# Patient Record
Sex: MALE | Race: BLACK OR AFRICAN AMERICAN | Employment: UNEMPLOYED | URBAN - METROPOLITAN AREA
[De-identification: names, ages, dates, MRNs, and addresses within clinical notes are randomized per-mention and may not be internally consistent; named-entity substitution may affect disease eponyms.]

---

## 2020-12-15 ENCOUNTER — APPOINTMENT (OUTPATIENT)
Dept: GENERAL RADIOLOGY | Age: 60
End: 2020-12-15
Payer: COMMERCIAL

## 2020-12-15 ENCOUNTER — HOSPITAL ENCOUNTER (EMERGENCY)
Age: 60
Discharge: HOME | End: 2020-12-15
Payer: COMMERCIAL

## 2020-12-15 VITALS
SYSTOLIC BLOOD PRESSURE: 151 MMHG | WEIGHT: 228.18 LBS | HEART RATE: 84 BPM | RESPIRATION RATE: 16 BRPM | DIASTOLIC BLOOD PRESSURE: 88 MMHG | TEMPERATURE: 98.8 F | BODY MASS INDEX: 34.58 KG/M2 | HEIGHT: 68 IN | OXYGEN SATURATION: 99 %

## 2020-12-15 PROCEDURE — 73502 X-RAY EXAM HIP UNI 2-3 VIEWS: CPT

## 2020-12-15 PROCEDURE — 6370000000 HC RX 637 (ALT 250 FOR IP): Performed by: PHYSICIAN ASSISTANT

## 2020-12-15 PROCEDURE — 99284 EMERGENCY DEPT VISIT MOD MDM: CPT

## 2020-12-15 PROCEDURE — 99283 EMERGENCY DEPT VISIT LOW MDM: CPT

## 2020-12-15 PROCEDURE — 73610 X-RAY EXAM OF ANKLE: CPT

## 2020-12-15 RX ORDER — HYDROCODONE BITARTRATE AND ACETAMINOPHEN 5; 325 MG/1; MG/1
1 TABLET ORAL EVERY 6 HOURS PRN
Qty: 12 TABLET | Refills: 0 | Status: SHIPPED | OUTPATIENT
Start: 2020-12-15 | End: 2020-12-18

## 2020-12-15 RX ORDER — NAPROXEN 500 MG/1
500 TABLET ORAL 2 TIMES DAILY WITH MEALS
Qty: 30 TABLET | Refills: 0 | Status: SHIPPED | OUTPATIENT
Start: 2020-12-15 | End: 2021-01-25

## 2020-12-15 RX ORDER — HYDROCODONE BITARTRATE AND ACETAMINOPHEN 5; 325 MG/1; MG/1
1 TABLET ORAL ONCE
Status: COMPLETED | OUTPATIENT
Start: 2020-12-15 | End: 2020-12-15

## 2020-12-15 RX ADMIN — HYDROCODONE BITARTRATE AND ACETAMINOPHEN 1 TABLET: 5; 325 TABLET ORAL at 12:45

## 2020-12-15 ASSESSMENT — PAIN DESCRIPTION - LOCATION
LOCATION: HIP
LOCATION_2: ANKLE

## 2020-12-15 ASSESSMENT — PAIN DESCRIPTION - ORIENTATION
ORIENTATION: RIGHT
ORIENTATION_2: LEFT

## 2020-12-15 ASSESSMENT — PAIN DESCRIPTION - DESCRIPTORS
DESCRIPTORS_2: DISCOMFORT
DESCRIPTORS: DISCOMFORT

## 2020-12-15 ASSESSMENT — PAIN SCALES - GENERAL
PAINLEVEL_OUTOF10: 8
PAINLEVEL_OUTOF10: 8
PAINLEVEL_OUTOF10: 1

## 2020-12-15 ASSESSMENT — PAIN - FUNCTIONAL ASSESSMENT: PAIN_FUNCTIONAL_ASSESSMENT: 0-10

## 2020-12-15 ASSESSMENT — PAIN DESCRIPTION - PAIN TYPE
TYPE_2: ACUTE PAIN
TYPE: ACUTE PAIN

## 2020-12-15 ASSESSMENT — ENCOUNTER SYMPTOMS
NAUSEA: 0
BACK PAIN: 0

## 2020-12-15 ASSESSMENT — PAIN DESCRIPTION - INTENSITY: RATING_2: 8

## 2020-12-15 NOTE — ED PROVIDER NOTES
[address] [facility]      Pt Name: Kaley Vasquez  MRN: [id number]  [place] 1960  Date of evaluation: 12/15/2020  Provider: Nestor Katz PA-C    This patient was not seen and evaluated by the attending physician No att. providers found. CHIEF COMPLAINT       Chief Complaint   Patient presents with    Hip Pain     states a alyce had fell while he was working and hit his left ankle and right hip.  Ankle Pain         HISTORYOF PRESENT ILLNESS  (Location/Symptom, Timing/Onset, Context/Setting, Quality, Duration, Modifying Factors, Severity.)   Kaley Vasquez is a 61 y.o. male who presents to the emergency department with right hip pain and left ankle pain. On Friday while at work a pipe rolled into his ankle and caused him to fall. He landed on his right hip. Since then he has had constant left ankle and right hip pain that worsens with weightbearing or movement. He took ibuprofen with minimal relief. Denies numbness or weakness. Nursing Notes were reviewedand I agree. REVIEW OF SYSTEMS    (2-9 systems for level 4, 10 or more forlevel 5)     Review of Systems   Constitutional: Negative for chills and fever. Gastrointestinal: Negative for nausea. Genitourinary: Negative for difficulty urinating. Musculoskeletal: Positive for arthralgias. Negative for back pain and neck pain. Skin: Negative for rash and wound. Neurological: Negative for weakness and numbness. All other systems reviewed and are negative. Except as noted above the remainder ofthe review of systems was reviewed and negative. PAST MEDICALHISTORY   History reviewed. No pertinent past medical history. SURGICAL HISTORY     History reviewed. No pertinent surgical history. CURRENT MEDICATIONS       Previous Medications    No medications on file       ALLERGIES     Patient has no known allergies. FAMILY HISTORY     History reviewed.  No pertinent family history. No family status information on file. SOCIAL HISTORY    reports that he has never smoked. He has never used smokeless tobacco. He reports current alcohol use of about 4.0 standard drinks of alcohol per week. He reports that he does not use drugs. PHYSICAL EXAM    (up to 7 for level 4, 8 or more for level 5)     ED Triage Vitals [12/15/20 1230]   BP Temp Temp Source Pulse Resp SpO2 Height Weight   (!) 151/88 98.8 °F (37.1 °C) Oral 84 16 99 % 5' 8\" (1.727 m) 228 lb 2.8 oz (103.5 kg)       Physical Exam  Vitals signs and nursing note reviewed. Constitutional:       General: He is not in acute distress. Appearance: He is well-developed. HENT:      Head: Normocephalic and atraumatic. Neck:      Musculoskeletal: Neck supple. Cardiovascular:      Pulses: Normal pulses. Pulmonary:      Effort: Pulmonary effort is normal. No respiratory distress. Musculoskeletal: Normal range of motion. Comments: Other than slight tenderness over the lateral malleolus of the left ankle and over the lateral right hip, the patient has a normal musculoskeletal exam without swelling or ecchymosis. Full range of motion of the hip and ankle. Skin:     General: Skin is warm and dry. Neurological:      Mental Status: He is alert and oriented to person, place, and time. Psychiatric:         Behavior: Behavior normal.            DIAGNOSTIC RESULTS     RADIOLOGY:   Non-plain film images such as CT, Ultrasound and MRI are read by the radiologist.Plain radiographic images are visualized and preliminarily interpreted by Carlos A Watts PA-C with the below findings:        Interpretation per the Radiologist below, if available at the time of this note:    XR HIP 2-3 VW W PELVIS RIGHT   Final Result   Negative. No fracture or other acute abnormality. XR ANKLE LEFT (MIN 3 VIEWS)   Final Result   No acute abnormality of the ankle.              LABS:  Labs Reviewed - No data to display    All other labs were within normal range or not returned as of this dictation. EMERGENCY DEPARTMENT COURSE and DIFFERENTIAL DIAGNOSIS/MDM:   Vitals:    Vitals:    12/15/20 1230   BP: (!) 151/88   Pulse: 84   Resp: 16   Temp: 98.8 °F (37.1 °C)   TempSrc: Oral   SpO2: 99%   Weight: 228 lb 2.8 oz (103.5 kg)   Height: 5' 8\" (1.727 m)        I have evaluated this patient. My supervising physician was available for consultation. Patient has a reassuring exam and negative x-rays. Pain medications prescribed. He will follow-up with Workmen's Comp. and/or orthopedics. Discussed results, diagnosis and plan with patient and/or family. Questions addressed. Dispositionand follow-up agreed upon. Specific discharge instructions explained. The patient and/or family and I have discussed the diagnosis and risks, and we agree with discharging home to follow-up with their primary care,specialist or referral doctor. We also discussed returning to the Emergency Department immediately if new or worsening symptoms occur. We have discussed the symptoms which are most concerning that necessitate immediatereturn. PROCEDURES:  None    FINAL IMPRESSION      1. Acute left ankle pain    2. Contusion of right hip, initial encounter          DISPOSITION/PLAN   DISPOSITION Decision To Discharge 12/15/2020 01:26:27 PM      PATIENT REFERRED TO:  *[address] [place] [phone]    Schedule an appointment as soon as possible for a visit       Heddy Sacks, MD  [address] [place] [address]  [phone]    Schedule an appointment as soon as possible for a visit         MEDICATIONS:  New Prescriptions    HYDROCODONE-ACETAMINOPHEN (NORCO) 5-325 MG PER TABLET    Take 1 tablet by mouth every 6 hours as needed for Pain for up to 3 days.  . Take lowest dose possible to manage pain       (Please note that portions of this note were completed with a voice recognition program.  Efforts were made toedit the dictations but occasionally words are mis-transcribed.)    JOHN Corbin PA-C  12/15/20 0732

## 2020-12-15 NOTE — ED TRIAGE NOTES
Patient to ED via private car states a alyce had fell while he was working and hit his left ankle and right hip. Patient did refuse wheelchair to be brought to room. Patient ambulated with steady gait.

## 2021-01-22 ENCOUNTER — OFFICE VISIT (OUTPATIENT)
Dept: ORTHOPEDIC SURGERY | Age: 61
End: 2021-01-22
Payer: COMMERCIAL

## 2021-01-22 VITALS — HEIGHT: 68 IN | WEIGHT: 228 LBS | BODY MASS INDEX: 34.56 KG/M2 | TEMPERATURE: 97.2 F

## 2021-01-22 DIAGNOSIS — M54.31 SCIATICA, RIGHT SIDE: Primary | ICD-10-CM

## 2021-01-22 DIAGNOSIS — S90.02XA CONTUSION OF LEFT ANKLE, INITIAL ENCOUNTER: ICD-10-CM

## 2021-01-22 DIAGNOSIS — M25.572 LEFT ANKLE PAIN, UNSPECIFIED CHRONICITY: ICD-10-CM

## 2021-01-22 DIAGNOSIS — S93.402A SPRAIN OF LEFT ANKLE, UNSPECIFIED LIGAMENT, INITIAL ENCOUNTER: ICD-10-CM

## 2021-01-22 DIAGNOSIS — M25.551 RIGHT HIP PAIN: ICD-10-CM

## 2021-01-22 PROCEDURE — 99203 OFFICE O/P NEW LOW 30 MIN: CPT | Performed by: ORTHOPAEDIC SURGERY

## 2021-01-22 RX ORDER — PREDNISONE 10 MG/1
TABLET ORAL
Qty: 26 TABLET | Refills: 0 | Status: ON HOLD | OUTPATIENT
Start: 2021-01-22 | End: 2021-02-02 | Stop reason: HOSPADM

## 2021-01-24 PROBLEM — M54.31 SCIATICA, RIGHT SIDE: Status: ACTIVE | Noted: 2021-01-24

## 2021-01-24 PROBLEM — S90.02XA CONTUSION OF LEFT ANKLE: Status: ACTIVE | Noted: 2021-01-24

## 2021-01-24 NOTE — PROGRESS NOTES
CHIEF COMPLAINT:   1- Left ankle pain/ Ankle sprain. 2- Right posterior hip pain/  Sciatica. DATE OF INJURY: 12/11/2020, Worker's Comp. HISTORY:  Mr. Jolie Welch 61 y.o.  male presents today for the first visit for evaluation of a left ankle and right hip injury which occurred when he was at work and a pipe hit his left ankle and fell on right hip. He is complaining of lateral ankle pain and posterior right hip pain. He went to [facility] ER 12/15/2020 because of the pain. He was told that he has a sprain, and asked to f/u with Orthopedics. He reports today moderate pain. Pain increase with walking and decrease with rest and elevation. He also here for evaluation of right posterior hip pain that radiate to the leg. He reports that the pain is worse with activity and better with rest.  He reports that the pain is aching and dull in nature. He has a h/o LBP as an old Worker's Comp injury. No numbness or tingling sensation, and no other complaint. Past Medical History:   Diagnosis Date    Diabetes mellitus type 1 ([facility] [address]         History reviewed. No pertinent surgical history. Social History     Socioeconomic History    Marital status:      Spouse name: Not on file    Number of children: Not on file    Years of education: Not on file    Highest education level: Not on file   Occupational History    Not on file   Social Needs    Financial resource strain: Not on file    Food insecurity     Worry: Not on file     Inability: Not on file    Transportation needs     Medical: Not on file     Non-medical: Not on file   Tobacco Use    Smoking status: Never Smoker    Smokeless tobacco: Never Used   Substance and Sexual Activity    Alcohol use:  Yes     Alcohol/week: 4.0 standard drinks     Types: 2 Cans of beer, 2 Shots of liquor per week    Drug use: No    Sexual activity: Not on file   Lifestyle    Physical activity     Days per week: Not on file     Minutes per session: Not on file    Stress: Not on file   Relationships    Social connections     Talks on phone: Not on file     Gets together: Not on file     Attends [employer] service: Not on file     Active member of club or organization: Not on file     Attends meetings of clubs or organizations: Not on file     Relationship status: Not on file    Intimate partner violence     Fear of current or ex partner: Not on file     Emotionally abused: Not on file     Physically abused: Not on file     Forced sexual activity: Not on file   Other Topics Concern    Not on file   Social History Narrative    Not on file        History reviewed. No pertinent family history. Pertinent items are noted in HPI  Review of systems reviewed from Patient History Form dated on 1/22/2021 and available in the patient's chart under the Media tab. No change. PHYSICAL EXAM:  Mr. Bulmaro Mahoney is a very pleasant 61 y.o.  male who presents today in no acute distress, awake, alert, and oriented. He is well dressed, nourished and  groomed. Patient with normal affect. Height is  5' 8\" (1.727 m), weight is 228 lb (103.4 kg). Resting respiratory rate is 16. Examination of the gait, showed that the patient walks with a limp, WB left leg . Examination of both ankles showing a decreased range of motion of the left ankle compare to the other side because of pain. There is minimal swelling that can be seen, no ecchymosis over lateral side of the left ankle. He has intact sensation and good pedal pulses. He has mild tenderness on deep palpation over the left ankle ATF. The ankles are stable to drawer test bilaterally, equally.  Ankle reflex 1+ bilaterally. He is nontender to palpation at the lateral aspect of either hip overlying the greater trochanteric bursa. Motor strength is 5/5 throughout both lower extremities. He has a positive straight leg raise at 80 degree.  The skin overlying the right hip is intact without evidence of scar, lesion, laceration or abrasion. Distal pulses are 2+ and symmetric bilaterally. Sensation is grossly intact to light touch and symmetric over the bilateral lower extremities. IMAGING:  Xray's taken today in the office, were reviewed, 3 views of the left ankle, and showed no acute fracture. Ankle mortise in anatomic position. Xrays taken today in the office were reviewed. There is an AP view of the pelvis, and AP and lateral view of the right hip which demonstrates no significant arthritis. IMPRESSION:    1- Left ankle pain/ Ankle sprain. 2- Right posterior hip pain/  Sciatica. PLAN:  I assured the patient that the xray is negative for acute fracture. The xray findings were reviewed with the patient and explained to his that the pain is 2ry to ankle sprain, and that it should continue to improve the next few weeks. He can be WBAT, and avoid heavy impact acitivity and work on peroneal muscle strength. I have discussed the normal course and history of the sciatica condition with the patient, and various treatment options. We also discussed the use of NSAIDs and tylenol, as well as risks and benefits of PIERRE. The patient would like to try Meds, and we will give Prednisone. F/U with our spine team. F/U in 4 weeks, PT if needed.       Yudi Shaikh MD

## 2021-01-25 ENCOUNTER — APPOINTMENT (OUTPATIENT)
Dept: GENERAL RADIOLOGY | Age: 61
End: 2021-01-25
Payer: MEDICAID

## 2021-01-25 ENCOUNTER — HOSPITAL ENCOUNTER (EMERGENCY)
Age: 61
Discharge: ANOTHER HOSPITAL | End: 2021-01-26
Attending: EMERGENCY MEDICINE
Payer: MEDICAID

## 2021-01-25 ENCOUNTER — APPOINTMENT (OUTPATIENT)
Dept: MRI IMAGING | Age: 61
End: 2021-01-25
Payer: MEDICAID

## 2021-01-25 DIAGNOSIS — M54.16 SPINAL STENOSIS OF LUMBAR REGION WITH RADICULOPATHY: ICD-10-CM

## 2021-01-25 DIAGNOSIS — N30.00 ACUTE CYSTITIS WITHOUT HEMATURIA: ICD-10-CM

## 2021-01-25 DIAGNOSIS — M48.061 SPINAL STENOSIS OF LUMBAR REGION WITH RADICULOPATHY: ICD-10-CM

## 2021-01-25 DIAGNOSIS — M48.02 NEURAL FORAMINAL STENOSIS OF CERVICAL SPINE: Primary | ICD-10-CM

## 2021-01-25 PROBLEM — M54.50: Status: ACTIVE | Noted: 2021-01-25

## 2021-01-25 LAB
A/G RATIO: 1.2 (ref 1.1–2.2)
ALBUMIN SERPL-MCNC: 4 G/DL (ref 3.4–5)
ALP BLD-CCNC: 171 U/L (ref 40–129)
ALT SERPL-CCNC: 22 U/L (ref 10–40)
ANION GAP SERPL CALCULATED.3IONS-SCNC: 11 MMOL/L (ref 3–16)
AST SERPL-CCNC: 29 U/L (ref 15–37)
BACTERIA: ABNORMAL /HPF
BASOPHILS ABSOLUTE: 0 K/UL (ref 0–0.2)
BASOPHILS RELATIVE PERCENT: 0.7 %
BILIRUB SERPL-MCNC: 0.7 MG/DL (ref 0–1)
BILIRUBIN URINE: ABNORMAL
BLOOD, URINE: ABNORMAL
BUN BLDV-MCNC: 9 MG/DL (ref 7–20)
CALCIUM SERPL-MCNC: 9.3 MG/DL (ref 8.3–10.6)
CHLORIDE BLD-SCNC: 99 MMOL/L (ref 99–110)
CLARITY: ABNORMAL
CO2: 24 MMOL/L (ref 21–32)
COLOR: ABNORMAL
COMMENT UA: ABNORMAL
CREAT SERPL-MCNC: 0.8 MG/DL (ref 0.8–1.3)
EKG ATRIAL RATE: 90 BPM
EKG DIAGNOSIS: NORMAL
EKG P AXIS: 70 DEGREES
EKG P-R INTERVAL: 170 MS
EKG Q-T INTERVAL: 346 MS
EKG QRS DURATION: 78 MS
EKG QTC CALCULATION (BAZETT): 423 MS
EKG R AXIS: 34 DEGREES
EKG T AXIS: 25 DEGREES
EKG VENTRICULAR RATE: 90 BPM
EOSINOPHILS ABSOLUTE: 0 K/UL (ref 0–0.6)
EOSINOPHILS RELATIVE PERCENT: 0.6 %
EPITHELIAL CELLS, UA: 0 /HPF (ref 0–5)
GFR AFRICAN AMERICAN: >60
GFR NON-AFRICAN AMERICAN: >60
GLOBULIN: 3.3 G/DL
GLUCOSE BLD-MCNC: 127 MG/DL (ref 70–99)
GLUCOSE URINE: NEGATIVE MG/DL
HCT VFR BLD CALC: 36.4 % (ref 40.5–52.5)
HEMOGLOBIN: 12.4 G/DL (ref 13.5–17.5)
KETONES, URINE: ABNORMAL MG/DL
LEUKOCYTE ESTERASE, URINE: ABNORMAL
LYMPHOCYTES ABSOLUTE: 0.7 K/UL (ref 1–5.1)
LYMPHOCYTES RELATIVE PERCENT: 13.7 %
MCH RBC QN AUTO: 33.3 PG (ref 26–34)
MCHC RBC AUTO-ENTMCNC: 34 G/DL (ref 31–36)
MCV RBC AUTO: 97.9 FL (ref 80–100)
MICROSCOPIC EXAMINATION: YES
MONOCYTES ABSOLUTE: 0.7 K/UL (ref 0–1.3)
MONOCYTES RELATIVE PERCENT: 15 %
MUCUS: ABNORMAL /LPF
NEUTROPHILS ABSOLUTE: 3.5 K/UL (ref 1.7–7.7)
NEUTROPHILS RELATIVE PERCENT: 70 %
NITRITE, URINE: POSITIVE
PDW BLD-RTO: 15 % (ref 12.4–15.4)
PH UA: 5 (ref 5–8)
PLATELET # BLD: 202 K/UL (ref 135–450)
PMV BLD AUTO: 7.7 FL (ref 5–10.5)
POTASSIUM SERPL-SCNC: 3.8 MMOL/L (ref 3.5–5.1)
PROTEIN UA: 30 MG/DL
RBC # BLD: 3.72 M/UL (ref 4.2–5.9)
RBC UA: 2 /HPF (ref 0–4)
SODIUM BLD-SCNC: 134 MMOL/L (ref 136–145)
SPECIFIC GRAVITY UA: 1.02 (ref 1–1.03)
TOTAL PROTEIN: 7.3 G/DL (ref 6.4–8.2)
URINE REFLEX TO CULTURE: YES
URINE TYPE: ABNORMAL
UROBILINOGEN, URINE: 1 E.U./DL
WBC # BLD: 5 K/UL (ref 4–11)
WBC UA: 85 /HPF (ref 0–5)

## 2021-01-25 PROCEDURE — 72141 MRI NECK SPINE W/O DYE: CPT

## 2021-01-25 PROCEDURE — 6360000002 HC RX W HCPCS: Performed by: EMERGENCY MEDICINE

## 2021-01-25 PROCEDURE — 93005 ELECTROCARDIOGRAM TRACING: CPT | Performed by: EMERGENCY MEDICINE

## 2021-01-25 PROCEDURE — 85025 COMPLETE CBC W/AUTO DIFF WBC: CPT

## 2021-01-25 PROCEDURE — 96365 THER/PROPH/DIAG IV INF INIT: CPT

## 2021-01-25 PROCEDURE — 87086 URINE CULTURE/COLONY COUNT: CPT

## 2021-01-25 PROCEDURE — 80053 COMPREHEN METABOLIC PANEL: CPT

## 2021-01-25 PROCEDURE — 36415 COLL VENOUS BLD VENIPUNCTURE: CPT

## 2021-01-25 PROCEDURE — 87186 SC STD MICRODIL/AGAR DIL: CPT

## 2021-01-25 PROCEDURE — 87040 BLOOD CULTURE FOR BACTERIA: CPT

## 2021-01-25 PROCEDURE — 72146 MRI CHEST SPINE W/O DYE: CPT

## 2021-01-25 PROCEDURE — 93010 ELECTROCARDIOGRAM REPORT: CPT | Performed by: INTERNAL MEDICINE

## 2021-01-25 PROCEDURE — 71045 X-RAY EXAM CHEST 1 VIEW: CPT

## 2021-01-25 PROCEDURE — 72148 MRI LUMBAR SPINE W/O DYE: CPT

## 2021-01-25 PROCEDURE — 99284 EMERGENCY DEPT VISIT MOD MDM: CPT

## 2021-01-25 PROCEDURE — 87088 URINE BACTERIA CULTURE: CPT

## 2021-01-25 PROCEDURE — 2580000003 HC RX 258: Performed by: EMERGENCY MEDICINE

## 2021-01-25 PROCEDURE — 81001 URINALYSIS AUTO W/SCOPE: CPT

## 2021-01-25 RX ORDER — CELECOXIB 200 MG/1
200 CAPSULE ORAL DAILY
Status: ON HOLD | COMMUNITY
End: 2021-02-02 | Stop reason: HOSPADM

## 2021-01-25 RX ORDER — AMLODIPINE BESYLATE 5 MG/1
5 TABLET ORAL DAILY
COMMUNITY
End: 2021-09-15 | Stop reason: SDUPTHER

## 2021-01-25 RX ORDER — ASPIRIN 81 MG/1
81 TABLET ORAL DAILY
Status: ON HOLD | COMMUNITY
End: 2021-02-11 | Stop reason: HOSPADM

## 2021-01-25 RX ORDER — OMEPRAZOLE 40 MG/1
40 CAPSULE, DELAYED RELEASE ORAL DAILY PRN
Status: ON HOLD | COMMUNITY
End: 2021-02-11 | Stop reason: HOSPADM

## 2021-01-25 RX ORDER — VALSARTAN 160 MG/1
160 TABLET ORAL DAILY
Status: ON HOLD | COMMUNITY
End: 2021-02-11 | Stop reason: HOSPADM

## 2021-01-25 RX ORDER — METFORMIN HYDROCHLORIDE 500 MG/1
500 TABLET, EXTENDED RELEASE ORAL
COMMUNITY
End: 2021-09-15 | Stop reason: SDUPTHER

## 2021-01-25 RX ADMIN — CEFTRIAXONE 1000 MG: 1 INJECTION, POWDER, FOR SOLUTION INTRAMUSCULAR; INTRAVENOUS at 20:27

## 2021-01-25 ASSESSMENT — PAIN SCALES - GENERAL: PAINLEVEL_OUTOF10: 8

## 2021-01-25 ASSESSMENT — PAIN DESCRIPTION - PAIN TYPE: TYPE: ACUTE PAIN

## 2021-01-25 ASSESSMENT — PAIN DESCRIPTION - DESCRIPTORS: DESCRIPTORS: ACHING;SHARP

## 2021-01-25 ASSESSMENT — PAIN DESCRIPTION - LOCATION: LOCATION: ANKLE;HIP

## 2021-01-25 ASSESSMENT — PAIN DESCRIPTION - PROGRESSION: CLINICAL_PROGRESSION: NOT CHANGED

## 2021-01-25 NOTE — ED PROVIDER NOTES
[facility]      Pt Name: Izzy Bardales  MRN: [id number]  [place] 1960  Date of evaluation: 1/25/2021  Provider: Andrew Jensen, [address] [place] [address]  Chief Complaint   Patient presents with    Extremity Weakness     Pt states he has bilateral leg weakness that started around midnight last night, also states he has bilateral finger tip numbness, pt with history recent sciatica(was seen 1/22/2021 by ortho) Has right hip pain and left ankle pain. I wore personal protective equipment when I was in the room the entire time. This includes gloves, N95 mask, face shield, and a glove over my stethoscope for protection. HPI  Izzy Bardales is a 61 y.o. male who presents with bilateral lower extremity numbness and weakness unable to walk for the past 24 hours. He is also complained of bilateral paresthesias in his fingertips. Denies any fever chills. Denies any history of IV drug abuse. He did fall 1 week ago and hit his head. The only blood thinner he is on his aspirin. He denies any previous history. Denies a history of cancer. He denies any loss of bowel or bladder. He has a history of diabetes and hypertension and hyperlipidemia. Nothing makes it better or worse. He describes it as moderate. He came in by ambulance and was able to ambulate without ataxia. He denies headache or back pain. He has had a history of back pain. He has been diagnosed recently sciatica. He was also diagnosed with a contusion of his ankle where he got injured at work within the past 2 weeks. Patient states he was out walking his legs became so weak that he had to crawl on the ground. He states this caused bruising of his forearms. He denies any loss of bowel or bladder. REVIEW OF SYSTEMS  All systems negative except as noted in the HPI. Reviewed Nurses' notes and concur    No LMP for male patient.     PAST MEDICAL HISTORY  Past Medical History:   Diagnosis Date    Diabetes mellitus type 1 ([facility] [address]     Hyperlipidemia     Hypertension        FAMILY HISTORY  History reviewed. No pertinent family history. SOCIAL HISTORY   reports that he has never smoked. He has never used smokeless tobacco. He reports current alcohol use of about 4.0 standard drinks of alcohol per week. He reports that he does not use drugs. SURGICAL HISTORY  Past Surgical History:   Procedure Laterality Date    GASTRIC BYPASS SURGERY         CURRENT MEDICATIONS  Current Outpatient Rx   Medication Sig Dispense Refill    valsartan (DIOVAN) 160 MG tablet Take 160 mg by mouth daily      amLODIPine (NORVASC) 5 MG tablet Take 5 mg by mouth daily      metFORMIN (GLUCOPHAGE-XR) 500 MG extended release tablet Take 500 mg by mouth daily (with breakfast)      aspirin 81 MG EC tablet Take 81 mg by mouth daily      celecoxib (CELEBREX) 200 MG capsule Take 200 mg by mouth daily      omeprazole (PRILOSEC) 40 MG delayed release capsule Take 40 mg by mouth daily as needed      predniSONE (DELTASONE) 10 MG tablet take 3 tabs PO BID x2 days, then 2 tabs PO BID x2 days, then 1 tab PO BID x2 days, then 1 tab PO daily x2 days 26 tablet 0       ALLERGIES  No Known Allergies    Tetanus vaccination status reviewed: tetanus re-vaccination not indicated. PHYSICAL EXAM  VITAL SIGNS: BP (!) 171/77   Pulse 84   Temp 97.7 °F (36.5 °C) (Oral)   Resp 22   Wt 225 lb 8.5 oz (102.3 kg)   SpO2 95%   BMI 34.29 kg/m²   Constitutional: Well-developed, well-nourished, appears normal, nontoxic, activity: Resting comfortably on the cart, no obvious pain moving all extremities equally. HENT: Normocephalic, Atraumatic, Bilateral external ears normal, TM's were normal, Mucus membranes are moist and oropharynx is patent and clear, No oral exudates, Nose normal.  Eyes: PERRLA, EOMI, Conjunctiva normal, No discharge. No scleral icterus.   Neck: Normal range of motion, No tenderness, Supple. Lymphatic: No lymphadenopathy noted. Cardiovascular: Normal heart rate, Normal rhythm, no murmurs, no gallops, no rubs. Thorax & Lungs: Normal breath sounds, no respiratory distress, no wheezing, no rales, no rhonchi  Abdomen: Soft, Nontender, No hepatosplenomegaly, No masses, No pulsatile masses, No distension, normal bowel sounds  Skin: Warm, Dry, No erythema, No rash. Back: No tenderness, Full range of motion, No scoliosis. Extremities: No edema, No tenderness, No cyanosis, No clubbing. No amputations, capillary refill less than 2 seconds. There is bruising noted on bilateral mid forearms measuring approximately 4 cm in size that he states is from crawling on the ground. Musculoskeletal: Good range of motion in all major joints, No tenderness to palpation, no major deformities noted. Neurologic: Alert & oriented x 3, CN II through XII are intact, normal motor function, normal sensory function, no focal deficits noted, no clonus, no tremors.   Psychiatric: Affect normal, Mood normal.    ?  LABORATORY  Labs Reviewed   CBC WITH AUTO DIFFERENTIAL - Abnormal; Notable for the following components:       Result Value    RBC 3.72 (*)     Hemoglobin 12.4 (*)     Hematocrit 36.4 (*)     Lymphocytes Absolute 0.7 (*)     All other components within normal limits    Narrative:     Performed at:  [address] [address]   Phone [phone]   COMPREHENSIVE METABOLIC PANEL - Abnormal; Notable for the following components:    Sodium 134 (*)     Glucose 127 (*)     Alkaline Phosphatase 171 (*)     All other components within normal limits    Narrative:     Performed at:  [facility]  [address] [facility] [address]   Phone [phone]   URINE RT REFLEX TO CULTURE - Abnormal; Notable for the following components:    Clarity, UA CLOUDY (*)     Bilirubin Urine SMALL (*)     Ketones, Urine TRACE (*) Blood, Urine SMALL (*)     Protein, UA 30 (*)     Nitrite, Urine POSITIVE (*)     Leukocyte Esterase, Urine MODERATE (*)     All other components within normal limits    Narrative:     Performed at:  [address] [address]   Phone [phone]   MICROSCOPIC URINALYSIS - Abnormal; Notable for the following components:    Mucus, UA Rare (*)     Bacteria, UA 4+ (*)     WBC, UA 85 (*)     All other components within normal limits    Narrative:     Performed at:  [facility]  [address] [facility] [address]   Phone [phone]   CULTURE, URINE   CULTURE, BLOOD 1   CULTURE, BLOOD 2         RADIOLOGY/PROCEDURES  I personally reviewed the images for this case. MRI LUMBAR SPINE WO CONTRAST   Final Result   1. Diffuse congenital spinal canal stenosis exacerbated by degenerative   changes as detailed above resulting in severe spinal canal stenosis at L3-4,   L4-5, and L5-S1 and moderate spinal canal stenosis at L2-3.   2. Severe bilateral L5 and mild bilateral L3 and L4 neural foraminal   narrowing. 3. Cholelithiasis. MRI THORACIC SPINE WO CONTRAST   Final Result   Diffuse congenital spinal canal stenosis and epidural lipomatosis exacerbated   by degenerative changes as described above and resulting in moderate spinal   canal stenosis at the T3-4, T4-5, T5-6, and T7-8 levels. MRI CERVICAL SPINE WO CONTRAST   Preliminary Result   Moderate to severe central canal narrowing is identified at multiple levels   due to combination of ossification of posterior longitudinal ligament,   congenital shortening of the pedicles, degenerate disc disease and facet   arthropathy with cord flattening notably C3-C4, C4-5 and less so C5-6. Severe right-sided foraminal narrowing C3-4 and C5-6. Severe left foraminal   narrowing C4-5.   Otherwise other multilevel degenerate changes identified   dictated above in the body of the report. XR CHEST PORTABLE   Final Result   No acute process. COURSE & MEDICAL DECISION MAKING  Pertinent Labs, EKG, & Imaging studies reviewed. (See chart for details)    Vitals:    01/25/21 1315 01/25/21 1445 01/25/21 1800 01/25/21 2015   BP: 139/79 (!) 140/78 (!) 161/84 (!) 171/77   Pulse: 83 84 79 84   Resp: 17 15 17 22   Temp:       TempSrc:       SpO2: 100% 98% 98% 95%   Weight:           Medications   cefTRIAXone (ROCEPHIN) 1000 mg IVPB in 50 mL D5W minibag (1,000 mg Intravenous New Bag 1/25/21 2027)       New Prescriptions    No medications on file       SEP-1 CORE MEASURE DATA  Exclusion criteria: the patient is NOT to be included for sepsis due to: Infection is not suspected    Patient remained stable in the ED. MRIs of his cervical spine revealed stenosis with spinal cord flattening at the cervical canal and neuroforaminal narrowing at C4-5 on the left and C5-6 on the right. The lumbar spine is pending at this time. At 2000 of the lumbar spine was returned and revealed severe stenosis of the lumbar spine with foraminal narrowing. I spoke to Dr. Shannan Timmons who stated that patient could go home or be admitted. However, the patient states that he cannot ambulate and I do not feel he be safe at home. He cannot perform his normal activities of daily living. Therefore, I will transfer the patient over to [facility]..  I spoke to the hospitalist, Dr. Adelso Alvarez, and he agreed to accept the patient. Patient's blood pressure was found to be elevated according to CMS/Medicare and the Affordable Care Act/ObamaCare criteria. Elevated blood pressure could occur because of pain or anxiety or other reasons and does not mean that they need to have their blood pressure treated or medications otherwise adjusted. However, this could also be a sign that they will need to have their blood pressure treated or medications changed.     The patient was instructed to follow up closely with their personal physician to have their blood pressure rechecked. The patient was instructed to take a list of recent blood pressure readings to their next visit with their personal physician. See discharge instructions for specific medications, discharge information, and treatments. They were verbally instructed to return to emergency if any problems. (This chart has been completed using [address]. Although attempts have been made to ensure accuracy, words and/or phrases may not be transcribed as intended.)    Patient refused pain medicines at the time of their exam.    IMPRESSION(S):  1. Neural foraminal stenosis of cervical spine    2. Acute cystitis without hematuria    3. Spinal stenosis of lumbar region with radiculopathy        ?   Recheck Times: 1600, 1730, 2000, 2015  Critical Care Time: 35 minutes    Diagnostic considerations include but are not limited to:  thrombotic stroke, embolic stroke, hemorrhagic stroke, TIA,  hypoglycemia, mass lesions, metabolic cause, head injury, encephalopathy, multiple sclerosis, seizure, hypoxia, Bell's palsy, Ayden's paralysis, infection/abscesses-intracranially or other spinal cord, other         Melinda Esquivel,   01/25/21 2002       Melinda Esquivel, DO  01/25/21 2046       Melinda Esquivel, DO  01/25/21 2055       Melinda Esquivel, DO  01/25/21 6372

## 2021-01-26 ENCOUNTER — APPOINTMENT (OUTPATIENT)
Dept: CT IMAGING | Age: 61
DRG: 023 | End: 2021-01-26
Attending: INTERNAL MEDICINE
Payer: MEDICAID

## 2021-01-26 ENCOUNTER — HOSPITAL ENCOUNTER (INPATIENT)
Age: 61
LOS: 8 days | Discharge: NURSING, REHAB OR LONG-TERM CARE | DRG: 023 | End: 2021-02-03
Attending: INTERNAL MEDICINE | Admitting: INTERNAL MEDICINE
Payer: MEDICAID

## 2021-01-26 VITALS
RESPIRATION RATE: 17 BRPM | SYSTOLIC BLOOD PRESSURE: 152 MMHG | OXYGEN SATURATION: 91 % | HEART RATE: 91 BPM | BODY MASS INDEX: 34.29 KG/M2 | WEIGHT: 225.53 LBS | DIASTOLIC BLOOD PRESSURE: 86 MMHG | TEMPERATURE: 97.7 F

## 2021-01-26 DIAGNOSIS — M54.50: ICD-10-CM

## 2021-01-26 DIAGNOSIS — S90.02XS CONTUSION OF LEFT ANKLE, SEQUELA: ICD-10-CM

## 2021-01-26 DIAGNOSIS — M48.02 CERVICAL SPINAL STENOSIS: Primary | ICD-10-CM

## 2021-01-26 LAB
A/G RATIO: 1.1 (ref 1.1–2.2)
ALBUMIN SERPL-MCNC: 4 G/DL (ref 3.4–5)
ALP BLD-CCNC: 158 U/L (ref 40–129)
ALT SERPL-CCNC: 19 U/L (ref 10–40)
ANION GAP SERPL CALCULATED.3IONS-SCNC: 14 MMOL/L (ref 3–16)
AST SERPL-CCNC: 27 U/L (ref 15–37)
BASOPHILS ABSOLUTE: 0 K/UL (ref 0–0.2)
BASOPHILS RELATIVE PERCENT: 0.4 %
BILIRUB SERPL-MCNC: 0.7 MG/DL (ref 0–1)
BUN BLDV-MCNC: 10 MG/DL (ref 7–20)
CALCIUM SERPL-MCNC: 9.6 MG/DL (ref 8.3–10.6)
CHLORIDE BLD-SCNC: 102 MMOL/L (ref 99–110)
CO2: 25 MMOL/L (ref 21–32)
CREAT SERPL-MCNC: 0.9 MG/DL (ref 0.8–1.3)
EOSINOPHILS ABSOLUTE: 0.1 K/UL (ref 0–0.6)
EOSINOPHILS RELATIVE PERCENT: 1.6 %
GFR AFRICAN AMERICAN: >60
GFR NON-AFRICAN AMERICAN: >60
GLOBULIN: 3.5 G/DL
GLUCOSE BLD-MCNC: 100 MG/DL (ref 70–99)
GLUCOSE BLD-MCNC: 104 MG/DL (ref 70–99)
GLUCOSE BLD-MCNC: 117 MG/DL (ref 70–99)
GLUCOSE BLD-MCNC: 125 MG/DL (ref 70–99)
GLUCOSE BLD-MCNC: 130 MG/DL (ref 70–99)
HCT VFR BLD CALC: 38.4 % (ref 40.5–52.5)
HEMOGLOBIN: 12.7 G/DL (ref 13.5–17.5)
INR BLD: 1.1 (ref 0.86–1.14)
LYMPHOCYTES ABSOLUTE: 0.8 K/UL (ref 1–5.1)
LYMPHOCYTES RELATIVE PERCENT: 22.3 %
MAGNESIUM: 1.9 MG/DL (ref 1.8–2.4)
MCH RBC QN AUTO: 33 PG (ref 26–34)
MCHC RBC AUTO-ENTMCNC: 33.1 G/DL (ref 31–36)
MCV RBC AUTO: 99.8 FL (ref 80–100)
MONOCYTES ABSOLUTE: 0.7 K/UL (ref 0–1.3)
MONOCYTES RELATIVE PERCENT: 19.3 %
NEUTROPHILS ABSOLUTE: 1.9 K/UL (ref 1.7–7.7)
NEUTROPHILS RELATIVE PERCENT: 56.4 %
PDW BLD-RTO: 15 % (ref 12.4–15.4)
PERFORMED ON: ABNORMAL
PLATELET # BLD: 176 K/UL (ref 135–450)
PMV BLD AUTO: 7.7 FL (ref 5–10.5)
POTASSIUM REFLEX MAGNESIUM: 3.4 MMOL/L (ref 3.5–5.1)
PROTHROMBIN TIME: 12.8 SEC (ref 10–13.2)
RBC # BLD: 3.85 M/UL (ref 4.2–5.9)
SODIUM BLD-SCNC: 141 MMOL/L (ref 136–145)
TOTAL PROTEIN: 7.5 G/DL (ref 6.4–8.2)
WBC # BLD: 3.4 K/UL (ref 4–11)

## 2021-01-26 PROCEDURE — 83036 HEMOGLOBIN GLYCOSYLATED A1C: CPT

## 2021-01-26 PROCEDURE — 6370000000 HC RX 637 (ALT 250 FOR IP): Performed by: INTERNAL MEDICINE

## 2021-01-26 PROCEDURE — 36415 COLL VENOUS BLD VENIPUNCTURE: CPT

## 2021-01-26 PROCEDURE — 2580000003 HC RX 258: Performed by: INTERNAL MEDICINE

## 2021-01-26 PROCEDURE — 85025 COMPLETE CBC W/AUTO DIFF WBC: CPT

## 2021-01-26 PROCEDURE — 1200000000 HC SEMI PRIVATE

## 2021-01-26 PROCEDURE — 72125 CT NECK SPINE W/O DYE: CPT

## 2021-01-26 PROCEDURE — 80053 COMPREHEN METABOLIC PANEL: CPT

## 2021-01-26 PROCEDURE — 83735 ASSAY OF MAGNESIUM: CPT

## 2021-01-26 PROCEDURE — 6360000002 HC RX W HCPCS: Performed by: INTERNAL MEDICINE

## 2021-01-26 PROCEDURE — 85610 PROTHROMBIN TIME: CPT

## 2021-01-26 RX ORDER — INSULIN LISPRO 100 [IU]/ML
0-6 INJECTION, SOLUTION INTRAVENOUS; SUBCUTANEOUS
Status: DISCONTINUED | OUTPATIENT
Start: 2021-01-27 | End: 2021-01-28

## 2021-01-26 RX ORDER — OXYCODONE HYDROCHLORIDE 5 MG/1
5 TABLET ORAL EVERY 4 HOURS PRN
Status: COMPLETED | OUTPATIENT
Start: 2021-01-26 | End: 2021-01-27

## 2021-01-26 RX ORDER — DEXTROSE MONOHYDRATE 25 G/50ML
12.5 INJECTION, SOLUTION INTRAVENOUS PRN
Status: DISCONTINUED | OUTPATIENT
Start: 2021-01-26 | End: 2021-01-28

## 2021-01-26 RX ORDER — INSULIN LISPRO 100 [IU]/ML
0-6 INJECTION, SOLUTION INTRAVENOUS; SUBCUTANEOUS EVERY 4 HOURS
Status: DISCONTINUED | OUTPATIENT
Start: 2021-01-26 | End: 2021-01-26

## 2021-01-26 RX ORDER — SODIUM CHLORIDE 0.9 % (FLUSH) 0.9 %
10 SYRINGE (ML) INJECTION PRN
Status: DISCONTINUED | OUTPATIENT
Start: 2021-01-26 | End: 2021-01-28

## 2021-01-26 RX ORDER — DEXTROSE MONOHYDRATE 50 MG/ML
100 INJECTION, SOLUTION INTRAVENOUS PRN
Status: DISCONTINUED | OUTPATIENT
Start: 2021-01-26 | End: 2021-01-28

## 2021-01-26 RX ORDER — SODIUM CHLORIDE 0.9 % (FLUSH) 0.9 %
10 SYRINGE (ML) INJECTION EVERY 12 HOURS SCHEDULED
Status: DISCONTINUED | OUTPATIENT
Start: 2021-01-26 | End: 2021-01-28

## 2021-01-26 RX ORDER — MAGNESIUM SULFATE IN WATER 40 MG/ML
2000 INJECTION, SOLUTION INTRAVENOUS PRN
Status: DISCONTINUED | OUTPATIENT
Start: 2021-01-26 | End: 2021-01-28

## 2021-01-26 RX ORDER — ASPIRIN 81 MG/1
81 TABLET ORAL DAILY
Status: DISCONTINUED | OUTPATIENT
Start: 2021-01-26 | End: 2021-01-26

## 2021-01-26 RX ORDER — ACETAMINOPHEN 325 MG/1
650 TABLET ORAL EVERY 6 HOURS PRN
Status: DISCONTINUED | OUTPATIENT
Start: 2021-01-26 | End: 2021-01-28

## 2021-01-26 RX ORDER — POTASSIUM CHLORIDE 7.45 MG/ML
10 INJECTION INTRAVENOUS PRN
Status: DISCONTINUED | OUTPATIENT
Start: 2021-01-26 | End: 2021-01-28

## 2021-01-26 RX ORDER — OMEPRAZOLE 20 MG/1
40 CAPSULE, DELAYED RELEASE ORAL EVERY MORNING
Status: DISCONTINUED | OUTPATIENT
Start: 2021-01-26 | End: 2021-02-03 | Stop reason: HOSPADM

## 2021-01-26 RX ORDER — AMLODIPINE BESYLATE 5 MG/1
5 TABLET ORAL DAILY
Status: DISCONTINUED | OUTPATIENT
Start: 2021-01-26 | End: 2021-02-03 | Stop reason: HOSPADM

## 2021-01-26 RX ORDER — ONDANSETRON 2 MG/ML
4 INJECTION INTRAMUSCULAR; INTRAVENOUS EVERY 6 HOURS PRN
Status: DISCONTINUED | OUTPATIENT
Start: 2021-01-26 | End: 2021-01-28

## 2021-01-26 RX ORDER — PROMETHAZINE HYDROCHLORIDE 12.5 MG/1
12.5 TABLET ORAL EVERY 6 HOURS PRN
Status: DISCONTINUED | OUTPATIENT
Start: 2021-01-26 | End: 2021-01-28

## 2021-01-26 RX ORDER — POTASSIUM CHLORIDE 20 MEQ/1
40 TABLET, EXTENDED RELEASE ORAL ONCE
Status: COMPLETED | OUTPATIENT
Start: 2021-01-26 | End: 2021-01-26

## 2021-01-26 RX ORDER — NICOTINE POLACRILEX 4 MG
15 LOZENGE BUCCAL PRN
Status: DISCONTINUED | OUTPATIENT
Start: 2021-01-26 | End: 2021-01-28

## 2021-01-26 RX ORDER — SODIUM CHLORIDE 9 MG/ML
1000 INJECTION, SOLUTION INTRAVENOUS ONCE
Status: COMPLETED | OUTPATIENT
Start: 2021-01-26 | End: 2021-01-26

## 2021-01-26 RX ORDER — ACETAMINOPHEN 650 MG/1
650 SUPPOSITORY RECTAL EVERY 6 HOURS PRN
Status: DISCONTINUED | OUTPATIENT
Start: 2021-01-26 | End: 2021-01-28

## 2021-01-26 RX ORDER — FAMOTIDINE 20 MG/1
20 TABLET, FILM COATED ORAL DAILY PRN
Status: DISCONTINUED | OUTPATIENT
Start: 2021-01-26 | End: 2021-01-28

## 2021-01-26 RX ADMIN — OXYCODONE 5 MG: 5 TABLET ORAL at 06:50

## 2021-01-26 RX ADMIN — CEFTRIAXONE 1000 MG: 1 INJECTION, POWDER, FOR SOLUTION INTRAMUSCULAR; INTRAVENOUS at 13:18

## 2021-01-26 RX ADMIN — Medication 10 ML: at 20:54

## 2021-01-26 RX ADMIN — AMLODIPINE BESYLATE 5 MG: 5 TABLET ORAL at 10:03

## 2021-01-26 RX ADMIN — POTASSIUM CHLORIDE 40 MEQ: 1500 TABLET, EXTENDED RELEASE ORAL at 13:18

## 2021-01-26 RX ADMIN — SODIUM CHLORIDE 1000 ML: 9 INJECTION, SOLUTION INTRAVENOUS at 06:24

## 2021-01-26 RX ADMIN — OXYCODONE 5 MG: 5 TABLET ORAL at 19:04

## 2021-01-26 RX ADMIN — OMEPRAZOLE 40 MG: 20 CAPSULE, DELAYED RELEASE ORAL at 10:03

## 2021-01-26 ASSESSMENT — PAIN SCALES - GENERAL
PAINLEVEL_OUTOF10: 0
PAINLEVEL_OUTOF10: 0
PAINLEVEL_OUTOF10: 7

## 2021-01-26 ASSESSMENT — PAIN DESCRIPTION - ORIENTATION: ORIENTATION: RIGHT

## 2021-01-26 ASSESSMENT — PAIN DESCRIPTION - LOCATION: LOCATION: HIP

## 2021-01-26 ASSESSMENT — PAIN DESCRIPTION - PAIN TYPE: TYPE: ACUTE PAIN;CHRONIC PAIN

## 2021-01-26 NOTE — ED NOTES
Gave report to Veto Mallory RN at [facility] All questions answered at this time.       Melva Bloch, RN  01/25/21 0648

## 2021-01-26 NOTE — PROGRESS NOTES
Patient admitted to unit. VSS, alert and oriented x4. Neuro checks WNL with exception to leg weakness and fingertip numbness. Pain a 3/10 to his back right now. Fall precautions in place.

## 2021-01-26 NOTE — PROGRESS NOTES
4 Eyes Admission Assessment     I agree as the admission nurse that 2 RN's have performed a thorough Head to Toe Skin Assessment on the patient. ALL assessment sites listed below have been assessed on admission. Areas assessed by both nurses:   [x]   Head, Face, and Ears   [x]   Shoulders, Back, and Chest  [x]   Arms, Elbows, and Hands   [x]   Coccyx, Sacrum, and Ischium  [x]   Legs, Feet, and Heels        Does the Patient have Skin Breakdown?   Bilateral scrapes to both elbows as well as knees  Randolph Prevention initiated:  NA   Wound Care Orders initiated:  NA      WOC nurse consulted for Pressure Injury (Stage 3,4, Unstageable, DTI, NWPT, and Complex wounds) or Randolph score 18 or lower:  NA      Nurse 1 eSignature: Electronically signed by Laxmi Lovelace RN on 1/26/21 at 4:12 AM EST    **SHARE this note so that the co-signing nurse is able to place an eSignature**    Nurse 2 eSignature: Electronically signed by Latonya Dougherty RN on 1/26/21 at 6:57 AM EST

## 2021-01-26 NOTE — CONSULTS
NEUROSURGERY CONSULT  Keyla Win  [id number]   1960 1/26/2021    Requesting physician: Jyotsna Man DO    Reason for consultation: LE weakness, lumbar stenosis     History of present illness: Patient is a 61 y.o. male w/ PMH of  HTN, HLD, DM, who presented on 1/25/2021 with complaints of lower extremity weakness/numbness, and numbness in fingertips that started approx 24 hours prior to arrival. Patient states he had a fall at work on 12/11/2020 where he rolled his ankle and was thrown to the ground when he was hit by a rolling pipe. He began having numbness in his fingertips after the fall that is still present, reports that he does not appreciate arm/hand weakness and has not been dropping things. He has midline posterior neck pain that he rates a 6/10 currently. Last night patient began noticing weakness and numbness in his legs, he reports the numbness has since resolved and he was able to walk when he got to the emergency department. Patient also has intermittent pain down the outside of his right leg that he states is not new, he was diagnosed with \"sciatica\" at some point in the past. He also has pain in his left ankle which he sprained during the same fall, he saw an orthopaedic surgeon for this and was negative for fractures. Patient denies any falls since 12/11, he denies loss bowel/bladder function, denies saddle paresthesias. ROS:   GENERAL:  Denies fever or recent illness.  Denies weight changes   EYES:  Denies vision change or diplopia  EARS:  Denies hearing loss  CARDIAC:  Denies chest pain  RESPIRATORY:  Denies shortness of breath  SKIN:  Denies rash or lesions   HEM:  Denies excessive bruising  PSYCH:  Denies anxiety or depression  NEURO:  Denies headache, + numbness/tingling in bilateral fingertips   :  Denies urinary difficulty  GI: Denies nausea, vomiting, diarrhea or constipation  MUSCULOSKELETAL:  + neck pain, + back pain, + left ankle pain    No Known Allergies    Past Medical History:   Diagnosis Date    Diabetes mellitus type 1 ([address]     Hyperlipidemia     Hypertension         Past Surgical History:   Procedure Laterality Date    GASTRIC BYPASS SURGERY         Social History     Occupational History    Not on file   Tobacco Use    Smoking status: Never Smoker    Smokeless tobacco: Never Used   Substance and Sexual Activity    Alcohol use: Yes     Alcohol/week: 4.0 standard drinks     Types: 2 Cans of beer, 2 Shots of liquor per week    Drug use: No    Sexual activity: Not on file        No family history on file.      Outpatient Medications Marked as Taking for the 1/26/21 encounter [facility] Encounter)   Medication Sig Dispense Refill    valsartan (DIOVAN) 160 MG tablet Take 160 mg by mouth daily      amLODIPine (NORVASC) 5 MG tablet Take 5 mg by mouth daily      metFORMIN (GLUCOPHAGE-XR) 500 MG extended release tablet Take 500 mg by mouth daily (with breakfast)      aspirin 81 MG EC tablet Take 81 mg by mouth daily      celecoxib (CELEBREX) 200 MG capsule Take 200 mg by mouth daily      omeprazole (PRILOSEC) 40 MG delayed release capsule Take 40 mg by mouth daily as needed      predniSONE (DELTASONE) 10 MG tablet take 3 tabs PO BID x2 days, then 2 tabs PO BID x2 days, then 1 tab PO BID x2 days, then 1 tab PO daily x2 days 26 tablet 0      Current Facility-Administered Medications   Medication Dose Route Frequency Provider Last Rate Last Admin    sodium chloride flush 0.9 % injection 10 mL  10 mL Intravenous 2 times per day Lisabeth Ort A [place], DO        sodium chloride flush 0.9 % injection 10 mL  10 mL Intravenous PRN Lisabeth Ort A [place], DO        potassium chloride 10 mEq/100 mL IVPB (Peripheral Line)  10 mEq Intravenous PRN Marva Abel [place], DO        magnesium sulfate 2000 mg in 50 mL IVPB premix  2,000 mg Intravenous PRN Lisabeth Ort A [place], DO        promethazine (PHENERGAN) tablet 12.5 mg  12.5 mg Oral Q6H PRN Ahmad A [place], DO        Or    ondansetron (ZOFRAN) injection 4 mg  4 mg Intravenous Q6H PRN Jenene Duty A [place], DO        famotidine (PEPCID) tablet 20 mg  20 mg Oral Daily PRN John Copa, DO        acetaminophen (TYLENOL) tablet 650 mg  650 mg Oral Q6H [facility]N [facility], [facility]        Or    acetaminophen (TYLENOL) suppository 650 mg  650 mg Rectal Q6H PRN John Copa, DO        aspirin EC tablet 81 mg  81 mg Oral Daily [facility]        amLODIPine (NORVASC) tablet 5 mg  5 mg Oral Daily [facility], [facility]        omeprazole (PRILOSEC) delayed release capsule 40 mg  40 mg Oral QAM [facility], [facility]        insulin lispro (1 Unit Dial) 0-6 Units  0-6 Units Subcutaneous Q4H [facility] [place], DO   Stopped at 01/26/21 0625    glucose (GLUTOSE) 40 % oral gel 15 g  15 g Oral PRN [facility], [facility]        dextrose 50 % IV solution  12.5 g Intravenous PRN Jenene Duty A [place], DO        glucagon (rDNA) injection 1 mg  1 mg Intramuscular PRN [facility], DO        dextrose 5 % solution  100 mL/hr Intravenous PRN Jenene Duty A [place], DO        oxyCODONE (ROXICODONE) immediate release tablet 5 mg  5 mg Oral Q4H PRN [facility], DO   5 mg at 01/26/21 0650      Objective:  BP (!) 152/90   Pulse 68   Temp 98.5 °F (36.9 °C) (Oral)   Resp 16   Ht 5' 8\" (1.727 m)   Wt 228 lb (103.4 kg)   SpO2 98%   BMI 34.67 kg/m²     Physical Exam:  Patient seen and examined   General: Well developed. Alert and cooperative in no acute distress. HENT: atraumatic, neck supple, missing teeth   Eyes: Optic discs: Not tested  Pulmonary: unlabored respiratory effort  Cardiovascular:  Warm well perfused.  No peripheral edema  Gastrointestinal: abdomen soft, NT, ND    Neurologic Exam:  Neurological:  Mental Status: Awake, alert, oriented x 4  Attention: Intact  Sensation: n/t to bilateral fingertips, otherwise sensation intact to light touch     DTRs:    Right  Left    ankle clonus  - -   toes (babinski)  down Down     Hoffmans negative bilaterally     Musculoskeletal:   Gait: Not tested   Assist devices: None   Tone: normal   Motor strength:    Right  Left    Right  Left    Deltoid  5 5  Hip Flex  5 5   Biceps  5 5  Knee Extensors  5 5   Triceps  5 5  Knee Flexors  5 5   Wrist Ext  5 5  Ankle Dorsiflex. 5 5   Wrist Flex  5 5  Ankle Plantarflex. 5 5   Handgrip  4+ 4+  Ext Manohar Longus  5 5   Thumb Ext  4+ 4+         Radiological Findings:  MRI cervical spine  1/25/2021 1824  Moderate to severe central canal narrowing is identified at multiple levels due to combination of thickening of the posterior longitudinal ligament,, congenital shortening of the pedicles, degenerate disc disease and facet arthropathy with cord flattening notably C3-C4, C4-5 and less so C5-6. Severe right-sided foraminal narrowing C3-4 and C5-6. Severe left foraminal narrowing C4-5. Otherwise other multilevel degenerate changes identified above in the body of the report. MRI thoracic spine  1/25/2021 1856  Diffuse congenital spinal canal stenosis and epidural lipomatosis exacerbated by degenerative changes as described above and resulting in moderate spinal canal stenosis at the T3-4, T4-5, T5-6, and T7-8 levels. MRI lumbar spine   1/25/2021 1919  1. Diffuse congenital spinal canal stenosis exacerbated by degenerative changes as detailed above resulting in severe spinal canal stenosis at L3-4, L4-5, and L5-S1 and moderate spinal canal stenosis at L2-3. 2. Severe bilateral L5 and mild bilateral L3 and L4 neural foraminal narrowing. 3. Cholelithiasis.      Labs  Recent Labs     01/25/21  1242 01/26/21  0545   * 141   CL 99 102   CO2 24 25   BUN 9 10   CREATININE 0.8 0.9   GLUCOSE 127* 104*     Recent Labs     01/25/21  1242 01/26/21  0545   WBC 5.0 3.4*   RBC 3.72* 3.85*   INR  --  1.10         Patient Active Problem List    Diagnosis Date Noted    Acute low back pain with possible spinal stenosis of less than six weeks' duration 01/25/2021    Contusion of left ankle 01/24/2021    Sciatica, right side 01/24/2021       Assessment:  60 yo male admitted with back pain, lower extremity weakness and hand paresthesias. Cervical spine with evidence of severe central canal narrowing with with cord flattening notably C3-C4, C4-5 and less so C5-6 as well as foraminal narrowing. MRI lumbar spine with moderate to severe spinal canal stenosis at L3-4, L4-5, and L5-S1 and neural foraminal narrowing. Plan:  1. Tentatively plan for C3-C6 posterior decompression/fusion/fixation with Dr. Beverly Morales on Thursday afternoon   2. q 4 hour neuro checks   3. PT/OT, activity as tolerated   4. Diet/pain control per primary team  5. Hold ASA for upcoming surgery   6. Medical clearance for OR   7. CT cervical spine wo contrast today  8. Will continue to follow, thank you for consult. Please call with any questions. DISPO- inpatient, tentatively plan for OR Thursday   WAGNER Green-CNP  Neurosurgery Nurse Practitioner  [phone]    Patient was seen and examined with Dr. Grzegorz Heredia  who agrees with above assessment and plan. Electronically signed by:  Delmi Hightower, 1/26/2021 7:37 AM

## 2021-01-26 NOTE — PLAN OF CARE
Problem: Falls - Risk of:  Goal: Will remain free from falls  Outcome: Ongoing  Patient has remained alert and oriented. Able to follow commands and verbalize his needs. Continues to have bilateral lower extremity weakness. Ambulates with walker and stand by assistance of one person. In bed with call light in reach and bed alarm activated. Will continue to keep room free of clutter and monitor for safety. Problem: Pain:  Description: Pain management should include both nonpharmacologic and pharmacologic interventions. Goal: Pain level will decrease  Outcome: Ongoing  No complaints of pain this shift. Instructed to notify of change in level of comfort. Will continue to monitor comfort level and treat as needed. Problem: Falls - Risk of:  Goal: Absence of physical injury  Outcome: Ongoing  Patient has remained free of injury. Will monitor.

## 2021-01-26 NOTE — H&P
Internal Medicine  MS4  History & Physical      CC:  Bilateral lower extremity weakness and numbness    HistoryObtained From:  Patient, Epic    HISTORY OF PRESENT ILLNESS:  Latoya Veras is a 61 y.o. male with PMH of Diabetes mellitus, hypertension, and hyperlipidemia who p/w acute onset lower extremity weakness and numbness with difficulty walking. Mr. Brooklyn Burton had an incident at work on 12/11/20 where a pipe fell and hit his left ankle, which caused him to fall on his right hip. He was seen at [facility] ED on 12/15 and was given medication and told to follow up with ortho and to start going to physical therapy. He was diagnosed with R sided sciatica and left ankle sprain. Pt states that he was watching football on Sunday (1/24) and that after returning home from the store he suddenly felt his legs go numb, stating \"they just stopped working\". Pt says that at the same time he felt some pain in his lower back as well as numbness and tingling in his fingers. Pt was unable to get up to go to the bathroom, resulting in an episode of urinary incontinence. EMS was then called. ED course: Pt was taken to [facility] ED for evaluation by EMS. He was able to ambulate in the ED without issue. CXR indicated no acute cardiopulmonary pathology. MRI of the spine showed moderate-severe central canal stenosis and degenerative changes of the cervical, thoracic, and lumbar spine. Pt was found to have a UTI on urinalysis. Due to the pt's LE weakness, inability to complete ADLs, and radiological findings, he was admitted to [facility]. for further evaluation and management. Today the patient continues to complain of lower extremity weakness and \"feels shaky\" when walking with assistance to the bathroom. He continues to endorse numbness and tingling in his fingertips. Pt denies F/C/N/V, HA, fatigue, CP, dyspnea, abdominal pain, constipation/diarrhea, and urinary symptoms.      Past Medical History: Diagnosis Date    Diabetes mellitus type 1 ([facility] [address]     Hyperlipidemia     Hypertension        Past Surgical History:        Procedure Laterality Date    GASTRIC BYPASS SURGERY         Medications Prior to Admission:    · Medications Prior to Admission: valsartan (DIOVAN) 160 MG tablet, Take 160 mg by mouth daily  · amLODIPine (NORVASC) 5 MG tablet, Take 5 mg by mouth daily  · metFORMIN (GLUCOPHAGE-XR) 500 MG extended release tablet, Take 500 mg by mouth daily (with breakfast)  · aspirin 81 MG EC tablet, Take 81 mg by mouth daily  · celecoxib (CELEBREX) 200 MG capsule, Take 200 mg by mouth daily  · omeprazole (PRILOSEC) 40 MG delayed release capsule, Take 40 mg by mouth daily as needed  · predniSONE (DELTASONE) 10 MG tablet, take 3 tabs PO BID x2 days, then 2 tabs PO BID x2 days, then 1 tab PO BID x2 days, then 1 tab PO daily x2 days  ·   Allergies:  Patient has no known allergies. Social History:   · TOBACCO: reports that he has never smoked. He has never used smokeless tobacco.  · ETOH:   reports current alcohol use of about 4.0 standard drinks of alcohol per week. · DRUGS : Denies  · Patient currently lives at home    Family History:   · No family history on file. Review of Systems: A 10 point review of systems was conducted, significant findings as notedin HPI. Physical Exam  Constitutional:       Appearance: Normal appearance. HENT:      Head: Normocephalic and atraumatic. Cardiovascular:      Rate and Rhythm: Normal rate and regular rhythm. Pulses: Normal pulses. Heart sounds: Normal heart sounds. No friction rub. No gallop. Comments: IV in left arm  Pulmonary:      Effort: Pulmonary effort is normal.      Breath sounds: Normal breath sounds. Abdominal:      General: Bowel sounds are normal.      Palpations: Abdomen is soft. Musculoskeletal:      Right lower leg: No edema. Left lower leg: No edema. Skin:     General: Skin is warm and dry.    Neurological: General: No focal deficit present. Mental Status: He is alert and oriented to person, place, and time. Comments: Straight leg raise test negative bilaterally. Numbness/tingling in finger tips bilaterally. Sensation to light touch preserved in LE bilaterally. Vitals:    01/26/21 0945   BP: 138/84   Pulse: 69   Resp: 18   Temp:    SpO2:        DATA:    Labs:  BMP:   Recent Labs     01/25/21  1242 01/26/21  0545   * 141   K 3.8 3.4*   CL 99 102   CO2 24 25   BUN 9 10   CREATININE 0.8 0.9   GLUCOSE 127* 104*     CBC:   Recent Labs     01/25/21  1242 01/26/21  0545   WBC 5.0 3.4*   HGB 12.4* 12.7*   HCT 36.4* 38.4*    176       LFT's:   Recent Labs     01/25/21  1242 01/26/21  0545   AST 29 27   ALT 22 19   BILITOT 0.7 0.7   ALKPHOS 171* 158*     Troponin: No results for input(s): TROPONINI in the last 72 hours. BNP: No results for input(s): BNP in the last 72 hours. ABGs: No results for input(s): PHART, UGZ8LRM, PO2ART in the last 72 hours. INR:   Recent Labs     01/26/21  0545   INR 1.10       U/A:  Recent Labs     01/25/21  1242 01/25/21  1352   COLORU DK YELLOW  --    PHUR 5.0  --    WBCUA  --  85*   RBCUA  --  2   MUCUS  --  Rare*   BACTERIA  --  4+*   CLARITYU CLOUDY*  --    SPECGRAV 1.021  --    LEUKOCYTESUR MODERATE*  --    UROBILINOGEN 1.0  --    BILIRUBINUR SMALL*  --    BLOODU SMALL*  --    GLUCOSEU Negative  --        CT CERVICAL SPINE WO CONTRAST    (Results Pending)       ASSESSMENT AND PLAN:  William Hunt is a 61 y.o. male with PMHx DM, HTN, HLD presenting with bilateral lower extremity weakness/numbness and low back pain. # Bilateral Lower extremity weakness and numbness  Diffuse congenital spinal canal stenosis exacerbated by degenerative  changes as detailed above resulting in severe spinal canal stenosis at L3-4,  L4-5, and L5-S1 and moderate spinal canal stenosis at L2-3. Severe bilateral L5 and mild bilateral L3 and L4 neural foraminal narrowing.   Pt is now

## 2021-01-26 NOTE — CARE COORDINATION
Case Management Assessment           Initial Evaluation                Date / Time of Evaluation: 1/26/2021 4:20 PM                 Assessment Completed by: Ana María Hendricks    Patient Name: Miriam Mallory     YOB: 1960  Diagnosis: Acute low back pain with possible spinal stenosis of less than six weeks' duration [M54.5]     Date / Time: 1/26/2021  2:02 AM    Patient Admission Status: Inpatient    If patient is discharged prior to next notation, then this note serves as note for discharge by case management. Current PCP: Adenike Ingram Patient: No    Chart Reviewed: Yes  Patient/ Family Interviewed: Yes    Initial assessment completed at bedside with: Patient     Hospitalization in the last 30 days: No    Emergency Contacts:  Extended Emergency Contact Information  [facility] [address] Phone: [phone]  Relation: Brother/Sister    Advance Directives:   Code Status: Full [address]: No    Financial:  Payor: Ivan Edmondson / Plan: Ivan Edmondson (O) / Product Type: *No Product type* /     Pre-cert required for SNF: No    Pharmacy:    [address] [address]  Phone: [phone] Fax: [phone]      Potential assistance Purchasing Medications: Potential Assistance Purchasing Medications: No  Does Patient want to participate in local refill/ meds to beds program?: Not Assessed    Meds To Beds General Rules:  1. Can ONLY be done Monday- Friday between 8:30am-5pm  2. Prescription(s) must be in pharmacy by 3pm to be filled same day  3. Copy of patient's insurance/ prescription drug card and patient face sheet must be sent along with the prescription(s)  4. Cost of Rx cannot be added to hospital bill.  If financial assistance is needed, please contact unit  or ;  or  CANNOT provide pharmacy voucher for patients co-pays  5. Patients can then  the prescription on their way out of the hospital at discharge, or pharmacy can deliver to the bedside if staff is available. (payment due at time of pick-up or delivery - cash, check, or card accepted)     Able to afford home medications/ co-pay costs: Yes    ADLS:  Support Systems: Family Members, Friends/Neighbors    PT AM-PAC:   /24  OT AM-PAC:   /24    Housing:  Home Environment: Home independent at baseline   Steps: yes     Plans to RETURN to current housing: Yes  Barrier(s) to RETURNING to current housing: [address]:  Currently ACTIVE with [employer]: No  Home Care Agency: Not Applicable    Currently ACTIVE with [employer]: No    Durable Medical Equipment:  DME Provider: None   Equipment: None reported by patient     Home Oxygen and Respiratory Equipment:  Has HOME OXYGEN prior to admission: No    Dialysis:  Active with HD/PD prior to admission: No    DISCHARGE PLAN:  Disposition: Home    Transportation PLAN for discharge: Friend      Factors facilitating achievement of predicted outcomes: Motivated, Cooperative and Pleasant    Barriers to discharge: clearance     Additional Case Management Notes:   SW met with patient at bedside. Patient reported he was feeling okay and was told he would have surgery Thursday. Patient is from home alone at baseline. He is independent with ADL and does not use any DME at home. Patient stated he is hoping this pain will be relived post surgery and he can return home. He is open to services if they are recommending. AT this time reports a friend may transport home. KAMRAN provided contact information to patient/family and placed information on white board in room should needs arise. No other needs noted at this time.        The Plan for Transition of Care is related

## 2021-01-26 NOTE — CONSULTS
Medical History:   Diagnosis Date    Diabetes mellitus type 1 ([address]     Hyperlipidemia     Hypertension         Past Surgical History:   Procedure Laterality Date    GASTRIC BYPASS SURGERY         Social History     Occupational History    Not on file   Tobacco Use    Smoking status: Never Smoker    Smokeless tobacco: Never Used   Substance and Sexual Activity    Alcohol use: Yes     Alcohol/week: 4.0 standard drinks     Types: 2 Cans of beer, 2 Shots of liquor per week    Drug use: No    Sexual activity: Not on file        No family history on file.      Outpatient Medications Marked as Taking for the 1/26/21 encounter [facility] Encounter)   Medication Sig Dispense Refill    valsartan (DIOVAN) 160 MG tablet Take 160 mg by mouth daily      amLODIPine (NORVASC) 5 MG tablet Take 5 mg by mouth daily      metFORMIN (GLUCOPHAGE-XR) 500 MG extended release tablet Take 500 mg by mouth daily (with breakfast)      aspirin 81 MG EC tablet Take 81 mg by mouth daily      celecoxib (CELEBREX) 200 MG capsule Take 200 mg by mouth daily      omeprazole (PRILOSEC) 40 MG delayed release capsule Take 40 mg by mouth daily as needed      predniSONE (DELTASONE) 10 MG tablet take 3 tabs PO BID x2 days, then 2 tabs PO BID x2 days, then 1 tab PO BID x2 days, then 1 tab PO daily x2 days 26 tablet 0      Current Facility-Administered Medications   Medication Dose Route Frequency Provider Last Rate Last Admin    sodium chloride flush 0.9 % injection 10 mL  10 mL Intravenous 2 times per day Asher Sabino A [place], DO        sodium chloride flush 0.9 % injection 10 mL  10 mL Intravenous PRN [place] Sabino A [place], DO        potassium chloride 10 mEq/100 mL IVPB (Peripheral Line)  10 mEq Intravenous PRN Herlinda Ibrahim, DO        magnesium sulfate 2000 mg in 50 mL IVPB premix  2,000 mg Intravenous PRN [place] Sabino A [place], DO        promethazine (PHENERGAN) tablet 12.5 mg  12.5 mg Oral Q6H PRN Mj Ibrahim, DO        Or    ondansetron (ZOFRAN) injection 4 mg  4 mg Intravenous Q6H PRN Mj Ibrahim, DO        famotidine (PEPCID) tablet 20 mg  20 mg Oral Daily PRN Ian Ibrahim, DO        acetaminophen (TYLENOL) tablet 650 mg  650 mg Oral Q6H PRN mad ABI Ibrahim, DO        Or    acetaminophen (TYLENOL) suppository 650 mg  650 mg Rectal Q6H PRN Nola Ibrahim, DO        amLODIPine (NORVASC) tablet 5 mg  5 mg Oral Daily mad ABI Ibrahim, DO   5 mg at 01/26/21 1003    omeprazole (PRILOSEC) delayed release capsule 40 mg  40 mg Oral QAM Ahmad ABI Ibrahim, DO   40 mg at 01/26/21 1003    insulin lispro (1 Unit Dial) 0-6 Units  0-6 Units Subcutaneous Q4H Ahmad ABI Ibrahim, DO   Stopped at 01/26/21 0625    glucose (GLUTOSE) 40 % oral gel 15 g  15 g Oral PRN Mj Ibrahim, DO        dextrose 50 % IV solution  12.5 g Intravenous PRN Nola Ibrahim, DO        glucagon (rDNA) injection 1 mg  1 mg Intramuscular PRN [facility]        dextrose 5 % solution  100 mL/hr Intravenous PRN Mj Ibrahim, DO        oxyCODONE (ROXICODONE) immediate release tablet 5 mg  5 mg Oral Q4H PRN [facility]   5 mg at 01/26/21 0650    cefTRIAXone (ROCEPHIN) 1000 mg IVPB in 50 mL D5W minibag  1,000 mg Intravenous Q24H Lili Cortez MD        potassium chloride (KLOR-CON M) extended release tablet 40 mEq  40 mEq Oral Once Lili Cortez MD          Objective:  /75   Pulse 71   Temp 98.4 °F (36.9 °C) (Oral)   Resp 16   Ht 5' 8\" (1.727 m)   Wt 228 lb (103.4 kg)   SpO2 96%   BMI 34.67 kg/m²     Physical Exam:  Patient seen and examined   General: Well developed. Alert and cooperative in no acute distress. HENT: atraumatic, neck supple, missing teeth   Eyes: Optic discs: Not tested  Pulmonary: unlabored respiratory effort  Cardiovascular:  Warm well perfused.  No peripheral edema  Gastrointestinal: abdomen soft, NT, ND    Neurologic Exam:  Neurological:  Mental Status: Awake, alert, oriented x 4  Attention: Intact  Sensation: n/t to bilateral fingertips, otherwise sensation intact to light touch     DTRs:    Right  Left    ankle clonus  - -   toes (babinski)  down Down     Hoffmans negative bilaterally     Musculoskeletal:   Gait: Not tested   Assist devices: None   Tone: normal   Motor strength:    Right  Left    Right  Left    Deltoid  5 5  Hip Flex  5 5   Biceps  5 5  Knee Extensors  5 5   Triceps  5 5  Knee Flexors  5 5   Wrist Ext  5 5  Ankle Dorsiflex. 5 5   Wrist Flex  5 5  Ankle Plantarflex. 5 5   Handgrip  4+ 4+  Ext Manohar Longus  5 5   Thumb Ext  4+ 4+         Radiological Findings:    I personally reviewed the patient's imaging which consists of an MRI of the cervical, thoracic, and lumbar spine dated 1/25/2021. In the cervical spine there is evidence of multilevel spondylosis with severe central stenosis extending from C3-C6. No evidence of T2 signal changes noted. In the thoracic spine, there is also diffuse spondylosis particularly at T3-4, T4-5, T5-6, and T7-8 levels. This results in mild central stenosis given concurrent epidural lipomatosis. In the lumbar spine, there is also diffusecongenital central stenosiswith epidural lipomatosis resulting in severe central stenosis extending from L2-S1. Labs  Recent Labs     01/25/21  1242 01/26/21  0545   * 141   CL 99 102   CO2 24 25   BUN 9 10   CREATININE 0.8 0.9   GLUCOSE 127* 104*   MG  --  1.90     Recent Labs     01/25/21  1242 01/26/21  0545   WBC 5.0 3.4*   RBC 3.72* 3.85*   INR  --  1.10         Patient Active Problem List    Diagnosis Date Noted    Acute low back pain with possible spinal stenosis of less than six weeks' duration 01/25/2021    Contusion of left ankle 01/24/2021    Sciatica, right side 01/24/2021       Assessment:  62 yo male admitted with back pain, lower extremity weakness and hand paresthesias.  Cervical spine with evidence of severe central canal narrowing extending from C3-6 that likely underlies his symptoms given the involvement of the upper extremities. Plan:  1. Tentatively plan for C3-C6 posterior decompression/fusion/fixation on Thursday afternoon   2. q 4 hour neuro checks   3. PT/OT, activity as tolerated   4. Diet/pain control per primary team  5. Hold ASA for upcoming surgery   6. Medical clearance for OR   7. CT cervical spine wo contrast today  8. Will continue to follow, thank you for consult. Please call with any questions.      Shea Interiano MD, PhD  [address], [facility], [address] [phone] (c), [phone] (o)

## 2021-01-27 LAB
A/G RATIO: 1.2 (ref 1.1–2.2)
ABO/RH: NORMAL
ALBUMIN SERPL-MCNC: 3.7 G/DL (ref 3.4–5)
ALP BLD-CCNC: 131 U/L (ref 40–129)
ALT SERPL-CCNC: 19 U/L (ref 10–40)
ANION GAP SERPL CALCULATED.3IONS-SCNC: 11 MMOL/L (ref 3–16)
ANTIBODY SCREEN: NORMAL
AST SERPL-CCNC: 24 U/L (ref 15–37)
BASOPHILS ABSOLUTE: 0 K/UL (ref 0–0.2)
BASOPHILS RELATIVE PERCENT: 0.6 %
BILIRUB SERPL-MCNC: 0.5 MG/DL (ref 0–1)
BUN BLDV-MCNC: 10 MG/DL (ref 7–20)
CALCIUM SERPL-MCNC: 9.1 MG/DL (ref 8.3–10.6)
CHLORIDE BLD-SCNC: 101 MMOL/L (ref 99–110)
CO2: 24 MMOL/L (ref 21–32)
CREAT SERPL-MCNC: 0.8 MG/DL (ref 0.8–1.3)
EOSINOPHILS ABSOLUTE: 0.1 K/UL (ref 0–0.6)
EOSINOPHILS RELATIVE PERCENT: 3.6 %
ESTIMATED AVERAGE GLUCOSE: 157.1 MG/DL
GFR AFRICAN AMERICAN: >60
GFR NON-AFRICAN AMERICAN: >60
GLOBULIN: 3.1 G/DL
GLUCOSE BLD-MCNC: 109 MG/DL (ref 70–99)
GLUCOSE BLD-MCNC: 145 MG/DL (ref 70–99)
GLUCOSE BLD-MCNC: 168 MG/DL (ref 70–99)
GLUCOSE BLD-MCNC: 214 MG/DL (ref 70–99)
HBA1C MFR BLD: 7.1 %
HCT VFR BLD CALC: 37 % (ref 40.5–52.5)
HEMOGLOBIN: 12.5 G/DL (ref 13.5–17.5)
INR BLD: 0.97 (ref 0.86–1.14)
LYMPHOCYTES ABSOLUTE: 0.6 K/UL (ref 1–5.1)
LYMPHOCYTES RELATIVE PERCENT: 27.4 %
MAGNESIUM: 1.9 MG/DL (ref 1.8–2.4)
MCH RBC QN AUTO: 33.2 PG (ref 26–34)
MCHC RBC AUTO-ENTMCNC: 33.7 G/DL (ref 31–36)
MCV RBC AUTO: 98.6 FL (ref 80–100)
MONOCYTES ABSOLUTE: 0.5 K/UL (ref 0–1.3)
MONOCYTES RELATIVE PERCENT: 22 %
NEUTROPHILS ABSOLUTE: 1.1 K/UL (ref 1.7–7.7)
NEUTROPHILS RELATIVE PERCENT: 46.4 %
ORGANISM: ABNORMAL
PDW BLD-RTO: 14.9 % (ref 12.4–15.4)
PERFORMED ON: ABNORMAL
PLATELET # BLD: 159 K/UL (ref 135–450)
PMV BLD AUTO: 7.9 FL (ref 5–10.5)
POTASSIUM REFLEX MAGNESIUM: 3.4 MMOL/L (ref 3.5–5.1)
PROTHROMBIN TIME: 11.2 SEC (ref 10–13.2)
RBC # BLD: 3.75 M/UL (ref 4.2–5.9)
SODIUM BLD-SCNC: 136 MMOL/L (ref 136–145)
TOTAL PROTEIN: 6.8 G/DL (ref 6.4–8.2)
URINE CULTURE, ROUTINE: ABNORMAL
WBC # BLD: 2.3 K/UL (ref 4–11)

## 2021-01-27 PROCEDURE — 82607 VITAMIN B-12: CPT

## 2021-01-27 PROCEDURE — 6370000000 HC RX 637 (ALT 250 FOR IP): Performed by: INTERNAL MEDICINE

## 2021-01-27 PROCEDURE — 6360000002 HC RX W HCPCS: Performed by: INTERNAL MEDICINE

## 2021-01-27 PROCEDURE — 85610 PROTHROMBIN TIME: CPT

## 2021-01-27 PROCEDURE — 80053 COMPREHEN METABOLIC PANEL: CPT

## 2021-01-27 PROCEDURE — 86850 RBC ANTIBODY SCREEN: CPT

## 2021-01-27 PROCEDURE — 36415 COLL VENOUS BLD VENIPUNCTURE: CPT

## 2021-01-27 PROCEDURE — 86900 BLOOD TYPING SEROLOGIC ABO: CPT

## 2021-01-27 PROCEDURE — 86901 BLOOD TYPING SEROLOGIC RH(D): CPT

## 2021-01-27 PROCEDURE — 2580000003 HC RX 258: Performed by: NURSE PRACTITIONER

## 2021-01-27 PROCEDURE — 2580000003 HC RX 258: Performed by: INTERNAL MEDICINE

## 2021-01-27 PROCEDURE — 82747 ASSAY OF FOLIC ACID RBC: CPT

## 2021-01-27 PROCEDURE — 85025 COMPLETE CBC W/AUTO DIFF WBC: CPT

## 2021-01-27 PROCEDURE — 83735 ASSAY OF MAGNESIUM: CPT

## 2021-01-27 PROCEDURE — 1200000000 HC SEMI PRIVATE

## 2021-01-27 PROCEDURE — U0002 COVID-19 LAB TEST NON-CDC: HCPCS

## 2021-01-27 RX ORDER — CEFAZOLIN SODIUM 2 G/50ML
2000 SOLUTION INTRAVENOUS ONCE
Status: DISCONTINUED | OUTPATIENT
Start: 2021-01-28 | End: 2021-01-28 | Stop reason: HOSPADM

## 2021-01-27 RX ORDER — SODIUM CHLORIDE 9 MG/ML
INJECTION, SOLUTION INTRAVENOUS CONTINUOUS
Status: DISCONTINUED | OUTPATIENT
Start: 2021-01-28 | End: 2021-01-28

## 2021-01-27 RX ADMIN — OXYCODONE 5 MG: 5 TABLET ORAL at 21:04

## 2021-01-27 RX ADMIN — AMLODIPINE BESYLATE 5 MG: 5 TABLET ORAL at 08:41

## 2021-01-27 RX ADMIN — Medication 10 ML: at 20:58

## 2021-01-27 RX ADMIN — SODIUM CHLORIDE: 9 INJECTION, SOLUTION INTRAVENOUS at 23:56

## 2021-01-27 RX ADMIN — INSULIN LISPRO 1 UNITS: 100 INJECTION, SOLUTION INTRAVENOUS; SUBCUTANEOUS at 09:15

## 2021-01-27 RX ADMIN — INSULIN LISPRO 1 UNITS: 100 INJECTION, SOLUTION INTRAVENOUS; SUBCUTANEOUS at 12:28

## 2021-01-27 RX ADMIN — CEFTRIAXONE 1000 MG: 1 INJECTION, POWDER, FOR SOLUTION INTRAMUSCULAR; INTRAVENOUS at 12:28

## 2021-01-27 RX ADMIN — Medication 10 ML: at 08:42

## 2021-01-27 RX ADMIN — INSULIN LISPRO 2 UNITS: 100 INJECTION, SOLUTION INTRAVENOUS; SUBCUTANEOUS at 20:57

## 2021-01-27 RX ADMIN — OMEPRAZOLE 40 MG: 20 CAPSULE, DELAYED RELEASE ORAL at 08:41

## 2021-01-27 ASSESSMENT — PAIN DESCRIPTION - ORIENTATION: ORIENTATION: RIGHT

## 2021-01-27 ASSESSMENT — PAIN - FUNCTIONAL ASSESSMENT: PAIN_FUNCTIONAL_ASSESSMENT: PREVENTS OR INTERFERES SOME ACTIVE ACTIVITIES AND ADLS

## 2021-01-27 ASSESSMENT — PAIN DESCRIPTION - PROGRESSION: CLINICAL_PROGRESSION: GRADUALLY WORSENING

## 2021-01-27 ASSESSMENT — PAIN SCALES - GENERAL: PAINLEVEL_OUTOF10: 6

## 2021-01-27 ASSESSMENT — PAIN DESCRIPTION - LOCATION: LOCATION: HIP;LEG

## 2021-01-27 NOTE — CARE COORDINATION
Patient here from home alone. He is independent at baseline. Plans to return to home when medically ready. Plans for OR tomorrow/decompression fusion. CM will continue to follow for any changes to discharge plans.     Tania Mayer RN  RN Case Manager  [phone]

## 2021-01-27 NOTE — PROGRESS NOTES
Physical Therapy and Occupational Therapy  No Treatment    Referral received and chart reviewed. Per chart, pt is schedule for a C3-C6 posterior decompression/fusion/fixation on 1/28. Will sign off from PT/OT at this time and await new orders post-op. Will initiate PT/OT evaluations after surgery.       Joanne Reyes Siemens, OTR/L, [id number]

## 2021-01-27 NOTE — PROGRESS NOTES
Patient a/o x4. Vitals stable. Pain tolerable overnight. Ambulating well with walker/gb. Resting comfortably overnight.

## 2021-01-27 NOTE — PROGRESS NOTES
NEUROSURGERY     Malia Parker   [id number]   1960 1/27/2021    Interval History: 62 yo male admitted with back pain, lower extremity weakness and hand paresthesias. Plan for posterior cervical decompression/fusion/fixation tomorrow. Hospital Day #1      Subjective: Patient has no complaints this morning, continues with numbness/tingling in his fingertips. Objective:  BP (!) 145/79   Pulse 67   Temp 97.9 °F (36.6 °C) (Oral)   Resp 16   Ht 5' 8\" (1.727 m)   Wt 228 lb (103.4 kg)   SpO2 98%   BMI 34.67 kg/m²     Labs:  Recent Labs     01/25/21  1242 01/26/21  0545 01/27/21  0635   * 141 136   CL 99 102 101   CO2 24 25 24   BUN 9 10 10   CREATININE 0.8 0.9 0.8   GLUCOSE 127* 104* 168*     Recent Labs     01/25/21  1242 01/26/21  0545 01/27/21  0635   WBC 5.0 3.4* 2.3*   RBC 3.72* 3.85* 3.75*   INR  --  1.10  --        Neurologic Exam:  GCS:  4 - Opens eyes on own  5 - Alert and oriented  6 - Follows simple motor commands    Mental Status: Awake, alert, oriented x 4  Attention: Intact  Sensation: n/t to bilateral fingertips, otherwise sensation intact to light touch      DTRs:     Right  Left    ankle clonus  - -   toes (babinski)  down Down      Hoffmans negative bilaterally      Musculoskeletal:   Gait: Not tested   Assist devices: None   Tone: normal   Motor strength:     Right  Left      Right  Left    Deltoid  5 5   Hip Flex  5 5   Biceps  5 5   Knee Extensors  5 5   Triceps  5 5   Knee Flexors  5 5   Wrist Ext  5 5   Ankle Dorsiflex. 5 5   Wrist Flex  5 5   Ankle Plantarflex. 5 5   Handgrip  4+ 4+   Ext Manohar Longus  5 5   Thumb Ext  4+ 4+                Assessment   62 yo male admitted with back pain, lower extremity weakness and hand paresthesias. Cervical spine with evidence of severe central canal narrowing extending from C3-6 that likely underlies his symptoms given the involvement of the upper extremities. Plan:  1.  Plan for C3-C6 posterior decompression/fusion/fixation tomorrow afternoon   2. q 4 hour neuro checks   3. PT/OT, activity as tolerated   4. Diet/pain control per primary team  5. Hold ASA for upcoming surgery   6. Medical clearance for OR   7. Pre op labs, surgical consent, rapid Covid test, NPO @ 0000, hibiclens bath prior to surgery, Ancef on call to OR  8. Will continue to follow. Please call with any questions. DISPO-inpatient, plan for OR tomorrow      WAGNER Christensen-CNP  Neurosurgery Nurse Practitioner  [phone]  Patient was seen and examined with Dr. Jose Mane who agrees with above assessment and plan. Electronically signed by:  William Estrella, 1/27/2021 8:32 AM

## 2021-01-27 NOTE — PLAN OF CARE
Problem: Falls - Risk of:  Goal: Will remain free from falls  Description: Will remain free from falls  Outcome: Ongoing     Problem: Skin Integrity:  Goal: Will show no infection signs and symptoms  Description: Will show no infection signs and symptoms  Outcome: Ongoing     Problem: Skin Integrity:  Goal: Absence of new skin breakdown  Description: Absence of new skin breakdown  Outcome: Ongoing     Problem: Pain:  Goal: Pain level will decrease  Description: Pain level will decrease  Outcome: Ongoing

## 2021-01-27 NOTE — PROGRESS NOTES
Patient A&Ox4, VSS with exception to an elevated BP. Neuro checks at baseline. Patient currently denies pain, tolerating diet well, denies nausea. Patient NPO at midnight, anticipating OR tomorrow. Patient instructed to call out for needs. Will continue to monitor.

## 2021-01-27 NOTE — PROGRESS NOTES
Hospitalist Progress Note      PCP: Shon García    Date of Admission: 1/26/2021    Chief Complaint: Bilateral lower extremity weakness    Hospital Course: 27-year-old male presented to the hospital bilateral lower extremity weakness. MRI of the spine showed multilevel spinal stenosis. Neurosurgery following    Subjective: No acute events reported overnight. Patient feels fine and awaiting surgery tomorrow      Medications:  Reviewed    Infusion Medications    [START ON 1/28/2021] sodium chloride      dextrose       Scheduled Medications    [START ON 1/28/2021] ceFAZolin  2,000 mg Intravenous Once    sodium chloride flush  10 mL Intravenous 2 times per day    amLODIPine  5 mg Oral Daily    omeprazole  40 mg Oral QAM    cefTRIAXone (ROCEPHIN) IV  1,000 mg Intravenous Q24H    influenza virus vaccine  0.5 mL Intramuscular Prior to discharge    insulin lispro  0-6 Units Subcutaneous 4x Daily AC & HS     PRN Meds: sodium chloride flush, potassium chloride, magnesium sulfate, promethazine **OR** ondansetron, famotidine, acetaminophen **OR** acetaminophen, glucose, dextrose, glucagon (rDNA), dextrose, oxyCODONE      Intake/Output Summary (Last 24 hours) at 1/27/2021 1240  Last data filed at 1/27/2021 0305  Gross per 24 hour   Intake 960 ml   Output --   Net 960 ml       Physical Exam Performed:    BP (!) 150/80   Pulse 73   Temp 98.4 °F (36.9 °C) (Oral)   Resp 16   Ht 5' 8\" (1.727 m)   Wt 228 lb (103.4 kg)   SpO2 96%   BMI 34.67 kg/m²     General appearance: No apparent distress, appears stated age and cooperative. HEENT: Pupils equal, round, and reactive to light. Conjunctivae/corneas clear. Neck: Supple, with full range of motion. No jugular venous distention. Trachea midline. Respiratory:  Normal respiratory effort. Clear to auscultation, bilaterally without Rales/Wheezes/Rhonchi. Cardiovascular: Regular rate and rhythm with normal S1/S2 without murmurs, rubs or gallops.   Abdomen: Soft, non-tender, non-distended with normal bowel sounds. Musculoskeletal: No clubbing, cyanosis or edema bilaterally. Full range of motion without deformity. Skin: Skin color, texture, turgor normal.  No rashes or lesions. Neurologic:  Neurovascularly intact without any focal sensory/motor deficits. Cranial nerves: II-XII intact, grossly non-focal.  Psychiatric: Alert and oriented, thought content appropriate, normal insight  Capillary Refill: Brisk,< 3 seconds   Peripheral Pulses: +2 palpable, equal bilaterally       Labs:   Recent Labs     01/25/21  1242 01/26/21  0545 01/27/21  0635   WBC 5.0 3.4* 2.3*   HGB 12.4* 12.7* 12.5*   HCT 36.4* 38.4* 37.0*    176 159     Recent Labs     01/25/21  1242 01/26/21  0545 01/27/21  0635   * 141 136   K 3.8 3.4* 3.4*   CL 99 102 101   CO2 24 25 24   BUN 9 10 10   CREATININE 0.8 0.9 0.8   CALCIUM 9.3 9.6 9.1     Recent Labs     01/25/21  1242 01/26/21  0545 01/27/21  0635   AST 29 27 24   ALT 22 19 19   BILITOT 0.7 0.7 0.5   ALKPHOS 171* 158* 131*     Recent Labs     01/26/21  0545   INR 1.10     No results for input(s): Mi Gathers in the last 72 hours. Urinalysis:      Lab Results   Component Value Date    NITRU POSITIVE 01/25/2021    WBCUA 85 01/25/2021    BACTERIA 4+ 01/25/2021    RBCUA 2 01/25/2021    BLOODU SMALL 01/25/2021    SPECGRAV 1.021 01/25/2021    GLUCOSEU Negative 01/25/2021       Radiology:  CT CERVICAL SPINE WO CONTRAST   Final Result      1. Straightening of the normal cervical lordosis without spondylolisthesis. 2. No evidence of vertebral compression deformity or acute fracture. 3. Moderate cervical spondylosis superimposed on short pedicles contributing to multilevel spinal stenosis which is moderate to severe at C3-4 and C5-6. These findings are better evaluated on the recent prior MRI.               Assessment/Plan:    Cervical spinal central canal stenosis    Lumbo-sacral spinal canal stenosis  Bilateral Lower extremity weakness and numbness  - Neurosurgery following, plan for C3-6 posterior decompression/fusion/fixation on Thursday per Dr. Price Paci  - Hold ASA  - Pain control  - Neuro checks q4h  - PT/OT eval     UTI  -Continue Rocephin     Diabetes mellitus  - LSSI    HTN  - Continue home amlodipine     HLD  - Continue home statin      ----------------------------------------------------------------------------------------    Pre-Operative Risk assessment using 2014 ACC/AHA guidelines     Emergent procedure No  Active Cardiac Condition No (decompensated HF, Arrhythmia, MI <3 weeks, severe valve disease)  Risk Level of Procedure Intermediate Risk (intraperitoneal, intrathoracic, HENT, orthopedic, or carotid endarterectomy, etc.)  Revised Cardiac Risk Index Risk factors: None  Measurement of Exercise Tolerance before Surgery >4 Yes    According to the 2014 ACC/AHA pre-operative risk assessment guidelines Dodie Gonzalez is a low risk for major cardiac complications during a intermediate risk procedure and may continue as planned. Specific medication recommendations are listed below. Medications recommended to continue should be taken with a sip of water even when NPO. Further recommendations from consultants: None    Patient is at intermediate/acceptable risk for planned procedure.   May go ahead without any further testing    ----------------------------------------------------------------------------------------      DVT Prophylaxis: SCD  Diet: DIET GENERAL;  Diet NPO, After Midnight  Code Status: Full Code    PT/OT Eval Status: Consulted    Dispo -pending clinical course    Len Austin MD

## 2021-01-28 ENCOUNTER — ANESTHESIA (OUTPATIENT)
Dept: OPERATING ROOM | Age: 61
DRG: 023 | End: 2021-01-28
Payer: MEDICAID

## 2021-01-28 ENCOUNTER — ANESTHESIA EVENT (OUTPATIENT)
Dept: OPERATING ROOM | Age: 61
DRG: 023 | End: 2021-01-28
Payer: MEDICAID

## 2021-01-28 VITALS — OXYGEN SATURATION: 100 % | SYSTOLIC BLOOD PRESSURE: 162 MMHG | DIASTOLIC BLOOD PRESSURE: 78 MMHG | TEMPERATURE: 96.8 F

## 2021-01-28 LAB
ANION GAP SERPL CALCULATED.3IONS-SCNC: 10 MMOL/L (ref 3–16)
BASOPHILS ABSOLUTE: 0 K/UL (ref 0–0.2)
BASOPHILS RELATIVE PERCENT: 0.6 %
BUN BLDV-MCNC: 10 MG/DL (ref 7–20)
CALCIUM SERPL-MCNC: 9.3 MG/DL (ref 8.3–10.6)
CHLORIDE BLD-SCNC: 103 MMOL/L (ref 99–110)
CO2: 25 MMOL/L (ref 21–32)
CREAT SERPL-MCNC: 0.9 MG/DL (ref 0.8–1.3)
EOSINOPHILS ABSOLUTE: 0.1 K/UL (ref 0–0.6)
EOSINOPHILS RELATIVE PERCENT: 2.1 %
GFR AFRICAN AMERICAN: >60
GFR NON-AFRICAN AMERICAN: >60
GLUCOSE BLD-MCNC: 117 MG/DL (ref 70–99)
GLUCOSE BLD-MCNC: 126 MG/DL (ref 70–99)
GLUCOSE BLD-MCNC: 148 MG/DL (ref 70–99)
GLUCOSE BLD-MCNC: 151 MG/DL (ref 70–99)
HCT VFR BLD CALC: 36.7 % (ref 40.5–52.5)
HEMOGLOBIN: 12.8 G/DL (ref 13.5–17.5)
LYMPHOCYTES ABSOLUTE: 0.8 K/UL (ref 1–5.1)
LYMPHOCYTES RELATIVE PERCENT: 32.1 %
MCH RBC QN AUTO: 33.9 PG (ref 26–34)
MCHC RBC AUTO-ENTMCNC: 34.9 G/DL (ref 31–36)
MCV RBC AUTO: 97.4 FL (ref 80–100)
MONOCYTES ABSOLUTE: 0.5 K/UL (ref 0–1.3)
MONOCYTES RELATIVE PERCENT: 21.7 %
NEUTROPHILS ABSOLUTE: 1.1 K/UL (ref 1.7–7.7)
NEUTROPHILS RELATIVE PERCENT: 43.5 %
PDW BLD-RTO: 14.7 % (ref 12.4–15.4)
PERFORMED ON: ABNORMAL
PLATELET # BLD: 162 K/UL (ref 135–450)
PMV BLD AUTO: 7.8 FL (ref 5–10.5)
POTASSIUM SERPL-SCNC: 3.5 MMOL/L (ref 3.5–5.1)
RBC # BLD: 3.77 M/UL (ref 4.2–5.9)
SODIUM BLD-SCNC: 138 MMOL/L (ref 136–145)
VITAMIN B-12: 1941 PG/ML (ref 211–911)
WBC # BLD: 2.4 K/UL (ref 4–11)

## 2021-01-28 PROCEDURE — 6360000002 HC RX W HCPCS: Performed by: NURSE ANESTHETIST, CERTIFIED REGISTERED

## 2021-01-28 PROCEDURE — 6370000000 HC RX 637 (ALT 250 FOR IP): Performed by: INTERNAL MEDICINE

## 2021-01-28 PROCEDURE — 3600000014 HC SURGERY LEVEL 4 ADDTL 15MIN: Performed by: NEUROLOGICAL SURGERY

## 2021-01-28 PROCEDURE — 00NW0ZZ RELEASE CERVICAL SPINAL CORD, OPEN APPROACH: ICD-10-PCS | Performed by: NEUROLOGICAL SURGERY

## 2021-01-28 PROCEDURE — 6360000002 HC RX W HCPCS: Performed by: NEUROLOGICAL SURGERY

## 2021-01-28 PROCEDURE — 2500000003 HC RX 250 WO HCPCS: Performed by: NEUROLOGICAL SURGERY

## 2021-01-28 PROCEDURE — 7100000001 HC PACU RECOVERY - ADDTL 15 MIN: Performed by: NEUROLOGICAL SURGERY

## 2021-01-28 PROCEDURE — 2720000010 HC SURG SUPPLY STERILE: Performed by: NEUROLOGICAL SURGERY

## 2021-01-28 PROCEDURE — 2709999900 HC NON-CHARGEABLE SUPPLY: Performed by: NEUROLOGICAL SURGERY

## 2021-01-28 PROCEDURE — 2580000003 HC RX 258: Performed by: NEUROLOGICAL SURGERY

## 2021-01-28 PROCEDURE — 94150 VITAL CAPACITY TEST: CPT

## 2021-01-28 PROCEDURE — 3600000004 HC SURGERY LEVEL 4 BASE: Performed by: NEUROLOGICAL SURGERY

## 2021-01-28 PROCEDURE — 3700000001 HC ADD 15 MINUTES (ANESTHESIA): Performed by: NEUROLOGICAL SURGERY

## 2021-01-28 PROCEDURE — 2500000003 HC RX 250 WO HCPCS: Performed by: NURSE ANESTHETIST, CERTIFIED REGISTERED

## 2021-01-28 PROCEDURE — 85025 COMPLETE CBC W/AUTO DIFF WBC: CPT

## 2021-01-28 PROCEDURE — 6360000002 HC RX W HCPCS: Performed by: ANESTHESIOLOGY

## 2021-01-28 PROCEDURE — 7100000000 HC PACU RECOVERY - FIRST 15 MIN: Performed by: NEUROLOGICAL SURGERY

## 2021-01-28 PROCEDURE — 6370000000 HC RX 637 (ALT 250 FOR IP): Performed by: NEUROLOGICAL SURGERY

## 2021-01-28 PROCEDURE — 2580000003 HC RX 258: Performed by: INTERNAL MEDICINE

## 2021-01-28 PROCEDURE — 1200000000 HC SEMI PRIVATE

## 2021-01-28 PROCEDURE — 0RG20K1 FUSION OF 2 OR MORE CERVICAL VERTEBRAL JOINTS WITH NONAUTOLOGOUS TISSUE SUBSTITUTE, POSTERIOR APPROACH, POSTERIOR COLUMN, OPEN APPROACH: ICD-10-PCS | Performed by: NEUROLOGICAL SURGERY

## 2021-01-28 PROCEDURE — C1713 ANCHOR/SCREW BN/BN,TIS/BN: HCPCS | Performed by: NEUROLOGICAL SURGERY

## 2021-01-28 PROCEDURE — 6360000002 HC RX W HCPCS: Performed by: INTERNAL MEDICINE

## 2021-01-28 PROCEDURE — 3700000000 HC ANESTHESIA ATTENDED CARE: Performed by: NEUROLOGICAL SURGERY

## 2021-01-28 PROCEDURE — C9290 INJ, BUPIVACAINE LIPOSOME: HCPCS | Performed by: NEUROLOGICAL SURGERY

## 2021-01-28 PROCEDURE — 80048 BASIC METABOLIC PNL TOTAL CA: CPT

## 2021-01-28 PROCEDURE — 2580000003 HC RX 258: Performed by: NURSE ANESTHETIST, CERTIFIED REGISTERED

## 2021-01-28 PROCEDURE — 36415 COLL VENOUS BLD VENIPUNCTURE: CPT

## 2021-01-28 DEVICE — ROD SPNL LORDOTIC 4X55MM TI ALLOY NON-STERILE SYMPHONY: Type: IMPLANTABLE DEVICE | Site: SPINE CERVICAL | Status: FUNCTIONAL

## 2021-01-28 DEVICE — SCREW SPNL 3.5X14MM SYMPHONY: Type: IMPLANTABLE DEVICE | Site: SPINE CERVICAL | Status: FUNCTIONAL

## 2021-01-28 DEVICE — GRAFT BNE MED 6.25 CC MTRX FIBERGRAFT BG: Type: IMPLANTABLE DEVICE | Site: SPINE CERVICAL | Status: FUNCTIONAL

## 2021-01-28 DEVICE — SCREW SPNL 3.5X12MM SYMPHONY: Type: IMPLANTABLE DEVICE | Site: SPINE CERVICAL | Status: FUNCTIONAL

## 2021-01-28 DEVICE — SCREW SPINAL F/SYMPHONY OCT SYSTEM: Type: IMPLANTABLE DEVICE | Site: SPINE CERVICAL | Status: FUNCTIONAL

## 2021-01-28 RX ORDER — CEFAZOLIN SODIUM 1 G/3ML
INJECTION, POWDER, FOR SOLUTION INTRAMUSCULAR; INTRAVENOUS PRN
Status: DISCONTINUED | OUTPATIENT
Start: 2021-01-28 | End: 2021-01-28 | Stop reason: SDUPTHER

## 2021-01-28 RX ORDER — OXYCODONE HYDROCHLORIDE 5 MG/1
10 TABLET ORAL PRN
Status: DISCONTINUED | OUTPATIENT
Start: 2021-01-28 | End: 2021-01-28 | Stop reason: HOSPADM

## 2021-01-28 RX ORDER — ACETAMINOPHEN 325 MG/1
650 TABLET ORAL EVERY 4 HOURS PRN
Status: DISCONTINUED | OUTPATIENT
Start: 2021-01-28 | End: 2021-02-03 | Stop reason: HOSPADM

## 2021-01-28 RX ORDER — LABETALOL 20 MG/4 ML (5 MG/ML) INTRAVENOUS SYRINGE
5 EVERY 10 MIN PRN
Status: DISCONTINUED | OUTPATIENT
Start: 2021-01-28 | End: 2021-01-28 | Stop reason: HOSPADM

## 2021-01-28 RX ORDER — MEPERIDINE HYDROCHLORIDE 25 MG/ML
12.5 INJECTION INTRAMUSCULAR; INTRAVENOUS; SUBCUTANEOUS EVERY 5 MIN PRN
Status: DISCONTINUED | OUTPATIENT
Start: 2021-01-28 | End: 2021-01-28 | Stop reason: HOSPADM

## 2021-01-28 RX ORDER — BISACODYL 10 MG
10 SUPPOSITORY, RECTAL RECTAL DAILY PRN
Status: DISCONTINUED | OUTPATIENT
Start: 2021-01-28 | End: 2021-02-03 | Stop reason: HOSPADM

## 2021-01-28 RX ORDER — SUCCINYLCHOLINE/SOD CL,ISO/PF 200MG/10ML
SYRINGE (ML) INTRAVENOUS PRN
Status: DISCONTINUED | OUTPATIENT
Start: 2021-01-28 | End: 2021-01-28 | Stop reason: SDUPTHER

## 2021-01-28 RX ORDER — ONDANSETRON 2 MG/ML
INJECTION INTRAMUSCULAR; INTRAVENOUS PRN
Status: DISCONTINUED | OUTPATIENT
Start: 2021-01-28 | End: 2021-01-28 | Stop reason: SDUPTHER

## 2021-01-28 RX ORDER — SODIUM CHLORIDE, SODIUM LACTATE, POTASSIUM CHLORIDE, CALCIUM CHLORIDE 600; 310; 30; 20 MG/100ML; MG/100ML; MG/100ML; MG/100ML
INJECTION, SOLUTION INTRAVENOUS CONTINUOUS PRN
Status: DISCONTINUED | OUTPATIENT
Start: 2021-01-28 | End: 2021-01-28 | Stop reason: SDUPTHER

## 2021-01-28 RX ORDER — HYDROMORPHONE HCL 110MG/55ML
PATIENT CONTROLLED ANALGESIA SYRINGE INTRAVENOUS PRN
Status: DISCONTINUED | OUTPATIENT
Start: 2021-01-28 | End: 2021-01-28 | Stop reason: SDUPTHER

## 2021-01-28 RX ORDER — DEXAMETHASONE SODIUM PHOSPHATE 4 MG/ML
INJECTION, SOLUTION INTRA-ARTICULAR; INTRALESIONAL; INTRAMUSCULAR; INTRAVENOUS; SOFT TISSUE PRN
Status: DISCONTINUED | OUTPATIENT
Start: 2021-01-28 | End: 2021-01-28 | Stop reason: SDUPTHER

## 2021-01-28 RX ORDER — SENNA AND DOCUSATE SODIUM 50; 8.6 MG/1; MG/1
1 TABLET, FILM COATED ORAL 2 TIMES DAILY
Status: DISCONTINUED | OUTPATIENT
Start: 2021-01-28 | End: 2021-02-03 | Stop reason: HOSPADM

## 2021-01-28 RX ORDER — ROCURONIUM BROMIDE 10 MG/ML
INJECTION, SOLUTION INTRAVENOUS PRN
Status: DISCONTINUED | OUTPATIENT
Start: 2021-01-28 | End: 2021-01-28 | Stop reason: SDUPTHER

## 2021-01-28 RX ORDER — MIDAZOLAM HYDROCHLORIDE 1 MG/ML
INJECTION INTRAMUSCULAR; INTRAVENOUS PRN
Status: DISCONTINUED | OUTPATIENT
Start: 2021-01-28 | End: 2021-01-28 | Stop reason: SDUPTHER

## 2021-01-28 RX ORDER — REMIFENTANIL HYDROCHLORIDE 1 MG/ML
INJECTION, POWDER, LYOPHILIZED, FOR SOLUTION INTRAVENOUS CONTINUOUS PRN
Status: DISCONTINUED | OUTPATIENT
Start: 2021-01-28 | End: 2021-01-28 | Stop reason: SDUPTHER

## 2021-01-28 RX ORDER — PROMETHAZINE HYDROCHLORIDE 12.5 MG/1
12.5 TABLET ORAL EVERY 6 HOURS PRN
Status: DISCONTINUED | OUTPATIENT
Start: 2021-01-28 | End: 2021-02-03 | Stop reason: HOSPADM

## 2021-01-28 RX ORDER — DIPHENHYDRAMINE HYDROCHLORIDE 50 MG/ML
12.5 INJECTION INTRAMUSCULAR; INTRAVENOUS
Status: DISCONTINUED | OUTPATIENT
Start: 2021-01-28 | End: 2021-01-28 | Stop reason: HOSPADM

## 2021-01-28 RX ORDER — PROPOFOL 10 MG/ML
INJECTION, EMULSION INTRAVENOUS CONTINUOUS PRN
Status: DISCONTINUED | OUTPATIENT
Start: 2021-01-28 | End: 2021-01-28 | Stop reason: SDUPTHER

## 2021-01-28 RX ORDER — SODIUM CHLORIDE 0.9 % (FLUSH) 0.9 %
10 SYRINGE (ML) INJECTION EVERY 12 HOURS SCHEDULED
Status: DISCONTINUED | OUTPATIENT
Start: 2021-01-28 | End: 2021-02-03 | Stop reason: HOSPADM

## 2021-01-28 RX ORDER — MAGNESIUM HYDROXIDE/ALUMINUM HYDROXICE/SIMETHICONE 120; 1200; 1200 MG/30ML; MG/30ML; MG/30ML
15 SUSPENSION ORAL EVERY 6 HOURS PRN
Status: DISCONTINUED | OUTPATIENT
Start: 2021-01-28 | End: 2021-02-03 | Stop reason: HOSPADM

## 2021-01-28 RX ORDER — OXYCODONE HYDROCHLORIDE 5 MG/1
5 TABLET ORAL PRN
Status: DISCONTINUED | OUTPATIENT
Start: 2021-01-28 | End: 2021-01-28 | Stop reason: HOSPADM

## 2021-01-28 RX ORDER — OXYCODONE HYDROCHLORIDE 5 MG/1
10 TABLET ORAL EVERY 4 HOURS PRN
Status: DISCONTINUED | OUTPATIENT
Start: 2021-01-28 | End: 2021-02-03 | Stop reason: HOSPADM

## 2021-01-28 RX ORDER — MORPHINE SULFATE 4 MG/ML
1 INJECTION, SOLUTION INTRAMUSCULAR; INTRAVENOUS EVERY 5 MIN PRN
Status: DISCONTINUED | OUTPATIENT
Start: 2021-01-28 | End: 2021-01-28 | Stop reason: HOSPADM

## 2021-01-28 RX ORDER — DIAZEPAM 5 MG/1
5 TABLET ORAL EVERY 6 HOURS PRN
Status: DISCONTINUED | OUTPATIENT
Start: 2021-01-28 | End: 2021-02-03 | Stop reason: HOSPADM

## 2021-01-28 RX ORDER — OXYCODONE HYDROCHLORIDE 5 MG/1
5 TABLET ORAL EVERY 4 HOURS PRN
Status: DISCONTINUED | OUTPATIENT
Start: 2021-01-28 | End: 2021-02-03 | Stop reason: HOSPADM

## 2021-01-28 RX ORDER — GLYCOPYRROLATE 1 MG/5 ML
SYRINGE (ML) INTRAVENOUS PRN
Status: DISCONTINUED | OUTPATIENT
Start: 2021-01-28 | End: 2021-01-28 | Stop reason: SDUPTHER

## 2021-01-28 RX ORDER — PROPOFOL 10 MG/ML
INJECTION, EMULSION INTRAVENOUS PRN
Status: DISCONTINUED | OUTPATIENT
Start: 2021-01-28 | End: 2021-01-28 | Stop reason: SDUPTHER

## 2021-01-28 RX ORDER — ONDANSETRON 2 MG/ML
4 INJECTION INTRAMUSCULAR; INTRAVENOUS EVERY 6 HOURS PRN
Status: DISCONTINUED | OUTPATIENT
Start: 2021-01-28 | End: 2021-02-03 | Stop reason: HOSPADM

## 2021-01-28 RX ORDER — FAMOTIDINE 20 MG/1
20 TABLET, FILM COATED ORAL 2 TIMES DAILY
Status: DISCONTINUED | OUTPATIENT
Start: 2021-01-28 | End: 2021-02-03 | Stop reason: HOSPADM

## 2021-01-28 RX ORDER — LIDOCAINE HYDROCHLORIDE 20 MG/ML
INJECTION, SOLUTION INTRAVENOUS PRN
Status: DISCONTINUED | OUTPATIENT
Start: 2021-01-28 | End: 2021-01-28 | Stop reason: SDUPTHER

## 2021-01-28 RX ORDER — SODIUM CHLORIDE 0.9 % (FLUSH) 0.9 %
10 SYRINGE (ML) INJECTION PRN
Status: DISCONTINUED | OUTPATIENT
Start: 2021-01-28 | End: 2021-02-03 | Stop reason: HOSPADM

## 2021-01-28 RX ORDER — SODIUM CHLORIDE 9 MG/ML
INJECTION, SOLUTION INTRAVENOUS CONTINUOUS
Status: DISCONTINUED | OUTPATIENT
Start: 2021-01-28 | End: 2021-01-29

## 2021-01-28 RX ORDER — PROMETHAZINE HYDROCHLORIDE 25 MG/ML
6.25 INJECTION, SOLUTION INTRAMUSCULAR; INTRAVENOUS
Status: DISCONTINUED | OUTPATIENT
Start: 2021-01-28 | End: 2021-01-28 | Stop reason: HOSPADM

## 2021-01-28 RX ORDER — METOCLOPRAMIDE HYDROCHLORIDE 5 MG/ML
10 INJECTION INTRAMUSCULAR; INTRAVENOUS
Status: DISCONTINUED | OUTPATIENT
Start: 2021-01-28 | End: 2021-01-28 | Stop reason: HOSPADM

## 2021-01-28 RX ORDER — METHOCARBAMOL 750 MG/1
750 TABLET, FILM COATED ORAL EVERY 6 HOURS PRN
Status: DISCONTINUED | OUTPATIENT
Start: 2021-01-29 | End: 2021-02-03 | Stop reason: HOSPADM

## 2021-01-28 RX ORDER — POLYETHYLENE GLYCOL 3350 17 G/17G
17 POWDER, FOR SOLUTION ORAL DAILY
Status: DISCONTINUED | OUTPATIENT
Start: 2021-01-28 | End: 2021-02-03 | Stop reason: HOSPADM

## 2021-01-28 RX ORDER — FENTANYL CITRATE 50 UG/ML
INJECTION, SOLUTION INTRAMUSCULAR; INTRAVENOUS PRN
Status: DISCONTINUED | OUTPATIENT
Start: 2021-01-28 | End: 2021-01-28 | Stop reason: SDUPTHER

## 2021-01-28 RX ORDER — CEFAZOLIN SODIUM 2 G/50ML
2000 SOLUTION INTRAVENOUS EVERY 8 HOURS
Status: COMPLETED | OUTPATIENT
Start: 2021-01-29 | End: 2021-01-31

## 2021-01-28 RX ORDER — HYDRALAZINE HYDROCHLORIDE 20 MG/ML
5 INJECTION INTRAMUSCULAR; INTRAVENOUS EVERY 10 MIN PRN
Status: DISCONTINUED | OUTPATIENT
Start: 2021-01-28 | End: 2021-01-28 | Stop reason: HOSPADM

## 2021-01-28 RX ORDER — HEPARIN SODIUM 5000 [USP'U]/.5ML
5000 INJECTION, SOLUTION INTRAVENOUS; SUBCUTANEOUS EVERY 8 HOURS
Status: DISCONTINUED | OUTPATIENT
Start: 2021-01-29 | End: 2021-01-29

## 2021-01-28 RX ADMIN — PHENYLEPHRINE HYDROCHLORIDE 100 MCG: 10 INJECTION, SOLUTION INTRAMUSCULAR; INTRAVENOUS; SUBCUTANEOUS at 17:06

## 2021-01-28 RX ADMIN — ROCURONIUM BROMIDE 5 MG: 10 INJECTION INTRAVENOUS at 16:20

## 2021-01-28 RX ADMIN — CEFTRIAXONE 1000 MG: 1 INJECTION, POWDER, FOR SOLUTION INTRAMUSCULAR; INTRAVENOUS at 12:14

## 2021-01-28 RX ADMIN — PHENYLEPHRINE HYDROCHLORIDE 100 MCG: 10 INJECTION, SOLUTION INTRAMUSCULAR; INTRAVENOUS; SUBCUTANEOUS at 17:41

## 2021-01-28 RX ADMIN — PHENYLEPHRINE HYDROCHLORIDE 100 MCG: 10 INJECTION, SOLUTION INTRAMUSCULAR; INTRAVENOUS; SUBCUTANEOUS at 16:53

## 2021-01-28 RX ADMIN — SODIUM CHLORIDE: 9 INJECTION, SOLUTION INTRAVENOUS at 19:42

## 2021-01-28 RX ADMIN — ROCURONIUM BROMIDE 45 MG: 10 INJECTION INTRAVENOUS at 16:50

## 2021-01-28 RX ADMIN — Medication 60 MG: at 16:35

## 2021-01-28 RX ADMIN — HYDROMORPHONE HYDROCHLORIDE 0.5 MG: 1 INJECTION, SOLUTION INTRAMUSCULAR; INTRAVENOUS; SUBCUTANEOUS at 19:23

## 2021-01-28 RX ADMIN — HYDROMORPHONE HYDROCHLORIDE 1 MG: 2 INJECTION, SOLUTION INTRAMUSCULAR; INTRAVENOUS; SUBCUTANEOUS at 17:31

## 2021-01-28 RX ADMIN — Medication 0.2 MG: at 16:11

## 2021-01-28 RX ADMIN — PROPOFOL 200 MCG/KG/MIN: 10 INJECTION, EMULSION INTRAVENOUS at 16:20

## 2021-01-28 RX ADMIN — MIDAZOLAM HYDROCHLORIDE 2 MG: 2 INJECTION, SOLUTION INTRAMUSCULAR; INTRAVENOUS at 16:11

## 2021-01-28 RX ADMIN — HYDROMORPHONE HYDROCHLORIDE 0.5 MG: 1 INJECTION, SOLUTION INTRAMUSCULAR; INTRAVENOUS; SUBCUTANEOUS at 19:03

## 2021-01-28 RX ADMIN — REMIFENTANIL HYDROCHLORIDE 0.2 MCG/KG/MIN: 1 INJECTION, POWDER, LYOPHILIZED, FOR SOLUTION INTRAVENOUS at 16:20

## 2021-01-28 RX ADMIN — PHENYLEPHRINE HYDROCHLORIDE 200 MCG: 10 INJECTION, SOLUTION INTRAMUSCULAR; INTRAVENOUS; SUBCUTANEOUS at 17:53

## 2021-01-28 RX ADMIN — LIDOCAINE HYDROCHLORIDE 100 MG: 20 INJECTION, SOLUTION INTRAVENOUS at 16:20

## 2021-01-28 RX ADMIN — PHENYLEPHRINE HYDROCHLORIDE 100 MCG: 10 INJECTION, SOLUTION INTRAMUSCULAR; INTRAVENOUS; SUBCUTANEOUS at 17:33

## 2021-01-28 RX ADMIN — FENTANYL CITRATE 100 MCG: 50 INJECTION INTRAMUSCULAR; INTRAVENOUS at 16:11

## 2021-01-28 RX ADMIN — METHOCARBAMOL 1000 MG: 100 INJECTION, SOLUTION INTRAMUSCULAR; INTRAVENOUS at 19:01

## 2021-01-28 RX ADMIN — SUGAMMADEX 200 MG: 100 INJECTION, SOLUTION INTRAVENOUS at 17:21

## 2021-01-28 RX ADMIN — Medication 140 MG: at 16:20

## 2021-01-28 RX ADMIN — OMEPRAZOLE 40 MG: 20 CAPSULE, DELAYED RELEASE ORAL at 09:35

## 2021-01-28 RX ADMIN — SODIUM CHLORIDE, SODIUM LACTATE, POTASSIUM CHLORIDE, AND CALCIUM CHLORIDE: .6; .31; .03; .02 INJECTION, SOLUTION INTRAVENOUS at 16:11

## 2021-01-28 RX ADMIN — PROPOFOL 200 MG: 10 INJECTION, EMULSION INTRAVENOUS at 16:20

## 2021-01-28 RX ADMIN — PROPOFOL 200 MG: 10 INJECTION, EMULSION INTRAVENOUS at 16:30

## 2021-01-28 RX ADMIN — ONDANSETRON 4 MG: 2 INJECTION INTRAMUSCULAR; INTRAVENOUS at 16:20

## 2021-01-28 RX ADMIN — AMLODIPINE BESYLATE 5 MG: 5 TABLET ORAL at 09:35

## 2021-01-28 RX ADMIN — PHENYLEPHRINE HYDROCHLORIDE 100 MCG: 10 INJECTION, SOLUTION INTRAMUSCULAR; INTRAVENOUS; SUBCUTANEOUS at 17:25

## 2021-01-28 RX ADMIN — HYDROMORPHONE HYDROCHLORIDE 0.5 MG: 1 INJECTION, SOLUTION INTRAMUSCULAR; INTRAVENOUS; SUBCUTANEOUS at 18:51

## 2021-01-28 RX ADMIN — LIDOCAINE HYDROCHLORIDE 100 MG: 20 INJECTION, SOLUTION INTRAVENOUS at 16:35

## 2021-01-28 RX ADMIN — DEXAMETHASONE SODIUM PHOSPHATE 10 MG: 4 INJECTION, SOLUTION INTRAMUSCULAR; INTRAVENOUS at 16:50

## 2021-01-28 RX ADMIN — Medication 10 ML: at 09:35

## 2021-01-28 RX ADMIN — CEFAZOLIN SODIUM 2000 MG: 1 POWDER, FOR SOLUTION INTRAMUSCULAR; INTRAVENOUS at 16:32

## 2021-01-28 ASSESSMENT — PULMONARY FUNCTION TESTS
PIF_VALUE: 21
PIF_VALUE: 23
PIF_VALUE: 21
PIF_VALUE: 41
PIF_VALUE: 25
PIF_VALUE: 26
PIF_VALUE: 24
PIF_VALUE: 26
PIF_VALUE: 24
PIF_VALUE: 26
PIF_VALUE: 19
PIF_VALUE: 5
PIF_VALUE: 23
PIF_VALUE: 24
PIF_VALUE: 24
PIF_VALUE: 26
PIF_VALUE: 26
PIF_VALUE: 21
PIF_VALUE: 27
PIF_VALUE: 25
PIF_VALUE: 21
PIF_VALUE: 21
PIF_VALUE: 24
PIF_VALUE: 26
PIF_VALUE: 25
PIF_VALUE: 28
PIF_VALUE: 26
PIF_VALUE: 21
PIF_VALUE: 26
PIF_VALUE: 24
PIF_VALUE: 25
PIF_VALUE: 1
PIF_VALUE: 3
PIF_VALUE: 25
PIF_VALUE: 22
PIF_VALUE: 21
PIF_VALUE: 25
PIF_VALUE: 24
PIF_VALUE: 26
PIF_VALUE: 19
PIF_VALUE: 26
PIF_VALUE: 23
PIF_VALUE: 24
PIF_VALUE: 24
PIF_VALUE: 21
PIF_VALUE: 24
PIF_VALUE: 24
PIF_VALUE: 23
PIF_VALUE: 19
PIF_VALUE: 27
PIF_VALUE: 21
PIF_VALUE: 21
PIF_VALUE: 19
PIF_VALUE: 23
PIF_VALUE: 24
PIF_VALUE: 21
PIF_VALUE: 26
PIF_VALUE: 25
PIF_VALUE: 26
PIF_VALUE: 24
PIF_VALUE: 26
PIF_VALUE: 26
PIF_VALUE: 4
PIF_VALUE: 26
PIF_VALUE: 25
PIF_VALUE: 24
PIF_VALUE: 2
PIF_VALUE: 26
PIF_VALUE: 26

## 2021-01-28 ASSESSMENT — PAIN SCALES - GENERAL
PAINLEVEL_OUTOF10: 7
PAINLEVEL_OUTOF10: 10
PAINLEVEL_OUTOF10: 4

## 2021-01-28 ASSESSMENT — PAIN DESCRIPTION - FREQUENCY
FREQUENCY: CONTINUOUS
FREQUENCY: CONTINUOUS

## 2021-01-28 ASSESSMENT — PAIN DESCRIPTION - PAIN TYPE
TYPE: ACUTE PAIN;SURGICAL PAIN

## 2021-01-28 ASSESSMENT — PAIN DESCRIPTION - LOCATION
LOCATION: INCISION;NECK
LOCATION: INCISION;NECK

## 2021-01-28 ASSESSMENT — PAIN DESCRIPTION - DESCRIPTORS: DESCRIPTORS: BURNING;SORE

## 2021-01-28 ASSESSMENT — PAIN DESCRIPTION - PROGRESSION: CLINICAL_PROGRESSION: GRADUALLY IMPROVING

## 2021-01-28 ASSESSMENT — PAIN DESCRIPTION - ONSET: ONSET: ON-GOING

## 2021-01-28 NOTE — PROGRESS NOTES
NEUROSURGERY     Erica Oconnor   [id number]   1960 1/28/2021    Interval History: 60 yo male admitted with back pain, lower extremity weakness and hand paresthesias. Plan for C3-C6 posterior decompression/fusion/fixation today with Dr. Britney Mock.     In language easily understood by a layperson, the risks and benefits of surgical intervention were discussed at length with the patient and any family members present. Risks including, but not limited to, infection, bleeding, possible blood transfusion, numbness, weakness, paralysis, loss of bowel or bladder control, or death were explained fully. Any questions were answered to the patient's satisfaction and signed consent obtained. Hospital Day #2      Subjective: Patient frustrated he cannot eat before surgery, otherwise no complaints.      Objective:  BP (!) 155/75   Pulse 64   Temp 98.5 °F (36.9 °C) (Oral)   Resp 14   Ht 5' 8\" (1.727 m)   Wt 228 lb (103.4 kg)   SpO2 97%   BMI 34.67 kg/m²     Labs:  Recent Labs     01/26/21  0545 01/27/21  0635 01/28/21  0646    136 138    101 103   CO2 25 24 25   BUN 10 10 10   CREATININE 0.9 0.8 0.9   GLUCOSE 104* 168* 148*     Recent Labs     01/26/21  0545 01/27/21  0635 01/27/21  1529 01/28/21  0646   WBC 3.4* 2.3*  --  2.4*   RBC 3.85* 3.75*  --  3.77*   INR 1.10  --  0.97  --        Neurologic Exam:  GCS:  4 - Opens eyes on own  5 - Alert and oriented  6 - Follows simple motor commands    Mental Status: Awake, alert, oriented x 4  Attention: Intact  Sensation: n/t to bilateral fingertips, otherwise sensation intact to light touch      DTRs:     Right  Left    ankle clonus  - -   toes (babinski)  down Down      Hoffmans negative bilaterally      Musculoskeletal:   Gait: Not tested   Assist devices: None   Tone: normal   Motor strength:     Right  Left      Right  Left    Deltoid  5 5   Hip Flex  5 5   Biceps  5 5   Knee Extensors  5 5   Triceps  5 5   Knee Flexors  5 5   Wrist Ext  5 5   Ankle

## 2021-01-28 NOTE — PLAN OF CARE
Problem: Falls - Risk of:  Goal: Will remain free from falls  1/27/2021 2153 by Yaritza Betts RN  Outcome: Ongoing   Calls out appropriately. Bed locked in lowest position. Bed alarm on. Call light/belongings within reach. Problem: Skin Integrity:  Goal: Absence of new skin breakdown  1/27/2021 2153 by Yaritza Betts RN  Outcome: Ongoing   Skin intact. Abrasions to knees and elbows (healing). Patient moves well in bed. Problem: Pain:  Goal: Control of acute pain  Outcome: Ongoing   Pain treated with oral medication.

## 2021-01-28 NOTE — ANESTHESIA POSTPROCEDURE EVALUATION
Department of Anesthesiology  Postprocedure Note    Patient: Marisol Scott  MRN: [id number]  YOB: 1960  Date of evaluation: 1/28/2021  Time:  6:59 PM     Procedure Summary     Date: 01/28/21 Room / Location: [facility] [address] [place] [address] / [facility]    Anesthesia Start: 7192 Anesthesia Stop: 1837    Procedure: C3-4, C4-5, C5-6 POSTERIOR CERVICAL DECOMPRESSION, FUSION AND FIXATION (N/A ) Diagnosis:       Cervical spinal cord compression ([address]      Cervical spinal stenosis      (Cervical spinal cord compression ([address] [G95.20] Cervical spinal stenosis [M48.02])    Surgeons: Montrell Price MD Responsible Provider:     Anesthesia Type: general ASA Status: 3          Anesthesia Type: No value filed. Harvinder Phase I:      Harvinder Phase II:      Last vitals: Reviewed and per EMR flowsheets.        Anesthesia Post Evaluation    Patient location during evaluation: PACU  Patient participation: complete - patient participated  Level of consciousness: awake and alert  Pain score: 0  Airway patency: patent  Nausea & Vomiting: no nausea and no vomiting  Complications: no  Cardiovascular status: hemodynamically stable  Respiratory status: acceptable  Hydration status: euvolemic

## 2021-01-28 NOTE — PLAN OF CARE
Problem: Falls - Risk of:  Goal: Will remain free from falls  Description: Will remain free from falls  Outcome: Ongoing     Problem: Skin Integrity:  Goal: Absence of new skin breakdown  Description: Absence of new skin breakdown  Outcome: Ongoing     Problem: Pain:  Goal: Pain level will decrease  Description: Pain level will decrease  Outcome: Met This Shift     Problem: Discharge Planning:  Goal: Discharged to appropriate level of care  Description: Discharged to appropriate level of care  Outcome: Ongoing     Problem: Mobility - Impaired:  Goal: Mobility will improve to maximum level  Description: Mobility will improve to maximum level  Outcome: Ongoing     Problem: Mobility - Impaired:  Goal: Mobility will improve to maximum level  Description: Mobility will improve to maximum level  Outcome: Ongoing     Problem: Pain - Acute:  Goal: Pain level will decrease  Description: Pain level will decrease  Outcome: Met This Shift

## 2021-01-28 NOTE — OP NOTE
Operative Report    PATIENT NAME: Chencho Mondragon  YOB: 1960  MEDICAL RECORD# [id number]  SURGERY DATE: 01/28/2021    SURGEON:  Adwoa Amador MD, PhD    ASSISTANT:  MD Dr. Petros Pal served as assistant on this surgery due to the complex nature of the procedure and the lack of a resident or fellow assistant. He provided assistance with exposure, instrumentation, and cervical decompression. PREOPERATIVE DIAGNOSIS:  1. Multilevel spondylosis from C3-6  2. C3-6 central stenosis  3. Central cord syndrome with paresis    POSTOPERATIVE DIAGNOSIS:  1.  Same. OPERATIVE PROCEDURES PERFORMED:  1. C3-C6 bilateral decompressive laminectomies for decompression of spinal cord   2. C3-C6 bilateral lateral mass screw fixation  4. C3-C6 bilateral posterolateral fusion with FiberGraft allograft  5. Intraoperative fluoroscopy  6. Intraoperative neuromonitoring (MEP, EMG, SSEP)    ANESTHESIA:  General    INDICATIONS:  Mr. Lorenza Herrera is a 61year old man who presented with central cord syndrome after a fall at work. He reported bilateral hand and arm weakness with numbness. He also noted bilateral leg weakness and imbalance. Imaging demonstrated evidence of significant central stenosis spanning C3-6. Given these findings and the patients symptoms, surgical decompression with fixation and fusion was recommended. The patient understands the risks and benefits of the procedure including but not limited to bleeding, infection, worsened neurologic outcome, vascular injury, cerebral spinal fluid leak, pseudoarthrosis, need for additional procedures, anesthesia risks, and death. PROCEDURES IN DETAIL:      Under universal protocol and informed consent, the patient was brought to the operating room. General anesthesia was induced and the patient was intubated with careful attention to not extend the patients neck. A Jauregui catheter was placed. Neuromonitoring leads (SSEPs, MEPs, EMG) were placed.  After pre-positioning baselines were obtained, the patient was placed in Gardner 3-pin fixation and positioned prone onto chest rolls. All pressure points were appropriately padded. The shoulders was pulled down and away using silk tape. A linear incision spanning C3-C6 levels was planned and shaved. Intravenous antibiotics (Ancef) and Decadron were given by anesthesia. Strict attention was paid to maintain systolic blood pressure above 120mm Hg and avoid hypotension. A time out was completed and the surgical site was prepped and draped in the usual sterile fashion. After infiltration of the skin with 1% Lidocaine with 1:100,000 Epinephrine, the incision was made with a #15 scalpel. Bovie electrocautery was then used to complete the dissection down to the fascia which was split at the midline. Subperiosteal dissection was undertaken to fully expose the lamina and spinous processes of C3 to C6 bilaterally. Dissection was carried out to the lateral edges of the lateral masses bilaterally. Angled Weitlaner retractors were inserted. Fluoroscopy was used to confirm proper identification of cervical levels. At this time, the laminectomy was initiated with drilling of troughs on the lateral aspects of the laminae from of C4 and C5 using the AM8 attachment on a [employer] Dial[employer] drill. The spinous process and lamina bone complex was then elevated up off the dura while the residual ligamentous attachments were released with Kerrison punch forceps. The inferior half of C3 lamina and the superior half of C6 lamina were also progressively removed to decompress the C3-4 and C5-6 interspaces. Once the bony complex was detached, additional bony edges and compressive ligamentum flavum were removed to complete the decompression. No neuromonitoring changes were noted during the decompression. With the central canal decompression completed, attention was turned to fixation of the subaxial cervical spine.  Lateral mass screws were placed from C3-C6 bilaterally. This was accomplished by drilling  holes in the center of the lateral mass and then drilling a path angled 30 degrees lateral and 20 degrees rostral. 14mm screws were placed at each level except at C4 on the right where a 12mm screw was placed. All screws had good bony purchase. The position of the screws was verified with AP and lateral fluoroscopy. Rods were then placed over the screw heads, cut, and bent to fit. Caps were placed over these to lock in the rods. Finally, the facets and remaining posterior elements were decorticated from C3-C6 bilaterally, along with the exposed lateral aspects of the lateral masses. FiberGraft allograft was then placed in the posterolateral gutters and into the facets spanning C3-C6 to provide a substrate for fusion. Meticulous hemostasis was undertaken and the wound was irrigated copiously with antibiotic solution. A medium hemovac drain was then laid deep to the fascia, tunneled away from the incision line, and secured to the skin with a stitch. The incision was then closed in layers using 0 Vicryl sutures in the deep muscles and fascia, 2-0 Vicryl sutures in the subcutaneous tissues, and a running 3-0 Ethilon suture for the skin. Exparel was injected jane-incisionally in the paraspinous muscles. The wound was dressed with antibiotic ointment, Telfa, and Medipore tape. All needle, sponge, and instrument counts were correct. Notably, SSEPs, MEPs, and EMGs remained at baseline for the duration of the case. The patient was turned back to supine position onto a hospital bed, taken out of Leigh 3-pin fixation, and extubated. He was taken to the PACU in stable condition. I affirm in accordance with CMS regulations that I was present and scrubbed for all critical portions of the case. ESTIMATED BLOOD LOSS: 75 mL    IMPLANTS:   1.  Depuy Synthes Symphony posterior cervical screw system   C3: 3.5 x 14 mm screws bilaterally   C4: 3.5 x 14 mm screw on left, 3.5 x 12 screw on right     C5: 3.5 x 14 mm screw on left   C6: 3.5 x 14 mm screw on left   2. Depuy Synthes Symphony 4.0 x 55 mm Ti rods bilaterally  3. FiberGraft allograft (6.25 cc)    SPECIMEN:   1. None    DRAINS:   1. Medium Hemovac Drain    DISPOSITION:  PACU in stable condition.

## 2021-01-28 NOTE — PROGRESS NOTES
Patient A&Ox4, VSS with exception to an elevated BP, pt asymptomatic. Neuro checks unchanged, pt denies pain, reports numbness and tingling in finger tips. Patient remains NPO, anticipating OR this afternoon. Will continue to monitor.

## 2021-01-28 NOTE — PROGRESS NOTES
Hospitalist Progress Note      PCP: [facility]    Date of Admission: 1/26/2021    Chief Complaint: Bilateral lower extremity weakness    Hospital Course: 71-year-old male presented to the hospital bilateral lower extremity weakness. MRI of the spine showed multilevel spinal stenosis. Neurosurgery following    Subjective: No acute events reported overnight. Awaiting OR today    Medications:  Reviewed    Infusion Medications    sodium chloride 75 mL/hr at 01/27/21 2356    dextrose       Scheduled Medications    ceFAZolin  2,000 mg Intravenous Once    sodium chloride flush  10 mL Intravenous 2 times per day    amLODIPine  5 mg Oral Daily    omeprazole  40 mg Oral QAM    cefTRIAXone (ROCEPHIN) IV  1,000 mg Intravenous Q24H    influenza virus vaccine  0.5 mL Intramuscular Prior to discharge    insulin lispro  0-6 Units Subcutaneous 4x Daily AC & HS     PRN Meds: sodium chloride flush, potassium chloride, magnesium sulfate, promethazine **OR** ondansetron, famotidine, acetaminophen **OR** acetaminophen, glucose, dextrose, glucagon (rDNA), dextrose      Intake/Output Summary (Last 24 hours) at 1/28/2021 1449  Last data filed at 1/28/2021 0425  Gross per 24 hour   Intake 240 ml   Output --   Net 240 ml       Physical Exam Performed:    BP (!) 160/90   Pulse 59   Temp 97.8 °F (36.6 °C) (Axillary)   Resp 16   Ht 5' 8\" (1.727 m)   Wt 228 lb (103.4 kg)   SpO2 97%   BMI 34.67 kg/m²     General appearance: No apparent distress, appears stated age and cooperative. HEENT: Pupils equal, round, and reactive to light. Conjunctivae/corneas clear. Neck: Supple, with full range of motion. No jugular venous distention. Trachea midline. Respiratory:  Normal respiratory effort. Clear to auscultation, bilaterally without Rales/Wheezes/Rhonchi. Cardiovascular: Regular rate and rhythm with normal S1/S2 without murmurs, rubs or gallops.   Abdomen: Soft, non-tender, non-distended with normal bowel sounds. Musculoskeletal: No clubbing, cyanosis or edema bilaterally. Full range of motion without deformity. Skin: Skin color, texture, turgor normal.  No rashes or lesions. Neurologic:  Neurovascularly intact without any focal sensory/motor deficits. Cranial nerves: II-XII intact, grossly non-focal.  Psychiatric: Alert and oriented, thought content appropriate, normal insight  Capillary Refill: Brisk,< 3 seconds   Peripheral Pulses: +2 palpable, equal bilaterally       Labs:   Recent Labs     01/26/21  0545 01/27/21  0635 01/27/21  1529 01/28/21  0646   WBC 3.4* 2.3*  --  2.4*   HGB 12.7* 12.5*  --  12.8*   HCT 38.4* 37.0* 37.0 36.7*    159  --  162     Recent Labs     01/26/21  0545 01/27/21  0635 01/28/21  0646    136 138   K 3.4* 3.4* 3.5    101 103   CO2 25 24 25   BUN 10 10 10   CREATININE 0.9 0.8 0.9   CALCIUM 9.6 9.1 9.3     Recent Labs     01/26/21  0545 01/27/21  0635   AST 27 24   ALT 19 19   BILITOT 0.7 0.5   ALKPHOS 158* 131*     Recent Labs     01/26/21  0545 01/27/21  1529   INR 1.10 0.97     No results for input(s): Hooker Prosper in the last 72 hours. Urinalysis:      Lab Results   Component Value Date    NITRU POSITIVE 01/25/2021    WBCUA 85 01/25/2021    BACTERIA 4+ 01/25/2021    RBCUA 2 01/25/2021    BLOODU SMALL 01/25/2021    SPECGRAV 1.021 01/25/2021    GLUCOSEU Negative 01/25/2021       Radiology:  CT CERVICAL SPINE WO CONTRAST   Final Result      1. Straightening of the normal cervical lordosis without spondylolisthesis. 2. No evidence of vertebral compression deformity or acute fracture. 3. Moderate cervical spondylosis superimposed on short pedicles contributing to multilevel spinal stenosis which is moderate to severe at C3-4 and C5-6. These findings are better evaluated on the recent prior MRI.               Assessment/Plan:    Cervical spinal central canal stenosis    Lumbo-sacral spinal canal stenosis  Bilateral Lower extremity weakness and numbness  - Neurosurgery following, plan for C3-6 posterior decompression/fusion/fixation on Thursday per Dr. Charisse Ziegler  - Hold ASA  - Pain control  - Neuro checks q4h  - PT/OT eval     UTI  -Continue Rocephin     Diabetes mellitus  - LSSI    HTN  - Continue home amlodipine     HLD  - Continue home statin    DVT Prophylaxis: SCD  Diet: Diet NPO, After Midnight  Code Status: Full Code    PT/OT Eval Status: Consulted    Dispo -OR today    Maite Donato MD

## 2021-01-28 NOTE — PROGRESS NOTES
Patient a/o x4. Vitals stable. Pain treated with oral medication. Pre-op shower done Wednesday evening. Tolerating ambulation well with walker. NPO @ MN.

## 2021-01-28 NOTE — PROGRESS NOTES
Neurosurgery Progress Note    Patient seen and examined on 01/28/21. No acute events overnight. Reports persistent leg weakness and paresthesias in the arms. Neurologically stable on exam.     A/P:  62 yo man with central cord syndrome and myelopathy 2/2 severe central stenosis    -Neuro stable  -Frequent neuro checks  -Pain control with PO meds  -Muscle relaxants prn  -Mi[place] J collar  -Plan to OR today for C3-6 D/F/F. All questions answered.    -NPO for procedure  -Will follow      Jenae Lyle MD, PhD  [address], [address] [place] [address], [id number] [phone] (c), [phone] (o)

## 2021-01-28 NOTE — PROGRESS NOTES
Neurosurgery Progress Note    Patient seen and examined on 01/27/21. No acute events overnight. Reports persistent leg weakness and paresthesias in the arms. Neurologically stable on exam.     A/P:  60 yo man with central cord syndrome and myelopathy 2/2 severe central stenosis    -Neuro stable  -Frequent neuro checks  -Pain control with PO meds  -Muscle relaxants prn  -Mi[place] J collar  -Plan to OR tomorrow for C3-6 D/F/F. Discussed the particulars of the case as well as risks and benefits at length with patient. All questions answered.    -NPO at midnight  -Medical clearance  -Will follow      Liz Felix MD, PhD  [address], [address] [place] [address], [address] [phone] (c), [phone] (o)

## 2021-01-28 NOTE — CARE COORDINATION
Case Management Daily Note                    Date: 1/28/2021     Patient Name: August Sellers    Date of Admission: 1/26/2021  2:02 AM  YOB: 1960    Length of Stay: 2         Patient Admission Status: Inpatient  Diagnosis:Acute low back pain with possible spinal stenosis of less than six weeks' duration [M54.5]     ________________________________________________________________________________________  Discharge Plan: Home  From home independent at baseline     Insurance: Payor: Edison Spence / Plan: Edison Spence ([facility] [place] / Product Type: *No Product type* /   Is pre-cert/notification needed: yes     Tentative discharge date: 1/29 vs 1/30     Current barriers: OR today, therapy post, transport    Referrals completed: Not Applicable    Resources/ information provided: Not indicated at this time   ________________________________________________________________________________________  PT AM-PAC:   / 24 per last evaluation on: Pending     OT AM-PAC:   / 24 per last evaluation on: Pending     DME Needs for discharge: defer   ________________________________________________________________________________________  Notes/Plan of Care:   SW rounded on this date. Patient going to OR today for C3-C6. Therapy to eval post op. SW to follow     Jakub Bird and/or his family were provided with choice of provider; he and/or his family are in agreement with the discharge plan at this time.     Care Transition Patient: TON Lay  [facility]   Case Management Department  Ph: [phone]

## 2021-01-28 NOTE — BRIEF OP NOTE
Brief Postoperative Note      Patient: Matthew Hillman  YOB: 1960  MRN: [id number]    Date of Procedure: 1/28/2021    Pre-Op Diagnosis: Cervical spinal cord compression (HCC) [G95.20] Cervical spinal stenosis [M48.02]    Post-Op Diagnosis: Same       Procedure(s):  C3-4, C4-5, C5-6 POSTERIOR CERVICAL DECOMPRESSION, FUSION AND FIXATION    Surgeon(s):  MD Giovanni Gooden MD    Assistant:  Surgical Assistant: Chanel Maya    Anesthesia: General    Estimated Blood Loss (mL): 75 cc    Complications: None    Specimens:   * No specimens in log *    Implants:  See operative log      Drains: Hemovac drain    Findings: Severe stenosis C3-6    Electronically signed by Shea Interiano MD on 1/28/2021 at 5:59 PM

## 2021-01-28 NOTE — ANESTHESIA PRE PROCEDURE
Department of Anesthesiology  Preprocedure Note       Name:  Jitendra Reddy   Age:  61 y.o.  :  1960                                          MRN:  [id number]         Date:  2021      Surgeon: Joseph Fuentes):  Leny Johnson MD    Procedure: Procedure(s):  C3-4, C4-5, C5-6 POSTERIOR CERVICAL DECOMPRESSION, FUSION AND FIXATION    Medications prior to admission:   Prior to Admission medications    Medication Sig Start Date End Date Taking?  Authorizing Provider   valsartan (DIOVAN) 160 MG tablet Take 160 mg by mouth daily   Yes Historical Provider, MD   amLODIPine (NORVASC) 5 MG tablet Take 5 mg by mouth daily   Yes Historical Provider, MD   metFORMIN (GLUCOPHAGE-XR) 500 MG extended release tablet Take 500 mg by mouth daily (with breakfast)   Yes Historical Provider, MD   aspirin 81 MG EC tablet Take 81 mg by mouth daily   Yes Historical Provider, MD   celecoxib (CELEBREX) 200 MG capsule Take 200 mg by mouth daily   Yes Historical Provider, MD   omeprazole (PRILOSEC) 40 MG delayed release capsule Take 40 mg by mouth daily as needed   Yes Historical Provider, MD   predniSONE (DELTASONE) 10 MG tablet take 3 tabs PO BID x2 days, then 2 tabs PO BID x2 days, then 1 tab PO BID x2 days, then 1 tab PO daily x2 days 21  Yes Abad Villanueva MD       Current medications:    Current Facility-Administered Medications   Medication Dose Route Frequency Provider Last Rate Last Admin    HYDROmorphone (DILAUDID) injection 0.25 mg  0.25 mg Intravenous Q5 Min PRN Romelia Daley MD        HYDROmorphone (DILAUDID) injection 0.5 mg  0.5 mg Intravenous Q5 Min PRN Romelia Daley MD        morphine injection 1 mg  1 mg Intravenous Q5 Min PRN Romelia Daley MD        HYDROmorphone (DILAUDID) injection 0.5 mg  0.5 mg Intravenous Q5 Min PRN Romelia Daley MD        oxyCODONE (ROXICODONE) immediate release tablet 5 mg  5 mg Oral PRN Romelia Daley MD        Or    oxyCODONE (ROXICODONE) immediate release tablet 10 mg 10 mg Oral PRN Concha Mondragon MD        diphenhydrAMINE (BENADRYL) injection 12.5 mg  12.5 mg Intravenous Once PRN Concha Mondragon MD        metoclopramide [facility]) injection 10 mg  10 mg Intravenous Once PRN Concha Mondragon MD        promethazine [facility]) injection 6.25 mg  6.25 mg Intravenous Once PRN Concha Mondragon MD        labetalol (NORMODYNE;TRANDATE) injection syringe 5 mg  5 mg Intravenous Q10 Min PRN Concha Mondragon MD        hydrALAZINE (APRESOLINE) injection 5 mg  5 mg Intravenous Q10 Min PRN Concha Mondragon MD        meperidine (DEMEROL) injection 12.5 mg  12.5 mg Intravenous Q5 Min PRN Concha Mondragon MD        0.9 % sodium chloride infusion   Intravenous Continuous William Delores, APRN - NP 75 mL/hr at 01/27/21 2356 New Bag at 01/27/21 2356    ceFAZolin (ANCEF) 2000 mg in dextrose 3 % 50 mL IVPB (duplex)  2,000 mg Intravenous Once William Delores, APRN - NP        sodium chloride flush 0.9 % injection 10 mL  10 mL Intravenous 2 times per day Jenene Duty A [place], DO   10 mL at 01/28/21 0935    sodium chloride flush 0.9 % injection 10 mL  10 mL Intravenous PRN Imelda Ibrahim, DO        potassium chloride 10 mEq/100 mL IVPB (Peripheral Line)  10 mEq Intravenous PRN John Copa, DO        magnesium sulfate 2000 mg in 50 mL IVPB premix  2,000 mg Intravenous PRN Pedmigue Diazzi, DO        promethazine (PHENERGAN) tablet 12.5 mg  12.5 mg Oral Q6H PRN Ahmad A [place], DO        Or    ondansetron (ZOFRAN) injection 4 mg  4 mg Intravenous Q6H PRN Ahmad A [place], DO        famotidine (PEPCID) tablet 20 mg  20 mg Oral Daily PRN Pedmigue Alejandrajazi, DO        acetaminophen (TYLENOL) tablet 650 mg  650 mg Oral Q6H PRN Ahmad A [place], DO        Or    acetaminophen (TYLENOL) suppository 650 mg  650 mg Rectal Q6H PRN Imelda Ibrahim, DO        amLODIPine (NORVASC) tablet 5 mg  5 mg Oral Daily Mj Ibrahim DO   5 mg at 01/28/21 0935    omeprazole (PRILOSEC) delayed release capsule 40 mg  40 mg Oral QAM Mj Ibrahim, DO   40 mg at 01/28/21 0935    glucose (GLUTOSE) 40 % oral gel 15 g  15 g Oral PRN Mj Ibrahim DO        dextrose 50 % IV solution  12.5 g Intravenous PRN Jaden Pradhan ABI Ibrahim DO        glucagon (rDNA) injection 1 mg  1 mg Intramuscular PRN Mj Ibrahim, DO        dextrose 5 % solution  100 mL/hr Intravenous PRN Mj Ibrahim DO        cefTRIAXone (ROCEPHIN) 1000 mg IVPB in 50 mL D5W minibag  1,000 mg Intravenous Q24H [facility] MD ALLAN   Stopped at 01/28/21 1328    influenza quadrivalent split vaccine (FLUZONE;FLUARIX;FLULAVAL;AFLURIA) injection 0.5 mL  0.5 mL Intramuscular Prior to discharge [facility] MD ALLAN        insulin lispro (1 Unit Dial) 0-6 Units  0-6 Units Subcutaneous 4x Daily AC & HS Ricarda Gloria MD   Stopped at 01/28/21 1211       Allergies:  No Known Allergies    Problem List:    Patient Active Problem List   Diagnosis Code    Contusion of left ankle S90. 02XA    Sciatica, right side M54.31    Acute low back pain with possible spinal stenosis of less than six weeks' duration M54.5       Past Medical History:        Diagnosis Date    Diabetes mellitus type 1 ([address]     Hyperlipidemia     Hypertension        Past Surgical History:        Procedure Laterality Date    GASTRIC BYPASS SURGERY         Social History:    Social History     Tobacco Use    Smoking status: Never Smoker    Smokeless tobacco: Never Used   Substance Use Topics    Alcohol use:  Yes     Alcohol/week: 4.0 standard drinks     Types: 2 Cans of beer, 2 Shots of liquor per week                                Counseling given: Not Answered      Vital Signs (Current):   Vitals:    01/27/21 2330 01/28/21 0330 01/28/21 0650 01/28/21 1143   BP: (!) 144/78 (!) 161/77 (!) 155/75 (!) 160/90   Pulse: 67 66 64 59   Resp: 14 14 14 16   Temp: 98.4 °F (36.9 °C) 98.2 °F (36.8 °C) 98.5 °F (36.9 °C) 97.8 °F (36.6 °C)   TempSrc: Oral Oral Oral Axillary   SpO2: 98% 97% 97% 97%   Weight:       Height: BP Readings from Last 3 Encounters:   01/28/21 (!) 160/90   01/26/21 (!) 152/86   12/15/20 (!) 151/88       NPO Status:                                                                                 BMI:   Wt Readings from Last 3 Encounters:   01/26/21 228 lb (103.4 kg)   01/25/21 225 lb 8.5 oz (102.3 kg)   01/22/21 228 lb (103.4 kg)     Body mass index is 34.67 kg/m².     CBC:   Lab Results   Component Value Date    WBC 2.4 01/28/2021    RBC 3.77 01/28/2021    HGB 12.8 01/28/2021    HCT 36.7 01/28/2021    MCV 97.4 01/28/2021    RDW 14.7 01/28/2021     01/28/2021       CMP:   Lab Results   Component Value Date     01/28/2021    K 3.5 01/28/2021    K 3.4 01/27/2021     01/28/2021    CO2 25 01/28/2021    BUN 10 01/28/2021    CREATININE 0.9 01/28/2021    GFRAA >60 01/28/2021    AGRATIO 1.2 01/27/2021    LABGLOM >60 01/28/2021    GLUCOSE 148 01/28/2021    PROT 6.8 01/27/2021    CALCIUM 9.3 01/28/2021    BILITOT 0.5 01/27/2021    ALKPHOS 131 01/27/2021    AST 24 01/27/2021    ALT 19 01/27/2021       POC Tests:   Recent Labs     01/28/21  1142   POCGLU 117*       Coags:   Lab Results   Component Value Date    PROTIME 11.2 01/27/2021    INR 0.97 01/27/2021       HCG (If Applicable): No results found for: PREGTESTUR, PREGSERUM, HCG, HCGQUANT     ABGs: No results found for: PHART, PO2ART, IFA3VZO, ZNH4MFU, BEART, O4VFIXXU     Type & Screen (If Applicable):  No results found for: LABABO, LABRH    Drug/Infectious Status (If Applicable):  No results found for: HIV, HEPCAB    COVID-19 Screening (If Applicable):   Lab Results   Component Value Date    COVID19 Not Detected 01/27/2021         Anesthesia Evaluation  Patient summary reviewed and Nursing notes reviewed no history of anesthetic complications:   Airway: Mallampati: II  TM distance: >3 FB   Neck ROM: full  Mouth opening: > = 3 FB Dental:          Pulmonary:                              Cardiovascular:    (+) hypertension:,                   Neuro/Psych:               GI/Hepatic/Renal: Neg GI/Hepatic/Renal ROS            Endo/Other:    (+) DiabetesType II DM, , .                 Abdominal:           Vascular:                                        Anesthesia Plan      general     ASA 1    (80-year-old male presents for C3-4, C4-5, C5-6 POSTERIOR CERVICAL DECOMPRESSION, FUSION AND FIXATION. Plan general anesthesia with ASA standard monitors. Questions answered. Patient agreeable with anesthetic plan.  )  Induction: intravenous. Anesthetic plan and risks discussed with patient. Plan discussed with CRNA.     Attending anesthesiologist reviewed and agrees with Pre Eval content        Tio Pang MD   1/28/2021

## 2021-01-29 ENCOUNTER — APPOINTMENT (OUTPATIENT)
Dept: GENERAL RADIOLOGY | Age: 61
DRG: 023 | End: 2021-01-29
Attending: INTERNAL MEDICINE
Payer: MEDICAID

## 2021-01-29 LAB
ANION GAP SERPL CALCULATED.3IONS-SCNC: 11 MMOL/L (ref 3–16)
BLOOD CULTURE, ROUTINE: NORMAL
BUN BLDV-MCNC: 7 MG/DL (ref 7–20)
CALCIUM SERPL-MCNC: 9 MG/DL (ref 8.3–10.6)
CHLORIDE BLD-SCNC: 103 MMOL/L (ref 99–110)
CO2: 22 MMOL/L (ref 21–32)
CREAT SERPL-MCNC: 0.9 MG/DL (ref 0.8–1.3)
CULTURE, BLOOD 2: NORMAL
GFR AFRICAN AMERICAN: >60
GFR NON-AFRICAN AMERICAN: >60
GLUCOSE BLD-MCNC: 214 MG/DL (ref 70–99)
HCT VFR BLD CALC: 35.5 % (ref 40.5–52.5)
HCT VFR BLD CALC: 37 %
HEMOGLOBIN: 12 G/DL (ref 13.5–17.5)
MCH RBC QN AUTO: 33.6 PG (ref 26–34)
MCHC RBC AUTO-ENTMCNC: 33.9 G/DL (ref 31–36)
MCV RBC AUTO: 99.2 FL (ref 80–100)
PDW BLD-RTO: 14.6 % (ref 12.4–15.4)
PLATELET # BLD: 157 K/UL (ref 135–450)
PMV BLD AUTO: 7.7 FL (ref 5–10.5)
POTASSIUM SERPL-SCNC: 3.9 MMOL/L (ref 3.5–5.1)
RBC # BLD: 3.58 M/UL (ref 4.2–5.9)
RBC FOLATE: 768 NG/ML
SODIUM BLD-SCNC: 136 MMOL/L (ref 136–145)
WBC # BLD: 6.8 K/UL (ref 4–11)

## 2021-01-29 PROCEDURE — 6370000000 HC RX 637 (ALT 250 FOR IP): Performed by: NEUROLOGICAL SURGERY

## 2021-01-29 PROCEDURE — 72040 X-RAY EXAM NECK SPINE 2-3 VW: CPT

## 2021-01-29 PROCEDURE — 97530 THERAPEUTIC ACTIVITIES: CPT

## 2021-01-29 PROCEDURE — 2580000003 HC RX 258: Performed by: NEUROLOGICAL SURGERY

## 2021-01-29 PROCEDURE — 97535 SELF CARE MNGMENT TRAINING: CPT

## 2021-01-29 PROCEDURE — 6360000002 HC RX W HCPCS: Performed by: NEUROLOGICAL SURGERY

## 2021-01-29 PROCEDURE — 97116 GAIT TRAINING THERAPY: CPT

## 2021-01-29 PROCEDURE — 80048 BASIC METABOLIC PNL TOTAL CA: CPT

## 2021-01-29 PROCEDURE — 85027 COMPLETE CBC AUTOMATED: CPT

## 2021-01-29 PROCEDURE — 6360000002 HC RX W HCPCS: Performed by: INTERNAL MEDICINE

## 2021-01-29 PROCEDURE — 97166 OT EVAL MOD COMPLEX 45 MIN: CPT

## 2021-01-29 PROCEDURE — 36415 COLL VENOUS BLD VENIPUNCTURE: CPT

## 2021-01-29 PROCEDURE — 97162 PT EVAL MOD COMPLEX 30 MIN: CPT

## 2021-01-29 PROCEDURE — 1200000000 HC SEMI PRIVATE

## 2021-01-29 RX ORDER — HEPARIN SODIUM 5000 [USP'U]/ML
5000 INJECTION, SOLUTION INTRAVENOUS; SUBCUTANEOUS EVERY 8 HOURS
Status: DISCONTINUED | OUTPATIENT
Start: 2021-01-29 | End: 2021-02-03 | Stop reason: HOSPADM

## 2021-01-29 RX ADMIN — OXYCODONE 10 MG: 5 TABLET ORAL at 00:06

## 2021-01-29 RX ADMIN — OMEPRAZOLE 40 MG: 20 CAPSULE, DELAYED RELEASE ORAL at 09:49

## 2021-01-29 RX ADMIN — METHOCARBAMOL 1000 MG: 100 INJECTION, SOLUTION INTRAMUSCULAR; INTRAVENOUS at 02:52

## 2021-01-29 RX ADMIN — OXYCODONE 10 MG: 5 TABLET ORAL at 05:07

## 2021-01-29 RX ADMIN — CEFAZOLIN SODIUM 2000 MG: 2 SOLUTION INTRAVENOUS at 17:14

## 2021-01-29 RX ADMIN — DOCUSATE SODIUM 50 MG AND SENNOSIDES 8.6 MG 1 TABLET: 8.6; 5 TABLET, FILM COATED ORAL at 21:03

## 2021-01-29 RX ADMIN — METHOCARBAMOL 1000 MG: 100 INJECTION, SOLUTION INTRAMUSCULAR; INTRAVENOUS at 11:00

## 2021-01-29 RX ADMIN — AMLODIPINE BESYLATE 5 MG: 5 TABLET ORAL at 09:49

## 2021-01-29 RX ADMIN — OXYCODONE 10 MG: 5 TABLET ORAL at 21:03

## 2021-01-29 RX ADMIN — DIAZEPAM 5 MG: 5 TABLET ORAL at 05:07

## 2021-01-29 RX ADMIN — CEFAZOLIN SODIUM 2000 MG: 2 SOLUTION INTRAVENOUS at 02:10

## 2021-01-29 RX ADMIN — FAMOTIDINE 20 MG: 20 TABLET ORAL at 09:49

## 2021-01-29 RX ADMIN — FAMOTIDINE 20 MG: 20 TABLET ORAL at 21:03

## 2021-01-29 RX ADMIN — CEFAZOLIN SODIUM 2000 MG: 2 SOLUTION INTRAVENOUS at 10:21

## 2021-01-29 RX ADMIN — OXYCODONE 10 MG: 5 TABLET ORAL at 17:14

## 2021-01-29 RX ADMIN — HEPARIN SODIUM 5000 UNITS: 5000 INJECTION INTRAVENOUS; SUBCUTANEOUS at 21:03

## 2021-01-29 RX ADMIN — Medication 10 ML: at 21:03

## 2021-01-29 RX ADMIN — OXYCODONE 10 MG: 5 TABLET ORAL at 12:02

## 2021-01-29 RX ADMIN — HYDROMORPHONE HYDROCHLORIDE 1 MG: 1 INJECTION, SOLUTION INTRAMUSCULAR; INTRAVENOUS; SUBCUTANEOUS at 07:37

## 2021-01-29 RX ADMIN — POLYETHYLENE GLYCOL 3350 17 G: 17 POWDER, FOR SOLUTION ORAL at 09:49

## 2021-01-29 RX ADMIN — CEFTRIAXONE 1000 MG: 1 INJECTION, POWDER, FOR SOLUTION INTRAMUSCULAR; INTRAVENOUS at 12:30

## 2021-01-29 ASSESSMENT — PAIN SCALES - GENERAL
PAINLEVEL_OUTOF10: 7

## 2021-01-29 ASSESSMENT — PAIN DESCRIPTION - PAIN TYPE
TYPE: ACUTE PAIN;SURGICAL PAIN
TYPE: SURGICAL PAIN

## 2021-01-29 ASSESSMENT — PAIN DESCRIPTION - FREQUENCY
FREQUENCY: CONTINUOUS
FREQUENCY: CONTINUOUS

## 2021-01-29 ASSESSMENT — PAIN DESCRIPTION - PROGRESSION
CLINICAL_PROGRESSION: NOT CHANGED

## 2021-01-29 ASSESSMENT — PAIN DESCRIPTION - ONSET
ONSET: ON-GOING
ONSET: ON-GOING

## 2021-01-29 ASSESSMENT — PAIN DESCRIPTION - LOCATION: LOCATION: INCISION;NECK

## 2021-01-29 ASSESSMENT — PAIN DESCRIPTION - DESCRIPTORS
DESCRIPTORS: BURNING;SORE
DESCRIPTORS: BURNING;SORE
DESCRIPTORS: ACHING

## 2021-01-29 ASSESSMENT — PAIN - FUNCTIONAL ASSESSMENT
PAIN_FUNCTIONAL_ASSESSMENT: PREVENTS OR INTERFERES SOME ACTIVE ACTIVITIES AND ADLS
PAIN_FUNCTIONAL_ASSESSMENT: PREVENTS OR INTERFERES SOME ACTIVE ACTIVITIES AND ADLS

## 2021-01-29 ASSESSMENT — PAIN DESCRIPTION - ORIENTATION: ORIENTATION: POSTERIOR

## 2021-01-29 NOTE — PLAN OF CARE
Problem: Falls - Risk of:  Goal: Will remain free from falls  Description: Will remain free from falls  Outcome: Ongoing  Note: Pt is in bed with alarm on. Non-skid socks are on. Fall precautions in place. Call light and bedside table in reach. Problem: Skin Integrity:  Goal: Will show no infection signs and symptoms  Description: Will show no infection signs and symptoms  Outcome: Ongoing  Note: No signs of infection. VSS. Skin is clean/dry/intact. Pt self-repositions. No new redness, swelling or drainage.

## 2021-01-29 NOTE — PROGRESS NOTES
This RN received report from PACU. Both emergency contacts were called by this RN with no answer. Voice mail left with call back number.

## 2021-01-29 NOTE — PROGRESS NOTES
Pt is A/O x4. VSS. Incision is CDI, collar on and aligned. Pt c/o pain to neck and back, managed with PO and IV meds per MAR. Voiding adequately. Fall precautions in place. Will continue to monitor.

## 2021-01-29 NOTE — PROGRESS NOTES
Physical Therapy    Facility/Department: [facility] 5T ORTHO/NEURO  Initial Assessment    NAME: Jose Miguel Cortes  : 1960  MRN: [id number]    Date of Service: 2021    Discharge Recommendations: Jose Miguel Cortes scored a 17/24 on the AM-PAC short mobility form. Current research shows that an AM-PAC score of 17 or less is typically not associated with a discharge to the patient's home setting. Based on the patient's AM-PAC score and their current functional mobility deficits, it is recommended that the patient have 5-7 sessions per week of Physical Therapy at d/c to increase the patient's independence. At this time, this patient demonstrates the endurance, and/or tolerance for 3 hours of therapy each day, with a treatment frequency of 5-7x/wk. Please see assessment section for further patient specific details. If patient discharges prior to next session this note will serve as a discharge summary. Please see below for the latest assessment towards goals. PT Equipment Recommendations  Equipment Needed: (defer)    Assessment   Body structures, Functions, Activity limitations: Decreased functional mobility ; Decreased balance;Decreased strength;Decreased endurance  Assessment: Pt presenting with unsteady gait and req RW for all fucntional mobility this date. PTA pt IND with all functional mobility and was not using any ADs. Pt showing good motivation and expected to make good progress with acute level rehab at d/c. Pt will continue to recieve skilled PT while in house to improve activty tolerance and functional mobility. Prognosis: Good  Decision Making: Medium Complexity  PT Education: Goals;PT Role;Gait Training;General Safety;Plan of Care;Transfer Training;Energy Conservation; Functional Mobility Training  REQUIRES PT FOLLOW UP: Yes  Activity Tolerance  Activity Tolerance: Patient Tolerated treatment well;Patient limited by fatigue;Patient limited by endurance       Patient Diagnosis(es): There were appointments at d/c. Cognition        Objective             Strength RLE  Strength RLE: WFL  Strength LLE  Strength LLE: WFL        Bed mobility  Rolling to Right: Contact guard assistance  Supine to Sit: Contact guard assistance(HOB elevated and used hand rail to pull himself up)  Scooting: Stand by assistance  Transfers  Sit to Stand: Contact guard assistance  Stand to sit: Contact guard assistance  Comment: Pt req VC to push from bed/chair for safe sit<>stand transfer  Ambulation  Ambulation?: Yes  Ambulation 1  Surface: level tile  Device: Rolling Walker  Assistance: Min A  Gait Deviations: Slow Jaqueline; Increased TERRELL; Decreased step length;Decreased step height  Distance: 10 feet and 15 feet  Comments: Pt demonstrated unsteadiness and muscle fasciculations on second bout of amb which req seated rest break. Pt had no reports of pain or dizziness.   Stairs/Curb  Stairs?: No     Balance  Sitting - Static: Good  Sitting - Dynamic: Fair  Standing - Static: Fair  Standing - Dynamic: [address]  Times per week: 5-7  Times per day: Daily  Current Treatment Recommendations: Strengthening, Safety Education & Training, Balance Training, Endurance Training, Functional Mobility Training, Equipment Evaluation, Education, & procurement, Transfer Training, Gait Training, Stair training  Safety Devices  Type of devices: Left in chair, Chair alarm in place, Call light within reach, Nurse notified, Gait belt    G-Code       OutComes Score                                                  AM-PAC Score  -PAC Inpatient Mobility Raw Score : 17 (01/29/21 1019)  AM-PAC Inpatient T-Scale Score : 42.13 (01/29/21 1019)  Mobility Inpatient CMS 0-100% Score: 50.57 (01/29/21 1019)  Mobility Inpatient CMS G-Code Modifier : CK (01/29/21 1019)          Goals  Short term goals  Time Frame for Short term goals: discharge  Short term goal 1: pt will complete sit<>stnd transfer with SUP to RW  Short term goal 2: pt will amb 50 feet with SUP with RW  Short term goal 3: pt will complete 5 steps with unilateral use of rail and SUP  Patient Goals   Patient goals : return home       Therapy Time   Individual Concurrent Group Co-treatment   Time In 0900         Time Out 0938         Minutes 38                  Timed code treatment minutes:28  Total treatment time:38      If patient is discharged prior to next treatment, this note will serve as the discharge summary. Domo Srivastava, SPT    Therapist was present, directed the patient's care, made skilled judgement, and was responsible for assessment and treatment of the patient.   Aidan Perea, PT

## 2021-01-29 NOTE — PROGRESS NOTES
NEUROSURGERY POST-OP PROGRESS NOTE    Patient Name: Marisol Scott YOB: 1960   Sex: Male Age: 61 yrs     Medical Record Number: [id number] Acct Number: [de-identified]   Room Number: 9882/0195-84 Hospital Day: Hospital Day: 4     Interval History:  Post-operative Day# 1 s/p  C3-4, C4-5, C5-6 POSTERIOR CERVICAL DECOMPRESSION, FUSION AND FIXATION    Subjective:  Patient reports neck is sore but pain is tolerable.  Sitting up eating breakfast.     Objective:    VITAL SIGNS   BP (!) 154/74   Pulse 73   Temp 98.5 °F (36.9 °C) (Oral)   Resp 14   Ht 5' 8\" (1.727 m)   Wt 228 lb (103.4 kg)   SpO2 94%   BMI 34.67 kg/m²    Height Height: 5' 8\" (172.7 cm)   Weight Weight: 228 lb (103.4 kg)        Allergies No Known Allergies   NPO Status DIET GENERAL;   Isolation No active isolations     LABS   Basic Metabolic Profile Recent Labs     01/27/21  0635 01/28/21  0646 01/29/21  0553    138 136    103 103   CO2 24 25 22   BUN 10 10 7   CREATININE 0.8 0.9 0.9   GLUCOSE 168* 148* 214*   MG 1.90  --   --       Complete Blood Count Recent Labs     01/27/21  0635 01/28/21  0646 01/29/21  0553   WBC 2.3* 2.4* 6.8   RBC 3.75* 3.77* 3.58*      Coagulation Studies Recent Labs     01/27/21  1529   INR 0.97        MEDICATIONS   Inpatient Medications     sodium chloride flush, 10 mL, Intravenous, 2 times per day    ceFAZolin (ANCEF) IVPB, 2,000 mg, Intravenous, Q8H    methocarbamol IVPB, 1,000 mg, Intravenous, Q8H **FOLLOWED BY** methocarbamol, 750 mg, Oral, Q6H PRN    polyethylene glycol, 17 g, Oral, Daily    sennosides-docusate sodium, 1 tablet, Oral, BID    famotidine, 20 mg, Oral, BID    heparin (porcine) PF, 5,000 Units, Subcutaneous, Q8H    amLODIPine, 5 mg, Oral, Daily    omeprazole, 40 mg, Oral, QAM    cefTRIAXone (ROCEPHIN) IV, 1,000 mg, Intravenous, Q24H   Infusions    sodium chloride 75 mL/hr at 01/28/21 1942      Antibiotics   [unfilled]     Neurologic Exam:    Mental status: awake, alert, oriented x 4    DTRs:    Right  Left    ankle clonus  - -   toes (babinski)  down Down      Musculoskeletal:   Gait: Not tested   Tone: normal   Sensory: numbness/tingling to bilateral fingertips   Motor strength:    Right  Left    Right  Left    Deltoid  5 5  Hip Flex  5 5   Biceps  5 5  Knee Extensors  5 5   Triceps  5 5  Knee Flexors  5 5   Wrist Ext  5 5  Ankle Dorsiflex. 5 5   Wrist Flex  5 5  Ankle Plantarflex. 5 5   Handgrip  4 4  Ext Manohar Longus  5 5   Thumb Ext  4 4         Incision: c/d/i   Drain: 55ml    Respiratory:  Unlabored respiratory pattern    Abdomen:   Soft, ND   Last BM pre-op     Cardiovascular:  Warm, well perfused    Assessment   62 yo male POD 1 s/p C3-4, C4-5, C5-6 POSTERIOR CERVICAL DECOMPRESSION, FUSION AND FIXATION    Plan:  1. Neurologic exam frequency: q 4   2. Mobility: PT/OT  3. Gardiner J collar:   -Cervical collar to be worn at all times   -Cervical collar does NOT need to be worn when bathing or showering   -Cervical collar pads to be cleaned with soap & water, air dried, and changed daily   -OK to put gauze over incision to keep cervical collar from rubbing, if needed  4. Diet: ADAT   5. Antibiotics: ancef q 8 hours post op  6. Drain: continue to monitor output  7. Jauregui: remove   8. DVT Prophylaxis: SCDs and heparin SQ    9. GI Prophylaxis: pepcid  10. Bowel Regimen: senokot, glycolax    11. Pain control: tylenol, roxicodone, dilaudid   12. Muscle spasms: robaxin, valium   13. Incisional Care: cleanse daily with soap and water, pat dry with clean towel and paint with CHG swab   14. Dispo Planning: continue care on [address] [place],  for discharge planning     Patient was seen and examined with Dr. Jevon Shaikh who agrees with above assessment and plan. Electronically signed by: Augusta Meigs, 1/29/2021 6:57 AM   Neurosurgery Nurse Practitioner  [phone]      __________________________________________________________________    I saw and examined Alexisis Rito on 01/29/21.  I agree 208.7

## 2021-01-29 NOTE — PLAN OF CARE
Problem: Falls - Risk of:  Goal: Will remain free from falls  1/28/2021 2218 by Leny Johnson RN  Outcome: Ongoing   Calls out appropriately. Bed locked in lowest position. Bed alarm on. Frequent rounds made. Problem: Pain:  Goal: Pain level will decrease  1/28/2021 2218 by Leny Johnson RN  Outcome: Ongoing   Pain treated with oral medication. IV ordered PRN. Problem: Mobility - Impaired:  Goal: Mobility will improve to maximum level  1/28/2021 2218 by Leny Johnson RN  Outcome: Ongoing   Yet to ambulate post surgery. Able to actively move all extremities. Problem: Sensory Perception - Impaired:  Goal: Sensory function intact, lower extremity  Outcome: Ongoing   Patient reports numbness/tingling to all extremities as did prior to sx.

## 2021-01-29 NOTE — PROGRESS NOTES
PACU Transfer Note    Vitals:    01/28/21 2000   BP: (!) 144/77   Pulse: 76   Resp: 10   Temp: 97.3 °F (36.3 °C)   SpO2: 100%   BP within 20% of baseline value. In: 1300 [I.V.:1300]  Out: 275 [Urine:200]    Pain assessment:  present - adequately treated  Pain Level: 4(\"tolerable\")    Report given to Receiving unit RNChandrakant Patient by phone. Patient is well known to her. Transferred back to room per pacu transport with cervical collar on, bag of extra collar supplies sent. Attempted to update daughter, Bronwyn Dickey by phone, but there is no answer.     1/28/2021 8:17 PM

## 2021-01-29 NOTE — PROGRESS NOTES
Hospitalist Progress Note      PCP: Regine Whitney    Date of Admission: 1/26/2021    Chief Complaint: Bilateral lower extremity weakness    Hospital Course: 71-year-old male presented to the hospital bilateral lower extremity weakness. MRI of the spine showed multilevel spinal stenosis. Neurosurgery following    Subjective: Patient underwent surgery today. This morning his pain is 4/10. Denies any other concerns. Medications:  Reviewed    Infusion Medications    sodium chloride 75 mL/hr at 01/28/21 1942     Scheduled Medications    sodium chloride flush  10 mL Intravenous 2 times per day    ceFAZolin (ANCEF) IVPB  2,000 mg Intravenous Q8H    polyethylene glycol  17 g Oral Daily    sennosides-docusate sodium  1 tablet Oral BID    famotidine  20 mg Oral BID    heparin (porcine) PF  5,000 Units Subcutaneous Q8H    amLODIPine  5 mg Oral Daily    omeprazole  40 mg Oral QAM    cefTRIAXone (ROCEPHIN) IV  1,000 mg Intravenous Q24H     PRN Meds: sodium chloride flush, promethazine **OR** ondansetron, acetaminophen, oxyCODONE **OR** oxyCODONE, HYDROmorphone **OR** HYDROmorphone, diazePAM, [COMPLETED] methocarbamol IVPB **FOLLOWED BY** methocarbamol, bisacodyl, aluminum & magnesium hydroxide-simethicone      Intake/Output Summary (Last 24 hours) at 1/29/2021 1241  Last data filed at 1/29/2021 0800  Gross per 24 hour   Intake 1900 ml   Output 1380 ml   Net 520 ml       Physical Exam Performed:    BP (!) 147/85   Pulse 75   Temp 98.6 °F (37 °C) (Oral)   Resp 12   Ht 5' 8\" (1.727 m)   Wt 228 lb (103.4 kg)   SpO2 97%   BMI 34.67 kg/m²     General appearance: No apparent distress, appears stated age and cooperative. HEENT: Pupils equal, round, and reactive to light. Conjunctivae/corneas clear. Neck: Neck brace on  Respiratory:  Normal respiratory effort. Clear to auscultation, bilaterally without Rales/Wheezes/Rhonchi.   Cardiovascular: Regular rate and rhythm with normal S1/S2 without murmurs, rubs or gallops. Abdomen: Soft, non-tender, non-distended with normal bowel sounds. Musculoskeletal: No clubbing, cyanosis or edema bilaterally. Full range of motion without deformity. Skin: Skin color, texture, turgor normal.  No rashes or lesions. Neurologic:  Neurovascularly intact without any focal sensory/motor deficits. Cranial nerves: II-XII intact, grossly non-focal.  Psychiatric: Alert and oriented, thought content appropriate, normal insight  Capillary Refill: Brisk,< 3 seconds   Peripheral Pulses: +2 palpable, equal bilaterally       Labs:   Recent Labs     01/27/21  0635 01/27/21  1529 01/28/21  0646 01/29/21  0553   WBC 2.3*  --  2.4* 6.8   HGB 12.5*  --  12.8* 12.0*   HCT 37.0* 37.0 36.7* 35.5*     --  162 157     Recent Labs     01/27/21  0635 01/28/21  0646 01/29/21  0553    138 136   K 3.4* 3.5 3.9    103 103   CO2 24 25 22   BUN 10 10 7   CREATININE 0.8 0.9 0.9   CALCIUM 9.1 9.3 9.0     Recent Labs     01/27/21  0635   AST 24   ALT 19   BILITOT 0.5   ALKPHOS 131*     Recent Labs     01/27/21  1529   INR 0.97     No results for input(s): CKTOTAL, TROPONINI in the last 72 hours. Urinalysis:      Lab Results   Component Value Date    NITRU POSITIVE 01/25/2021    WBCUA 85 01/25/2021    BACTERIA 4+ 01/25/2021    RBCUA 2 01/25/2021    BLOODU SMALL 01/25/2021    SPECGRAV 1.021 01/25/2021    GLUCOSEU Negative 01/25/2021       Radiology:  XR CERVICAL SPINE (2-3 VIEWS)   Final Result      4 views demonstrate grade 1 anterolisthesis of C2 and C3. There are fusion rods and lateral mass screws from C3-C6. Prevertebral soft tissues are unremarkable. Mild-moderate degenerative disc disease at C4-5 and C5-6. Postoperative changes are seen. There is carotid artery calcification. CT CERVICAL SPINE WO CONTRAST   Final Result      1. Straightening of the normal cervical lordosis without spondylolisthesis. 2. No evidence of vertebral compression deformity or acute fracture.    3. Moderate

## 2021-01-29 NOTE — PROGRESS NOTES
functional mobility attempts  Safety Devices  Safety Devices in place: Yes  Type of devices: Call light within reach; Chair alarm in place;Nurse notified; Left in chair           Patient Diagnosis(es): There were no encounter diagnoses. has a past medical history of Diabetes mellitus type 1 ([address] Hyperlipidemia, and Hypertension. has a past surgical history that includes Gastric bypass surgery. Treatment Diagnosis: impaired ADLs /functional transfers/ decreaed endurance 2/2 generalized weakness /fall      Restrictions  Position Activity Restriction  Other position/activity restrictions: activity as tolerated. Cervical collar donned at all times    Subjective   General  Chart Reviewed: Yes  Patient assessed for rehabilitation services?: Yes  Additional Pertinent Hx: Admit 1/26 Acute low back pain    Neuro sx consult to OR 1/28 s/p C3-4, C4-5, C5-6 POSTERIOR CERVICAL DECOMPRESSION, FUSION AND FIXATION                 PMHX: DM, HTN, HLD and gastric bypass sx  Family / Caregiver Present: No  Diagnosis: Acute back pain-- s/p C3-4, C4-5, C5-6 POSTERIOR CERVICAL DECOMPRESSION, FUSION AND FIXATION on 1/28  Subjective  Subjective: \" I feel like I can't take care of myself\" pt in bed agreeable for OOB/OT eval and tx.   Patient Currently in Pain: No  Vital Signs  Patient Currently in Pain: No    Social/Functional History  Social/Functional History  Lives With: Alone  Type of Home: Apartment  Home Layout: One level(laundry is up 8 steps in apartment complex, pt completed PTA)  Bathroom Shower/Tub: Tub/Shower unit  Bathroom Toilet: Standard  Bathroom Accessibility: Accessible  ADL Assistance: Independent  Homemaking Assistance: Independent  Homemaking Responsibilities: Yes  Ambulation Assistance: Independent  Transfer Assistance: Independent  Active : Yes  Occupation: Retired  Type of occupation:   Additional Comments: Pt reports he was IND with all ADLs and transportation PTA but expects daughter in law to help around the home and takes to his appointments at d/c.       Objective  Treatment included functional transfer training, ADL's and pt. education. Orientation  Overall Orientation Status: Within Functional Limits     Balance  Sitting Balance: Contact guard assistance  Standing Balance: Minimal assistance(to CGA with walker)  Standing Balance  Time: 4 mins x 2 and 1 mins x  1  Activity: functional transfers /stance at sink/ functional mobility in room  Functional Mobility  Functional - Mobility Device: Rolling Walker  Activity: To/from bathroom  Assist Level: Minimal assistance  Functional Mobility Comments: Pt with LADI dias at end of ambulation. 18 ft total  Toilet Transfers  Toilet - Technique: Ambulating  Equipment Used: Standard toilet  Toilet Transfer: Minimal assistance  Toilet Transfers Comments: with two hand holds  ADL  Feeding: Setup  Grooming: Setup(seated)  LE Dressing: Maximum assistance; Moderate assistance  Toileting: Maximum assistance  Additional Comments: Pt limited by back pain/ cervical collar  and generalized weakness  Tone RUE  RUE Tone: Normotonic  Tone LUE  LUE Tone: Normotonic  Coordination  Coordination and Movement description: Fine motor impairments;Right UE;Left UE;Decreased accuracy     Bed mobility  Rolling to Right: Contact guard assistance  Supine to Sit: Contact guard assistance(HOB elevated and used hand rail to pull himself up)  Scooting: Stand by assistance  Transfers  Sit to stand: Minimal assistance  Stand to sit: Minimal assistance  Transfer Comments: v.cues for hand placement / tech - pulling up on walker     Cognition  Overall Cognitive Status: WFL    LUE AROM (degrees)  LUE AROM : WFL  Left Hand AROM (degrees)  Left Hand AROM: WFL  RUE AROM (degrees)  RUE AROM : WFL  Right Hand AROM (degrees)  Right Hand AROM: WFL  LUE Strength  Gross LUE Strength: WFL  L Hand General: 4/5  RUE Strength  Gross RUE Strength: WFL  R Hand General: 4+/5     Plan   Plan  Times per week:

## 2021-01-29 NOTE — PROGRESS NOTES
Patient received from the OR to PACU #9 post C3-4, C4-5, C5-6 POSTERIOR CERVICAL DECOMPRESSION, FUSION AND FIXATION of Dr. Melissa Ahumada on PACU monitoring equipment. Report given per CRNA and OR RN. Per report, patient did require pressors for BP support but was otherwise stable intra op. Hard cervical collar placed on arrival to pacu. Patient is still asleep from surgery with oral airway in place and no evidence of pain.

## 2021-01-29 NOTE — CARE COORDINATION
Patient here from home. Plans to discharge to home when medically ready. PT/OT pending. CM will continue to follow for any changes to discharge plans. Chance Mitchell RN  RN Case Manager  [phone]    Updated 1218pm  CM spoke with Lucy/DANNY. Patient has recs for ARU. He however is under workmans comp. Would need a C9. CM spoke to Sri/Neuro. Danni Pang discussed with Drs office. Does not anticipate procedure being covered under workmans comp because it is not related to case. Patient does not have any other insurance. Lost his job in December. Will need to complete a medicaid application ASAP. CM left a vm for Ed/inpatient financial.  CM will continue to follow.   Chance Mitchell RN  RN Case Manager  [phone]

## 2021-01-30 PROCEDURE — 97535 SELF CARE MNGMENT TRAINING: CPT

## 2021-01-30 PROCEDURE — 6370000000 HC RX 637 (ALT 250 FOR IP): Performed by: NEUROLOGICAL SURGERY

## 2021-01-30 PROCEDURE — 6360000002 HC RX W HCPCS: Performed by: NEUROLOGICAL SURGERY

## 2021-01-30 PROCEDURE — 6360000002 HC RX W HCPCS: Performed by: INTERNAL MEDICINE

## 2021-01-30 PROCEDURE — 97116 GAIT TRAINING THERAPY: CPT

## 2021-01-30 PROCEDURE — 97530 THERAPEUTIC ACTIVITIES: CPT

## 2021-01-30 PROCEDURE — 2580000003 HC RX 258: Performed by: NEUROLOGICAL SURGERY

## 2021-01-30 PROCEDURE — 1200000000 HC SEMI PRIVATE

## 2021-01-30 PROCEDURE — 97110 THERAPEUTIC EXERCISES: CPT

## 2021-01-30 RX ADMIN — DIAZEPAM 5 MG: 5 TABLET ORAL at 14:21

## 2021-01-30 RX ADMIN — OXYCODONE 10 MG: 5 TABLET ORAL at 08:59

## 2021-01-30 RX ADMIN — OXYCODONE 10 MG: 5 TABLET ORAL at 14:21

## 2021-01-30 RX ADMIN — METHOCARBAMOL 750 MG: 750 TABLET ORAL at 08:59

## 2021-01-30 RX ADMIN — CEFAZOLIN SODIUM 2000 MG: 2 SOLUTION INTRAVENOUS at 00:28

## 2021-01-30 RX ADMIN — FAMOTIDINE 20 MG: 20 TABLET ORAL at 09:58

## 2021-01-30 RX ADMIN — POLYETHYLENE GLYCOL 3350 17 G: 17 POWDER, FOR SOLUTION ORAL at 09:58

## 2021-01-30 RX ADMIN — CEFTRIAXONE 1000 MG: 1 INJECTION, POWDER, FOR SOLUTION INTRAMUSCULAR; INTRAVENOUS at 11:59

## 2021-01-30 RX ADMIN — OXYCODONE 10 MG: 5 TABLET ORAL at 04:19

## 2021-01-30 RX ADMIN — OMEPRAZOLE 40 MG: 20 CAPSULE, DELAYED RELEASE ORAL at 09:58

## 2021-01-30 RX ADMIN — OXYCODONE 10 MG: 5 TABLET ORAL at 18:32

## 2021-01-30 RX ADMIN — Medication 10 ML: at 21:34

## 2021-01-30 RX ADMIN — DIAZEPAM 5 MG: 5 TABLET ORAL at 05:29

## 2021-01-30 RX ADMIN — DOCUSATE SODIUM 50 MG AND SENNOSIDES 8.6 MG 1 TABLET: 8.6; 5 TABLET, FILM COATED ORAL at 20:56

## 2021-01-30 RX ADMIN — HEPARIN SODIUM 5000 UNITS: 5000 INJECTION INTRAVENOUS; SUBCUTANEOUS at 14:21

## 2021-01-30 RX ADMIN — AMLODIPINE BESYLATE 5 MG: 5 TABLET ORAL at 09:58

## 2021-01-30 RX ADMIN — CEFAZOLIN SODIUM 2000 MG: 2 SOLUTION INTRAVENOUS at 17:14

## 2021-01-30 RX ADMIN — CEFAZOLIN SODIUM 2000 MG: 2 SOLUTION INTRAVENOUS at 09:58

## 2021-01-30 RX ADMIN — HEPARIN SODIUM 5000 UNITS: 5000 INJECTION INTRAVENOUS; SUBCUTANEOUS at 04:19

## 2021-01-30 RX ADMIN — DOCUSATE SODIUM 50 MG AND SENNOSIDES 8.6 MG 1 TABLET: 8.6; 5 TABLET, FILM COATED ORAL at 09:58

## 2021-01-30 RX ADMIN — Medication 10 ML: at 10:00

## 2021-01-30 RX ADMIN — FAMOTIDINE 20 MG: 20 TABLET ORAL at 20:57

## 2021-01-30 RX ADMIN — HEPARIN SODIUM 5000 UNITS: 5000 INJECTION INTRAVENOUS; SUBCUTANEOUS at 20:57

## 2021-01-30 ASSESSMENT — PAIN SCALES - GENERAL
PAINLEVEL_OUTOF10: 7
PAINLEVEL_OUTOF10: 9

## 2021-01-30 ASSESSMENT — PAIN DESCRIPTION - LOCATION: LOCATION: NECK

## 2021-01-30 NOTE — PROGRESS NOTES
Patient is alert and oriented. Vital signs are stable. Collar and drain remain in place. Dressing is clean, dry, and intact. Patient's pain is controlled with oral medication per MAR. Patient ambulates x1 with a walker. Patient tolerates ambulation well. Patient voiding adequate amount of urine without complication. Bed is in the lowest position. Bed alarm is activated. Call light is within reach. Will continue to monitor and reassess.

## 2021-01-30 NOTE — PROGRESS NOTES
Physical Therapy  Daily Treatment Note    Discharge Recommendations: Darshan Mullins scored a 17/24 on the AM-PAC short mobility form. Current research shows that an AM-PAC score of 17 or less is typically not associated with a discharge to the patient's home setting. Based on the patient's AM-PAC score and their current functional mobility deficits, it is recommended that the patient have 5-7 sessions per week of Physical Therapy at d/c to increase the patient's independence. At this time, this patient demonstrates the endurance, and/or tolerance for 3 hours of therapy each day, with a treatment frequency of 5-7x/wk. Please see assessment section for further patient specific details. Assessment:  Pt with improved gait tolerance this session. Mobility effortful and guarded. Needing min assist for bed mobility. Pt is well below baseline for mobility--was independent prior to admission. Pt lives alone with flight of stairs to apartment. Would benefit from continued IP PT at D/C prior to going home. Equipment Needs: Defer to next level of care    Chart Reviewed: Yes     Other position/activity restrictions: activity as tolerated. Cervical collar donned at all times   Additional Pertinent Hx: Pt is a 62 y/o male admitted acute LBP and possibe spinal stenosis. Transferred from [facility] ED post fall on 1/26 to [facility] had C3-6 cervicl decompression on 1/28. 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 Recent sciatica 1/22/21. PMHx: DM, HTN. Diagnosis: acute LBP and possibe spinl stenosis   Treatment Diagnosis: Decreased mobility s/p cervical spine surgery    Subjective: Pt in bed initially. Agreeable to working with PT. States he lives in apartment alone. Flight of stairs to his apartment. \"My legs just stopped working one day. \"     Pain: 4/10 at end of session. \"It got up to 9 overnight. \"    Objective:    Bed mobility  Supine to sit: Min assist x 1, HOB up partially with use of rail  Sit to Supine: Min assist x 1, HOB up partially with use of rail    Transfers  Sit to stand: CGA from bed  Stand to sit: CGA onto bed  Other: Cues/reminders for hand placement with transfers when using walker    Ambulation  Assistance Level: CGA  Assistive device: Wheeled walker  Distance: ~140 ft  Quality of gait: Step-through pattern; decreased pace; weak; no LOB; cautious    Exercises  While sitting EOB with SBA: 15 reps B heel raises, toe raises, hip flexion, LAQ    Patient Education  Reviewed log roll. Demonstrated understanding. Calling for assist with needs. Expressed understanding. Safety Devices  Pt left with needs in reach. In bed with bed alarm on. RN updated. AM-PAC score  AM-PAC Inpatient Mobility Raw Score : 17  AM-PAC Inpatient T-Scale Score : 42.13  Mobility Inpatient CMS 0-100% Score: 50.57  Mobility Inpatient CMS G-Code Modifier : CK    Goals: (as determined and assessed by primary PT)  Time Frame for Short term goals: discharge  Short term goal 1: pt will complete sit<>stnd transfer with SUP to RW   Short term goal 2: pt will amb 50 feet with SUP with RW   Short term goal 3: pt will complete 5 steps with unilateral use of rail and SUP      Plan:  Times per week: 5-7; Times per day: Daily  Current Treatment Recommendations: Strengthening, Safety Education & Training, Balance Training, Endurance Training, Functional Mobility Training, Equipment Evaluation, Education, & procurement, Transfer Training, Gait Training, Stair training    Therapy Time    Individual  Concurrent  Group  Co-treatment    Time In  1105            Time Out  1128            Minutes  23              Timed Code Treatment Minutes: 23  Total Treatment Minutes: 23    Will continue per plan of care. If patient is discharged prior to next treatment, this note will serve as the discharge summary.     [place], [place] [id number]    No goals met this date.  Continue working towards goals.   Destinee Ramos, PT, DPT  [id number]

## 2021-01-30 NOTE — PLAN OF CARE
Problem: Falls - Risk of:  Goal: Will remain free from falls  Description: Will remain free from falls  1/30/2021 1013 by Jessica Bunn RN  Outcome: Ongoing  1/30/2021 0008 by Camacho Onofre RN  Outcome: Ongoing     Problem: Pain:  Goal: Pain level will decrease  Description: Pain level will decrease  1/30/2021 1013 by Jessica Bunn RN  Outcome: Ongoing  1/30/2021 0008 by Camacho Onofre RN  Outcome: Ongoing

## 2021-01-30 NOTE — PROGRESS NOTES
Occupational Therapy  Facility/Department: [facility] 5T ORTHO/NEURO  Daily Treatment Note  NAME: William Hunt  : 1960  MRN: [id number]    Date of Service: 2021    Discharge Recommendations:     OT Equipment Recommendations  Other: defer to next care facility    Assessment   Performance deficits / Impairments: Decreased functional mobility ; Decreased ADL status; Decreased endurance    Assessment: Pt demo increased activity tolerance- ambulating in hallway w/ RW and Min A-CGA. Pt also don/doff pants and socks w/ CGA this date. Pt completed functinal transfers w/ CGA. Pt cont to be limited by pain. Pt would benefit from cont OT services to maximize functional independence. Treatment Diagnosis: impaired ADLs /functional transfers/ decreaed endurance 2/2 generalized weakness /fall  Prognosis: Good  OT Education: OT Role;ADL Adaptive Strategies;Transfer Training  Patient Education: verb understanding / reinforce as needed  REQUIRES OT FOLLOW UP: Yes  Activity Tolerance  Activity Tolerance: Patient Tolerated treatment well;Patient limited by pain  Activity Tolerance: Pt tolerated treatment but was limited by pain  Safety Devices  Safety Devices in place: Yes  Type of devices: Call light within reach;Nurse notified; Left in bed;Bed alarm in place         Patient Diagnosis(es): There were no encounter diagnoses. has a past medical history of Diabetes mellitus type 1 ([facility] [address] Hyperlipidemia, and Hypertension. has a past surgical history that includes Gastric bypass surgery and cervical laminectomy (N/A, 2021). Restrictions  Position Activity Restriction  Other position/activity restrictions: activity as tolerated.  Cervical collar donned at all times     Subjective   General  Chart Reviewed: Yes  Patient assessed for rehabilitation services?: Yes  Additional Pertinent Hx: Admit  Acute low back pain    Neuro sx consult to OR  s/p C3-4, C4-5, C5-6 POSTERIOR CERVICAL DECOMPRESSION, FUSION AND FIXATION                 PMHX: DM, HTN, HLD and gastric bypass sx  Response to previous treatment: Patient with no complaints from previous session  Family / Caregiver Present: No  Diagnosis: Acute back pain-- s/p C3-4, C4-5, C5-6 POSTERIOR CERVICAL DECOMPRESSION, FUSION AND FIXATION on 1/28  Subjective  Subjective: Pt supine in bed upon arrival and agreeable to therapy        Orientation      Objective    ADL  UE Dressing: Minimal assistance(don/doff gown seated EOB)  LE Dressing: Contact guard assistance(pt donned pants and socks seated EOB. CGA in stance for balance during pant mgmt)           Balance  Sitting Balance: Stand by assistance(seated unsupported EOB)  Standing Balance: Contact guard assistance  Standing Balance  Time: ~10min total  Activity: functional transfer/ functional mobility in hallway/ stance for LB dressing  Functional Mobility  Functional - Mobility Device: Rolling Walker  Activity: To/from bathroom; Other(ambulationin hallway)  Assist Level: Minimal assistance(-CGA)     Bed mobility  Rolling to Right: Stand by assistance  Supine to Sit: Stand by assistance(HOB elevated)  Sit to Supine: Stand by assistance(HOB elevated)  Scooting: Stand by assistance     Transfers  Sit to stand: Contact guard assistance  Stand to sit: Contact guard assistance  Transfer Comments: v.cues for hand placement / tech - pulling up on walker                          Cognition  Overall Cognitive Status: [facility]                                         Plan   Plan  Times per week: 5-7x  Times per day: Daily  Current Treatment Recommendations: Endurance Training, Safety Education & Training, Self-Care / ADL, Equipment Evaluation, Education, & procurement, Patient/Caregiver Education & Training  G-Code     OutComes Score                                                  AM-PAC Score        AM-[facility] Inpatient Daily Activity Raw Score: 18 (01/30/21 1532)  AM-PAC Inpatient ADL T-Scale Score : 38.66 (01/30/21 1532)  ADL Inpatient CMS 0-100% Score: 46.65 (01/30/21 1532)  ADL Inpatient CMS G-Code Modifier : CK (01/30/21 1532)    Goals  Short term goals  Time Frame for Short term goals: at d/c  Short term goal 1: Stance x 7 mins with CGA for ADLs  Short term goal 2: LE dressing with CGA and AE prn- goal met 1/30: upgraded goal= LE dressing w/ Mod I  Short term goal 3: Commode transfers with SBA with RTS prn  Patient Goals   Patient goals : return to normal again       Therapy Time   Individual Concurrent Group Co-treatment   Time In [address]         Time Out 0929         Minutes [address] [place], [place]

## 2021-01-30 NOTE — PROGRESS NOTES
Hospitalist Progress Note      PCP: Sol Patel    Date of Admission: 1/26/2021    Chief Complaint: Bilateral lower extremity weakness    Hospital Course: 51-year-old male presented to the hospital bilateral lower extremity weakness. MRI of the spine showed multilevel spinal stenosis. Neurosurgery following    Subjective: Patient states he is in a lot of pain this morning. Complains of worsening numbness in bilateral hands. Denies any other weakness      Medications:  Reviewed    Infusion Medications     Scheduled Medications    heparin (porcine)  5,000 Units Subcutaneous Q8H    sodium chloride flush  10 mL Intravenous 2 times per day    ceFAZolin (ANCEF) IVPB  2,000 mg Intravenous Q8H    polyethylene glycol  17 g Oral Daily    sennosides-docusate sodium  1 tablet Oral BID    famotidine  20 mg Oral BID    amLODIPine  5 mg Oral Daily    omeprazole  40 mg Oral QAM    cefTRIAXone (ROCEPHIN) IV  1,000 mg Intravenous Q24H     PRN Meds: sodium chloride flush, promethazine **OR** ondansetron, acetaminophen, oxyCODONE **OR** oxyCODONE, HYDROmorphone **OR** HYDROmorphone, diazePAM, [COMPLETED] methocarbamol IVPB **FOLLOWED BY** methocarbamol, bisacodyl, aluminum & magnesium hydroxide-simethicone      Intake/Output Summary (Last 24 hours) at 1/30/2021 0920  Last data filed at 1/30/2021 0701  Gross per 24 hour   Intake 240 ml   Output 2265 ml   Net -2025 ml       Physical Exam Performed:    BP (!) 170/81   Pulse 74   Temp 98.5 °F (36.9 °C) (Oral)   Resp 14   Ht 5' 8\" (1.727 m)   Wt 228 lb (103.4 kg)   SpO2 97%   BMI 34.67 kg/m²     General appearance: No apparent distress, appears stated age and cooperative. HEENT: Pupils equal, round, and reactive to light. Conjunctivae/corneas clear. Neck: Neck brace on  Respiratory:  Normal respiratory effort. Clear to auscultation, bilaterally without Rales/Wheezes/Rhonchi.   Cardiovascular: Regular rate and rhythm with normal S1/S2 without murmurs, rubs or gallops. Abdomen: Soft, non-tender, non-distended with normal bowel sounds. Musculoskeletal: No clubbing, cyanosis or edema bilaterally. Full range of motion without deformity. Skin: Skin color, texture, turgor normal.  No rashes or lesions. Neurologic:  Neurovascularly intact without any focal sensory/motor deficits. Cranial nerves: II-XII intact, grossly non-focal.  Psychiatric: Alert and oriented, thought content appropriate, normal insight  Capillary Refill: Brisk,< 3 seconds   Peripheral Pulses: +2 palpable, equal bilaterally       Labs:   Recent Labs     01/27/21  1529 01/28/21  0646 01/29/21  0553   WBC  --  2.4* 6.8   HGB  --  12.8* 12.0*   HCT 37.0 36.7* 35.5*   PLT  --  162 157     Recent Labs     01/28/21  0646 01/29/21  0553    136   K 3.5 3.9    103   CO2 25 22   BUN 10 7   CREATININE 0.9 0.9   CALCIUM 9.3 9.0     No results for input(s): AST, ALT, BILIDIR, BILITOT, ALKPHOS in the last 72 hours. Recent Labs     01/27/21  1529   INR 0.97     No results for input(s): Coralyn Collazo in the last 72 hours. Urinalysis:      Lab Results   Component Value Date    NITRU POSITIVE 01/25/2021    WBCUA 85 01/25/2021    BACTERIA 4+ 01/25/2021    RBCUA 2 01/25/2021    BLOODU SMALL 01/25/2021    SPECGRAV 1.021 01/25/2021    GLUCOSEU Negative 01/25/2021       Radiology:  XR CERVICAL SPINE (2-3 VIEWS)   Final Result      4 views demonstrate grade 1 anterolisthesis of C2 and C3. There are fusion rods and lateral mass screws from C3-C6. Prevertebral soft tissues are unremarkable. Mild-moderate degenerative disc disease at C4-5 and C5-6. Postoperative changes are seen. There is carotid artery calcification. CT CERVICAL SPINE WO CONTRAST   Final Result      1. Straightening of the normal cervical lordosis without spondylolisthesis. 2. No evidence of vertebral compression deformity or acute fracture.    3. Moderate cervical spondylosis superimposed on short pedicles contributing to multilevel spinal stenosis which is moderate to severe at C3-4 and C5-6. These findings are better evaluated on the recent prior MRI. Assessment/Plan:    Cervical spinal central canal stenosis    Lumbo-sacral spinal canal stenosis  Bilateral Lower extremity weakness and numbness  - Neurosurgery following,   -Status post C3-6 posterior decompression/fusion/fixation   - Pain control  - Neuro checks q4h  - PT/OT eval     UTI  -Continue Rocephin     Diabetes mellitus  - LSSI    HTN  - Continue home amlodipine     HLD  - Continue home statin    DVT Prophylaxis: SCD  Diet: DIET GENERAL;  Code Status: Full Code    PT/OT Eval Status: Consulted    Dispo -likely need SNF placement.   Scoring 16/17 on PT/OT    Emily Hernandez MD

## 2021-01-30 NOTE — PROGRESS NOTES
Neurosurgery Progress Note    Patient seen and examined on 01/30/21. No acute events overnight. Reports moderate control of incisional pain. Neurologically stable on exam. Able to ambulate in hallway with PT. Drain output 90 cc.     A/P: 62 yo man POD 2 s/p C3-6 D/F/F    -Neuro stable  -Pain control with PO meds  -Muscle relaxants prn  -Will monitor drain output  -PT/OT, mobilize  -ARU consult given myelopathy/SCI   -Will follow    Roel Umaña MD, PhD  [address] [place] [address], [address], [address] [phone] (c), [phone] (o)     \

## 2021-01-31 ENCOUNTER — APPOINTMENT (OUTPATIENT)
Dept: GENERAL RADIOLOGY | Age: 61
DRG: 023 | End: 2021-01-31
Attending: INTERNAL MEDICINE
Payer: MEDICAID

## 2021-01-31 PROCEDURE — 6370000000 HC RX 637 (ALT 250 FOR IP): Performed by: NEUROLOGICAL SURGERY

## 2021-01-31 PROCEDURE — 3209999900 FLUORO FOR SURGICAL PROCEDURES

## 2021-01-31 PROCEDURE — 6360000002 HC RX W HCPCS: Performed by: NEUROLOGICAL SURGERY

## 2021-01-31 PROCEDURE — 2580000003 HC RX 258: Performed by: NEUROLOGICAL SURGERY

## 2021-01-31 PROCEDURE — 1200000000 HC SEMI PRIVATE

## 2021-01-31 PROCEDURE — 72040 X-RAY EXAM NECK SPINE 2-3 VW: CPT

## 2021-01-31 PROCEDURE — 6360000002 HC RX W HCPCS: Performed by: INTERNAL MEDICINE

## 2021-01-31 RX ADMIN — HEPARIN SODIUM 5000 UNITS: 5000 INJECTION INTRAVENOUS; SUBCUTANEOUS at 20:43

## 2021-01-31 RX ADMIN — OXYCODONE 10 MG: 5 TABLET ORAL at 00:36

## 2021-01-31 RX ADMIN — Medication 10 ML: at 08:20

## 2021-01-31 RX ADMIN — CEFAZOLIN SODIUM 2000 MG: 2 SOLUTION INTRAVENOUS at 00:36

## 2021-01-31 RX ADMIN — CEFAZOLIN SODIUM 2000 MG: 2 SOLUTION INTRAVENOUS at 08:20

## 2021-01-31 RX ADMIN — FAMOTIDINE 20 MG: 20 TABLET ORAL at 20:43

## 2021-01-31 RX ADMIN — POLYETHYLENE GLYCOL 3350 17 G: 17 POWDER, FOR SOLUTION ORAL at 08:20

## 2021-01-31 RX ADMIN — AMLODIPINE BESYLATE 5 MG: 5 TABLET ORAL at 08:21

## 2021-01-31 RX ADMIN — OXYCODONE 10 MG: 5 TABLET ORAL at 16:38

## 2021-01-31 RX ADMIN — FAMOTIDINE 20 MG: 20 TABLET ORAL at 08:20

## 2021-01-31 RX ADMIN — OMEPRAZOLE 40 MG: 20 CAPSULE, DELAYED RELEASE ORAL at 09:12

## 2021-01-31 RX ADMIN — CEFTRIAXONE 1000 MG: 1 INJECTION, POWDER, FOR SOLUTION INTRAMUSCULAR; INTRAVENOUS at 11:36

## 2021-01-31 RX ADMIN — Medication 10 ML: at 20:50

## 2021-01-31 RX ADMIN — HEPARIN SODIUM 5000 UNITS: 5000 INJECTION INTRAVENOUS; SUBCUTANEOUS at 14:59

## 2021-01-31 RX ADMIN — DOCUSATE SODIUM 50 MG AND SENNOSIDES 8.6 MG 1 TABLET: 8.6; 5 TABLET, FILM COATED ORAL at 20:43

## 2021-01-31 RX ADMIN — ONDANSETRON 4 MG: 2 INJECTION INTRAMUSCULAR; INTRAVENOUS at 14:59

## 2021-01-31 RX ADMIN — HEPARIN SODIUM 5000 UNITS: 5000 INJECTION INTRAVENOUS; SUBCUTANEOUS at 06:22

## 2021-01-31 RX ADMIN — OXYCODONE 10 MG: 5 TABLET ORAL at 06:27

## 2021-01-31 RX ADMIN — DOCUSATE SODIUM 50 MG AND SENNOSIDES 8.6 MG 1 TABLET: 8.6; 5 TABLET, FILM COATED ORAL at 08:20

## 2021-01-31 RX ADMIN — DIAZEPAM 5 MG: 5 TABLET ORAL at 07:13

## 2021-01-31 ASSESSMENT — PAIN DESCRIPTION - PROGRESSION
CLINICAL_PROGRESSION: GRADUALLY WORSENING
CLINICAL_PROGRESSION: GRADUALLY IMPROVING

## 2021-01-31 ASSESSMENT — PAIN SCALES - GENERAL
PAINLEVEL_OUTOF10: 3
PAINLEVEL_OUTOF10: 7
PAINLEVEL_OUTOF10: 3

## 2021-01-31 ASSESSMENT — PAIN DESCRIPTION - FREQUENCY: FREQUENCY: CONTINUOUS

## 2021-01-31 ASSESSMENT — PAIN DESCRIPTION - DESCRIPTORS
DESCRIPTORS: ACHING
DESCRIPTORS: ACHING

## 2021-01-31 ASSESSMENT — PAIN DESCRIPTION - ONSET: ONSET: ON-GOING

## 2021-01-31 ASSESSMENT — PAIN DESCRIPTION - LOCATION: LOCATION: NECK

## 2021-01-31 NOTE — PROGRESS NOTES
Hospitalist Progress Note      PCP: Svetlana Benton    Date of Admission: 1/26/2021    Chief Complaint: Bilateral lower extremity weakness    Hospital Course: 70-year-old male presented to the hospital bilateral lower extremity weakness. MRI of the spine showed multilevel spinal stenosis. Neurosurgery following    Subjective: Patient denies any new concerns. Neck pain has been improving. Continues to remain in neck brace    Medications:  Reviewed    Infusion Medications     Scheduled Medications    heparin (porcine)  5,000 Units Subcutaneous Q8H    sodium chloride flush  10 mL Intravenous 2 times per day    polyethylene glycol  17 g Oral Daily    sennosides-docusate sodium  1 tablet Oral BID    famotidine  20 mg Oral BID    amLODIPine  5 mg Oral Daily    omeprazole  40 mg Oral QAM    cefTRIAXone (ROCEPHIN) IV  1,000 mg Intravenous Q24H     PRN Meds: sodium chloride flush, promethazine **OR** ondansetron, acetaminophen, oxyCODONE **OR** oxyCODONE, diazePAM, [COMPLETED] methocarbamol IVPB **FOLLOWED BY** methocarbamol, bisacodyl, aluminum & magnesium hydroxide-simethicone      Intake/Output Summary (Last 24 hours) at 1/31/2021 1156  Last data filed at 1/31/2021 0820  Gross per 24 hour   Intake 1090 ml   Output 197 ml   Net 893 ml       Physical Exam Performed:    BP (!) 166/92   Pulse 70   Temp 99.1 °F (37.3 °C) (Oral)   Resp 16   Ht 5' 8\" (1.727 m)   Wt 232 lb 5.8 oz (105.4 kg)   SpO2 96%   BMI 35.33 kg/m²     General appearance: No apparent distress, appears stated age and cooperative. HEENT: Pupils equal, round, and reactive to light. Conjunctivae/corneas clear. Neck: Neck brace on  Respiratory:  Normal respiratory effort. Clear to auscultation, bilaterally without Rales/Wheezes/Rhonchi. Cardiovascular: Regular rate and rhythm with normal S1/S2 without murmurs, rubs or gallops. Abdomen: Soft, non-tender, non-distended with normal bowel sounds.   Musculoskeletal: No clubbing, cyanosis or edema bilaterally. Full range of motion without deformity. Skin: Skin color, texture, turgor normal.  No rashes or lesions. Neurologic:  Neurovascularly intact without any focal sensory/motor deficits. Cranial nerves: II-XII intact, grossly non-focal.  Psychiatric: Alert and oriented, thought content appropriate, normal insight  Capillary Refill: Brisk,< 3 seconds   Peripheral Pulses: +2 palpable, equal bilaterally       Labs:   Recent Labs     01/29/21  0553   WBC 6.8   HGB 12.0*   HCT 35.5*        Recent Labs     01/29/21  0553      K 3.9      CO2 22   BUN 7   CREATININE 0.9   CALCIUM 9.0     No results for input(s): AST, ALT, BILIDIR, BILITOT, ALKPHOS in the last 72 hours. No results for input(s): INR in the last 72 hours. No results for input(s): Maryclare Brayan in the last 72 hours. Urinalysis:      Lab Results   Component Value Date    NITRU POSITIVE 01/25/2021    WBCUA 85 01/25/2021    BACTERIA 4+ 01/25/2021    RBCUA 2 01/25/2021    BLOODU SMALL 01/25/2021    SPECGRAV 1.021 01/25/2021    GLUCOSEU Negative 01/25/2021       Radiology:  XR CERVICAL SPINE (2-3 VIEWS)   Final Result      4 views demonstrate grade 1 anterolisthesis of C2 and C3. There are fusion rods and lateral mass screws from C3-C6. Prevertebral soft tissues are unremarkable. Mild-moderate degenerative disc disease at C4-5 and C5-6. Postoperative changes are seen. There is carotid artery calcification. XR CERVICAL SPINE (2-3 VIEWS)   Final Result      Intraoperative fluoroscopy was performed. Correlate with procedural note for additional information. FLUORO FOR SURGICAL PROCEDURES   Final Result      Intraoperative fluoroscopy was performed. Correlate with procedural note for additional information. CT CERVICAL SPINE WO CONTRAST   Final Result      1. Straightening of the normal cervical lordosis without spondylolisthesis.    2. No evidence of vertebral compression deformity or acute fracture. 3. Moderate cervical spondylosis superimposed on short pedicles contributing to multilevel spinal stenosis which is moderate to severe at C3-4 and C5-6. These findings are better evaluated on the recent prior MRI.               Assessment/Plan:    Cervical spinal central canal stenosis    Lumbo-sacral spinal canal stenosis  Bilateral Lower extremity weakness and numbness  - Neurosurgery following,   -Status post C3-6 posterior decompression/fusion/fixation   - Pain control  - Neuro checks q4h  - PT/OT eval.  Pending evaluation for ARU     UTI  -Completed Rocephin course     Diabetes mellitus  - LSSI    HTN  - Continue home amlodipine     HLD  - Continue home statin    DVT Prophylaxis: SCD  Diet: DIET GENERAL;  Code Status: Full Code    PT/OT Eval Status: Consulted    Dispo -ARU evaluation pending    Gabbie Holden MD

## 2021-01-31 NOTE — PLAN OF CARE
Problem: Falls - Risk of:  Goal: Will remain free from falls  Description: Will remain free from falls  Outcome: Ongoing  Note: Patient will remain free from falls. Patient will use call light to notify staff of needs prior to exiting the bed. Patient's bed will remain in lowest position with wheels locked and bed alarm engaged. Problem: Pain:  Goal: Pain level will decrease  Description: Pain level will decrease  Outcome: Ongoing  Note: Patient's pain will continue to improve and continue to be managed per the [facility]. Patient will also attempt to use distraction and repositioning to help alleviate pain. Problem: Pain:  Goal: Pain level will decrease  Description: Pain level will decrease  Outcome: Ongoing  Note: Patient's pain will continue to improve and continue to be managed per the [facility]. Patient will also attempt to use distraction and repositioning to help alleviate pain. Problem: Pain:  Goal: Pain level will decrease  Description: Pain level will decrease  Outcome: Ongoing  Note: Patient's pain will continue to improve and continue to be managed per the [facility]. Patient will also attempt to use distraction and repositioning to help alleviate pain. Problem: Pain - Acute:  Goal: Pain level will decrease  Description: Pain level will decrease  Outcome: Ongoing  Note: Patient's pain will continue to improve and continue to be managed per the [facility]. Patient will also attempt to use distraction and repositioning to help alleviate pain.

## 2021-01-31 NOTE — PROGRESS NOTES
Patient is A/O x4, VSS - BP can be high, and pain is being managed per the [facility]. Patient is ambulating to the bathroom to void with a walker, gait belt and x1 SBA. Patient tolerates activity well. Patient is tolerating PO liquids and diet well. Fall precautions remain in place, will continue to monitor and assess patient.

## 2021-01-31 NOTE — PROGRESS NOTES
Neurosurgery Progress Note    Patient seen and examined on 01/31/21. No acute events overnight. Has persistent numbness in arms. Neurologically stable on exam. Drain output 220 cc.     A/P: 60 yo man POD 3 s/p C3-6 D/F/F    -Neuro stable  -Pain control with PO meds  -Muscle relaxants prn  -Will monitor drain output  -PT/OT, mobilize  -ARU consult given myelopathy/SCI   -Dispo: Placement pending    Rissa Gillis MD, PhD  [address], [address] [place] [address], [id number] [phone] (c), [phone] (o)

## 2021-01-31 NOTE — PLAN OF CARE
Problem: Falls - Risk of:  Goal: Will remain free from falls  Description: Will remain free from falls  1/31/2021 0930 by Steve Weinberg RN  Outcome: Ongoing   Fall risk precautions in place. Bed is locked and in lowest position. 2/4 side rails up. Call light within reach. Fall risk Bracelet in place. Frequent checks on patient. Free from falls at this time. Will continue to monitor. Problem: Pain:  Goal: Pain level will decrease  Description: Pain level will decrease  1/31/2021 0930 by Steve Weinberg RN  Outcome: Ongoing   PRN medications given for pain. Will continue to monitor.

## 2021-02-01 LAB
ANION GAP SERPL CALCULATED.3IONS-SCNC: 7 MMOL/L (ref 3–16)
BASOPHILS ABSOLUTE: 0 K/UL (ref 0–0.2)
BASOPHILS RELATIVE PERCENT: 0.3 %
BUN BLDV-MCNC: 12 MG/DL (ref 7–20)
CALCIUM SERPL-MCNC: 9.4 MG/DL (ref 8.3–10.6)
CHLORIDE BLD-SCNC: 102 MMOL/L (ref 99–110)
CO2: 31 MMOL/L (ref 21–32)
CREAT SERPL-MCNC: 0.9 MG/DL (ref 0.8–1.3)
EOSINOPHILS ABSOLUTE: 0.1 K/UL (ref 0–0.6)
EOSINOPHILS RELATIVE PERCENT: 1.8 %
GFR AFRICAN AMERICAN: >60
GFR NON-AFRICAN AMERICAN: >60
GLUCOSE BLD-MCNC: 130 MG/DL (ref 70–99)
GLUCOSE BLD-MCNC: 153 MG/DL (ref 70–99)
GLUCOSE BLD-MCNC: 183 MG/DL (ref 70–99)
HCT VFR BLD CALC: 32.9 % (ref 40.5–52.5)
HEMOGLOBIN: 11.2 G/DL (ref 13.5–17.5)
LYMPHOCYTES ABSOLUTE: 0.9 K/UL (ref 1–5.1)
LYMPHOCYTES RELATIVE PERCENT: 20.9 %
MCH RBC QN AUTO: 33.7 PG (ref 26–34)
MCHC RBC AUTO-ENTMCNC: 34.2 G/DL (ref 31–36)
MCV RBC AUTO: 98.5 FL (ref 80–100)
MONOCYTES ABSOLUTE: 0.5 K/UL (ref 0–1.3)
MONOCYTES RELATIVE PERCENT: 11.6 %
NEUTROPHILS ABSOLUTE: 2.9 K/UL (ref 1.7–7.7)
NEUTROPHILS RELATIVE PERCENT: 65.4 %
PDW BLD-RTO: 14.3 % (ref 12.4–15.4)
PERFORMED ON: ABNORMAL
PERFORMED ON: ABNORMAL
PLATELET # BLD: 161 K/UL (ref 135–450)
PMV BLD AUTO: 8 FL (ref 5–10.5)
POTASSIUM REFLEX MAGNESIUM: 3.9 MMOL/L (ref 3.5–5.1)
RBC # BLD: 3.34 M/UL (ref 4.2–5.9)
SODIUM BLD-SCNC: 140 MMOL/L (ref 136–145)
WBC # BLD: 4.5 K/UL (ref 4–11)

## 2021-02-01 PROCEDURE — 97116 GAIT TRAINING THERAPY: CPT

## 2021-02-01 PROCEDURE — 6360000002 HC RX W HCPCS: Performed by: INTERNAL MEDICINE

## 2021-02-01 PROCEDURE — 97530 THERAPEUTIC ACTIVITIES: CPT

## 2021-02-01 PROCEDURE — 99254 IP/OBS CNSLTJ NEW/EST MOD 60: CPT | Performed by: PHYSICAL MEDICINE & REHABILITATION

## 2021-02-01 PROCEDURE — 83036 HEMOGLOBIN GLYCOSYLATED A1C: CPT

## 2021-02-01 PROCEDURE — 85025 COMPLETE CBC W/AUTO DIFF WBC: CPT

## 2021-02-01 PROCEDURE — 97535 SELF CARE MNGMENT TRAINING: CPT

## 2021-02-01 PROCEDURE — 36415 COLL VENOUS BLD VENIPUNCTURE: CPT

## 2021-02-01 PROCEDURE — 6370000000 HC RX 637 (ALT 250 FOR IP): Performed by: NEUROLOGICAL SURGERY

## 2021-02-01 PROCEDURE — 1200000000 HC SEMI PRIVATE

## 2021-02-01 PROCEDURE — 2580000003 HC RX 258: Performed by: NEUROLOGICAL SURGERY

## 2021-02-01 PROCEDURE — 80048 BASIC METABOLIC PNL TOTAL CA: CPT

## 2021-02-01 PROCEDURE — 6370000000 HC RX 637 (ALT 250 FOR IP): Performed by: INTERNAL MEDICINE

## 2021-02-01 RX ORDER — LABETALOL 20 MG/4 ML (5 MG/ML) INTRAVENOUS SYRINGE
10 EVERY 4 HOURS PRN
Status: DISCONTINUED | OUTPATIENT
Start: 2021-02-01 | End: 2021-02-03 | Stop reason: HOSPADM

## 2021-02-01 RX ORDER — NICOTINE POLACRILEX 4 MG
15 LOZENGE BUCCAL PRN
Status: DISCONTINUED | OUTPATIENT
Start: 2021-02-01 | End: 2021-02-03 | Stop reason: HOSPADM

## 2021-02-01 RX ORDER — INSULIN LISPRO 100 [IU]/ML
0-3 INJECTION, SOLUTION INTRAVENOUS; SUBCUTANEOUS NIGHTLY
Status: DISCONTINUED | OUTPATIENT
Start: 2021-02-01 | End: 2021-02-03 | Stop reason: HOSPADM

## 2021-02-01 RX ORDER — DEXTROSE MONOHYDRATE 25 G/50ML
12.5 INJECTION, SOLUTION INTRAVENOUS PRN
Status: DISCONTINUED | OUTPATIENT
Start: 2021-02-01 | End: 2021-02-03 | Stop reason: HOSPADM

## 2021-02-01 RX ORDER — INSULIN LISPRO 100 [IU]/ML
0-6 INJECTION, SOLUTION INTRAVENOUS; SUBCUTANEOUS
Status: DISCONTINUED | OUTPATIENT
Start: 2021-02-01 | End: 2021-02-03 | Stop reason: HOSPADM

## 2021-02-01 RX ORDER — DEXTROSE MONOHYDRATE 50 MG/ML
100 INJECTION, SOLUTION INTRAVENOUS PRN
Status: DISCONTINUED | OUTPATIENT
Start: 2021-02-01 | End: 2021-02-03 | Stop reason: HOSPADM

## 2021-02-01 RX ADMIN — OMEPRAZOLE 40 MG: 20 CAPSULE, DELAYED RELEASE ORAL at 09:04

## 2021-02-01 RX ADMIN — POLYETHYLENE GLYCOL 3350 17 G: 17 POWDER, FOR SOLUTION ORAL at 08:57

## 2021-02-01 RX ADMIN — AMLODIPINE BESYLATE 5 MG: 5 TABLET ORAL at 08:57

## 2021-02-01 RX ADMIN — OXYCODONE 10 MG: 5 TABLET ORAL at 03:57

## 2021-02-01 RX ADMIN — OXYCODONE 10 MG: 5 TABLET ORAL at 18:11

## 2021-02-01 RX ADMIN — INSULIN LISPRO 1 UNITS: 100 INJECTION, SOLUTION INTRAVENOUS; SUBCUTANEOUS at 20:33

## 2021-02-01 RX ADMIN — DOCUSATE SODIUM 50 MG AND SENNOSIDES 8.6 MG 1 TABLET: 8.6; 5 TABLET, FILM COATED ORAL at 08:57

## 2021-02-01 RX ADMIN — INSULIN LISPRO 1 UNITS: 100 INJECTION, SOLUTION INTRAVENOUS; SUBCUTANEOUS at 17:15

## 2021-02-01 RX ADMIN — FAMOTIDINE 20 MG: 20 TABLET ORAL at 20:32

## 2021-02-01 RX ADMIN — OXYCODONE 10 MG: 5 TABLET ORAL at 13:08

## 2021-02-01 RX ADMIN — FAMOTIDINE 20 MG: 20 TABLET ORAL at 08:57

## 2021-02-01 RX ADMIN — OXYCODONE 10 MG: 5 TABLET ORAL at 08:57

## 2021-02-01 RX ADMIN — DOCUSATE SODIUM 50 MG AND SENNOSIDES 8.6 MG 1 TABLET: 8.6; 5 TABLET, FILM COATED ORAL at 20:32

## 2021-02-01 RX ADMIN — Medication 10 ML: at 20:59

## 2021-02-01 RX ADMIN — Medication 10 ML: at 09:04

## 2021-02-01 RX ADMIN — HEPARIN SODIUM 5000 UNITS: 5000 INJECTION INTRAVENOUS; SUBCUTANEOUS at 05:50

## 2021-02-01 RX ADMIN — HEPARIN SODIUM 5000 UNITS: 5000 INJECTION INTRAVENOUS; SUBCUTANEOUS at 20:32

## 2021-02-01 RX ADMIN — OXYCODONE 10 MG: 5 TABLET ORAL at 00:08

## 2021-02-01 RX ADMIN — HEPARIN SODIUM 5000 UNITS: 5000 INJECTION INTRAVENOUS; SUBCUTANEOUS at 13:08

## 2021-02-01 ASSESSMENT — PAIN DESCRIPTION - LOCATION
LOCATION: NECK

## 2021-02-01 ASSESSMENT — PAIN DESCRIPTION - ONSET
ONSET: ON-GOING
ONSET: ON-GOING

## 2021-02-01 ASSESSMENT — PAIN DESCRIPTION - DESCRIPTORS
DESCRIPTORS: ACHING

## 2021-02-01 ASSESSMENT — PAIN DESCRIPTION - PAIN TYPE
TYPE: SURGICAL PAIN
TYPE: SURGICAL PAIN

## 2021-02-01 ASSESSMENT — PAIN DESCRIPTION - FREQUENCY
FREQUENCY: INTERMITTENT
FREQUENCY: INTERMITTENT

## 2021-02-01 ASSESSMENT — PAIN DESCRIPTION - PROGRESSION
CLINICAL_PROGRESSION: NOT CHANGED
CLINICAL_PROGRESSION: NOT CHANGED

## 2021-02-01 ASSESSMENT — PAIN SCALES - GENERAL
PAINLEVEL_OUTOF10: 7
PAINLEVEL_OUTOF10: 1
PAINLEVEL_OUTOF10: 8
PAINLEVEL_OUTOF10: 7

## 2021-02-01 ASSESSMENT — PAIN DESCRIPTION - ORIENTATION
ORIENTATION: RIGHT;LEFT

## 2021-02-01 NOTE — PROGRESS NOTES
Hospitalist Progress Note      PCP: Jason Specter    Chief Complaint. Patient is a 80-year-old male who presented to hospital for bilateral lower extremity weakness, MRI spine did show multilevel spinal stenosis    Date of Admission: 1/26/2021    Subjective:   denies chest pain, nausea, vomiting, shortness of breath, fever or chills. mention feels overall better    Medications:  Reviewed    Infusion Medications   Scheduled Medications    heparin (porcine)  5,000 Units Subcutaneous Q8H    sodium chloride flush  10 mL Intravenous 2 times per day    polyethylene glycol  17 g Oral Daily    sennosides-docusate sodium  1 tablet Oral BID    famotidine  20 mg Oral BID    amLODIPine  5 mg Oral Daily    omeprazole  40 mg Oral QAM     PRN Meds: sodium chloride flush, promethazine **OR** ondansetron, acetaminophen, oxyCODONE **OR** oxyCODONE, diazePAM, [COMPLETED] methocarbamol IVPB **FOLLOWED BY** methocarbamol, bisacodyl, aluminum & magnesium hydroxide-simethicone      Intake/Output Summary (Last 24 hours) at 2/1/2021 1320  Last data filed at 2/1/2021 0900  Gross per 24 hour   Intake 1080 ml   Output 80 ml   Net 1000 ml       Physical Exam Performed:    BP (!) 163/90   Pulse 75   Temp 98.5 °F (36.9 °C) (Oral)   Resp 16   Ht 5' 8\" (1.727 m)   Wt 232 lb 5.8 oz (105.4 kg)   SpO2 95%   BMI 35.33 kg/m²     General appearance: No apparent distress,   HEENT:  Conjunctivae/corneas clear. Neck: Supple, with full range of motion. Respiratory:  Normal respiratory effort. Clear to auscultation, bilaterally without Rales/Wheezes/Rhonchi. Cardiovascular: Regular rate and rhythm with normal S1/S2 without murmurs or rubs  Abdomen: Soft, non-tender, non-distended, normal bowel sounds. Musculoskeletal: No cyanosis or edema bilaterally  Neurologic:  without any focal sensory/motor deficits.  grossly non-focal.  Psychiatric: Alert and oriented, Normal mood  Peripheral Pulses: +2 palpable, equal bilaterally       Labs: Recent Labs     02/01/21  0608   WBC 4.5   HGB 11.2*   HCT 32.9*        Recent Labs     02/01/21  0608      K 3.9      CO2 31   BUN 12   CREATININE 0.9   CALCIUM 9.4     No results for input(s): AST, ALT, BILIDIR, BILITOT, ALKPHOS in the last 72 hours. No results for input(s): INR in the last 72 hours. No results for input(s): Lyndee Romain in the last 72 hours. Urinalysis:      Lab Results   Component Value Date    NITRU POSITIVE 01/25/2021    WBCUA 85 01/25/2021    BACTERIA 4+ 01/25/2021    RBCUA 2 01/25/2021    BLOODU SMALL 01/25/2021    SPECGRAV 1.021 01/25/2021    GLUCOSEU Negative 01/25/2021       Radiology:  XR CERVICAL SPINE (2-3 VIEWS)   Final Result      4 views demonstrate grade 1 anterolisthesis of C2 and C3. There are fusion rods and lateral mass screws from C3-C6. Prevertebral soft tissues are unremarkable. Mild-moderate degenerative disc disease at C4-5 and C5-6. Postoperative changes are seen. There is carotid artery calcification. XR CERVICAL SPINE (2-3 VIEWS)   Final Result      Intraoperative fluoroscopy was performed. Correlate with procedural note for additional information. FLUORO FOR SURGICAL PROCEDURES   Final Result      Intraoperative fluoroscopy was performed. Correlate with procedural note for additional information. CT CERVICAL SPINE WO CONTRAST   Final Result      1. Straightening of the normal cervical lordosis without spondylolisthesis. 2. No evidence of vertebral compression deformity or acute fracture. 3. Moderate cervical spondylosis superimposed on short pedicles contributing to multilevel spinal stenosis which is moderate to severe at C3-4 and C5-6. These findings are better evaluated on the recent prior MRI.             Assessment/Plan:    Active Hospital Problems    Diagnosis    Acute low back pain with possible spinal stenosis of less than six weeks' duration [M54.5]       Patient is a 75-year-old male who presented to hospital for bilateral lower extremity weakness, MRI spine did show multilevel spinal stenosis    Assessment  Bilateral lower extremity weakness, numbness  Cervical spinal stenosis  Lumbosacral canal stenosis  Urinary tract infection  Diabetes mellitus  Hypertension  Hyperlipidemia    Plan  Neurosurgery following, status post C3-C6 posterior decompression fixation, fusion  Pain control  Patient evaluated by PT OT-recommend ARU placement  Neurochecks  Sliding scale  Continue home amlodipine  Continue home statin  S/p completion of Rocephin course  DVT prophylaxis-SCD  Diet: DIET GENERAL;  Code Status: Full Code    PT/OT Eval Status: ordered    Dispo -likely discharge 1 to 2 days    Julian Narayan MD

## 2021-02-01 NOTE — PROGRESS NOTES
Physical Therapy  Facility/Department: [facility] 5T ORTHO/NEURO  Daily Treatment Note  NAME: Odilon Smith  : 1960  MRN: [id number]    Date of Service: 2021    Discharge Recommendations: Odilon Smith scored a 17/24 on the AM-PAC short mobility form. Current research shows that an AM-PAC score of 17 or less is typically not associated with a discharge to the patient's home setting. Based on the patient's AM-PAC score and their current functional mobility deficits, it is recommended that the patient have 5-7 sessions per week of Physical Therapy at d/ to increase the patient's independence. At this time, this patient demonstrates the endurance, and/or tolerance for 3 hours of therapy each day, with a treatment frequency of 5-7x/wk. Please see assessment section for further patient specific details. If patient discharges prior to next session this note will serve as a discharge summary. Please see below for the latest assessment towards goals. Assessment   Body structures, Functions, Activity limitations: Decreased functional mobility ; Decreased balance;Decreased strength;Decreased endurance  Assessment: Pt demonstrated improvement in amb distance with use of RW but showed unsteadiness throughout the session. Pt completed stair training but showed poor awarenss of where feet are in space which req Min A and VC throughout to ensure safe navigation. Pt previously IND with functional mobility and completion of full flight of stairs to enter apartment, now req RW and assist with all functional mobility. Pt is a good candidate for acute level rehab at d/ to return to IND functional status and PLOF. Pt will continue to benefit from skilled PT while in house to improve activity tolerace, RW management, and gait/stair sequencing. PT Education: Goals;PT Role;Gait Training;General Safety;Plan of Care;Transfer Training;Energy Conservation; Functional Mobility Training  REQUIRES PT FOLLOW UP: Yes  Activity Tolerance  Activity Tolerance: Patient Tolerated treatment well;Patient limited by fatigue;Patient limited by endurance     Patient Diagnosis(es): There were no encounter diagnoses. has a past medical history of Diabetes mellitus type 1 ([facility] [address] Hyperlipidemia, and Hypertension. has a past surgical history that includes Gastric bypass surgery and cervical laminectomy (N/A, 1/28/2021). Restrictions  Position Activity Restriction  Other position/activity restrictions: activity as tolerated. Cervical collar donned at all times  Subjective   General  Chart Reviewed: Yes  Additional Pertinent Hx: Pt is a 62 y/o male admitted acute LBP and possibe spinal stenosis. Transferred from [facility] ED post fall on 1/26 to [place] [address] had C3-6 cervicl decompression on 1/28. 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 Recent sciatica 1/22/21. PMHx: DM, HTN. Family / Caregiver Present: No  Subjective  Subjective: Pt found supine in bed and agreeable to PT. \"I can give the stairs a try\"          Orientation     Cognition      Objective   Bed mobility  Rolling to Right: Stand by assistance  Supine to Sit: Stand by assistance  Sit to Supine: Stand by assistance  Scooting: Stand by assistance  Transfers  Sit to Stand: Contact guard assistance  Stand to sit: Contact guard assistance  Comment: Pt req VC to push from bed/chair for safe sit<>stand transfer  Ambulation  Ambulation?: Yes  Ambulation 1  Surface: level tile  Device: Rolling Walker  Assistance: Contact guard assistance  Gait Deviations: Slow Jaqueline; Increased TERRELL; Decreased step length;Decreased step height  Distance: 150 feet X 2, 25 feet  Comments: pt demonstrated unsteadiness and decreased awareness of where feet are in space during amb. Pt had no overt LOB but req CGA throughout amb secondary to unsteadiness.   Stairs/Curb  Stairs?: Yes  Stairs  # Steps : 2  Rails: Bilateral  Device: No Device  Assistance: Minimal assistance  Comment: Pt demonstrated lack of awareness in where feet are in space and increased time to complete 2 steps secondary to this. Pt was unsteady throughout stiar training and req VC to perform step to step pattern for asc/desc stairs     Balance  Sitting - Static: Good  Sitting - Dynamic: Fair  Standing - Static: Fair  Standing - Dynamic: Fair  Comments: Pt had no overt LOB but was unsteady throguhout the session                           G-Code     OutComes Score                                                     AM-PAC Score  AM-PAC Inpatient Mobility Raw Score : 17 (02/01/21 1211)  AM-PAC Inpatient T-Scale Score : 42.13 (02/01/21 1211)  Mobility Inpatient CMS 0-100% Score: 50.57 (02/01/21 1211)  Mobility Inpatient CMS G-Code Modifier : CK (02/01/21 1211)          Goals  Short term goals  Time Frame for Short term goals: discharge  Short term goal 1: pt will complete sit<>stnd transfer with SUP to RW-ongoing  Short term goal 2: pt will amb 50 feet with SUP with RW-ongoing  Short term goal 3: pt will complete 5 steps with unilateral use of rail and SUP-ongoing  Patient Goals   Patient goals : return home    Plan    Plan  Times per week: 5-7  Times per day: Daily  Current Treatment Recommendations: Strengthening, Safety Education & Training, Balance Training, Endurance Training, Functional Mobility Training, Equipment Evaluation, Education, & procurement, Transfer Training, Gait Training, Stair training  Safety Devices  Type of devices: Left in bed, Gait belt, Nurse notified, Call light within reach, Bed alarm in place     Therapy Time   Individual Concurrent Group Co-treatment   Time In 1136         Time Out 1159         Minutes 23                  Timed code treatment minutes:23  Total treatment time:23    If patient is discharged prior to next treatment, this note will serve as the discharge summary.       Kathleen Gonzalez, SPT

## 2021-02-01 NOTE — PLAN OF CARE
Problem: Falls - Risk of:  Goal: Will remain free from falls  Description: Will remain free from falls  2/1/2021 1342 by Mylene Richardson RN  Outcome: Ongoing  Note: Pt is in bed with alarm on. Non-skid socks are on. Up x1 with walker and gait belt. Camera on. Fall precautions in place. Problem: Skin Integrity:  Goal: Will show no infection signs and symptoms  Description: Will show no infection signs and symptoms  Outcome: Ongoing  Note: No signs of infection. Incision is clean/dry/intact. No new redness, swelling, or drainage.

## 2021-02-01 NOTE — PROGRESS NOTES
Occupational Therapy  Facility/Department: [facility] 5T ORTHO/NEURO  Daily Treatment Note  NAME: Darshan Mullins  : 1960  MRN: [id number]    Date of Service: 2021    Discharge Recommendations:  Darshan Mullins scored a 16/24 on the AM-PAC ADL Inpatient form. Current research shows that an AM-PAC score of 17 or less is typically not associated with a discharge to the patient's home setting. Based on the patient's AM-PAC score and their current ADL deficits, it is recommended that the patient have 5-7 sessions per week of Occupational Therapy at d/c to increase the patient's independence. At this time, this patient demonstrates the endurance, and/or tolerance for 3 hours of therapy each day, with a treatment frequency of 5-7x/wk. Please see assessment section for further patient specific details. If patient discharges prior to next session this note will serve as a discharge summary. Please see below for the latest assessment towards goals. OT Equipment Recommendations  Other: defer to next care facility    Assessment   Performance deficits / Impairments: Decreased functional mobility ; Decreased ADL status; Decreased endurance  Assessment: Performing functional transfers/mobility w/ CGA. Required Min A to don pants. Pt has c/o numbness/tingling both hands - needing assist to open/close containers, to tie pants, to  clothing. Anticipate pt would require assist to don/doff cervical collar for hygiene (managing velcro). Pt lives alone and has limited support system. Pt would benefit from furhter IP OT to maximize safety and independence prior to return home alone. Continue per POC.   Treatment Diagnosis: impaired ADLs /functional transfers/ decreaed endurance 2/2 generalized weakness /fall  Prognosis: Good  OT Education: OT Role;ADL Adaptive Strategies;Transfer Training  Patient Education: verb understanding / reinforce as needed  REQUIRES OT FOLLOW UP: Yes  Activity Tolerance  Activity Tolerance: Patient Tolerated treatment well  Safety Devices  Safety Devices in place: Not Applicable(handoff to physical therapist)         Patient Diagnosis(es): There were no encounter diagnoses. has a past medical history of Diabetes mellitus type 1 ([facility] [address] Hyperlipidemia, and Hypertension. has a past surgical history that includes Gastric bypass surgery and cervical laminectomy (N/A, 1/28/2021). Restrictions  Position Activity Restriction  Other position/activity restrictions: activity as tolerated. Cervical collar: Wear collar at all times except for hygiene     Subjective   General  Chart Reviewed: Yes  Patient assessed for rehabilitation services?: Yes  Additional Pertinent Hx: Admit 1/26 Acute low back pain    Neuro sx consult to OR 1/28 s/p C3-4, C4-5, C5-6 POSTERIOR CERVICAL DECOMPRESSION, FUSION AND FIXATION                 PMHX: DM, HTN, HLD and gastric bypass sx  Response to previous treatment: Patient with no complaints from previous session  Family / Caregiver Present: No  Diagnosis: Acute back pain-- s/p C3-4, C4-5, C5-6 POSTERIOR CERVICAL DECOMPRESSION, FUSION AND FIXATION on 1/28    Subjective  Subjective: In bed on entry. Agreeable to work with OT. Reporting his hands are numb and tingling. \"They told me I'd be here [facility]) for another 7 days or so. \"    Vital Signs  Patient Currently in Pain: Yes(surgical discomfort - not rated, denied need for pain meds at the time (late entry for 1145))     Orientation  Orientation  Overall Orientation Status: Within Functional Limits     Objective    ADL  Feeding: Setup  UE Dressing: (NT - pt does have B hand weakness/numbness (anticipate pt would need assistance for velcro management))  LE Dressing: Minimal assistance(requesting to put on new underwear/pants; doffed dirty clothing - donned 2 different pairs of pants (for proper fit); required assist to thread L foot, CGA pullup in standing)           Balance  Sitting Balance: Stand by assistance(EOB)  Standing Balance: Contact guard assistance(for clothing management, hand hygiene at sink level)    Functional Mobility  Functional - Mobility Device: Rolling Walker  Activity: To/from bathroom  Assist Level: Contact guard assistance  Functional Mobility Comments: Note: mildly unsteady, LEs appearing to be weak    Toilet Transfers  Toilet - Technique: Ambulating(using wh walker)  Equipment Used: Standard bedside commode(in bathroom)  Toilet Transfer: Contact guard assistance  Toilet Transfers Comments: NOTE: pt declining to sit on std toilet 2* needing to be cleaned; performed 3in1 transfer w/ CGA (freestanding in bathroom)    Bed mobility  Supine to Sit: Stand by assistance  Scooting: Stand by assistance(to EOB)     Transfers  Sit to stand: Contact guard assistance  Stand to sit: Contact guard assistance  Transfer Comments: v.cues for hand placement / tech - pulling up on walker           Coordination  Fine Motor: needing assistance to manipulate tie on pants; challenged to properly  onto waistband of pants (numbness/tingling, weakness of B hands)                 Cognition  Overall Cognitive Status: [facility]                                         Plan   Plan  Times per week: 5-7x  Times per day: Daily  Current Treatment Recommendations: Endurance Training, Safety Education & Training, Self-Care / ADL, Equipment Evaluation, Education, & procurement, Patient/Caregiver Education & Training                                                      AM-PAC Score        AM-[facility] Inpatient Daily Activity Raw Score: 16 (02/01/21 1456)  AM-PAC Inpatient ADL T-Scale Score : 35.96 (02/01/21 1456)  ADL Inpatient CMS 0-100% Score: 53.32 (02/01/21 1456)  ADL Inpatient CMS G-Code Modifier : CK (02/01/21 1456)    Goals  Short term goals  Time Frame for Short term goals: at d/c  Short term goal 1: Stance x 7 mins with CGA for ADLs (ongoing)  Short term goal 2: LE dressing with CGA and AE prn- goal met 1/30: upgraded goal= LE dressing w/ Mod I (not met)  Short term goal 3: Commode transfers with SBA with RTS prn (not met)  Patient Goals   Patient goals : return to normal again       Therapy Time   Individual Concurrent Group Co-treatment   Time In 1113         Time Out 1145         Minutes 32                 Catherine Acosta OTR/L #[id number]

## 2021-02-01 NOTE — PROGRESS NOTES
Patient is A/O x4, VSS, and pain is being managed per the [facility]. Patient is tolerating PO liquids and diet well. Patient is adequately voiding to the bathroom. Patient ambulates with a walker and gait belt and tolerates activity well. Fall precautions remain in place. Will continue to monitor and assess patients.

## 2021-02-01 NOTE — PROGRESS NOTES
NEUROSURGERY POST-OP PROGRESS NOTE    Patient Name: Dana Brar YOB: 1960   Sex: Male Age: 61 yrs     Medical Record Number: [id number] Acct Number: [de-identified]   Room Number: 1449/2317-06 Hospital Day: Hospital Day: 7     Interval History:  Post-operative Day# 4 s/p  C3-4, C4-5, C5-6 POSTERIOR CERVICAL DECOMPRESSION, FUSION AND FIXATION    Subjective:  Resting in bed, pain manageable. Waiting on social work to assist with discharge planning. Objective:    VITAL SIGNS   BP (!) 166/90   Pulse 68   Temp 98.3 °F (36.8 °C) (Oral)   Resp 16   Ht 5' 8\" (1.727 m)   Wt 232 lb 5.8 oz (105.4 kg)   SpO2 96%   BMI 35.33 kg/m²    Height Height: 5' 8\" (172.7 cm)   Weight Weight: 232 lb 5.8 oz (105.4 kg)        Allergies No Known Allergies   NPO Status DIET GENERAL;   Isolation No active isolations     LABS   Basic Metabolic Profile No results for input(s): NA, CL, CO2, BUN, CREATININE, GLUCOSE, ALB, PHOS, MG in the last 72 hours. Invalid input(s): POTASSIUM, CA   Complete Blood Count No results for input(s): WBC, RBC, HEMOGLOBIN in the last 72 hours. Invalid input(s): HEMATOCRIT   Coagulation Studies No results for input(s): PTT, INR in the last 72 hours.     Invalid input(s): PLATELETS, PROA, PT, PTTA     MEDICATIONS   Inpatient Medications     heparin (porcine), 5,000 Units, Subcutaneous, Q8H    sodium chloride flush, 10 mL, Intravenous, 2 times per day    polyethylene glycol, 17 g, Oral, Daily    sennosides-docusate sodium, 1 tablet, Oral, BID    famotidine, 20 mg, Oral, BID    amLODIPine, 5 mg, Oral, Daily    omeprazole, 40 mg, Oral, QAM   Infusions      Antibiotics   [unfilled]     Neurologic Exam:    Mental status: awake, alert, oriented x 4    DTRs:    Right  Left    ankle clonus  - -   toes (babinski)  down Down      Musculoskeletal:   Gait: Not tested   Tone: normal   Sensory: numbness/tingling to bilateral fingertips   Motor strength:    Right  Left    Right  Left    Deltoid 5 5  Hip Flex  5 5   Biceps  5 5  Knee Extensors  5 5   Triceps  5 5  Knee Flexors  5 5   Wrist Ext  5 5  Ankle Dorsiflex. 5 5   Wrist Flex  5 5  Ankle Plantarflex. 5 5   Handgrip  4 4  Ext Manohar Longus  5 5   Thumb Ext  4 4         Incision: c/d/i   Drain: 45/50 : 95ml out     Respiratory:  Unlabored respiratory pattern    Abdomen:   Soft, ND   Last BM 1/28     Cardiovascular:  Warm, well perfused    Assessment   62 yo male POD 4 s/p C3-4, C4-5, C5-6 POSTERIOR CERVICAL DECOMPRESSION, FUSION AND FIXATION    Plan:  1. Neurologic exam frequency: q 4   2. Mobility: PT/OT  3. [place] collar:   -Cervical collar to be worn at all times   -Cervical collar does NOT need to be worn when bathing or showering   -Cervical collar pads to be cleaned with soap & water, air dried, and changed daily   -OK to put gauze over incision to keep cervical collar from rubbing, if needed  4. Diet: ADAT   5. Drain: continue to monitor output  6. DVT Prophylaxis: SCDs and heparin SQ    7. GI Prophylaxis: pepcid  8. Bowel Regimen: senokot, glycolax    9. Pain control: tylenol, roxicodone  10. Muscle spasms: robaxin, valium   11. Incisional Care: cleanse daily with soap and water, pat dry with clean towel and paint with CHG swab   12. Dispo Planning: continue care on 5 tower, ARU consult,  for discharge assistance     Patient was discussed with Dr. Alisa Pichardo who agrees with above assessment and plan. Electronically signed by:  Valentina Mccracken, 2/1/2021 7:08 AM   Neurosurgery Nurse Practitioner  [phone]

## 2021-02-01 NOTE — CARE COORDINATION
Patient here from home. Has recs for ARU. Will need to complete medicaid application. Per Lucy/DANNY, unit can take patient as a pending medicaid. CM left follow up vm for Ed/. Per Sri/Angelita, issue was discussed with Drs office and doesn't anticipate procedure being covered under workmans comp because it is not related to case.  CM will continue to follow for any changes to discharge plans,  Dottie Garcia RN  RN Case Manager  [phone]

## 2021-02-01 NOTE — PLAN OF CARE
Problem: Falls - Risk of:  Goal: Will remain free from falls  Description: Will remain free from falls  Outcome: Ongoing  Note: Patient will remain free from falls. Patient will use call light to notify staff of needs prior to exiting the bed. Patient's bed will remain in lowest position with wheels locked and bed alarm engaged. Problem: Pain:  Goal: Pain level will decrease  Description: Pain level will decrease  Outcome: Ongoing  Note: Patient's pain will continue to improve and continue to be managed per the [facility]. Problem: Pain - Acute:  Goal: Pain level will decrease  Description: Pain level will decrease  Outcome: Ongoing  Note: Patient's pain will continue to improve and continue to be managed per the [facility].

## 2021-02-01 NOTE — CONSULTS
Consult Note  Physical Medicine and Rehabilitation    Patient: Rimma Adame  [id number]  Date: 2/1/2021      Chief Complaint: Spinal stenosis     History of Present Illness/Hospital Course:  Patient is a 35-year-old male who presented to hospital for bilateral lower extremity weakness, MRI spine did show multilevel spinal stenosis. He underwent a C3-4, C4-5, C5-6 POSTERIOR CERVICAL DECOMPRESSION, FUSION AND FIXATION with Dr. Nova Sarmiento. Today he is feeling well and would like to come to the ARU for further treatment and therapy. Prior Level of Function:  Independent for mobility, ADLs, and IADLs    Current Level of Function:  Mod assist     Past Medical History:   Diagnosis Date    Diabetes mellitus type 1 ([facility] [address]     Hyperlipidemia     Hypertension        Past Surgical History:   Procedure Laterality Date    CERVICAL LAMINECTOMY N/A 1/28/2021    C3-4, C4-5, C5-6 POSTERIOR CERVICAL DECOMPRESSION, FUSION AND FIXATION performed by Jenny More MD at [address] [place] [address]         No family history on file. Social History     Socioeconomic History    Marital status:      Spouse name: None    Number of children: None    Years of education: None    Highest education level: None   Occupational History    None   Social Needs    Financial resource strain: None    Food insecurity     Worry: None     Inability: None    Transportation needs     Medical: None     Non-medical: None   Tobacco Use    Smoking status: Never Smoker    Smokeless tobacco: Never Used   Substance and Sexual Activity    Alcohol use:  Yes     Alcohol/week: 4.0 standard drinks     Types: 2 Cans of beer, 2 Shots of liquor per week    Drug use: No    Sexual activity: None   Lifestyle    Physical activity     Days per week: None     Minutes per session: None    Stress: None   Relationships    Social connections     Talks on phone: None     Gets together: None     Attends [employer] service: None     Active member of club or organization: None     Attends meetings of clubs or organizations: None     Relationship status: None    Intimate partner violence     Fear of current or ex partner: None     Emotionally abused: None     Physically abused: None     Forced sexual activity: None   Other Topics Concern    None   Social History Narrative    None           REVIEW OF SYSTEMS:   CONSTITUTIONAL: negative for fevers, chills, diaphoresis, appetite change, night sweats, unexpected weight change, fatigue  EYES: negative for blurred vision, eye discharge, visual disturbance and icterus  HEENT: negative for hearing loss, tinnitus, ear drainage, sinus pressure, nasal congestion, epistaxis and snoring  RESPIRATORY: Negative for hemoptysis, cough, sputum production  CARDIOVASCULAR: negative for chest pain, palpitations, exertional chest pressure/discomfort, syncope, edema  GASTROINTESTINAL: negative for nausea, vomiting, diarrhea, blood in stool, abdominal pain, constipation  GENITOURINARY: negative for frequency, dysuria, urinary incontinence, decreased urine volume, and hematuria  HEMATOLOGIC/LYMPHATIC: negative for easy bruising, bleeding and lymphadenopathy  ALLERGIC/IMMUNOLOGIC: negative for recurrent infections, angioedema, anaphylaxis and drug reactions  ENDOCRINE: negative for weight changes and diabetic symptoms including polyuria, polydipsia and polyphagia  MUSCULOSKELETAL: positive for pain, joint swelling, decreased range of motion in cervical spine. NEUROLOGICAL: negative for headaches, slurred speech, unilateral weakness- positive for bilateral LE weakness. PSYCHIATRIC/BEHAVIORAL: negative for hallucinations, behavioral problems, confusion and agitation.      Physical Examination:  Vitals:   Patient Vitals for the past 24 hrs:   BP Temp Temp src Pulse Resp SpO2   02/01/21 1215 (!) 163/90 98.5 °F (36.9 °C) Oral 75 16 95 %   02/01/21 0700 (!) 166/90 98.3 °F (36.8 °C) Oral 68 16 96 %   02/01/21 0330 (!) 159/84 98.4 °F (36.9 °C) Oral 72 16 96 %   02/01/21 0000 (!) 146/89 98.5 °F (36.9 °C) Oral 80 16 95 %   01/31/21 1908 (!) 158/90 98.4 °F (36.9 °C) Oral 72 16 96 %     Const: Alert. WDWN. No distress  Eyes: Conjunctiva noninjected, no icterus noted; pupils equal, round, and reactive to light. HENT: Atraumatic, normocephalic; Oral mucosa moist  Neck: Trachea midline, neck supple. No thyromegaly noted. - [place] J noted. CV: Regular rate and rhythm, no murmur rub or gallop noted  Resp: Lungs clear to auscultation bilaterally, no rales wheezes or ronchi, no retractions. Respirations unlabored. GI: Soft, nontender, nondistended. Normal bowel sounds. No palpable masses. Skin: Normal temperature and turgor. No rashes or breakdown noted. Ext: No significant edema appreciated. No varicosities. MSK: No joint tenderness, erythema, warmth noted. AROM intact. Neuro: Alert, oriented, appropriate. No cranial nerve deficits appreciated. Sensation intact to light touch. Motor examination reveals normal strength in bilateral UE and 4/5 strength in bilateral LE. Reflexes normal and symmetric. Psych: Stable mood, normal judgement, normal affect     Lab Results   Component Value Date    WBC 4.5 02/01/2021    HGB 11.2 (L) 02/01/2021    HCT 32.9 (L) 02/01/2021    MCV 98.5 02/01/2021     02/01/2021     Lab Results   Component Value Date    INR 0.97 01/27/2021    INR 1.10 01/26/2021    PROTIME 11.2 01/27/2021    PROTIME 12.8 01/26/2021     Lab Results   Component Value Date    CREATININE 0.9 02/01/2021    BUN 12 02/01/2021     02/01/2021    K 3.9 02/01/2021     02/01/2021    CO2 31 02/01/2021     Lab Results   Component Value Date    ALT 19 01/27/2021    AST 24 01/27/2021    ALKPHOS 131 (H) 01/27/2021    BILITOT 0.5 01/27/2021         XR CERVICAL SPINE (2-3 VIEWS)   Final Result      4 views demonstrate grade 1 anterolisthesis of C2 and C3. There are fusion rods and lateral mass screws from C3-C6.  Prevertebral soft tissues are unremarkable. Mild-moderate degenerative disc disease at C4-5 and C5-6. Postoperative changes are seen. There is carotid artery calcification. XR CERVICAL SPINE (2-3 VIEWS)   Final Result      Intraoperative fluoroscopy was performed. Correlate with procedural note for additional information. FLUORO FOR SURGICAL PROCEDURES   Final Result      Intraoperative fluoroscopy was performed. Correlate with procedural note for additional information. CT CERVICAL SPINE WO CONTRAST   Final Result      1. Straightening of the normal cervical lordosis without spondylolisthesis. 2. No evidence of vertebral compression deformity or acute fracture. 3. Moderate cervical spondylosis superimposed on short pedicles contributing to multilevel spinal stenosis which is moderate to severe at C3-4 and C5-6. These findings are better evaluated on the recent prior MRI. Assessment:  Cervical spinal central canal stenosis    Lumbo-sacral spinal canal stenosis  Bilateral Lower extremity weakness and numbness  - Neurosurgery following  -Status post C3-6 posterior decompression/fusion/fixation- successful   - Pain control  - Requires extensive PT/OT in ARU setting      UTI  -Completed Rocephin course  - Follow up      Diabetes mellitus  - LSSI     HTN  - Continue home amlodipine     HLD  - Continue home statin    Impairments- Decreased functional mobility, Decreased ADLs    Recommendations:  ARU admit when medically ready. Patient with new functional deficits and ongoing medical complexity. Demonstrates ability to tolerate 3 hours therapy/day. He is a good candidate for acute inpatient rehab when medically appropriate. Thank you for this consult. Please contact me with any questions or concerns.      Anselmo Gann D.O. M.P.H  PM&R  2/1/2021  3:26 PM

## 2021-02-02 LAB
ANION GAP SERPL CALCULATED.3IONS-SCNC: 8 MMOL/L (ref 3–16)
BUN BLDV-MCNC: 13 MG/DL (ref 7–20)
CALCIUM SERPL-MCNC: 9.2 MG/DL (ref 8.3–10.6)
CHLORIDE BLD-SCNC: 102 MMOL/L (ref 99–110)
CO2: 29 MMOL/L (ref 21–32)
CREAT SERPL-MCNC: 0.9 MG/DL (ref 0.8–1.3)
ESTIMATED AVERAGE GLUCOSE: 142.7 MG/DL
GFR AFRICAN AMERICAN: >60
GFR NON-AFRICAN AMERICAN: >60
GLUCOSE BLD-MCNC: 105 MG/DL (ref 70–99)
GLUCOSE BLD-MCNC: 121 MG/DL (ref 70–99)
GLUCOSE BLD-MCNC: 133 MG/DL (ref 70–99)
GLUCOSE BLD-MCNC: 134 MG/DL (ref 70–99)
GLUCOSE BLD-MCNC: 253 MG/DL (ref 70–99)
HBA1C MFR BLD: 6.6 %
PERFORMED ON: ABNORMAL
POTASSIUM REFLEX MAGNESIUM: 3.8 MMOL/L (ref 3.5–5.1)
SODIUM BLD-SCNC: 139 MMOL/L (ref 136–145)

## 2021-02-02 PROCEDURE — 80048 BASIC METABOLIC PNL TOTAL CA: CPT

## 2021-02-02 PROCEDURE — 97116 GAIT TRAINING THERAPY: CPT

## 2021-02-02 PROCEDURE — 97530 THERAPEUTIC ACTIVITIES: CPT

## 2021-02-02 PROCEDURE — 1200000000 HC SEMI PRIVATE

## 2021-02-02 PROCEDURE — 6370000000 HC RX 637 (ALT 250 FOR IP): Performed by: NEUROLOGICAL SURGERY

## 2021-02-02 PROCEDURE — 2580000003 HC RX 258: Performed by: NEUROLOGICAL SURGERY

## 2021-02-02 PROCEDURE — 99232 SBSQ HOSP IP/OBS MODERATE 35: CPT | Performed by: PHYSICAL MEDICINE & REHABILITATION

## 2021-02-02 PROCEDURE — 2500000003 HC RX 250 WO HCPCS: Performed by: INTERNAL MEDICINE

## 2021-02-02 PROCEDURE — 36415 COLL VENOUS BLD VENIPUNCTURE: CPT

## 2021-02-02 PROCEDURE — 6360000002 HC RX W HCPCS: Performed by: INTERNAL MEDICINE

## 2021-02-02 RX ORDER — OXYCODONE HYDROCHLORIDE 10 MG/1
10 TABLET ORAL EVERY 4 HOURS PRN
Qty: 12 TABLET | Refills: 0 | Status: ON HOLD | OUTPATIENT
Start: 2021-02-02 | End: 2021-02-11 | Stop reason: HOSPADM

## 2021-02-02 RX ORDER — AMLODIPINE BESYLATE 5 MG/1
5 TABLET ORAL DAILY
Status: CANCELLED | OUTPATIENT
Start: 2021-02-03

## 2021-02-02 RX ORDER — NICOTINE POLACRILEX 4 MG
15 LOZENGE BUCCAL PRN
Status: CANCELLED | OUTPATIENT
Start: 2021-02-02

## 2021-02-02 RX ORDER — OMEPRAZOLE 20 MG/1
40 CAPSULE, DELAYED RELEASE ORAL EVERY MORNING
Status: CANCELLED | OUTPATIENT
Start: 2021-02-03

## 2021-02-02 RX ORDER — DEXTROSE MONOHYDRATE 50 MG/ML
100 INJECTION, SOLUTION INTRAVENOUS PRN
Status: CANCELLED | OUTPATIENT
Start: 2021-02-02

## 2021-02-02 RX ORDER — BISACODYL 10 MG
10 SUPPOSITORY, RECTAL RECTAL DAILY PRN
Status: CANCELLED | OUTPATIENT
Start: 2021-02-02

## 2021-02-02 RX ORDER — FAMOTIDINE 20 MG/1
20 TABLET, FILM COATED ORAL 2 TIMES DAILY
Status: CANCELLED | OUTPATIENT
Start: 2021-02-02

## 2021-02-02 RX ORDER — OXYCODONE HYDROCHLORIDE 5 MG/1
5 TABLET ORAL EVERY 4 HOURS PRN
Qty: 9 TABLET | Refills: 0 | Status: ON HOLD | OUTPATIENT
Start: 2021-02-02 | End: 2021-02-11 | Stop reason: HOSPADM

## 2021-02-02 RX ORDER — INSULIN LISPRO 100 [IU]/ML
0-6 INJECTION, SOLUTION INTRAVENOUS; SUBCUTANEOUS
Status: CANCELLED | OUTPATIENT
Start: 2021-02-02

## 2021-02-02 RX ORDER — MAGNESIUM HYDROXIDE/ALUMINUM HYDROXICE/SIMETHICONE 120; 1200; 1200 MG/30ML; MG/30ML; MG/30ML
15 SUSPENSION ORAL EVERY 6 HOURS PRN
Status: CANCELLED | OUTPATIENT
Start: 2021-02-02

## 2021-02-02 RX ORDER — FAMOTIDINE 20 MG/1
20 TABLET, FILM COATED ORAL 2 TIMES DAILY
Qty: 60 TABLET | Refills: 3 | Status: SHIPPED | OUTPATIENT
Start: 2021-02-02 | End: 2021-09-15 | Stop reason: SDUPTHER

## 2021-02-02 RX ORDER — ONDANSETRON 2 MG/ML
4 INJECTION INTRAMUSCULAR; INTRAVENOUS EVERY 6 HOURS PRN
Status: CANCELLED | OUTPATIENT
Start: 2021-02-02

## 2021-02-02 RX ORDER — METHOCARBAMOL 750 MG/1
750 TABLET, FILM COATED ORAL 3 TIMES DAILY PRN
Qty: 30 TABLET | Refills: 0 | Status: ON HOLD | OUTPATIENT
Start: 2021-02-02 | End: 2021-02-11 | Stop reason: HOSPADM

## 2021-02-02 RX ORDER — PROMETHAZINE HYDROCHLORIDE 12.5 MG/1
12.5 TABLET ORAL EVERY 6 HOURS PRN
Status: CANCELLED | OUTPATIENT
Start: 2021-02-02

## 2021-02-02 RX ORDER — ACETAMINOPHEN 325 MG/1
650 TABLET ORAL EVERY 4 HOURS PRN
Status: CANCELLED | OUTPATIENT
Start: 2021-02-02

## 2021-02-02 RX ORDER — DEXTROSE MONOHYDRATE 25 G/50ML
12.5 INJECTION, SOLUTION INTRAVENOUS PRN
Status: CANCELLED | OUTPATIENT
Start: 2021-02-02

## 2021-02-02 RX ORDER — POLYETHYLENE GLYCOL 3350 17 G/17G
17 POWDER, FOR SOLUTION ORAL DAILY PRN
Status: CANCELLED | OUTPATIENT
Start: 2021-02-02

## 2021-02-02 RX ORDER — METHOCARBAMOL 750 MG/1
750 TABLET, FILM COATED ORAL EVERY 6 HOURS PRN
Status: CANCELLED | OUTPATIENT
Start: 2021-02-02

## 2021-02-02 RX ORDER — OXYCODONE HYDROCHLORIDE 5 MG/1
10 TABLET ORAL EVERY 4 HOURS PRN
Status: CANCELLED | OUTPATIENT
Start: 2021-02-02

## 2021-02-02 RX ORDER — INSULIN LISPRO 100 [IU]/ML
0-3 INJECTION, SOLUTION INTRAVENOUS; SUBCUTANEOUS NIGHTLY
Status: CANCELLED | OUTPATIENT
Start: 2021-02-02

## 2021-02-02 RX ORDER — OXYCODONE HYDROCHLORIDE 5 MG/1
5 TABLET ORAL EVERY 4 HOURS PRN
Status: CANCELLED | OUTPATIENT
Start: 2021-02-02

## 2021-02-02 RX ADMIN — OXYCODONE 10 MG: 5 TABLET ORAL at 16:10

## 2021-02-02 RX ADMIN — INSULIN LISPRO 2 UNITS: 100 INJECTION, SOLUTION INTRAVENOUS; SUBCUTANEOUS at 20:30

## 2021-02-02 RX ADMIN — LABETALOL 20 MG/4 ML (5 MG/ML) INTRAVENOUS SYRINGE 10 MG: at 12:31

## 2021-02-02 RX ADMIN — AMLODIPINE BESYLATE 5 MG: 5 TABLET ORAL at 08:33

## 2021-02-02 RX ADMIN — OXYCODONE 10 MG: 5 TABLET ORAL at 10:30

## 2021-02-02 RX ADMIN — OMEPRAZOLE 40 MG: 20 CAPSULE, DELAYED RELEASE ORAL at 08:33

## 2021-02-02 RX ADMIN — OXYCODONE 10 MG: 5 TABLET ORAL at 01:43

## 2021-02-02 RX ADMIN — HEPARIN SODIUM 5000 UNITS: 5000 INJECTION INTRAVENOUS; SUBCUTANEOUS at 19:34

## 2021-02-02 RX ADMIN — DOCUSATE SODIUM 50 MG AND SENNOSIDES 8.6 MG 1 TABLET: 8.6; 5 TABLET, FILM COATED ORAL at 19:35

## 2021-02-02 RX ADMIN — HEPARIN SODIUM 5000 UNITS: 5000 INJECTION INTRAVENOUS; SUBCUTANEOUS at 12:33

## 2021-02-02 RX ADMIN — HEPARIN SODIUM 5000 UNITS: 5000 INJECTION INTRAVENOUS; SUBCUTANEOUS at 05:08

## 2021-02-02 RX ADMIN — Medication 10 ML: at 20:30

## 2021-02-02 RX ADMIN — Medication 10 ML: at 08:34

## 2021-02-02 RX ADMIN — METHOCARBAMOL 750 MG: 750 TABLET ORAL at 05:08

## 2021-02-02 RX ADMIN — METHOCARBAMOL 750 MG: 750 TABLET ORAL at 19:35

## 2021-02-02 RX ADMIN — DOCUSATE SODIUM 50 MG AND SENNOSIDES 8.6 MG 1 TABLET: 8.6; 5 TABLET, FILM COATED ORAL at 08:33

## 2021-02-02 RX ADMIN — FAMOTIDINE 20 MG: 20 TABLET ORAL at 08:33

## 2021-02-02 RX ADMIN — FAMOTIDINE 20 MG: 20 TABLET ORAL at 19:34

## 2021-02-02 RX ADMIN — POLYETHYLENE GLYCOL 3350 17 G: 17 POWDER, FOR SOLUTION ORAL at 08:33

## 2021-02-02 RX ADMIN — OXYCODONE 10 MG: 5 TABLET ORAL at 20:30

## 2021-02-02 RX ADMIN — OXYCODONE 10 MG: 5 TABLET ORAL at 06:29

## 2021-02-02 ASSESSMENT — PAIN - FUNCTIONAL ASSESSMENT: PAIN_FUNCTIONAL_ASSESSMENT: PREVENTS OR INTERFERES SOME ACTIVE ACTIVITIES AND ADLS

## 2021-02-02 ASSESSMENT — PAIN SCALES - GENERAL
PAINLEVEL_OUTOF10: 6
PAINLEVEL_OUTOF10: 7
PAINLEVEL_OUTOF10: 7
PAINLEVEL_OUTOF10: 4

## 2021-02-02 ASSESSMENT — PAIN DESCRIPTION - LOCATION
LOCATION: NECK

## 2021-02-02 ASSESSMENT — PAIN DESCRIPTION - ORIENTATION
ORIENTATION: RIGHT;LEFT
ORIENTATION: LEFT;RIGHT
ORIENTATION: LEFT;RIGHT

## 2021-02-02 ASSESSMENT — PAIN DESCRIPTION - FREQUENCY
FREQUENCY: INTERMITTENT

## 2021-02-02 ASSESSMENT — PAIN DESCRIPTION - PAIN TYPE
TYPE: SURGICAL PAIN

## 2021-02-02 ASSESSMENT — PAIN DESCRIPTION - DESCRIPTORS
DESCRIPTORS: ACHING

## 2021-02-02 ASSESSMENT — PAIN DESCRIPTION - ONSET
ONSET: ON-GOING
ONSET: ON-GOING

## 2021-02-02 ASSESSMENT — PAIN DESCRIPTION - PROGRESSION
CLINICAL_PROGRESSION: NOT CHANGED
CLINICAL_PROGRESSION: NOT CHANGED

## 2021-02-02 NOTE — CARE COORDINATION
Case Management Daily Note                    Date: 2/2/2021     Patient Name: Dariel Ramos    Date of Admission: 1/26/2021  2:02 AM  YOB: 1960    Length of Stay: 7         Patient Admission Status: Inpatient  Diagnosis:Acute low back pain with possible spinal stenosis of less than six weeks' duration [M54.5]     ________________________________________________________________________________________  Discharge Plan: Home with Suzette Hartley   From home at baseline     Insurance: Payor: Worker's Comp not covering as this is not work related. Self-Pay with team working on pending medicaid application. Is pre-cert/notification needed: N/A     Tentative discharge date: 2/3     Current barriers: Start Medicaid Ashlyn, would need placement that could take pending medicaid status     Referrals completed: Inpatient Rehab: [facility]  Will discuss SNF    Resources/ information provided: Inpatient Rehab Information   ________________________________________________________________________________________  PT AM-PAC: 16 / 24 per last evaluation on: 2/1    OT AM-PAC: 16 / 24 per last evaluation on: 2/1    DME Needs for discharge: Lakeisha Villanueva   ________________________________________________________________________________________  Notes/Plan of Care:   KAMRAN reviewed chart on this date. Patient from home with stairs to enter his apartment. He is currently stand by and contact guard assist for transfers. He is able to ambulate 150 ft.x2. Stairs appear to be area that could make return home difficult. [facility] unable to accept under Interview Master as it is not a work related injury. Patient will need to find placement that is willing to take pending medicaid. Earle Maria with Financial stated application will be submitted today. SW to discuss alternative options to ARU if not available. UPDATE: 11:54 pm, COVID PCR. [facility] Plan for admission 2/3. Shreyas Ying and/or his family were provided with choice of provider; he and/or his family are in agreement with the discharge plan at this time.     Care Transition Patient: TON Thompson  [facility]   Case Management Department  Ph: [phone]

## 2021-02-02 NOTE — PROGRESS NOTES
Patient is alert and oriented. Vital signs are stable. Patient's hemovac drain and collar remain in place. Patient's pain is controlled with oral medication per MAR. Patient ambulates x1 with a walker. Patient tolerates ambulation well. Bed is in the lowest position. Bed alarm is activated. Call light is within reach. Will continue to monitor and reassess.

## 2021-02-02 NOTE — PROGRESS NOTES
Pt is A/O x4. VSS w/ exception to BP which can be elevated at times. Up x1 with walker and gait belt, tolerating ambulation well. Pt states he has some numbness to fingertips. Dressing is CDI. Fall precautions in place. Will continue to monitor.

## 2021-02-02 NOTE — PROGRESS NOTES
Hospitalist Progress Note      PCP: David Smith    Chief Complaint. Patient is a 41-year-old male who presented to hospital for bilateral lower extremity weakness, MRI spine did show multilevel spinal stenosis    Date of Admission: 1/26/2021    Subjective: Patient lying in bed comfortably, has no complaints today, denies chest pain, nausea, vomiting, shortness of breath, fever or chills. mention feels overall better    Medications:  Reviewed    Infusion Medications    dextrose       Scheduled Medications    insulin lispro  0-6 Units Subcutaneous TID WC    insulin lispro  0-3 Units Subcutaneous Nightly    heparin (porcine)  5,000 Units Subcutaneous Q8H    sodium chloride flush  10 mL Intravenous 2 times per day    polyethylene glycol  17 g Oral Daily    sennosides-docusate sodium  1 tablet Oral BID    famotidine  20 mg Oral BID    amLODIPine  5 mg Oral Daily    omeprazole  40 mg Oral QAM     PRN Meds: labetalol, glucose, dextrose, glucagon (rDNA), dextrose, sodium chloride flush, promethazine **OR** ondansetron, acetaminophen, oxyCODONE **OR** oxyCODONE, diazePAM, [COMPLETED] methocarbamol IVPB **FOLLOWED BY** methocarbamol, bisacodyl, aluminum & magnesium hydroxide-simethicone      Intake/Output Summary (Last 24 hours) at 2/2/2021 1249  Last data filed at 2/2/2021 0900  Gross per 24 hour   Intake 240 ml   Output 75 ml   Net 165 ml       Physical Exam Performed:    BP (!) 172/81   Pulse 73   Temp 98.9 °F (37.2 °C) (Oral)   Resp 16   Ht 5' 8\" (1.727 m)   Wt 232 lb 5.8 oz (105.4 kg)   SpO2 98%   BMI 35.33 kg/m²     General appearance: No apparent distress, lying in bed comfortably  HEENT:  Conjunctivae/corneas clear. Neck: Supple, with full range of motion. Respiratory:  Normal respiratory effort. Clear to auscultation, bilaterally without Rales/Wheezes/Rhonchi.   Cardiovascular: Regular rate and rhythm with normal S1/S2 without murmurs or rubs  Abdomen: Soft, non-tender, non-distended, normal bowel sounds. Musculoskeletal: No cyanosis or edema bilaterally  Neurologic:  without any focal sensory/motor deficits. grossly non-focal.  Psychiatric: Alert and oriented, Normal mood  Peripheral Pulses: +2 palpable, equal bilaterally       Labs:   Recent Labs     02/01/21  0608   WBC 4.5   HGB 11.2*   HCT 32.9*        Recent Labs     02/01/21  0608 02/02/21  0552    139   K 3.9 3.8    102   CO2 31 29   BUN 12 13   CREATININE 0.9 0.9   CALCIUM 9.4 9.2     No results for input(s): AST, ALT, BILIDIR, BILITOT, ALKPHOS in the last 72 hours. No results for input(s): INR in the last 72 hours. No results for input(s): Nikolay Greg in the last 72 hours. Urinalysis:      Lab Results   Component Value Date    NITRU POSITIVE 01/25/2021    WBCUA 85 01/25/2021    BACTERIA 4+ 01/25/2021    RBCUA 2 01/25/2021    BLOODU SMALL 01/25/2021    SPECGRAV 1.021 01/25/2021    GLUCOSEU Negative 01/25/2021       Radiology:  XR CERVICAL SPINE (2-3 VIEWS)   Final Result      4 views demonstrate grade 1 anterolisthesis of C2 and C3. There are fusion rods and lateral mass screws from C3-C6. Prevertebral soft tissues are unremarkable. Mild-moderate degenerative disc disease at C4-5 and C5-6. Postoperative changes are seen. There is carotid artery calcification. XR CERVICAL SPINE (2-3 VIEWS)   Final Result      Intraoperative fluoroscopy was performed. Correlate with procedural note for additional information. FLUORO FOR SURGICAL PROCEDURES   Final Result      Intraoperative fluoroscopy was performed. Correlate with procedural note for additional information. CT CERVICAL SPINE WO CONTRAST   Final Result      1. Straightening of the normal cervical lordosis without spondylolisthesis. 2. No evidence of vertebral compression deformity or acute fracture.    3. Moderate cervical spondylosis superimposed on short pedicles contributing to multilevel spinal stenosis which is moderate to severe at C3-4 and C5-6. These findings are better evaluated on the recent prior MRI.             Assessment/Plan:    Active Hospital Problems    Diagnosis    Acute low back pain with possible spinal stenosis of less than six weeks' duration [M54.5]       Patient is a 70-year-old male who presented to hospital for bilateral lower extremity weakness, MRI spine did show multilevel spinal stenosis    Assessment  Bilateral lower extremity weakness, numbness  Cervical spinal stenosis  Lumbosacral canal stenosis  Urinary tract infection  Diabetes mellitus  Hypertension  Hyperlipidemia    Plan  Neurosurgery following, status post C3-C6 posterior decompression fixation, fusion  Pain control  Patient evaluated by PT OT-recommend ARU placement  Neurochecks  Sliding scale  Continue home amlodipine  Continue home statin  S/p completion of Rocephin course  DVT prophylaxis-SCD  Diet: DIET GENERAL; Carb Control: 4 carb choices (60 gms)/meal  Code Status: Full Code    PT/OT Eval Status: ordered    Dispo -likely discharge 1 to 2 days, awaiting ARU placement pending pre-CERT    Elvira Lane MD

## 2021-02-02 NOTE — PROGRESS NOTES
Relationships    Social connections     Talks on phone: None     Gets together: None     Attends Christianity service: None     Active member of club or organization: None     Attends meetings of clubs or organizations: None     Relationship status: None    Intimate partner violence     Fear of current or ex partner: None     Emotionally abused: None     Physically abused: None     Forced sexual activity: None   Other Topics Concern    None   Social History Narrative    None           REVIEW OF SYSTEMS:   CONSTITUTIONAL: negative for fevers, chills, diaphoresis, appetite change, night sweats, unexpected weight change, fatigue  EYES: negative for blurred vision, eye discharge, visual disturbance and icterus  HEENT: negative for hearing loss, tinnitus, ear drainage, sinus pressure, nasal congestion, epistaxis and snoring  RESPIRATORY: Negative for hemoptysis, cough, sputum production  CARDIOVASCULAR: negative for chest pain, palpitations, exertional chest pressure/discomfort, syncope, edema  GASTROINTESTINAL: negative for nausea, vomiting, diarrhea, blood in stool, abdominal pain, constipation  GENITOURINARY: negative for frequency, dysuria, urinary incontinence, decreased urine volume, and hematuria  HEMATOLOGIC/LYMPHATIC: negative for easy bruising, bleeding and lymphadenopathy  ALLERGIC/IMMUNOLOGIC: negative for recurrent infections, angioedema, anaphylaxis and drug reactions  ENDOCRINE: negative for weight changes and diabetic symptoms including polyuria, polydipsia and polyphagia  MUSCULOSKELETAL: positive for pain, joint swelling, decreased range of motion in cervical spine. NEUROLOGICAL: negative for headaches, slurred speech, unilateral weakness- positive for bilateral LE weakness. PSYCHIATRIC/BEHAVIORAL: negative for hallucinations, behavioral problems, confusion and agitation.      Physical Examination:  Vitals:   Patient Vitals for the past 24 hrs:   BP Temp Temp src Pulse Resp SpO2   02/02/21 0715 XR CERVICAL SPINE (2-3 VIEWS)   Final Result      4 views demonstrate grade 1 anterolisthesis of C2 and C3. There are fusion rods and lateral mass screws from C3-C6. Prevertebral soft tissues are unremarkable. Mild-moderate degenerative disc disease at C4-5 and C5-6. Postoperative changes are seen. There is carotid artery calcification. XR CERVICAL SPINE (2-3 VIEWS)   Final Result      Intraoperative fluoroscopy was performed. Correlate with procedural note for additional information. FLUORO FOR SURGICAL PROCEDURES   Final Result      Intraoperative fluoroscopy was performed. Correlate with procedural note for additional information. CT CERVICAL SPINE WO CONTRAST   Final Result      1. Straightening of the normal cervical lordosis without spondylolisthesis. 2. No evidence of vertebral compression deformity or acute fracture. 3. Moderate cervical spondylosis superimposed on short pedicles contributing to multilevel spinal stenosis which is moderate to severe at C3-4 and C5-6. These findings are better evaluated on the recent prior MRI. Assessment:  Cervical spinal central canal stenosis    Lumbo-sacral spinal canal stenosis  Bilateral Lower extremity weakness and numbness  - Neurosurgery following  -Status post C3-6 posterior decompression/fusion/fixation- successful   - Pain control  - Requires extensive PT/OT in ARU setting      UTI  -Completed Rocephin course  - Follow up      Diabetes mellitus  - LSSI     HTN  - Continue home amlodipine     HLD  - Continue home statin    Impairments- Decreased functional mobility, Decreased ADLs    Recommendations:  ARU admit tonight    Patient with new functional deficits and ongoing medical complexity. Demonstrates ability to tolerate 3 hours therapy/day. He is a good candidate for acute inpatient rehab when medically appropriate. Thank you for this consult. Please contact me with any questions or concerns.      Mayte Rea CHARLENE MENDOSA  PM&R  2/2/2021  11:22 AM

## 2021-02-02 NOTE — DISCHARGE INSTR - COC
01/27/21 COVID-19 (Ordered)   01/27/21 Rule-Out Test Resulted            Nurse Assessment:  Last Vital Signs: BP (!) 172/81   Pulse 73   Temp 98.9 °F (37.2 °C) (Oral)   Resp 16   Ht 5' 8\" (1.727 m)   Wt 232 lb 5.8 oz (105.4 kg)   SpO2 98%   BMI 35.33 kg/m²     Last documented pain score (0-10 scale): Pain Level: 4  Last Weight:   Wt Readings from Last 1 Encounters:   01/31/21 232 lb 5.8 oz (105.4 kg)     Mental Status:  oriented and alert    IV Access:  - Peripheral IV - site  L Antecubital, insertion date: ***    Nursing Mobility/ADLs:  Walking   Assisted  Transfer  Assisted  Bathing  Independent  Dressing  Assisted  Toileting  Independent  Feeding  Independent  Med Admin  Independent  Med Delivery   whole    Wound Care Documentation and Therapy: Wash incision daily with soap and water, pat dry with clean towel and paint with CHG swab, keep ANTOINETTE and dry    Sutures to be removed on 2/11/2021         Elimination:  Continence: Bowel: Yes  Bladder: Yes  Urinary Catheter: None   Colostomy/Ileostomy/Ileal Conduit: No       Date of Last BM: 2/1/21    Intake/Output Summary (Last 24 hours) at 2/2/2021 1248  Last data filed at 2/2/2021 0900  Gross per 24 hour   Intake 240 ml   Output 75 ml   Net 165 ml     I/O last 3 completed shifts: In: 360 [P.O.:360]  Out: 76 [Drains:75]    Safety Concerns: At Risk for Falls    Impairments/Disabilities:      None    Nutrition Therapy:  Current Nutrition Therapy:   - Oral Diet:  Carb Control 4 carbs/meal (1800kcals/day)    Routes of Feeding: Oral  Liquids: Thin Liquids  Daily Fluid Restriction: no  Last Modified Barium Swallow with Video (Video Swallowing Test): not done    Treatments at the Time of Hospital Discharge:   Respiratory Treatments: ***  Oxygen Therapy:  is not on home oxygen therapy.   Ventilator:    - No ventilator support    Rehab Therapies: Physical Therapy and Occupational Therapy  Weight Bearing Status/Restrictions: No weight bearing restirctions  Other

## 2021-02-02 NOTE — PROGRESS NOTES
Physical Therapy  Facility/Department: [facility] 5T ORTHO/NEURO  Daily Treatment Note  NAME: Hernando Gordon  : 1960  MRN: [id number]    Date of Service: 2021    Discharge Recommendations: Hernando Gordon scored a 18/24 on the AM-PAC short mobility form. Current research shows that an AM-PAC score of 17 or less is typically not associated with a discharge to the patient's home setting. Based on the patient's AM-PAC score and their current functional mobility deficits, it is recommended that the patient have 5-7 sessions per week of Physical Therapy at [facility] to increase the patient's independence. At this time, this patient demonstrates the endurance, and/or tolerance for 3 hours of therapy each day, with a treatment frequency of 5-7x/wk. Please see assessment section for further patient specific details. If patient discharges prior to next session this note will serve as a discharge summary. Please see below for the latest assessment towards goals. Assessment   Body structures, Functions, Activity limitations: Decreased functional mobility ; Decreased balance;Decreased strength;Decreased endurance  Assessment: Pt is showing improvement in amb quality but still showing unsteadiness especially when turning sharp corners with RW, but no LOB noted. Pt showing improvment in stair navigation with increased awareness of where feet are in space but still required VC to ensure full foot on next step before advancement of limb. Pt demonstrating improvement with skilled PT intervention but is still a good candidate for acute level rehab at / to improve strength, balance, kinesthetic awareness, and IND mobility. If pt were to return home he will need 24 hr assist to ensure safe mobility. PT will continue to follow while in house. PT Education: Goals;PT Role;Gait Training;General Safety;Plan of Care;Transfer Training;Energy Conservation; Functional Mobility Training  REQUIRES PT FOLLOW UP: Yes  Activity Tolerance  Activity Tolerance: Patient Tolerated treatment well     Patient Diagnosis(es): The primary encounter diagnosis was Cervical spinal stenosis. Diagnoses of Acute low back pain with possible spinal stenosis of less than six weeks' duration and Contusion of left ankle, sequela were also pertinent to this visit. has a past medical history of Diabetes mellitus type 1 ([address] Hyperlipidemia, and Hypertension. has a past surgical history that includes Gastric bypass surgery and cervical laminectomy (N/A, 1/28/2021). Restrictions  Position Activity Restriction  Other position/activity restrictions: activity as tolerated. Cervical collar: Wear collar at all times except for hygiene  Subjective   General  Chart Reviewed: Yes  Additional Pertinent Hx: Pt is a 62 y/o male admitted acute LBP and possibe spinal stenosis. Transferred from [facility] ED post fall on 1/26 to [place] [address] had C3-6 cervicl decompression on 1/28. 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 Recent sciatica 1/22/21. PMHx: DM, HTN. Subjective  Subjective: Pt found supine in bed and agreeable to PT.  \"I already went for a walk but I can go again\"  Pain Screening  Patient Currently in Pain: Yes(Pt reports 6/10 pain in neck, nurse notified)  Vital Signs  Patient Currently in Pain: Yes(Pt reports 6/10 pain in neck, nurse notified)       Orientation     Cognition      Objective   Bed mobility  Rolling to Right: Stand by assistance  Supine to Sit: Stand by assistance  Sit to Supine: Supervision  Scooting: Stand by assistance  Transfers  Sit to Stand: Stand by assistance  Stand to sit: Stand by assistance  Comment: Pt req VC to push from bed/chair for safe sit<>stand transfer  Ambulation  Ambulation?: Yes  Ambulation 1  Surface: level tile  Device: Rolling Walker  Assistance: Contact guard assistance;Stand by assistance(Progressed to SBA by end of session)  Distance: 75 feet, 250 feet  Comments: Pt demonstrated slight unsteadiness when turning with RW but overall showing improvement with amb. Stairs/Curb  Stairs?: Yes  Stairs  # Steps : 4  Rails: Bilateral  Device: No Device  Assistance: Contact guard assistance  Comment: Pt showing improvement in awareness of where feet are in space and only req 1 VC for proper foot placement during training     Balance  Sitting - Static: Good  Sitting - Dynamic: Fair  Standing - Static: Fair  Standing - Dynamic: Fair                           G-Code     OutComes Score                                                     AM-PAC Score  AM-PAC Inpatient Mobility Raw Score : 18 (02/02/21 1503)  AM-PAC Inpatient T-Scale Score : 43.63 (02/02/21 1503)  Mobility Inpatient CMS 0-100% Score: 46.58 (02/02/21 1503)  Mobility Inpatient CMS G-Code Modifier : CK (02/02/21 1503)          Goals  Short term goals  Time Frame for Short term goals: discharge  Short term goal 1: pt will complete sit<>stand transfer with SUP to RW-ongoing  Short term goal 2: pt will amb 50 feet with SUP with RW-ongoing  Short term goal 3: pt will complete 5 steps with unilateral use of rail and SUP-ongoing  Patient Goals   Patient goals : return home    Plan    Plan  Times per week: 5-7  Times per day: Daily  Current Treatment Recommendations: Strengthening, Safety Education & Training, Balance Training, Endurance Training, Functional Mobility Training, Equipment Evaluation, Education, & procurement, Transfer Training, Gait Training, Stair training  Safety Devices  Type of devices: Left in bed, Gait belt, Nurse notified, Call light within reach, Bed alarm in place     Therapy Time   Individual Concurrent Group Co-treatment   Time In 1411         Time Out 1434         Minutes 23                  Timed code treatment minutes:23  Total treatment time:23      If patient is discharged prior to next treatment, this note will serve as the discharge summary.       Sanjiv Castillo, SPT

## 2021-02-02 NOTE — PROGRESS NOTES
Occupational Therapy  Facility/Department: [facility] 5T ORTHO/NEURO  Daily Treatment Note  NAME: Michelle Ferris  : 1960  MRN: [id number]    Date of Service: 2021    Discharge Recommendations:  Michelle Ferris scored a 16/24 on the AM-PAC ADL Inpatient form. Current research shows that an AM-PAC score of 17 or less is typically not associated with a discharge to the patient's home setting. Based on the patient's AM-PAC score and their current ADL deficits, it is recommended that the patient have 5-7 sessions per week of Occupational Therapy at d/c to increase the patient's independence. At this time, this patient demonstrates the endurance, and/or tolerance for 3 hours of therapy each day, with a treatment frequency of 5-7x/wk. Please see assessment section for further patient specific details. If patient discharges prior to next session this note will serve as a discharge summary. Please see below for the latest assessment towards goals. OT Equipment Recommendations  Equipment Needed: No    Assessment   Performance deficits / Impairments: Decreased functional mobility ; Decreased ADL status; Decreased endurance  Assessment: Pt continues to require CGA for functional mobility/transfers and dynamic standing balance activity. Pt lives alone and would benefit from intensive inpt therapy at d/c to maximize functional independence. Continue with POC. Treatment Diagnosis: impaired ADLs /functional transfers/ decreaed endurance 2/2 generalized weakness /fall  Prognosis: Good  OT Education: OT Role;Transfer Training  Patient Education: verb understanding / reinforce as needed  REQUIRES OT FOLLOW UP: Yes  Activity Tolerance  Activity Tolerance: Patient Tolerated treatment well  Safety Devices  Type of devices: Call light within reach;Nurse notified; Left in bed;Bed alarm in place         Patient Diagnosis(es): There were no encounter diagnoses.       has a past medical history of Diabetes mellitus type 1 ([facility] [address] Hyperlipidemia, and Hypertension. has a past surgical history that includes Gastric bypass surgery and cervical laminectomy (N/A, 1/28/2021). Restrictions  Position Activity Restriction  Other position/activity restrictions: activity as tolerated. Cervical collar: Wear collar at all times except for hygiene  Subjective   General  Chart Reviewed: Yes  Patient assessed for rehabilitation services?: Yes  Additional Pertinent Hx: Admit 1/26 Acute low back pain    Neuro sx consult to OR 1/28 s/p C3-4, C4-5, C5-6 POSTERIOR CERVICAL DECOMPRESSION, FUSION AND FIXATION                 PMHX: DM, HTN, HLD and gastric bypass sx  Response to previous treatment: Patient with no complaints from previous session  Family / Caregiver Present: No  Diagnosis: Acute back pain-- s/p C3-4, C4-5, C5-6 POSTERIOR CERVICAL DECOMPRESSION, FUSION AND FIXATION on 1/28  Subjective  Subjective: Pt supine in bed with [place] Collar donned. Pt pleasant, agreeable to therapy session, declined all ADLs as pt had already completed this am with PCA. General Comment  Comments: Pt supine to sit log roll SBA with use of bed rail. Pt sit EOB Supervision. Pt sit to stand CGA, pt ambulated ~125 ft with rw at [facility]. Pt stand to sit CGA. Pt with the following dynamic standing balance/UB ROM activity x 2 trials with seataed rest break between: 10 lateral raises, 10 horizontal AB/Adduction, 10 bicep curls and 10 front raises. Pt ambulated back to room ~125 ft with rw CGA. Pt stand to sit CGA EOB. Pt sit to supine Supervision. Call light in reach and bed alarm on.   Pain Assessment  Pain Level: 4  Pain Type: Surgical pain  Pain Location: Neck  Pain Orientation: Left;Right  Pain Descriptors: Aching  Pre Treatment Pain Screening  Intervention List: Patient able to continue with treatment  Comments / Details: RN present administering pain meds  Vital Signs  Patient Currently in Pain: Yes   Orientation  Orientation  Overall Orientation Status: Within Normal Limits  Objective             Balance  Sitting Balance: Supervision  Standing Balance: Contact guard assistance  Standing Balance  Time: ~12 min  Activity: ambulation in hallway and dynamict standing balance activity x 2 trials  Functional Mobility  Functional - Mobility Device: Rolling Walker  Activity: Other  Assist Level: Contact guard assistance  Bed mobility  Supine to Sit: Stand by assistance  Sit to Supine: Supervision  Scooting: Stand by assistance  Transfers  Sit to stand: Contact guard assistance  Stand to sit: Contact guard assistance                       Cognition  Overall Cognitive Status: WNL                                         Plan   Plan  Times per week: 5-7x  Times per day: Daily  Current Treatment Recommendations: Endurance Training, Safety Education & Training, Self-Care / ADL, Equipment Evaluation, Education, & procurement, Patient/Caregiver Education & Training  G-Code     OutComes Score                                                  AM-PAC Score        AM-[facility] Inpatient Daily Activity Raw Score: 16 (02/02/21 1059)  AM-PAC Inpatient ADL T-Scale Score : 35.96 (02/02/21 1059)  ADL Inpatient CMS 0-100% Score: 53.32 (02/02/21 1059)  ADL Inpatient CMS G-Code Modifier : CK (02/02/21 1059)    Goals  Short term goals  Time Frame for Short term goals: at d/c  Short term goal 1: Stance x 7 mins with CGA for ADLs (ongoing) - not addressed 2/2  Short term goal 2: LE dressing with CGA and AE prn- goal met 1/30: upgraded goal= LE dressing w/ Mod I (not met) - not addressed 2/2  Short term goal 3: Commode transfers with SBA with RTS prn (not met) - not addressed 2/2  Patient Goals   Patient goals : return to normal again       Therapy Time   Individual Concurrent Group Co-treatment   Time In 1023         Time Out 1048         Minutes 25         Timed Code Treatment Minutes: 19 [address]

## 2021-02-02 NOTE — CONSULTS
Progress/Consult Note  Physical Medicine and Rehabilitation    Patient: Ricardo Vazquez  [id number]  Date: 2/2/2021      Chief Complaint: Spinal stenosis     History of Present Illness/Hospital Course:  Patient is a 61-year-old male who presented to hospital for bilateral lower extremity weakness, MRI spine did show multilevel spinal stenosis. He underwent a C3-4, C4-5, C5-6 POSTERIOR CERVICAL DECOMPRESSION, FUSION AND FIXATION with Dr. Lamar Snider. Today he is feeling well and would like to come to the ARU for further treatment and therapy. Interval history: No new complaints today. He still has deficits and would like to go to the ARU ASAP. Prior Level of Function:  Independent for mobility, ADLs, and IADLs    Current Level of Function:  Mod assist     Past Medical History:   Diagnosis Date    Diabetes mellitus type 1 [address] [address]     Hyperlipidemia     Hypertension        Past Surgical History:   Procedure Laterality Date    CERVICAL LAMINECTOMY N/A 1/28/2021    C3-4, C4-5, C5-6 POSTERIOR CERVICAL DECOMPRESSION, FUSION AND FIXATION performed by Vadim Castro MD at [address] [place] [address]         No family history on file. Social History     Socioeconomic History    Marital status:      Spouse name: None    Number of children: None    Years of education: None    Highest education level: None   Occupational History    None   Social Needs    Financial resource strain: None    Food insecurity     Worry: None     Inability: None    Transportation needs     Medical: None     Non-medical: None   Tobacco Use    Smoking status: Never Smoker    Smokeless tobacco: Never Used   Substance and Sexual Activity    Alcohol use:  Yes     Alcohol/week: 4.0 standard drinks     Types: 2 Cans of beer, 2 Shots of liquor per week    Drug use: No    Sexual activity: None   Lifestyle    Physical activity     Days per week: None     Minutes per session: None    Stress: None   Relationships  Social connections     Talks on phone: None     Gets together: None     Attends Druze service: None     Active member of club or organization: None     Attends meetings of clubs or organizations: None     Relationship status: None    Intimate partner violence     Fear of current or ex partner: None     Emotionally abused: None     Physically abused: None     Forced sexual activity: None   Other Topics Concern    None   Social History Narrative    None           REVIEW OF SYSTEMS:   CONSTITUTIONAL: negative for fevers, chills, diaphoresis, appetite change, night sweats, unexpected weight change, fatigue  EYES: negative for blurred vision, eye discharge, visual disturbance and icterus  HEENT: negative for hearing loss, tinnitus, ear drainage, sinus pressure, nasal congestion, epistaxis and snoring  RESPIRATORY: Negative for hemoptysis, cough, sputum production  CARDIOVASCULAR: negative for chest pain, palpitations, exertional chest pressure/discomfort, syncope, edema  GASTROINTESTINAL: negative for nausea, vomiting, diarrhea, blood in stool, abdominal pain, constipation  GENITOURINARY: negative for frequency, dysuria, urinary incontinence, decreased urine volume, and hematuria  HEMATOLOGIC/LYMPHATIC: negative for easy bruising, bleeding and lymphadenopathy  ALLERGIC/IMMUNOLOGIC: negative for recurrent infections, angioedema, anaphylaxis and drug reactions  ENDOCRINE: negative for weight changes and diabetic symptoms including polyuria, polydipsia and polyphagia  MUSCULOSKELETAL: positive for pain, joint swelling, decreased range of motion in cervical spine. NEUROLOGICAL: negative for headaches, slurred speech, unilateral weakness- positive for bilateral LE weakness. PSYCHIATRIC/BEHAVIORAL: negative for hallucinations, behavioral problems, confusion and agitation.      Physical Examination:  Vitals:   Patient Vitals for the past 24 hrs:   BP Temp Temp src Pulse Resp SpO2 02/02/21 0715 (!) 167/82 98.4 °F (36.9 °C) Oral 76 16 96 %   02/02/21 0252 (!) 168/82 98.1 °F (36.7 °C) Oral 73 16 96 %   02/01/21 2305 (!) 176/96 98.4 °F (36.9 °C) Oral 70 16 97 %   02/01/21 1812 (!) 155/94 98.7 °F (37.1 °C) Oral 75 16 97 %   02/01/21 1531 (!) 153/91 98.9 °F (37.2 °C) Oral 83 16 95 %   02/01/21 1215 (!) 163/90 98.5 °F (36.9 °C) Oral 75 16 95 %     Const: Alert. WDWN. No distress  Eyes: Conjunctiva noninjected, no icterus noted; pupils equal, round, and reactive to light. HENT: Atraumatic, normocephalic; Oral mucosa moist  Neck: Trachea midline, neck supple. No thyromegaly noted. - [place] J noted. CV: Regular rate and rhythm, no murmur rub or gallop noted  Resp: Lungs clear to auscultation bilaterally, no rales wheezes or ronchi, no retractions. Respirations unlabored. GI: Soft, nontender, nondistended. Normal bowel sounds. No palpable masses. Skin: Normal temperature and turgor. No rashes or breakdown noted. Ext: No significant edema appreciated. No varicosities. MSK: No joint tenderness, erythema, warmth noted. AROM intact. Neuro: Alert, oriented, appropriate. No cranial nerve deficits appreciated. Sensation intact to light touch. Motor examination reveals normal strength in bilateral UE and 4/5 strength in bilateral LE. Reflexes normal and symmetric.   Psych: Stable mood, normal judgement, normal affect     Lab Results   Component Value Date    WBC 4.5 02/01/2021    HGB 11.2 (L) 02/01/2021    HCT 32.9 (L) 02/01/2021    MCV 98.5 02/01/2021     02/01/2021     Lab Results   Component Value Date    INR 0.97 01/27/2021    INR 1.10 01/26/2021    PROTIME 11.2 01/27/2021    PROTIME 12.8 01/26/2021     Lab Results   Component Value Date    CREATININE 0.9 02/02/2021    BUN 13 02/02/2021     02/02/2021    K 3.8 02/02/2021     02/02/2021    CO2 29 02/02/2021     Lab Results   Component Value Date    ALT 19 01/27/2021    AST 24 01/27/2021    ALKPHOS 131 (H) 01/27/2021 BILITOT 0.5 01/27/2021         XR CERVICAL SPINE (2-3 VIEWS)   Final Result      4 views demonstrate grade 1 anterolisthesis of C2 and C3. There are fusion rods and lateral mass screws from C3-C6. Prevertebral soft tissues are unremarkable. Mild-moderate degenerative disc disease at C4-5 and C5-6. Postoperative changes are seen. There is carotid artery calcification. XR CERVICAL SPINE (2-3 VIEWS)   Final Result      Intraoperative fluoroscopy was performed. Correlate with procedural note for additional information. FLUORO FOR SURGICAL PROCEDURES   Final Result      Intraoperative fluoroscopy was performed. Correlate with procedural note for additional information. CT CERVICAL SPINE WO CONTRAST   Final Result      1. Straightening of the normal cervical lordosis without spondylolisthesis. 2. No evidence of vertebral compression deformity or acute fracture. 3. Moderate cervical spondylosis superimposed on short pedicles contributing to multilevel spinal stenosis which is moderate to severe at C3-4 and C5-6. These findings are better evaluated on the recent prior MRI. Assessment:  Cervical spinal central canal stenosis    Lumbo-sacral spinal canal stenosis  Bilateral Lower extremity weakness and numbness  - Neurosurgery following  -Status post C3-6 posterior decompression/fusion/fixation- successful   - Pain control  - Requires extensive PT/OT in ARU setting      UTI  -Completed Rocephin course  - Follow up      Diabetes mellitus  - LSSI     HTN  - Continue home amlodipine     HLD  - Continue home statin    Impairments- Decreased functional mobility, Decreased ADLs    Recommendations:  ARU admit with insurance precert. Patient with new functional deficits and ongoing medical complexity. Demonstrates ability to tolerate 3 hours therapy/day. He is a good candidate for acute inpatient rehab when medically appropriate. Thank you for this consult. Please contact me with any questions or concerns.      Jeremy Brandt D.O. M.P.H  PM&R  2/2/2021  11:20 AM

## 2021-02-02 NOTE — PROGRESS NOTES
NUTRITION NOTE   Admission Date: 1/26/2021     Type and Reason for Visit: Initial    NUTRITION RECOMMENDATIONS:   1. PO Diet: Continue current diet; CC4   2. ONS: Not indicated at this point    NUTRITION ASSESSMENT:  RD assessment for LOS for pt with PMHx of DM (Hgb A1C 6.6% on 2/1), HTN and hyperlipidemia. Pt has been tolerating diet and PO intake adequate per EMR. Pt indicating consuming most of meals provided. Noted DC order to ARU and pt will continue to be monitored for nutritional stability. Consult dietitian if nutrition interventions essential to patient care is needed. MALNUTRITION ASSESSMENT  Context of Malnutrition: Acute Illness   Malnutrition Status: No malnutrition    NUTRITION DIAGNOSIS No nutrition diagnosis at this time.      NUTRITION INTERVENTION  Food and/or Nutrient Delivery:Continue Current diet   Nutrition education/counseling/coordination of care: Continue Inpatient Monitoring     NUTRITION RISK LEVEL: Risk Level: Abbe Armstrong [address]  [place]:  [phone]  Office:  [phone]

## 2021-02-02 NOTE — PROGRESS NOTES
NEUROSURGERY POST-OP PROGRESS NOTE    Patient Name: Thais Lopez YOB: 1960   Sex: Male Age: 61 yrs     Medical Record Number: [id number] Acct Number: [de-identified]   Room Number: 6913/1441-65 Hospital Day: Hospital Day: 8     Interval History:  Post-operative Day# 5 s/p  C3-4, C4-5, C5-6 POSTERIOR CERVICAL DECOMPRESSION, FUSION AND FIXATION    Subjective:  Resting in bed, no new complaints. Objective:    VITAL SIGNS   BP (!) 167/82   Pulse 76   Temp 98.4 °F (36.9 °C) (Oral)   Resp 16   Ht 5' 8\" (1.727 m)   Wt 232 lb 5.8 oz (105.4 kg)   SpO2 96%   BMI 35.33 kg/m²    Height Height: 5' 8\" (172.7 cm)   Weight Weight: 232 lb 5.8 oz (105.4 kg)        Allergies No Known Allergies   NPO Status DIET GENERAL; Carb Control: 4 carb choices (60 gms)/meal   Isolation No active isolations     LABS   Basic Metabolic Profile Recent Labs     02/01/21  0608 02/02/21  0552    139    102   CO2 31 29   BUN 12 13   CREATININE 0.9 0.9   GLUCOSE 130* 133*      Complete Blood Count Recent Labs     02/01/21  0608   WBC 4.5   RBC 3.34*      Coagulation Studies No results for input(s): PTT, INR in the last 72 hours.     Invalid input(s): PLATELETS, PROA, PT, PTTA     MEDICATIONS   Inpatient Medications   insulin lispro, 0-6 Units, Subcutaneous, TID WC    insulin lispro, 0-3 Units, Subcutaneous, Nightly    heparin (porcine), 5,000 Units, Subcutaneous, Q8H    sodium chloride flush, 10 mL, Intravenous, 2 times per day    polyethylene glycol, 17 g, Oral, Daily    sennosides-docusate sodium, 1 tablet, Oral, BID    famotidine, 20 mg, Oral, BID    amLODIPine, 5 mg, Oral, Daily    omeprazole, 40 mg, Oral, QAM   Infusions    dextrose        Antibiotics   [unfilled]     Neurologic Exam:    Mental status: awake, alert, oriented x 4    DTRs:    Right  Left    ankle clonus  - -   toes (babinski)  down Down      Musculoskeletal:   Gait: Not tested   Tone: normal   Sensory: numbness/tingling to bilateral fingertips   Motor strength:    Right  Left    Right  Left    Deltoid  5 5  Hip Flex  5 5   Biceps  5 5  Knee Extensors  5 5   Triceps  5 5  Knee Flexors  5 5   Wrist Ext  5 5  Ankle Dorsiflex. 5 5   Wrist Flex  5 5  Ankle Plantarflex. 5 5   Handgrip  4 4  Ext Manohar Longus  5 5   Thumb Ext  4 4         Incision: c/d/i   Drain:  20 ml    Respiratory:  Unlabored respiratory pattern    Abdomen:   Soft, ND   Last BM 2/1/21    Cardiovascular:  Warm, well perfused    Assessment   60 yo male POD 5 s/p C3-4, C4-5, C5-6 POSTERIOR CERVICAL DECOMPRESSION, FUSION AND FIXATION    Plan:  1. Neurologic exam frequency: q 4   2. Mobility: PT/OT  3. [place] collar:   -Cervical collar to be worn at all times   -Cervical collar does NOT need to be worn when bathing or showering   -Cervical collar pads to be cleaned with soap & water, air dried, and changed daily   -OK to put gauze over incision to keep cervical collar from rubbing, if needed  4. Diet: ADAT   5. Drain: remove today   6. DVT Prophylaxis: SCDs and heparin SQ    7. GI Prophylaxis: pepcid  8. Bowel Regimen: senokot, glycolax    9. Pain control: tylenol, roxicodone  10. Muscle spasms: robaxin, valium   11. Incisional Care: cleanse daily with soap and water, pat dry with clean towel and paint with CHG swab   12. Dispo Planning: can dc to ARU when medicaid number/precert obtained, SW following for assistance     Patient was seen and examined with Dr. Nova Sarmiento who agrees with above assessment and plan. Electronically signed by: Jay España, 2/2/2021 7:25 AM   Neurosurgery Nurse Practitioner  [phone]    __________________________________________________________________    I saw and examined Rimma Adame on 02/02/21. I agree with the assessment and plan as detailed above.      Aysha Delong MD, PhD  [address], [address] [place], [place], [address]  [phone] (c), [phone] (o)

## 2021-02-02 NOTE — CARE COORDINATION
Case Management            Discharge Note                    Date / Time of Note: 2/2/2021 4:03 PM                  Discharge Note Completed by: Ryan Ferrcordell Day    Patient Name: Darshan Mullins   YOB: 1960  Diagnosis: Acute low back pain with possible spinal stenosis of less than six weeks' duration [M54.5]   Date / Time: 1/26/2021  2:02 AM    Current PCP: Adenike Ingram patient: No    Hospitalization in the last 30 days: No    Advance Directives:  Code Status: Full Code  [place] DNR form completed and on chart: Not Indicated    Financial:  Payor: Sharita Evener / Plan: Sharita Evener ([employer]) / Product Type: *No Product type* /      [facility] [address] [place] [address]  Phone: [phone] Fax: [phone]      Assistance purchasing medications?: Potential Assistance Purchasing Medications: No  Assistance provided by Case Management: None at this time    Does patient want to participate in local refill/ meds to beds program?: Not Assessed    Meds To Beds General Rules:  1. Can ONLY be done Monday- Friday between 8:30am-5pm  2. Prescription(s) must be in pharmacy by 3pm to be filled same day  3. Copy of patient's insurance/ prescription drug card and patient face sheet must be sent along with the prescription(s)  4. Cost of Rx cannot be added to hospital bill. If financial assistance is needed, please contact unit  or ;  or  CANNOT provide pharmacy voucher for patients co-pays  5.  Patients can then  the prescription on their way out of the hospital at discharge, or pharmacy can deliver to the bedside if staff is available. (payment due at time of pick-up or delivery - cash, check, or card accepted)     Able to afford

## 2021-02-02 NOTE — PLAN OF CARE
Problem: Falls - Risk of:  Goal: Will remain free from falls  Description: Will remain free from falls  2/2/2021 0306 by Syeda Neal RN  Outcome: Ongoing  Patient remains free from falls during this shift. Patient is up x1 person assist with walker. Bed is in the lowest position and the bed and chair alarm is activated. Anti-slip socks are on. Call light is within reach. Will continue to monitor and reassess. Problem: Pain:  Goal: Pain level will decrease  Description: Pain level will decrease  Outcome: Ongoing   RN assesses pain using 0-10 scale. Patient understands how to rate pain using 0-10 scale. Pain is controled with medication per MAR. RN encourages patient to call out for breakthrough pain. Will continue to monitor and reassess.

## 2021-02-03 ENCOUNTER — HOSPITAL ENCOUNTER (INPATIENT)
Age: 61
LOS: 8 days | Discharge: HOME HEALTH | DRG: 862 | End: 2021-02-11
Attending: PHYSICAL MEDICINE & REHABILITATION | Admitting: PHYSICAL MEDICINE & REHABILITATION
Payer: MEDICAID

## 2021-02-03 VITALS
TEMPERATURE: 98.7 F | BODY MASS INDEX: 35.22 KG/M2 | WEIGHT: 232.37 LBS | HEART RATE: 76 BPM | SYSTOLIC BLOOD PRESSURE: 157 MMHG | HEIGHT: 68 IN | DIASTOLIC BLOOD PRESSURE: 81 MMHG | OXYGEN SATURATION: 97 % | RESPIRATION RATE: 16 BRPM

## 2021-02-03 DIAGNOSIS — Z98.1 S/P CERVICAL SPINAL FUSION: Primary | ICD-10-CM

## 2021-02-03 LAB
ANION GAP SERPL CALCULATED.3IONS-SCNC: 8 MMOL/L (ref 3–16)
BUN BLDV-MCNC: 10 MG/DL (ref 7–20)
CALCIUM SERPL-MCNC: 9.4 MG/DL (ref 8.3–10.6)
CHLORIDE BLD-SCNC: 103 MMOL/L (ref 99–110)
CO2: 27 MMOL/L (ref 21–32)
CREAT SERPL-MCNC: 0.8 MG/DL (ref 0.8–1.3)
GFR AFRICAN AMERICAN: >60
GFR NON-AFRICAN AMERICAN: >60
GLUCOSE BLD-MCNC: 126 MG/DL (ref 70–99)
GLUCOSE BLD-MCNC: 133 MG/DL (ref 70–99)
GLUCOSE BLD-MCNC: 136 MG/DL (ref 70–99)
GLUCOSE BLD-MCNC: 150 MG/DL (ref 70–99)
GLUCOSE BLD-MCNC: 218 MG/DL (ref 70–99)
PERFORMED ON: ABNORMAL
POTASSIUM REFLEX MAGNESIUM: 3.8 MMOL/L (ref 3.5–5.1)
SODIUM BLD-SCNC: 138 MMOL/L (ref 136–145)

## 2021-02-03 PROCEDURE — 6370000000 HC RX 637 (ALT 250 FOR IP): Performed by: NEUROLOGICAL SURGERY

## 2021-02-03 PROCEDURE — 1280000000 HC REHAB R&B

## 2021-02-03 PROCEDURE — 99232 SBSQ HOSP IP/OBS MODERATE 35: CPT | Performed by: PHYSICAL MEDICINE & REHABILITATION

## 2021-02-03 PROCEDURE — 6370000000 HC RX 637 (ALT 250 FOR IP): Performed by: PHYSICAL MEDICINE & REHABILITATION

## 2021-02-03 PROCEDURE — 36415 COLL VENOUS BLD VENIPUNCTURE: CPT

## 2021-02-03 PROCEDURE — 6360000002 HC RX W HCPCS: Performed by: INTERNAL MEDICINE

## 2021-02-03 PROCEDURE — 2580000003 HC RX 258: Performed by: NEUROLOGICAL SURGERY

## 2021-02-03 PROCEDURE — 80048 BASIC METABOLIC PNL TOTAL CA: CPT

## 2021-02-03 PROCEDURE — 2500000003 HC RX 250 WO HCPCS: Performed by: INTERNAL MEDICINE

## 2021-02-03 RX ORDER — ACETAMINOPHEN 325 MG/1
650 TABLET ORAL EVERY 4 HOURS PRN
Status: DISCONTINUED | OUTPATIENT
Start: 2021-02-03 | End: 2021-02-11 | Stop reason: HOSPADM

## 2021-02-03 RX ORDER — PANTOPRAZOLE SODIUM 40 MG/1
40 TABLET, DELAYED RELEASE ORAL EVERY MORNING
Status: DISCONTINUED | OUTPATIENT
Start: 2021-02-04 | End: 2021-02-11

## 2021-02-03 RX ORDER — BISACODYL 10 MG
10 SUPPOSITORY, RECTAL RECTAL DAILY PRN
Status: DISCONTINUED | OUTPATIENT
Start: 2021-02-03 | End: 2021-02-11 | Stop reason: HOSPADM

## 2021-02-03 RX ORDER — POLYETHYLENE GLYCOL 3350 17 G/17G
17 POWDER, FOR SOLUTION ORAL DAILY PRN
Status: DISCONTINUED | OUTPATIENT
Start: 2021-02-03 | End: 2021-02-11 | Stop reason: HOSPADM

## 2021-02-03 RX ORDER — DEXTROSE MONOHYDRATE 50 MG/ML
100 INJECTION, SOLUTION INTRAVENOUS PRN
Status: DISCONTINUED | OUTPATIENT
Start: 2021-02-03 | End: 2021-02-11 | Stop reason: HOSPADM

## 2021-02-03 RX ORDER — NICOTINE POLACRILEX 4 MG
15 LOZENGE BUCCAL PRN
Status: DISCONTINUED | OUTPATIENT
Start: 2021-02-03 | End: 2021-02-11 | Stop reason: HOSPADM

## 2021-02-03 RX ORDER — FAMOTIDINE 20 MG/1
20 TABLET, FILM COATED ORAL 2 TIMES DAILY
Status: DISCONTINUED | OUTPATIENT
Start: 2021-02-03 | End: 2021-02-11 | Stop reason: HOSPADM

## 2021-02-03 RX ORDER — INSULIN LISPRO 100 [IU]/ML
0-3 INJECTION, SOLUTION INTRAVENOUS; SUBCUTANEOUS NIGHTLY
Status: DISCONTINUED | OUTPATIENT
Start: 2021-02-03 | End: 2021-02-11 | Stop reason: HOSPADM

## 2021-02-03 RX ORDER — DEXTROSE MONOHYDRATE 25 G/50ML
12.5 INJECTION, SOLUTION INTRAVENOUS PRN
Status: DISCONTINUED | OUTPATIENT
Start: 2021-02-03 | End: 2021-02-11 | Stop reason: HOSPADM

## 2021-02-03 RX ORDER — AMLODIPINE BESYLATE 5 MG/1
5 TABLET ORAL DAILY
Status: DISCONTINUED | OUTPATIENT
Start: 2021-02-04 | End: 2021-02-11 | Stop reason: HOSPADM

## 2021-02-03 RX ORDER — OXYCODONE HYDROCHLORIDE 5 MG/1
10 TABLET ORAL EVERY 4 HOURS PRN
Status: DISCONTINUED | OUTPATIENT
Start: 2021-02-03 | End: 2021-02-11 | Stop reason: HOSPADM

## 2021-02-03 RX ORDER — PROMETHAZINE HYDROCHLORIDE 25 MG/1
12.5 TABLET ORAL EVERY 6 HOURS PRN
Status: DISCONTINUED | OUTPATIENT
Start: 2021-02-03 | End: 2021-02-11 | Stop reason: HOSPADM

## 2021-02-03 RX ORDER — MAGNESIUM HYDROXIDE/ALUMINUM HYDROXICE/SIMETHICONE 120; 1200; 1200 MG/30ML; MG/30ML; MG/30ML
15 SUSPENSION ORAL EVERY 6 HOURS PRN
Status: DISCONTINUED | OUTPATIENT
Start: 2021-02-03 | End: 2021-02-11 | Stop reason: HOSPADM

## 2021-02-03 RX ORDER — ONDANSETRON 2 MG/ML
4 INJECTION INTRAMUSCULAR; INTRAVENOUS EVERY 6 HOURS PRN
Status: DISCONTINUED | OUTPATIENT
Start: 2021-02-03 | End: 2021-02-11 | Stop reason: HOSPADM

## 2021-02-03 RX ORDER — INSULIN LISPRO 100 [IU]/ML
0-6 INJECTION, SOLUTION INTRAVENOUS; SUBCUTANEOUS
Status: DISCONTINUED | OUTPATIENT
Start: 2021-02-03 | End: 2021-02-11 | Stop reason: HOSPADM

## 2021-02-03 RX ORDER — METHOCARBAMOL 750 MG/1
750 TABLET, FILM COATED ORAL EVERY 6 HOURS PRN
Status: DISCONTINUED | OUTPATIENT
Start: 2021-02-03 | End: 2021-02-11 | Stop reason: HOSPADM

## 2021-02-03 RX ORDER — OXYCODONE HYDROCHLORIDE 5 MG/1
5 TABLET ORAL EVERY 4 HOURS PRN
Status: DISCONTINUED | OUTPATIENT
Start: 2021-02-03 | End: 2021-02-11 | Stop reason: HOSPADM

## 2021-02-03 RX ADMIN — AMLODIPINE BESYLATE 5 MG: 5 TABLET ORAL at 10:02

## 2021-02-03 RX ADMIN — FAMOTIDINE 20 MG: 20 TABLET, FILM COATED ORAL at 20:17

## 2021-02-03 RX ADMIN — INSULIN LISPRO 1 UNITS: 100 INJECTION, SOLUTION INTRAVENOUS; SUBCUTANEOUS at 21:30

## 2021-02-03 RX ADMIN — OXYCODONE 5 MG: 5 TABLET ORAL at 11:50

## 2021-02-03 RX ADMIN — OXYCODONE 10 MG: 5 TABLET ORAL at 02:47

## 2021-02-03 RX ADMIN — OXYCODONE 10 MG: 5 TABLET ORAL at 21:31

## 2021-02-03 RX ADMIN — HEPARIN SODIUM 5000 UNITS: 5000 INJECTION INTRAVENOUS; SUBCUTANEOUS at 05:50

## 2021-02-03 RX ADMIN — OXYCODONE 5 MG: 5 TABLET ORAL at 16:42

## 2021-02-03 RX ADMIN — DOCUSATE SODIUM 50 MG AND SENNOSIDES 8.6 MG 1 TABLET: 8.6; 5 TABLET, FILM COATED ORAL at 10:02

## 2021-02-03 RX ADMIN — LABETALOL 20 MG/4 ML (5 MG/ML) INTRAVENOUS SYRINGE 10 MG: at 15:34

## 2021-02-03 RX ADMIN — POLYETHYLENE GLYCOL 3350 17 G: 17 POWDER, FOR SOLUTION ORAL at 10:02

## 2021-02-03 RX ADMIN — FAMOTIDINE 20 MG: 20 TABLET ORAL at 10:02

## 2021-02-03 RX ADMIN — OXYCODONE 10 MG: 5 TABLET ORAL at 06:56

## 2021-02-03 RX ADMIN — HEPARIN SODIUM 5000 UNITS: 5000 INJECTION INTRAVENOUS; SUBCUTANEOUS at 14:04

## 2021-02-03 RX ADMIN — Medication 10 ML: at 10:03

## 2021-02-03 RX ADMIN — OMEPRAZOLE 40 MG: 20 CAPSULE, DELAYED RELEASE ORAL at 10:04

## 2021-02-03 RX ADMIN — LABETALOL 20 MG/4 ML (5 MG/ML) INTRAVENOUS SYRINGE 10 MG: at 02:48

## 2021-02-03 ASSESSMENT — PAIN DESCRIPTION - DESCRIPTORS
DESCRIPTORS: SORE
DESCRIPTORS: SORE
DESCRIPTORS: ACHING
DESCRIPTORS: CONSTANT;THROBBING

## 2021-02-03 ASSESSMENT — PAIN SCALES - GENERAL
PAINLEVEL_OUTOF10: 6
PAINLEVEL_OUTOF10: 4
PAINLEVEL_OUTOF10: 6

## 2021-02-03 ASSESSMENT — PAIN DESCRIPTION - PROGRESSION: CLINICAL_PROGRESSION: NOT CHANGED

## 2021-02-03 ASSESSMENT — PAIN DESCRIPTION - ORIENTATION
ORIENTATION: POSTERIOR
ORIENTATION: POSTERIOR

## 2021-02-03 ASSESSMENT — PAIN DESCRIPTION - PAIN TYPE
TYPE: SURGICAL PAIN
TYPE: SURGICAL PAIN

## 2021-02-03 ASSESSMENT — PAIN DESCRIPTION - LOCATION
LOCATION: NECK

## 2021-02-03 ASSESSMENT — PAIN DESCRIPTION - ONSET
ONSET: ON-GOING
ONSET: ON-GOING

## 2021-02-03 ASSESSMENT — PAIN DESCRIPTION - FREQUENCY
FREQUENCY: CONTINUOUS
FREQUENCY: CONTINUOUS

## 2021-02-03 NOTE — PLAN OF CARE
Falls - Risk of:  Goal: Will remain free from falls  Outcome: Ongoing  Note: Mr. Landry Aguero is utilizing the walker for support with my assistance as needed. Fall precautions are also in place (nonskid socks, fall sign posted, bed alarm in use, call light/bedside within reach). Pain - Acute:  Goal: Pain level will decrease  Outcome: Ongoing  Note: Mr. Landry gAuero is utilizing the Roxicodone medication as prescribed to manage his cervical, throbbing pain. It has ranged from 4-6/10 so far this shift.

## 2021-02-03 NOTE — PLAN OF CARE
ARU PATIENT TREATMENT PLAN  [address]  [phone]      William Hunt    : 1960  Acct #: [de-identified]  MRN: [id number]  PHYSICIAN:  Tosin Rondon DO  Primary Problem    Patient Active Problem List   Diagnosis    Contusion of left ankle    Sciatica, right side    Acute low back pain with possible spinal stenosis of less than six weeks' duration       Rehabilitation Diagnosis:  Orthopedic, 8.9, Other Orthopedic   ADMIT DATE:2/3/2021    Patient Goals: To safely return home with his spouse  Admitting Impairments: Decreased ability to transfer, ambulate and perform ADLs. Decreased safety and endurance.   Activity Restrictions: none  Participation Limitations: none   CARE PLAN     NURSING:  William Hunt while on this unit will:  [] Be continent of bowel and bladder     [] Have an adequate number of bowel movements  [x] Urinate with no urinary retention >300ml in bladder  [] Complete bladder protocol with kennedy removal  [] Maintain O2 SATs at ___%  [x] Have pain managed while on ARU       [] Be pain free by discharge   [x] Have no skin breakdown while on ARU  [] Have improved skin integrity via wound measurements  [x] Have no signs/symptoms of infection at the wound site  [x] Be free from injury during hospitalization   [] Complete education with patient/family with understanding demonstrated for:  [] Adjustment   [] Other:     Nursing Interventions will include:  [] bowel/bladder training   [x] education for medical assistive devices   [x] medication education   [x] O2 saturation management   [x] energy conservation   [x] stress management techniques   [x] fall prevention   [x] alarms protocol   [x] seating and positioning   [x] skin/wound care   [x] pressure relief instruction   [] dressing changes     [x] infection protection   [x] DVT prophylaxis  [x] assistance with in room safety with transfers to bed, toilet, wheelchair, shower   [x] bathroom activities and hygiene  [] Other:    Patient/Caregiver Education for:  [x] Disease/sustained injury/management     [x] Medication Use  [x] Surgical intervention  [x] Safety  [x] Body mechanics and or joint protection  [] Health maintenance     [] Other:     PHYSICAL THERAPY:  Goals: These goals were reviewed with this patient at the time of assessment and Bud Fuchs is in agreement. Plan of Care: Pt to be seen 5 out of 7 days per week per ARU protocol (90 minutes with PT)                       OCCUPATIONAL THERAPY:  Goals:               :    :  These goals were reviewed with this patient at the time of assessment and Bud Fuchs is in agreement    Plan of Care:  Pt to be seen 5 out of 7 days per week per ARU protocol (90 minutes with OT)       SPEECH THERAPY: Goals will be left blank if speech is not following this patient. Goals:                                                                               Plan of Care:  Pt to be seen 5 out of 7 days per week per ARU protocol (0 minutes with SLP)    Therapy Treatments will include:  [x]  therapeutic exercises    [x]  gait training     [x]  neuromuscular re-ed                            [x]  transfer training             [] community reintegration    [x] bed mobility                          []  w/c mobility and training  [x]  self care    [x]home mgmt    []  cognitive training            [x]  energy conservation        []  dysphagia tx    []  speech/language/communication therapy   []  group therapy    [x]  patient/family education    [] Other:    CASE MANAGEMENT:  Goals:   Assist patient/family with discharge planning, patient/family counseling,   and coordination with insurance during ARU stay.     Admission Period/Goal QM SCORES  QM Admit/Goal Score   Eating   /     Oral Hygiene   /     Shower/Bathing   /     UB Dressing   /     LB Dressing   /     Putting on/off Footwear   /     Ricardo Serna Hygiene   /     Bladder Continence      Bowel Continence      Toilet Transfers   /     Shower/Bathe Self    /     Rolling Left and Right   /     Sit to Lying   /     Lying to Sitting on Bedside   /     Sit to Stand   /     Chair/Bed to Chair Transfer   /     Car Transfers   /     Walk 10 Feet   /     Walk 50 Feet with Two Turns   /     Walk 150 Feet   /     Walk 10 Feet on Uneven Surfaces   /     1 Step (Curb)   /     4 Steps   /     12 Steps   /     Picking up Object from Floor   /     Wheel 50 Feet with 2 Turns   /     Type         [] Manual        [] Motorized        [] N/A   Wheel 150 Feet   /     Type         [] Manual        [] Motorized        [] N/A        Esme Free will be seen a minimum of 3 hours of therapy per day, a minimum of 5 out of 7 days per week (please see above for specific treatment plan per PT/OT/SLP). [] In this rare instance due to the nature of this patient's medical involvement, this patient will be seen 15 hours per week (900 minutes within a 7day period). In addition, dietician/nutritionist may monitor calorie count as well as intake and collaboratively work with SLP on dietary upgrades. Neuropsychology/Psychology may evaluate and provide necessary support.     Medical issues being managed closely and that require 24hour availability of a physician:  [] Swallowing Precautions  [x] Bowel/Bladder Fx  [] Weight bearing precautions  [x] Wound Care    [x] Pain Mgmt   [] Infection Protection  [x] DVT Prophylaxis   [x] Fall Precautions  [x] Fluid/Electrolyte/Nutrition Balance  [] Voice Protection   [] Respiratory  [] Other:    Medical Prognosis: [x] Good  [] Fair    [] Guarded   Total expected IRF days 10  Anticipated discharge destination: Home with spouse  [x] Home Independently   [] Home Modified Independent  [] Home with supervision    []SNF     [] Other                                           Physician anticipated functional outcomes: Home with LRAD     IPOC brief synthesis: Patient is a 25-year-old male who presented to hospital for bilateral lower extremity weakness, MRI spine did show multilevel spinal stenosis. He underwent a C3-4, C4-5, C5-6 POSTERIOR CERVICAL DECOMPRESSION, FUSION AND FIXATION with Dr. Jd Traore. Today he is feeling well and would like to come to the ARU for further treatment and therapy.        This initial ARU patient treatment plan of care, together with the IPOC & the Education plan, form the foundation for the patient's plan of care. Weekly patient care conferences are held to evaluate progress towards the initial treatment plan & goals.     I have reviewed this initial plan of care and agree with its contents:    Title   Name    Date    Time    Physician: Acacia Newman D.O. M.P.H  PM&R  2/5/2021  8:14 AM        Case Mgmt:Electronically signed by TON Gallardo LISW-S on 2/4/2021 at 10:11 AM     OT: Concetta Sandoval, OTR/L 2/4/2021 @ [phone]    PT:  Laith Gupta, MALLY, DPT 2/4/2021 at 2644     RN: Jeniffer Vargas, TONYA 02/03/2021 @4720    ST:    :  Hari Sylvester MA, PD, 2/4/2021  15:22  Other:

## 2021-02-03 NOTE — DISCHARGE SUMMARY
Hospital Medicine Discharge Summary    Patient ID: Carisa Martell      Patient's PCP: Jacinta Gaffney    Admit Date: 1/26/2021     Discharge Date:      Admitting Physician: Valeria Jensen DO     Discharge Physician: Jennifer Zarco MD     Discharge Diagnoses: Active Hospital Problems    Diagnosis Date Noted    Acute low back pain with possible spinal stenosis of less than six weeks' duration [M54.5] 01/25/2021       The patient was seen and examined on day of discharge and this discharge summary is in conjunction with any daily progress note from day of discharge. Condition at discharge - stable    Hospital Course: patient seen and evaluated on the day of discharge. Patient informed about following up with appointments. Patient verbalized understanding for follow-up appointments. All questions of the patient answered, patient is in agreement with the discharge plan. Medical reconciliation performed. Patient will be discharged stable condition.  Patient is a 66-year-old male who presented to hospital for bilateral lower extremity weakness, MRI spine did show multilevel spinal stenosis     Assessment  Bilateral lower extremity weakness, numbness  Cervical spinal stenosis  Lumbosacral canal stenosis  Urinary tract infection  Diabetes mellitus  Hypertension  Hyperlipidemia     Plan  Neurosurgery following, status post C3-C6 posterior decompression fixation, fusion  Pain control  Patient evaluated by PT OT-recommend ARU placement  Neurochecks  Sliding scale  Continue home amlodipine  Continue home statin  S/p completion of Rocephin course  DVT prophylaxis-SCD  Diet: DIET GENERAL; Carb Control: 4 carb choices (60 gms)/meal  Code Status: Full Code     PT/OT Eval Status: ordered     Dispo -likely discharge 1 to 2 days, awaiting ARU placement pending pre-CERT            Exam:     BP (!) 152/87   Pulse 75   Temp 99 °F (37.2 °C) (Oral)   Resp 16   Ht 5' 8\" (1.727 m)   Wt 232 lb 5.8 oz (105.4 kg) SpO2 96%   BMI 35.33 kg/m²     General appearance: No apparent distress  HEENT:  Conjunctivae/corneas clear. Neck: Supple, No jugular venous distention. Respiratory:  Normal respiratory effort. Clear to auscultation, bilaterally without Rales/Wheezes/Rhonchi. Cardiovascular: Regular rate and rhythm with normal S1/S2 without murmurs, rubs or gallops. Abdomen: Soft, non-tender, non-distended, normal bowel sounds. Musculoskelatal: No clubbing, cyanosis or edema bilaterally. Skin: Skin color, texture, turgor normal.   Neurologic: no focal neurologic deficits. grossly non-focal.  Psychiatric: Alert and oriented, normal mood    Consults:     IP CONSULT TO NEUROSURGERY  IP CONSULT TO SOCIAL WORK  IP CONSULT TO PHYSICAL MEDICINE REHAB  IP CONSULT TO CASE MANAGEMENT      Code Status:  Full Code    Activity: activity as tolerated    Labs: For convenience and continuity at follow-up the following most recent labs are provided:      CBC:    Lab Results   Component Value Date    WBC 4.5 02/01/2021    HGB 11.2 02/01/2021    HCT 32.9 02/01/2021     02/01/2021       Renal:    Lab Results   Component Value Date     02/03/2021    K 3.8 02/03/2021     02/03/2021    CO2 27 02/03/2021    BUN 10 02/03/2021    CREATININE 0.8 02/03/2021    CALCIUM 9.4 02/03/2021       Discharge Medications:     Current Discharge Medication List           Details   !! oxyCODONE (ROXICODONE) 5 MG immediate release tablet Take 1 tablet by mouth every 4 hours as needed for Pain for up to 3 days. Qty: 9 tablet, Refills: 0    Comments: Reduce doses taken as pain becomes manageable  Associated Diagnoses: Cervical spinal stenosis; Acute low back pain with possible spinal stenosis of less than six weeks' duration; Contusion of left ankle, sequela      !! oxyCODONE (OXY-IR) 10 MG immediate release tablet Take 1 tablet by mouth every 4 hours as needed for Pain for up to 3 days.   Qty: 12 tablet, Refills: 0    Comments: Reduce doses taken as pain becomes manageable  Associated Diagnoses: Cervical spinal stenosis; Acute low back pain with possible spinal stenosis of less than six weeks' duration; Contusion of left ankle, sequela      methocarbamol (ROBAXIN) 750 MG tablet Take 1 tablet by mouth 3 times daily as needed (muscle spasms)  Qty: 30 tablet, Refills: 0      famotidine (PEPCID) 20 MG tablet Take 1 tablet by mouth 2 times daily  Qty: 60 tablet, Refills: 3       !! - Potential duplicate medications found. Please discuss with provider. Details   valsartan (DIOVAN) 160 MG tablet Take 160 mg by mouth daily      amLODIPine (NORVASC) 5 MG tablet Take 5 mg by mouth daily      metFORMIN (GLUCOPHAGE-XR) 500 MG extended release tablet Take 500 mg by mouth daily (with breakfast)      aspirin 81 MG EC tablet Take 81 mg by mouth daily      omeprazole (PRILOSEC) 40 MG delayed release capsule Take 40 mg by mouth daily as needed             Time Spent on discharge is more than 30 mints in the examination, evaluation, counseling and review of medications and discharge plan. Signed:    Brice Correa MD   2/3/2021      Thank you Fran Thomas for the opportunity to be involved in this patient's care. If you have any questions or concerns please feel free to contact me at [phone].

## 2021-02-03 NOTE — PROGRESS NOTES
Patient is alert and oriented. Vital signs are stable. Patient's pain is controlled with oral pain medication per MAR. Dressing is clean, dry, and intact. Hard collar in place. Patient ambulates x1 with a walker. Patient tolerates ambulation well. Patient voiding urine without complication. Bed is in the lowest position. Bed alarm is activated. Call light is within reach. Will continue to monitor and reassess.

## 2021-02-03 NOTE — PROGRESS NOTES
NEUROSURGERY POST-OP PROGRESS NOTE    Patient Name: William Hunt YOB: 1960   Sex: Male Age: 61 yrs     Medical Record Number: [id number] Acct Number: [de-identified]   Room Number: 2818/2509-95 Hospital Day: Hospital Day: 9     Interval History:  Post-operative Day# 6 s/p  C3-4, C4-5, C5-6 POSTERIOR CERVICAL DECOMPRESSION, FUSION AND FIXATION    Subjective:  Patient anticipating discharge to rehab today, his neck is sore but pain is manageable. Objective:    VITAL SIGNS   BP (!) 163/85   Pulse 64   Temp 98.3 °F (36.8 °C) (Oral)   Resp 16   Ht 5' 8\" (1.727 m)   Wt 232 lb 5.8 oz (105.4 kg)   SpO2 97%   BMI 35.33 kg/m²    Height Height: 5' 8\" (172.7 cm)   Weight Weight: 232 lb 5.8 oz (105.4 kg)        Allergies No Known Allergies   NPO Status DIET GENERAL; Carb Control: 4 carb choices (60 gms)/meal   Isolation No active isolations     LABS   Basic Metabolic Profile Recent Labs     02/01/21  0608 02/02/21  0552 02/03/21  0531    139 138    102 103   CO2 31 29 27   BUN 12 13 10   CREATININE 0.9 0.9 0.8   GLUCOSE 130* 133* 150*      Complete Blood Count Recent Labs     02/01/21  0608   WBC 4.5   RBC 3.34*      Coagulation Studies No results for input(s): PTT, INR in the last 72 hours.     Invalid input(s): PLATELETS, PROA, PT, PTTA     MEDICATIONS   Inpatient Medications   insulin lispro, 0-6 Units, Subcutaneous, TID WC    insulin lispro, 0-3 Units, Subcutaneous, Nightly    heparin (porcine), 5,000 Units, Subcutaneous, Q8H    sodium chloride flush, 10 mL, Intravenous, 2 times per day    polyethylene glycol, 17 g, Oral, Daily    sennosides-docusate sodium, 1 tablet, Oral, BID    famotidine, 20 mg, Oral, BID    amLODIPine, 5 mg, Oral, Daily    omeprazole, 40 mg, Oral, QAM   Infusions    dextrose        Antibiotics   [unfilled]     Neurologic Exam:    Mental status: awake, alert, oriented x 4    DTRs:    Right  Left    ankle clonus  - -   toes (babinski)  down Down Musculoskeletal:   Gait: Not tested   Tone: normal   Sensory: numbness/tingling to bilateral fingertips   Motor strength:    Right  Left    Right  Left    Deltoid  5 5  Hip Flex  5 5   Biceps  5 5  Knee Extensors  5 5   Triceps  5 5  Knee Flexors  5 5   Wrist Ext  5 5  Ankle Dorsiflex. 5 5   Wrist Flex  5 5  Ankle Plantarflex. 5 5   Handgrip  4 4  Ext Manohar Longus  5 5   Thumb Ext  4 4         Incision: c/d/i     Respiratory:  Unlabored respiratory pattern    Abdomen:   Soft, ND   Last BM 2/1/21    Cardiovascular:  Warm, well perfused    Assessment   60 yo male POD 6 s/p C3-4, C4-5, C5-6 POSTERIOR CERVICAL DECOMPRESSION, FUSION AND FIXATION    Plan:  1. Neurologic exam frequency: q 4   2. Mobility: PT/OT  3. [place] J collar:   -Cervical collar to be worn at all times   -Cervical collar does NOT need to be worn when bathing or showering   -Cervical collar pads to be cleaned with soap & water, air dried, and changed daily   -OK to put gauze over incision to keep cervical collar from rubbing, if needed  4. Diet: ADAT   5. DVT Prophylaxis: SCDs and heparin SQ    6. GI Prophylaxis: pepcid  7. Bowel Regimen: senokot, glycolax    8. Pain control: tylenol, roxicodone  9. Muscle spasms: robaxin, valium   10. Incisional Care: cleanse daily with soap and water, pat dry with clean towel and paint with CHG swab   11. Sutures to be removed on 2/11/2021   12. Dispo Planning: dc to ARU     Patient was discussed with Dr. Shonna Gaviria who agrees with above assessment and plan. Electronically signed by:  Jeff Palacios, 2/3/2021 8:55 AM   Neurosurgery Nurse Practitioner  [phone]

## 2021-02-03 NOTE — PROGRESS NOTES
Progress/Consult Note  Physical Medicine and Rehabilitation    Patient: Jamir Coronado  [id number]  Date: 2/3/2021      Chief Complaint: Spinal stenosis     History of Present Illness/Hospital Course:  Patient is a 60-year-old male who presented to hospital for bilateral lower extremity weakness, MRI spine did show multilevel spinal stenosis. He underwent a C3-4, C4-5, C5-6 POSTERIOR CERVICAL DECOMPRESSION, FUSION AND FIXATION with Dr. Grzegorz Heredia. Today he is feeling well and would like to come to the ARU for further treatment and therapy. Interval history: ARU admit last night delayed until 2/3. Will admit tonight. Admit to ARU tonight    Prior Level of Function:  Independent for mobility, ADLs, and IADLs    Current Level of Function:  Mod assist     Past Medical History:   Diagnosis Date    Diabetes mellitus type 1 ([facility] [address]     Hyperlipidemia     Hypertension        Past Surgical History:   Procedure Laterality Date    CERVICAL LAMINECTOMY N/A 1/28/2021    C3-4, C4-5, C5-6 POSTERIOR CERVICAL DECOMPRESSION, FUSION AND FIXATION performed by Ema Tomas MD at [address] [place] [address]         No family history on file. Social History     Socioeconomic History    Marital status:      Spouse name: None    Number of children: None    Years of education: None    Highest education level: None   Occupational History    None   Social Needs    Financial resource strain: None    Food insecurity     Worry: None     Inability: None    Transportation needs     Medical: None     Non-medical: None   Tobacco Use    Smoking status: Never Smoker    Smokeless tobacco: Never Used   Substance and Sexual Activity    Alcohol use:  Yes     Alcohol/week: 4.0 standard drinks     Types: 2 Cans of beer, 2 Shots of liquor per week    Drug use: No    Sexual activity: None   Lifestyle    Physical activity     Days per week: None     Minutes per session: None    Stress: None   Relationships    Social connections     Talks on phone: None     Gets together: None     Attends Hoahaoism service: None     Active member of club or organization: None     Attends meetings of clubs or organizations: None     Relationship status: None    Intimate partner violence     Fear of current or ex partner: None     Emotionally abused: None     Physically abused: None     Forced sexual activity: None   Other Topics Concern    None   Social History Narrative    None           REVIEW OF SYSTEMS:   CONSTITUTIONAL: negative for fevers, chills, diaphoresis, appetite change, night sweats, unexpected weight change, fatigue  EYES: negative for blurred vision, eye discharge, visual disturbance and icterus  HEENT: negative for hearing loss, tinnitus, ear drainage, sinus pressure, nasal congestion, epistaxis and snoring  RESPIRATORY: Negative for hemoptysis, cough, sputum production  CARDIOVASCULAR: negative for chest pain, palpitations, exertional chest pressure/discomfort, syncope, edema  GASTROINTESTINAL: negative for nausea, vomiting, diarrhea, blood in stool, abdominal pain, constipation  GENITOURINARY: negative for frequency, dysuria, urinary incontinence, decreased urine volume, and hematuria  HEMATOLOGIC/LYMPHATIC: negative for easy bruising, bleeding and lymphadenopathy  ALLERGIC/IMMUNOLOGIC: negative for recurrent infections, angioedema, anaphylaxis and drug reactions  ENDOCRINE: negative for weight changes and diabetic symptoms including polyuria, polydipsia and polyphagia  MUSCULOSKELETAL: positive for pain, joint swelling, decreased range of motion in cervical spine. NEUROLOGICAL: negative for headaches, slurred speech, unilateral weakness- positive for bilateral LE weakness. PSYCHIATRIC/BEHAVIORAL: negative for hallucinations, behavioral problems, confusion and agitation.      Physical Examination:  Vitals:   Patient Vitals for the past 24 hrs:   BP Temp Temp src Pulse Resp SpO2   02/03/21 1153 (!) 152/87 99 °F (37.2 °C) Oral 75 16 96 %   02/03/21 0719 (!) 163/85 98.3 °F (36.8 °C) Oral 64 16 97 %   02/03/21 0545 (!) 160/77 -- -- 67 -- --   02/03/21 0230 (!) 171/83 98.6 °F (37 °C) Oral 70 18 96 %   02/02/21 2247 (!) 165/93 -- -- 67 -- --   02/02/21 2245 (!) 177/90 98.1 °F (36.7 °C) Oral 67 18 97 %   02/02/21 1929 (!) 151/85 98.3 °F (36.8 °C) Oral 78 18 97 %     Const: Alert. WDWN. No distress  Eyes: Conjunctiva noninjected, no icterus noted; pupils equal, round, and reactive to light. HENT: Atraumatic, normocephalic; Oral mucosa moist  Neck: Trachea midline, neck supple. No thyromegaly noted. - [place] J noted. CV: Regular rate and rhythm, no murmur rub or gallop noted  Resp: Lungs clear to auscultation bilaterally, no rales wheezes or ronchi, no retractions. Respirations unlabored. GI: Soft, nontender, nondistended. Normal bowel sounds. No palpable masses. Skin: Normal temperature and turgor. No rashes or breakdown noted. Ext: No significant edema appreciated. No varicosities. MSK: No joint tenderness, erythema, warmth noted. AROM intact. Neuro: Alert, oriented, appropriate. No cranial nerve deficits appreciated. Sensation intact to light touch. Motor examination reveals normal strength in bilateral UE and 4/5 strength in bilateral LE. Reflexes normal and symmetric.   Psych: Stable mood, normal judgement, normal affect     Lab Results   Component Value Date    WBC 4.5 02/01/2021    HGB 11.2 (L) 02/01/2021    HCT 32.9 (L) 02/01/2021    MCV 98.5 02/01/2021     02/01/2021     Lab Results   Component Value Date    INR 0.97 01/27/2021    INR 1.10 01/26/2021    PROTIME 11.2 01/27/2021    PROTIME 12.8 01/26/2021     Lab Results   Component Value Date    CREATININE 0.8 02/03/2021    BUN 10 02/03/2021     02/03/2021    K 3.8 02/03/2021     02/03/2021    CO2 27 02/03/2021     Lab Results   Component Value Date    ALT 19 01/27/2021    AST 24 01/27/2021    ALKPHOS 131 (H) 01/27/2021    BILITOT 0.5 01/27/2021 XR CERVICAL SPINE (2-3 VIEWS)   Final Result      4 views demonstrate grade 1 anterolisthesis of C2 and C3. There are fusion rods and lateral mass screws from C3-C6. Prevertebral soft tissues are unremarkable. Mild-moderate degenerative disc disease at C4-5 and C5-6. Postoperative changes are seen. There is carotid artery calcification. XR CERVICAL SPINE (2-3 VIEWS)   Final Result      Intraoperative fluoroscopy was performed. Correlate with procedural note for additional information. FLUORO FOR SURGICAL PROCEDURES   Final Result      Intraoperative fluoroscopy was performed. Correlate with procedural note for additional information. CT CERVICAL SPINE WO CONTRAST   Final Result      1. Straightening of the normal cervical lordosis without spondylolisthesis. 2. No evidence of vertebral compression deformity or acute fracture. 3. Moderate cervical spondylosis superimposed on short pedicles contributing to multilevel spinal stenosis which is moderate to severe at C3-4 and C5-6. These findings are better evaluated on the recent prior MRI. Assessment:  Cervical spinal central canal stenosis    Lumbo-sacral spinal canal stenosis  Bilateral Lower extremity weakness and numbness  - Neurosurgery following  -Status post C3-6 posterior decompression/fusion/fixation- successful   - Pain control  - Requires extensive PT/OT in ARU setting      UTI  -Completed Rocephin course  - Follow up      Diabetes mellitus  - LSSI     HTN  - Continue home amlodipine     HLD  - Continue home statin    Impairments- Decreased functional mobility, Decreased ADLs    Recommendations:  ARU admit     Patient with new functional deficits and ongoing medical complexity. Demonstrates ability to tolerate 3 hours therapy/day. He is a good candidate for acute inpatient rehab when medically appropriate. Thank you for this consult. Please contact me with any questions or concerns.      Russ Craft D.O.

## 2021-02-03 NOTE — PROGRESS NOTES
4 Eyes Admission Assessment     I agree as the admission nurse that 2 RN's have performed a thorough Head to Toe Skin Assessment on the patient. ALL assessment sites listed below have been assessed on admission. Areas assessed by both nurses: ISABELLE lacey RN/ Cele Cohen RN  [x]   Head, Face, and Ears   [x]   Shoulders, Back, and Chest  [x]   Arms, Elbows, and Hands   [x]   Coccyx, Sacrum, and Ischium  [x]   Legs, Feet, and Heels        Does the Patient have Skin Breakdown?   Scattered bruising noted Scabs noted to bilateral elbows and knees, incision from surgical site      Randolph Prevention initiated:  No   Wound Care Orders initiated:  No      [facility] nurse consulted for Pressure Injury (Stage 3,4, Unstageable, DTI, NWPT, and Complex wounds) or Randolph score 18 or lower:  No      Nurse 1 eSignature: Electronically signed by [place] on 2/3/21 at 5:34 PM EST    **SHARE this note so that the co-signing nurse is able to place an eSignature**    Nurse 2 eSignature: Electronically signed by [place] on 2/3/21 at 5:35 PM EST

## 2021-02-03 NOTE — PROGRESS NOTES
Patient arrived on unit via w/c with nurse Ivelisse Grimaldo RN. Assisted to bed. 4 eyes skin assessment completed. Oriented to room, unit, ARU policies.

## 2021-02-04 LAB
GLUCOSE BLD-MCNC: 124 MG/DL (ref 70–99)
GLUCOSE BLD-MCNC: 134 MG/DL (ref 70–99)
GLUCOSE BLD-MCNC: 145 MG/DL (ref 70–99)
GLUCOSE BLD-MCNC: 178 MG/DL (ref 70–99)
PERFORMED ON: ABNORMAL

## 2021-02-04 PROCEDURE — 6370000000 HC RX 637 (ALT 250 FOR IP): Performed by: PHYSICAL MEDICINE & REHABILITATION

## 2021-02-04 PROCEDURE — 97166 OT EVAL MOD COMPLEX 45 MIN: CPT

## 2021-02-04 PROCEDURE — 97530 THERAPEUTIC ACTIVITIES: CPT

## 2021-02-04 PROCEDURE — 97162 PT EVAL MOD COMPLEX 30 MIN: CPT

## 2021-02-04 PROCEDURE — 97535 SELF CARE MNGMENT TRAINING: CPT

## 2021-02-04 PROCEDURE — 6360000002 HC RX W HCPCS: Performed by: PHYSICAL MEDICINE & REHABILITATION

## 2021-02-04 PROCEDURE — 1280000000 HC REHAB R&B

## 2021-02-04 PROCEDURE — 99222 1ST HOSP IP/OBS MODERATE 55: CPT | Performed by: PHYSICAL MEDICINE & REHABILITATION

## 2021-02-04 PROCEDURE — 97116 GAIT TRAINING THERAPY: CPT

## 2021-02-04 RX ADMIN — BISACODYL 5 MG: 5 TABLET, COATED ORAL at 09:15

## 2021-02-04 RX ADMIN — OXYCODONE 10 MG: 5 TABLET ORAL at 20:14

## 2021-02-04 RX ADMIN — OXYCODONE 10 MG: 5 TABLET ORAL at 02:50

## 2021-02-04 RX ADMIN — OXYCODONE 10 MG: 5 TABLET ORAL at 09:19

## 2021-02-04 RX ADMIN — PANTOPRAZOLE SODIUM 40 MG: 40 TABLET, DELAYED RELEASE ORAL at 09:15

## 2021-02-04 RX ADMIN — OXYCODONE 10 MG: 5 TABLET ORAL at 14:31

## 2021-02-04 RX ADMIN — INSULIN LISPRO 1 UNITS: 100 INJECTION, SOLUTION INTRAVENOUS; SUBCUTANEOUS at 17:56

## 2021-02-04 RX ADMIN — AMLODIPINE BESYLATE 5 MG: 5 TABLET ORAL at 09:15

## 2021-02-04 RX ADMIN — FAMOTIDINE 20 MG: 20 TABLET, FILM COATED ORAL at 09:15

## 2021-02-04 RX ADMIN — INSULIN LISPRO 1 UNITS: 100 INJECTION, SOLUTION INTRAVENOUS; SUBCUTANEOUS at 09:16

## 2021-02-04 RX ADMIN — ENOXAPARIN SODIUM 40 MG: 40 INJECTION SUBCUTANEOUS at 09:15

## 2021-02-04 RX ADMIN — FAMOTIDINE 20 MG: 20 TABLET, FILM COATED ORAL at 20:14

## 2021-02-04 ASSESSMENT — PAIN DESCRIPTION - DESCRIPTORS
DESCRIPTORS: SORE

## 2021-02-04 ASSESSMENT — PAIN SCALES - GENERAL
PAINLEVEL_OUTOF10: 5
PAINLEVEL_OUTOF10: 6
PAINLEVEL_OUTOF10: 0
PAINLEVEL_OUTOF10: 5
PAINLEVEL_OUTOF10: 7

## 2021-02-04 ASSESSMENT — PAIN DESCRIPTION - PAIN TYPE
TYPE: SURGICAL PAIN

## 2021-02-04 ASSESSMENT — PAIN DESCRIPTION - ORIENTATION
ORIENTATION: POSTERIOR

## 2021-02-04 ASSESSMENT — PAIN DESCRIPTION - ONSET
ONSET: ON-GOING
ONSET: ON-GOING

## 2021-02-04 ASSESSMENT — PAIN DESCRIPTION - LOCATION
LOCATION: NECK
LOCATION: NECK

## 2021-02-04 ASSESSMENT — PAIN - FUNCTIONAL ASSESSMENT
PAIN_FUNCTIONAL_ASSESSMENT: ACTIVITIES ARE NOT PREVENTED
PAIN_FUNCTIONAL_ASSESSMENT: ACTIVITIES ARE NOT PREVENTED

## 2021-02-04 ASSESSMENT — PAIN DESCRIPTION - PROGRESSION
CLINICAL_PROGRESSION: NOT CHANGED

## 2021-02-04 NOTE — PROGRESS NOTES
Transferring Mr. Hema Mazariegos to [id number] to [facility]. Report was given. Mr. Hema Mazariegos had all of his personal belongings.

## 2021-02-04 NOTE — PROGRESS NOTES
NUTRITION NOTE   Admission Date: 2/3/2021     Type and Reason for Visit: Consult    NUTRITION RECOMMENDATIONS:   1. PO Diet: Continue CC4 diet      NUTRITION ASSESSMENT:  New ARU admission. Pt s/p C3-4, C4-5, C5-6 cervical decompression, fusion, and fixation. Pt reports having a good appetite, denies wt changes. Noted pt is consuming >75% of meals. Encouraged po intakes and hydration. Will follow per [facility]. MALNUTRITION ASSESSMENT  Context of Malnutrition: Acute Illness   Malnutrition Status: No malnutrition    NUTRITION DIAGNOSIS No nutrition diagnosis at this time. NUTRITION INTERVENTION  Food and/or Nutrient Delivery:Continue Current diet   Nutrition education/counseling/coordination of care: Continue Inpatient Monitoring     NUTRITION RISK LEVEL: Risk Level: Low        The patient will still be monitored per nutrition standards of care. Consult dietitian if nutrition interventions essential to patient care is needed.      Anthony Guadalupe, [address], [address] [place] [address] Drive:  [phone]  Office:  [phone]

## 2021-02-04 NOTE — PROGRESS NOTES
Physical Therapy    Facility/Department: [facility] ACUTE REHAB UNIT  Initial Assessment/Treatment    NAME: Dariel Ramos  : 1960  MRN: [id number]    Date of Service: 2021    Discharge Recommendations:  Home with assist PRN, Outpatient PT, Continue to assess pending progress   PT Equipment Recommendations  Other: will continue to assess pending progress    Assessment   Body structures, Functions, Activity limitations: Decreased functional mobility ; Decreased balance;Decreased strength;Decreased endurance  Assessment: Pt currently requiring CGA/SBA for transfers, amb with RW and stair negotiation with BHR. Pt is limited by decreased strength and endurance as well as pain. Pt is below his baseline and would benefit from further skilled PT to maximize safety and independence with functional mobility. Treatment Diagnosis: Decreased functional mobility  Prognosis: Good  Decision Making: Medium Complexity  PT Education: Goals;PT Role;Gait Training;General Safety;Plan of Care;Transfer Training;Energy Conservation; Functional Mobility Training  Barriers to Learning: none  REQUIRES PT FOLLOW UP: Yes  Activity Tolerance  Activity Tolerance: Patient Tolerated treatment well       Patient Diagnosis(es): There were no encounter diagnoses. has a past medical history of Diabetes mellitus type 1 ([facility] [address] Hyperlipidemia, and Hypertension. has a past surgical history that includes Gastric bypass surgery and cervical laminectomy (N/A, 2021). Restrictions  Position Activity Restriction  Other position/activity restrictions: activity as tolerated. Cervical collar: Wear collar at all times except for hygiene  Vision/Hearing  Vision: Impaired  Vision Exceptions: Wears glasses at all times  Hearing: Within functional limits     Subjective  General  Chart Reviewed: Yes  Additional Pertinent Hx: Pt is a 62 y/o male admitted acute LBP and possibe spinal stenosis.  Transferred from [facility] ED post fall on  to [facility] hospital. Pt had C3-6 cervicl decompression on 1/28. 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 Recent sciatica 1/22/21. Admitted to ARU on 2/3. PMHx: DM, HTN. Family / Caregiver Present: No  Referring Practitioner: DO Nela  Diagnosis: s/p C-spine fusion  Follows Commands: Within Functional Limits  Subjective  Subjective: Pt found supine in bed upon arrival, reporting 6/10 pain and agreeable to therapy. Orientation  Orientation  Overall Orientation Status: Within Normal Limits  Social/Functional History  Social/Functional History  Lives With: Alone  Type of Home: Apartment  Home Layout: One level(laundry is up 8 steps in apartment complex, pt completed PTA)  Home Access: Stairs to enter without rails, Stairs to enter with rails  Entrance Stairs - Number of Steps: up 1 step then down 4 steps with BHR to apt  Bathroom Shower/Tub: Tub/Shower unit  Bathroom Toilet: Standard(sink for leverage)  Bathroom Accessibility: Accessible  ADL Assistance: Independent  Homemaking Assistance: Independent  Homemaking Responsibilities: Yes  Ambulation Assistance: Independent  Transfer Assistance: Independent  Active : Yes  Occupation: Retired  Type of occupation:   Additional Comments: Pt reports he was IND with all ADLs and transportation PTA but expects daughter in law to help around the home and takes to his appointments at d/c.  Objective  AROM RLE (degrees)  RLE AROM: WFL  AROM LLE (degrees)  LLE AROM : WFL  Strength RLE  Strength RLE: WFL  Strength LLE  Strength LLE: WFL           Transfers  Sit to Stand: Contact guard assistance;Stand by assistance  Stand to sit: Stand by assistance  Ambulation  Ambulation?: Yes  Ambulation 1  Surface: level tile  Device: Rolling Walker  Other Apparatus: (francia ZAMARRIPA)  Assistance: Contact guard assistance;Stand by assistance  Gait Deviations: Slow Jaqueline; Increased TERRELL; Decreased step 7-10 days  Long term goal 1: bed mobiliy independent  Long term goal 2: sit<>stand independent  Long term goal 3: amb 150' mod I with LRAD  Long term goal 4: ascend/descend 12 stairs mod I with HR  Patient Goals   Patient goals : move better and improve balance       Therapy Time   Individual Concurrent Group Co-treatment   Time In 0730         Time Out 0830         Minutes 60         Timed Code Treatment Minutes:  45    Total Treatment Minutes:  60    Second Session Therapy Time:   Individual Concurrent Group Co-treatment   Time In 2551         Time Out 1345         Minutes 30           Timed Code Treatment Minutes:  30    Total Treatment Minutes:  30    Kindra Tanner, PT, DPT

## 2021-02-04 NOTE — PROGRESS NOTES
Occupational Therapy   Occupational Therapy Initial Assessment/Treatment Note  Date: 2021   Patient Name: Marisol Scott  MRN: [id number]     : 1960    Date of Service: 2021    Discharge Recommendations:  Continue to assess pending progress, Outpatient OT, Home with assist PRN  OT Equipment Recommendations  Equipment Needed: Yes  Mobility Devices: ADL Assistive Devices  ADL Assistive Devices: Transfer Tub Bench  Other: continue to assess pending progress    Assessment   Performance deficits / Impairments: Decreased functional mobility ; Decreased ADL status; Decreased endurance;Decreased sensation;Decreased coordination;Decreased strength;Decreased high-level IADLs;Decreased fine motor control;Decreased balance  Assessment: Pt is a 62 y/o M s/p C3-4, C4-5, C5-6 posterior cervical decompression, fusion and fixation who reports being independent with all functional mobility and ADLs PTA. Pt currently demonstrates decreased sensation, strength and [facility] in [facility] requiring increased assistance with UB and LB dressing, and also demonstrates decreased dynamic standing balance and activity tolerance. Pt functioning below baseline but is very motivated to return to [facility]. Pt will benefit from skilled OT to maximize functioanl independence for safe d/c.   Treatment Diagnosis: impaired ADLs /functional transfers/ decreaed endurance 2/2 C3-C-6 posterior cervical decompression fusion and fixation sx  Prognosis: Good  Decision Making: Medium Complexity    OT Education: OT Role;Plan of Care;ADL Adaptive Strategies;Transfer Training  Patient Education: pt educated on cleaning and changing C-collar pads and technique to change pads appropriately provided with demonstration-pt verb understanding, continue to reinforce how to don/doff c-collar  REQUIRES OT FOLLOW UP: Yes  Activity Tolerance  Activity Tolerance: Patient Tolerated treatment well  Safety Devices  Safety Devices in place: Yes  Type of devices: Left in chair; All fall risk precautions in place;Call light within reach; Chair alarm in place           Patient Diagnosis(es): There were no encounter diagnoses. has a past medical history of Diabetes mellitus type 1 ([address] Hyperlipidemia, and Hypertension. has a past surgical history that includes Gastric bypass surgery and cervical laminectomy (N/A, 1/28/2021). Treatment Diagnosis: impaired ADLs /functional transfers/ decreaed endurance 2/2 C3-C-6 posterior cervical decompression fusion and fixation sx      Restrictions  Restrictions/Precautions  Required Braces or Orthoses?: Yes  Required Braces or Orthoses  Cervical: c-collar  Position Activity Restriction  Other position/activity restrictions: activity as tolerated. Cervical collar: Wear collar at all times except for showering    Subjective   General  Chart Reviewed: Yes  Patient assessed for rehabilitation services?: Yes  Additional Pertinent Hx: Pt is a 60 y/o male admitted acute LBP and possibe spinal stenosis. Transferred from [facility] ED post fall on 1/26 to [place] [address] had C3-6 cervicl decompression on 1/28. 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 Recent sciatica 1/22/21. Admitted to ARU on 2/3. PMHx: DM, HTN, gastric bypass sx  Family / Caregiver Present: No  Referring Practitioner: Dr Lendia Oppenheim  Diagnosis: C3-4, C4-5, C5-6 POSTERIOR CERVICAL DECOMPRESSION, FUSION AND FIXATION  Subjective  Subjective: Pt sitting in bedside recliner upon arrival with [facility] donned, pleasant and agreeable to OT evaluation.   Patient Currently in Pain: Yes(6/10 surgical neck pain)  Vital Signs  Patient Currently in Pain: Yes(6/10 surgical neck pain)     Social/Functional History  Social/Functional History  Lives With: Alone  Type of Home: Apartment  Home Layout: One level(laundry is up 8 steps in apartment complex, pt completed PTA)  Home Access: Stairs to enter without rails, Stairs to enter with rails  Entrance Stairs - Number of Steps: up 1 step then down 4 steps with BHR to apt  Bathroom Shower/Tub: Tub/Shower unit  Bathroom Toilet: Standard(sink for leverage)  Bathroom Accessibility: Accessible  ADL Assistance: [address]: Independent  Homemaking Responsibilities: Yes  Ambulation Assistance: Independent  Transfer Assistance: Independent  Active : Yes  Occupation: Retired  Type of occupation:   Leisure & Hobbies: watch tv  Additional Comments: Pt reports he was IND with all ADLs and transportation PTA but expects daughter in law to help around the home and takes to his appointments at d/c.       Objective        Orientation  Overall Orientation Status: Within Functional Limits  Observation/Palpation  Observation: IV to L forearm and C-collar  Balance  Sitting Balance: Independent  Standing Balance: Contact guard assistance  Standing Balance  Time: ~8 minutes total  Activity: functional mobility to/from bathroom; in stance for LB ADLs  Functional Mobility  Functional - Mobility Device: Rolling Walker  Activity: To/from bathroom  Assist Level: Contact guard assistance  Shower Transfers  Shower - Transfer From: Lorence Boop - Transfer Type: To and From  Shower - Transfer To:  Transfer tub bench  Shower - Technique: Ambulating  Shower Transfers: Contact Guard  Shower Transfers Comments: VCs for [employer], lateral step into shower threshold with use of GB    ADL  UE Bathing: Supervision(seated on TTB; VCs to maintain neutral cervical positioning)  LE Bathing: Minimal assistance(seated on TTB; VCs for figure 4 when washing lower limbs and feet; CGA in stance for rear jane hygiene, assist for thoroughness as pt with decreased sensation in B hands)  UE Dressing: Minimal assistance(Max A to doff C-collar with TCs to locate and assist with velcro, total A to don, setup to don t shirt)  LE Dressing: Minimal assistance(pt donned pants and socks seated on TTB using figure 4 to thread and CGA in stance to manage over hips, assist to tie drawstring)  Toileting: (denied need)           Transfers  Sit to stand: Contact guard assistance  Stand to sit: Contact guard assistance     Cognition  Overall Cognitive Status: WNL        Sensation  Overall Sensation Status: Impaired(pt reports numbness and tinglling to moises fingertips)                 Left Hand Strength -  (lbs)  Handle Setting 2: 56  Left Hand Strength - Pinch (lbs)  Lateral: 16  Tip: 14  Palmar 3 point: 17  Right Hand Strength -  (lbs)  Handle Setting 2: 58  Right Hand Strength - Pinch (lbs)  Lateral: 20  Tip: 13  Palmar 3 point: 16     ADDENDUM [id number]  Pt seated in recliner upon entry, pleasant and agreeable to therapy session. Pt reports neck pain of 6 and RN contacted, entered to administer pain meds. Pt completed 3 trials of 9HPT both RUE and LUE: RUE - 1 min 23 sec, 57 sec and 1 min 23 sec and LUE - 1 min 2 sec, 1 min 5 sec and 1 min 4 sec. RUE avg 1 min 14 sec and LUE avg 1 min 3 sec. Pt given soft thera-putty and performed the following activities with RUE and LUE to address intrinsic muscle stregnth, tip to tip  and tripod : squeezing thera-putty into ball, rolling thera-putty flat on table, using tripod  to roll cylindrical putty, squeezing thera-putty and using tripod  to retrieve 8 small pony beads from thera-putty. [facility] for pt to complete aforementioned activities at his leisure, pt verbalized understanding. Pt given pencil and 2 different pencil  (poly triangular  and cylindrical foam ), with both pt wrote address, printed capital alphabet. Pt stated that it looked very close to his handwriting and pt preferred poly triangular . Pt sit to stand CGA, pt ambulated ~18 ft to toilet with rw at [facility]. Pt in stance SBA to void. Pt in stance at sink SBA to wash hands. Pt ambulated back to recliner with rw at [facility]. Pt stand to sit CGA.  Call light in reach and chair alarm on.         Plan   Plan  Times per week: 90 mintes per day 5 days/week  Times per day: Daily  Current Treatment Recommendations: Endurance Training, Safety Education & Training, Self-Care / ADL, Equipment Evaluation, Education, & procurement, Patient/Caregiver Education & Training, Strengthening, Home Management Training, Balance Training, Functional Mobility Training    G-Code     OutComes Score                                                  AM-PAC Score             Goals  Short term goals  Time Frame for Short term goals: STGs=LTGs  Long term goals  Time Frame for Long term goals : 7-10 days  Long term goal 1: Pt will complete UB dressing with mod I including C-collar  Long term goal 2: Pt will complete LB dressing with mod I  Long term goal 3: Pt will complete dry tub transfer with mod I  Long term goal 4: Pt will complete bathing with mod I  Long term goal 5: Pt will be independent with [facility] HEP to increase independence with dressing  Patient Goals   Patient goals : get stronger and increase balance       Therapy Time   Individual Concurrent Group Co-treatment   Time In 0930         Time Out 1030         Minutes 60         Timed Code Treatment Minutes: 60 Minutes     Therapy Time   Individual Concurrent Group Co-treatment   Time In 1420         Time Out 1459         Minutes 39         Timed Code Treatment Minutes: [address], [address] (Treatment and documentation for 2nd therapy session)    Otto Baca, OT

## 2021-02-04 NOTE — H&P
Department of Physical Medicine & Rehabilitation  History & Physical      Patient Identification:  Jose Miguel Cortes  : 1960  Admit date: 2/3/2021   Attending provider: Tara Moreland DO        Primary care provider: Alvin Sosa     Chief Complaint: Spinal stenosis     History of Present Illness/Hospital Course:  Patient is a 49-year-old male who presented to hospital for bilateral lower extremity weakness, MRI spine did show multilevel spinal stenosis. He underwent a C3-4, C4-5, C5-6 POSTERIOR CERVICAL DECOMPRESSION, FUSION AND FIXATION with Dr. Jose Mane. Today he is feeling well and would like to come to the ARU for further treatment and therapy. Prior Level of Function:  Independent for mobility, ADLs, and IADLs    Current Level of Function:  Mod assist       Past Medical History:   Diagnosis Date    Diabetes mellitus type 1 ([facility] [address]     Hyperlipidemia     Hypertension      Fall in past year: No    Past Surgical History:   Procedure Laterality Date    CERVICAL LAMINECTOMY N/A 2021    C3-4, C4-5, C5-6 POSTERIOR CERVICAL DECOMPRESSION, FUSION AND FIXATION performed by Lukas Solitario MD at [address] [place] [address]       Major Surgery in past 100 days: yes    No family history on file. Social History     Socioeconomic History    Marital status:      Spouse name: Not on file    Number of children: Not on file    Years of education: Not on file    Highest education level: Not on file   Occupational History    Not on file   Social Needs    Financial resource strain: Not on file    Food insecurity     Worry: Not on file     Inability: Not on file    Transportation needs     Medical: Not on file     Non-medical: Not on file   Tobacco Use    Smoking status: Never Smoker    Smokeless tobacco: Never Used   Substance and Sexual Activity    Alcohol use:  Yes     Alcohol/week: 4.0 standard drinks     Types: 2 Cans of beer, 2 Shots of liquor per week    Drug use: No    Sexual activity: Not on file   Lifestyle    Physical activity     Days per week: Not on file     Minutes per session: Not on file    Stress: Not on file   Relationships    Social connections     Talks on phone: Not on file     Gets together: Not on file     Attends [employer] service: Not on file     Active member of club or organization: Not on file     Attends meetings of clubs or organizations: Not on file     Relationship status: Not on file    Intimate partner violence     Fear of current or ex partner: Not on file     Emotionally abused: Not on file     Physically abused: Not on file     Forced sexual activity: Not on file   Other Topics Concern    Not on file   Social History Narrative    Not on file       No Known Allergies      Current Facility-Administered Medications   Medication Dose Route Frequency Provider Last Rate Last Admin    aluminum & magnesium hydroxide-simethicone (MAALOX) 200-200-20 MG/5ML suspension 15 mL  15 mL Oral Q6H PRN Philippe L Heis, DO        bisacodyl (DULCOLAX) suppository 10 mg  10 mg Rectal Daily PRN Philippe L Heis, DO        famotidine (PEPCID) tablet 20 mg  20 mg Oral BID Philippe L Heis, DO   20 mg at 02/04/21 0915    oxyCODONE (ROXICODONE) immediate release tablet 5 mg  5 mg Oral Q4H PRN Philippe L Heis, DO        Or    oxyCODONE (ROXICODONE) immediate release tablet 10 mg  10 mg Oral Q4H PRN Philippe L Heis, DO   10 mg at 02/04/21 0919    promethazine (PHENERGAN) tablet 12.5 mg  12.5 mg Oral Q6H PRN Philippe L Heis, DO        Or    ondansetron (ZOFRAN) injection 4 mg  4 mg Intravenous Q6H PRN Philippe L Heis, DO        dextrose 5 % solution  100 mL/hr Intravenous PRN Philippe L Heis, DO        dextrose 50 % IV solution  12.5 g Intravenous PRN Philippe L Heis, DO        glucagon (rDNA) injection 1 mg  1 mg Intramuscular PRN Philippe L Heis, DO        glucose (GLUTOSE) 40 % oral gel 15 g  15 g Oral PRN Philippe L Heis, DO        insulin lispro (1 Unit Dial) 0-3 Units  0-3 Units Subcutaneous Nightly Ron Irving Heis, DO   1 Units at 02/03/21 2130    insulin lispro (1 Unit Dial) 0-6 Units  0-6 Units Subcutaneous TID  Philippe AMBRIZ Heis, DO   1 Units at 02/04/21 1410    acetaminophen (TYLENOL) tablet 650 mg  650 mg Oral Q4H PRN Philippe L Heis, DO        bisacodyl (DULCOLAX) EC tablet 5 mg  5 mg Oral Daily Philippe L Heis, DO   5 mg at 02/04/21 0915    enoxaparin (LOVENOX) injection 40 mg  40 mg Subcutaneous Daily Philippe L Heis, DO   40 mg at 02/04/21 0915    magnesium hydroxide (MILK OF MAGNESIA) 400 MG/5ML suspension 30 mL  30 mL Oral Daily PRN Philippe L Ericis, DO        polyethylene glycol (GLYCOLAX) packet 17 g  17 g Oral Daily PRN Philippe L Ericis, DO        amLODIPine (NORVASC) tablet 5 mg  5 mg Oral Daily Philippe L Heis, DO   5 mg at 02/04/21 0915    methocarbamol (ROBAXIN) tablet 750 mg  750 mg Oral Q6H PRN [facility] Ericis, DO        pantoprazole (PROTONIX) tablet 40 mg  40 mg Oral QAM Philippe L Heis, DO   40 mg at 02/04/21 0915         REVIEW OF SYSTEMS:   CONSTITUTIONAL: negative for fevers, chills, diaphoresis, appetite change, night sweats, unexpected weight change, fatigue  EYES: negative for blurred vision, eye discharge, visual disturbance and icterus  HEENT: negative for hearing loss, tinnitus, ear drainage, sinus pressure, nasal congestion, epistaxis and snoring  RESPIRATORY: Negative for hemoptysis, cough, sputum production  CARDIOVASCULAR: negative for chest pain, palpitations, exertional chest pressure/discomfort, syncope, edema  GASTROINTESTINAL: negative for nausea, vomiting, diarrhea, blood in stool, abdominal pain, constipation  GENITOURINARY: negative for frequency, dysuria, urinary incontinence, decreased urine volume, and hematuria  HEMATOLOGIC/LYMPHATIC: negative for easy bruising, bleeding and lymphadenopathy  ALLERGIC/IMMUNOLOGIC: negative for recurrent infections, angioedema, anaphylaxis and drug reactions  ENDOCRINE: negative for weight changes and diabetic symptoms including polyuria, polydipsia and polyphagia  MUSCULOSKELETAL: positive for pain, joint swelling, decreased range of motion in cervical spine. NEUROLOGICAL: negative for headaches, slurred speech, unilateral weakness- positive for bilateral LE weakness. PSYCHIATRIC/BEHAVIORAL: negative for hallucinations, behavioral problems, confusion and agitation.        All pertinent positives are noted in the HPI. Physical Examination:  Vitals:   Patient Vitals for the past 24 hrs:   BP Temp Temp src Pulse Resp SpO2 Height Weight   02/04/21 0914 (!) 158/83 99.6 °F (37.6 °C) Oral 81 16 96 % -- --   02/03/21 2015 138/77 98.6 °F (37 °C) Oral 81 16 94 % -- --   02/03/21 1745 (!) 159/84 99.4 °F (37.4 °C) Oral 84 16 96 % 5' 8\" (1.727 m) 232 lb (105.2 kg)          Const: Alert. WDWN. No distress  Eyes: Conjunctiva noninjected, no icterus noted; pupils equal, round, and reactive to light. HENT: Atraumatic, normocephalic; Oral mucosa moist  Neck: Trachea midline, neck supple. No thyromegaly noted. - Wright J noted. CV: Regular rate and rhythm, no murmur rub or gallop noted  Resp: Lungs clear to auscultation bilaterally, no rales wheezes or ronchi, no retractions. Respirations unlabored. GI: Soft, nontender, nondistended. Normal bowel sounds. No palpable masses. Skin: Normal temperature and turgor. No rashes or breakdown noted. Ext: No significant edema appreciated. No varicosities. MSK: No joint tenderness, erythema, warmth noted. AROM intact. Neuro: Alert, oriented, appropriate. No cranial nerve deficits appreciated. Sensation intact to light touch. Motor examination reveals normal strength in bilateral UE and 4/5 strength in bilateral LE. Reflexes normal and symmetric.   Psych: Stable mood, normal judgement, normal affect     Lab Results   Component Value Date    WBC 4.5 02/01/2021    HGB 11.2 (L) 02/01/2021    HCT 32.9 (L) 02/01/2021    MCV 98.5 02/01/2021     02/01/2021     Lab Results Component Value Date    INR 0.97 01/27/2021    INR 1.10 01/26/2021    PROTIME 11.2 01/27/2021    PROTIME 12.8 01/26/2021     Lab Results   Component Value Date    CREATININE 0.8 02/03/2021    BUN 10 02/03/2021     02/03/2021    K 3.8 02/03/2021     02/03/2021    CO2 27 02/03/2021     Lab Results   Component Value Date    ALT 19 01/27/2021    AST 24 01/27/2021    ALKPHOS 131 (H) 01/27/2021    BILITOT 0.5 01/27/2021         No orders to display         XR CERVICAL SPINE (2-3 VIEWS)   Final Result       4 views demonstrate grade 1 anterolisthesis of C2 and C3. There are fusion rods and lateral mass screws from C3-C6. Prevertebral soft tissues are unremarkable. Mild-moderate degenerative disc disease at C4-5 and C5-6. Postoperative changes are seen. There is carotid artery calcification.       XR CERVICAL SPINE (2-3 VIEWS)   Final Result       Intraoperative fluoroscopy was performed. Correlate with procedural note for additional information.            FLUORO FOR SURGICAL PROCEDURES   Final Result       Intraoperative fluoroscopy was performed. Correlate with procedural note for additional information.            CT CERVICAL SPINE WO CONTRAST   Final Result       1. Straightening of the normal cervical lordosis without spondylolisthesis. 2. No evidence of vertebral compression deformity or acute fracture. 3. Moderate cervical spondylosis superimposed on short pedicles contributing to multilevel spinal stenosis which is moderate to severe at C3-4 and C5-6. These findings are better evaluated on the recent prior MRI.             The above laboratory data have been reviewed. The above imaging data have been reviewed. The above medical testing have been reviewed. Body mass index is 35.28 kg/m².     POST ADMISSION PHYSICIAN EVALUATION  The patient has agreed to being admitted to our comprehensive inpatient rehabilitation facility and can tolerate the intensity of service consisting of at least:  --180 minutes of therapy a day, 5 out of 7 days a week. OR  --15 hours of intensive therapy within a 7 consecutive day period. The patient/family has a good understanding of our discharge process and will benefit from an interdisciplinary inpatient rehabilitation program. The patient has potential to make improvement and is in need of at least two of the following multidisciplinary therapies including but not limited to physical, occupational, respiratory, and speech, nutritional services, wound care, and prosthetics and orthotics. Given the patients complex condition and risk of further medical complications, rehabilitation services cannot be safely provided at a lower level of care such as a skilled nursing facility. All of the goals listed below were reviewed with the patient and he/she is in agreement. I have compared the patients medical and functional status at the time of the preadmission screening and the same on this date, and there are no significant changes. By signing this document, I acknowledge that I have personally performed a full physical examination on this patient within 24 hours of admission to this inpatient rehabilitation facility and have determined the patient to be able to tolerate the above course of treatment at an intensive level for a reasonable period of time. I will be completing a detailed individualized  Plan of Care for this patient by day four of the patients stay based upon the Preadmission Screen, this Post-Admission Evaluation, and the therapy evaluations.      Barriers: Decreased functional mobility, medical comorbidities  Services Required: PT, OT  Goals: mod I  Prognosis: Good  Anticipated Dispo: home  ELOS: TBD    Rehabilitation Diagnosis:   Spinal Cord Dysfunction - Non-traumatic, 4.130, Other NTSCI      Assessment and Plan:  Cervical spinal central canal stenosis    Lumbo-sacral spinal canal stenosis  Bilateral Lower extremity weakness and numbness  - Neurosurgery following  -Status post C3-6 posterior decompression/fusion/fixation- successful   - Pain control  - Requires extensive PT/OT in ARU setting      UTI  -Completed Rocephin course  - Follow up      Diabetes mellitus  - LSSI     HTN  - Continue home amlodipine     HLD  - Continue home statin       Impairments: Decreased functional mobility, Decreased ADLs    Bladder - high risk retention - Monitor PVRs, SC prn >300cc    Bowel - high risk constipation - colace BID, PRN miralax and MoM. follow bowel movements. Enema or suppository if needed.      Safety - fall precautions    PPx  DVT: lovenox  GI: pantoprazole    FULL CODE    Fransisco De Oliveira D.O. M.P.H  PM&R  2/4/2021  10:02 AM

## 2021-02-04 NOTE — PLAN OF CARE
Problem: Skin Integrity:  Goal: Will show no infection signs and symptoms  Description: Will show no infection signs and symptoms  Outcome: Ongoing  Note: Surgical site observed for signs/symptoms of infection. Vitals performed routinely, labs observed. Will monitor pt while on ARU       Problem: Pain:  Goal: Control of acute pain  Description: Control of acute pain  Outcome: Ongoing  Note: Pt voices pain needs appropriately, pain is assessed during shift. Problem: Falls - Risk of:  Goal: Will remain free from falls  Description: Will remain free from falls  Outcome: Ongoing  Note: Client remains free from falls, bed/chair alarm in place, door open, encouraged to use call light for needs, call light is within reach, bed locked in lowest position,  Will continue to monitor.

## 2021-02-04 NOTE — CARE COORDINATION
Referred to patient for d/c planning. Spoke to patient. Patient is a 61year old male admitted s/p cervical fusion. Patient reports he lives at home alone. Patient reports he is independent in ADLs. expalined the role of SW and will continue to follow for d/c needs. Case Management Assessment           Initial Evaluation                Date / Time of Evaluation: 2/4/2021 12:09 PM                 Assessment Completed by: Shira Tate    Patient Name: Diane Pablo     YOB: 1960  Diagnosis: S/P cervical spinal fusion [Z98.1]     Date / Time: 2/3/2021  5:22 PM    Patient Admission Status: Inpatient    If patient is discharged prior to next notation, then this note serves as note for discharge by case management. Current PCP: Adenike Ingram Patient: No    Chart Reviewed: Yes  Patient/ Family Interviewed: Yes    Initial assessment completed at bedside with: Patient    Hospitalization in the last 30 days: Yes    Emergency Contacts:  Extended Emergency Contact Information  Primary Emergency Contact: [facility], [address] Phone: [phone]  Relation: Brother/Sister  Secondary Emergency Contact: [employer] Phone: [phone]  Relation: Child  Preferred language: English   needed? No    Advance Directives:   Code Status: Full [address]: No      Financial  Payor: PENDING MEDICAID / Plan: PENDING MEDICAID / Product Type: *No Product type* /     Pre-cert required for SNF: No    Pharmacy    [facility], [facility]  [address] [address]  Phone: [phone] Fax: [phone]      Potential assistance Purchasing Medications: Potential Assistance Purchasing Medications: No  Does Patient want to participate in local refill/ meds to beds program?: No    Meds To Beds General Rules:  1. Can ONLY be done Monday- Friday between 8:30am-5pm  2. Prescription(s) must be in pharmacy by 3pm to be filled same day  3. Copy of patient's insurance/ prescription drug card and patient face sheet must be sent along with the prescription(s)  4. Cost of Rx cannot be added to hospital bill. If financial assistance is needed, please contact unit  or ;  or  CANNOT provide pharmacy voucher for patients co-pays  5.  Patients can then  the prescription on their way out of the hospital at discharge, or pharmacy can deliver to the bedside if staff is available. (payment due at time of pick-up or delivery - cash, check, or card accepted)     Able to afford home medications/ co-pay costs: Yes    ADLS  Support Systems: Spouse/Significant Other    PT AM-PAC:   /24  OT AM-PAC:   /24    [place]: home alone  Steps: 4 stairs down    Plans to RETURN to current housing: Yes  Barriers to RETURNING to current housing: medical complications    Kurt Delgado 78  Currently ACTIVE with [address]: No  Home Care Agency: Not Applicable    Currently ACTIVE with [place] [facility]: No        Durable Medical Equipment  DME Provider:   Equipment: none noted    Home Oxygen and Respiratory Equipment  Has HOME OXYGEN prior to admission: No  Sudheer [address]: Not Applicable      DISCHARGE PLAN:  Disposition: Home with [address]: TBD     Transportation PLAN for discharge: family     Factors facilitating achievement of predicted outcomes: Family support, Motivated, Cooperative and Pleasant    Barriers to discharge: Medical complications    Additional Case Management Notes: see above    The Plan for Transition of Care is related to the following treatment goals of S/P cervical spinal fusion [Z98.1]    The Patient and/or patient representative Vani Carbajal and his family were provided with a choice of provider and agrees with the discharge plan Not Indicated    Freedom of choice list was provided with basic dialogue that supports the patient's individualized plan of care/goals and shares the quality data associated with the providers.  Not Indicated    Care Transition patient: No    TON Cabral, MAURO  [facility]  Case Management Department  Ph: [phone]

## 2021-02-05 LAB
ANION GAP SERPL CALCULATED.3IONS-SCNC: 8 MMOL/L (ref 3–16)
BASOPHILS ABSOLUTE: 0 K/UL (ref 0–0.2)
BASOPHILS RELATIVE PERCENT: 0.8 %
BUN BLDV-MCNC: 12 MG/DL (ref 7–20)
CALCIUM SERPL-MCNC: 9.8 MG/DL (ref 8.3–10.6)
CHLORIDE BLD-SCNC: 102 MMOL/L (ref 99–110)
CO2: 27 MMOL/L (ref 21–32)
CREAT SERPL-MCNC: 0.9 MG/DL (ref 0.8–1.3)
EOSINOPHILS ABSOLUTE: 0.1 K/UL (ref 0–0.6)
EOSINOPHILS RELATIVE PERCENT: 1.9 %
GFR AFRICAN AMERICAN: >60
GFR NON-AFRICAN AMERICAN: >60
GLUCOSE BLD-MCNC: 106 MG/DL (ref 70–99)
GLUCOSE BLD-MCNC: 111 MG/DL (ref 70–99)
GLUCOSE BLD-MCNC: 136 MG/DL (ref 70–99)
GLUCOSE BLD-MCNC: 146 MG/DL (ref 70–99)
GLUCOSE BLD-MCNC: 157 MG/DL (ref 70–99)
HCT VFR BLD CALC: 34.6 % (ref 40.5–52.5)
HEMOGLOBIN: 11.8 G/DL (ref 13.5–17.5)
LYMPHOCYTES ABSOLUTE: 1 K/UL (ref 1–5.1)
LYMPHOCYTES RELATIVE PERCENT: 23.5 %
MCH RBC QN AUTO: 33.5 PG (ref 26–34)
MCHC RBC AUTO-ENTMCNC: 34 G/DL (ref 31–36)
MCV RBC AUTO: 98.5 FL (ref 80–100)
MONOCYTES ABSOLUTE: 0.4 K/UL (ref 0–1.3)
MONOCYTES RELATIVE PERCENT: 10.2 %
NEUTROPHILS ABSOLUTE: 2.6 K/UL (ref 1.7–7.7)
NEUTROPHILS RELATIVE PERCENT: 63.6 %
PDW BLD-RTO: 13.9 % (ref 12.4–15.4)
PERFORMED ON: ABNORMAL
PLATELET # BLD: 276 K/UL (ref 135–450)
PMV BLD AUTO: 7.7 FL (ref 5–10.5)
POTASSIUM REFLEX MAGNESIUM: 3.8 MMOL/L (ref 3.5–5.1)
RBC # BLD: 3.51 M/UL (ref 4.2–5.9)
SODIUM BLD-SCNC: 137 MMOL/L (ref 136–145)
WBC # BLD: 4.1 K/UL (ref 4–11)

## 2021-02-05 PROCEDURE — 80048 BASIC METABOLIC PNL TOTAL CA: CPT

## 2021-02-05 PROCEDURE — 97530 THERAPEUTIC ACTIVITIES: CPT | Performed by: PHYSICAL THERAPIST

## 2021-02-05 PROCEDURE — 97116 GAIT TRAINING THERAPY: CPT | Performed by: PHYSICAL THERAPIST

## 2021-02-05 PROCEDURE — 97110 THERAPEUTIC EXERCISES: CPT | Performed by: PHYSICAL THERAPIST

## 2021-02-05 PROCEDURE — 97530 THERAPEUTIC ACTIVITIES: CPT

## 2021-02-05 PROCEDURE — 1280000000 HC REHAB R&B

## 2021-02-05 PROCEDURE — 6360000002 HC RX W HCPCS: Performed by: PHYSICAL MEDICINE & REHABILITATION

## 2021-02-05 PROCEDURE — 6370000000 HC RX 637 (ALT 250 FOR IP): Performed by: PHYSICAL MEDICINE & REHABILITATION

## 2021-02-05 PROCEDURE — 85025 COMPLETE CBC W/AUTO DIFF WBC: CPT

## 2021-02-05 PROCEDURE — 99232 SBSQ HOSP IP/OBS MODERATE 35: CPT | Performed by: PHYSICAL MEDICINE & REHABILITATION

## 2021-02-05 PROCEDURE — 97535 SELF CARE MNGMENT TRAINING: CPT

## 2021-02-05 RX ADMIN — OXYCODONE 10 MG: 5 TABLET ORAL at 20:22

## 2021-02-05 RX ADMIN — AMLODIPINE BESYLATE 5 MG: 5 TABLET ORAL at 09:07

## 2021-02-05 RX ADMIN — FAMOTIDINE 20 MG: 20 TABLET, FILM COATED ORAL at 20:22

## 2021-02-05 RX ADMIN — OXYCODONE 10 MG: 5 TABLET ORAL at 03:40

## 2021-02-05 RX ADMIN — INSULIN LISPRO 1 UNITS: 100 INJECTION, SOLUTION INTRAVENOUS; SUBCUTANEOUS at 08:13

## 2021-02-05 RX ADMIN — BISACODYL 5 MG: 5 TABLET, COATED ORAL at 09:07

## 2021-02-05 RX ADMIN — OXYCODONE 5 MG: 5 TABLET ORAL at 09:09

## 2021-02-05 RX ADMIN — OXYCODONE 10 MG: 5 TABLET ORAL at 15:57

## 2021-02-05 RX ADMIN — ENOXAPARIN SODIUM 40 MG: 40 INJECTION SUBCUTANEOUS at 09:07

## 2021-02-05 RX ADMIN — INSULIN LISPRO 1 UNITS: 100 INJECTION, SOLUTION INTRAVENOUS; SUBCUTANEOUS at 20:21

## 2021-02-05 RX ADMIN — PANTOPRAZOLE SODIUM 40 MG: 40 TABLET, DELAYED RELEASE ORAL at 09:07

## 2021-02-05 RX ADMIN — FAMOTIDINE 20 MG: 20 TABLET, FILM COATED ORAL at 09:07

## 2021-02-05 ASSESSMENT — PAIN - FUNCTIONAL ASSESSMENT
PAIN_FUNCTIONAL_ASSESSMENT: ACTIVITIES ARE NOT PREVENTED
PAIN_FUNCTIONAL_ASSESSMENT: PREVENTS OR INTERFERES SOME ACTIVE ACTIVITIES AND ADLS

## 2021-02-05 ASSESSMENT — PAIN DESCRIPTION - PROGRESSION
CLINICAL_PROGRESSION: NOT CHANGED
CLINICAL_PROGRESSION: NOT CHANGED

## 2021-02-05 ASSESSMENT — PAIN DESCRIPTION - ONSET
ONSET: ON-GOING

## 2021-02-05 ASSESSMENT — PAIN DESCRIPTION - PAIN TYPE
TYPE: SURGICAL PAIN

## 2021-02-05 ASSESSMENT — PAIN DESCRIPTION - FREQUENCY
FREQUENCY: INTERMITTENT
FREQUENCY: INTERMITTENT

## 2021-02-05 ASSESSMENT — PAIN SCALES - GENERAL
PAINLEVEL_OUTOF10: 0
PAINLEVEL_OUTOF10: 6
PAINLEVEL_OUTOF10: 8

## 2021-02-05 ASSESSMENT — PAIN DESCRIPTION - ORIENTATION
ORIENTATION: POSTERIOR
ORIENTATION: POSTERIOR

## 2021-02-05 ASSESSMENT — PAIN DESCRIPTION - LOCATION: LOCATION: NECK

## 2021-02-05 NOTE — PROGRESS NOTES
Occupational Therapy  Facility/Department: [facility] ACUTE REHAB UNIT  Daily Treatment Note  NAME: Jazmyne Mcqueen  : 1960  MRN: [id number]    Date of Service: 2021    Discharge Recommendations:  Continue to assess pending progress, Outpatient OT, Home with assist PRN  OT Equipment Recommendations  Equipment Needed: Yes  ADL Assistive Devices: Transfer Tub Bench; Toileting - Raised Toilet Seat with arms  Other: pt reported interest in RTS as pt does not have GBs and does not want to rely on sink for leverage    Assessment   Performance deficits / Impairments: Decreased functional mobility ; Decreased ADL status; Decreased endurance;Decreased sensation;Decreased coordination;Decreased strength;Decreased high-level IADLs;Decreased fine motor control;Decreased balance  Treatment Diagnosis: impaired ADLs /functional transfers/ decreaed endurance 2/2 C3-C-6 posterior cervical decompression fusion and fixation sx  Prognosis: Good  OT Education: Plan of Care;ADL Adaptive Strategies;Transfer Training;Home Exercise Program  Patient Education: Pt instructed on donning/doffing C-collar in stance using mirror, pt also provided with velcro strip to practice grasping and pulling for increased [address] to improve independence with doffing c-collar-pt verb and demo understanding  REQUIRES OT FOLLOW UP: Yes  Activity Tolerance  Activity Tolerance: Patient Tolerated treatment well  Safety Devices  Safety Devices in place: Yes  Type of devices: Bed alarm in place; Left in bed; All fall risk precautions in place;Call light within reach         Patient Diagnosis(es): There were no encounter diagnoses. has a past medical history of Diabetes mellitus type 1 ([address] Hyperlipidemia, and Hypertension. has a past surgical history that includes Gastric bypass surgery and cervical laminectomy (N/A, 2021).     Restrictions  Restrictions/Precautions  Required Braces or Orthoses?: Yes  Required Braces or Orthoses  Cervical: c-collar  Position Activity Restriction  Other position/activity restrictions: activity as tolerated. Cervical collar: Wear collar at all times except for showering     Subjective   General  Chart Reviewed: Yes  Patient assessed for rehabilitation services?: Yes  Additional Pertinent Hx: Pt is a 62 y/o male admitted acute LBP and possibe spinal stenosis. Transferred from [facility] ED post fall on 1/26 to [place] [address] had C3-6 cervicl decompression on 1/28. 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 Recent sciatica 1/22/21. Admitted to ARU on 2/3. PMHx: DM, HTN, gastric bypass sx  Response to previous treatment: Patient with no complaints from previous session  Family / Caregiver Present: No  Referring Practitioner: Dr Radha Breen  Diagnosis: C3-4, C4-5, C5-6 POSTERIOR CERVICAL DECOMPRESSION, FUSION AND FIXATION  Subjective  Subjective: Pt sitting in bedside recliner upon arrival with [facility] donned, pleasant and agreeable to OT session.   Vital Signs  Patient Currently in Pain: Yes(6/10 surgical neck pain)     Orientation  Orientation  Overall Orientation Status: Within Functional Limits  Objective    ADL  Feeding: Independent(finishing lunch upon arrival)  Grooming: Stand by assistance(hand hygiene in stance at sink)  UE Dressing: Minimal assistance(Pt donned/doffed C-collar in stance in bathroom using mirror to assist with visual feedback to locate velcro straps; pt able to doff with SBA and pt donned with min A to position brace behind, pt able to tighten with + time and assist for thoroughness)  LE Dressing: Supervision(pt doffed boots and socks seatd on recliner with figure 4 tech, donned  socks with figure 4)  Toileting: Stand by assistance(pt voided in stance)    Instrumental ADL's  Instrumental ADLs: Yes  Health Management  Health Management Level of Assistance: Independent  Health Management: Pt completed medication mgmt task as pt reports being responsible for placing pills into pill organizer, activity completed with goal of working on [facility] to open pill bottles as well asusing pincer grasp to place small \"beads\" into pill organizer to simulate home environment; pt demo no difficulty opening various pill bottles and was able to accurately follow instructions to produce correct number of pills into each day slot in the pill organizer; pt demonstrated dropping of pills x 5 occasions d/t numbness in fingertips, however pt stated this activity less challenging than the pincer grasp [facility] activity, \"I am used to messing with my meds at home\"     Balance  Sitting Balance: Independent  Standing Balance: Stand by assistance  Standing Balance  Time: ~25 minutes total  Activity: functional mobility to/from therapy gym; functional transfers; in stance for dynamic standing activity; in stance for kitchen item retrieval task  Comment: Pt completed the following activities in stance: 1) Pt completed item retrieval task using RW in kitchen to retrieve items from overhead cabinets and fridge to simulate home environment and assess safety with RW in functional setting, pt demonstrated good safety awareness with RW mgmt and maintaining unilateral UE suport on RW when reaching for items outside TERRELL, pt completed with SBA overall and no overt LOB; 2) Pt completed standing dynamic activity with unilateral UE support to play cornhole throwing bean bags at target x 2 trials with no overt LOB and CGA-SBA    Functional Mobility  Functional - Mobility Device: Rolling Walker  Activity: To/From therapy gym  Assist Level: Stand by assistance  Tub Transfers  Tub - Transfer From: Rolling walker  Tub - Transfer To:  Transfer tub bench  Tub - Technique: Ambulating  Tub Transfers: Stand by assistance  Tub Transfers Comments: Pt completed dry tub transfer to simulate home envrionment as pt verbalized interest in acquiring a TTB for d/c, pt able to complete with VCs for sit<>swivel tech without difficulty managing BLEs in/out of tub, pt able to stand from TTB with SBA to RW  Bed mobility  Sit to Supine: Modified independent(HOB flat)  Transfers  Sit to stand: Stand by assistance  Stand to sit: Stand by assistance           Coordination  Fine Motor: Pt completed [facility] activity in order to increase dexterity, coordination and intrinsic hand strength in BUEs as pt reports numbness, tingling and dec  strength;  (pt completed x 15 trials with both hands as active  with opposite hand holding tennis ball for all tools): pt held tennis ball with slit with one hand req squeezing in order to open the slit while opposite hand used various tools (chip clips, tweezers, clothes hangers) of increasing resistance to  small craft pom poms from tabletop using pincer grasp, pt at times demonstrating difficulty producing enough force to open chip clip and req assist with opposite hand to reposition in active hand; pt demo difficulty using fingertips to use pincer grasp and instead positioned tools on lateral aspect on index finger requiring + time and frequent droping of pom poms throughout activity                             Additional Activities Comment  Additional Activities: Pt provided with velcro strip to promote repetition of unstrapping velcro d/t impaired FMC, intrinsic hand strength and sensation in order to increase independence with donning/doffing C-Collar as that is his biggest barrier; pt demo understanding and encouraged to practice during free time                          Plan   Plan  Times per week: 90 mintes per day 5 days/week  Times per day: Daily  Current Treatment Recommendations: Endurance Training, Safety Education & Training, Self-Care / ADL, Equipment Evaluation, Education, & procurement, Patient/Caregiver Education & Training, Strengthening, Home Management Training, Balance Training, Functional Mobility Training  G-Code     OutComes Score

## 2021-02-05 NOTE — PROGRESS NOTES
Department of Physical Medicine & Rehabilitation  Progress Note    Patient Identification:  Brittney Hannon  [id number]  : 1960  Admit date: 2/3/2021    Chief Complaint: NTSCI s/p fixation     Subjective:   No acute events overnight. Patient seen this am. He continues to have slight neck pain but he states it is improving with medication. Continues to participate well with therapy. ROS: No f/c, n/v, cp     Objective:  Patient Vitals for the past 24 hrs:   BP Temp Temp src Pulse Resp SpO2   21 (!) 155/83 98.3 °F (36.8 °C) Oral 69 16 96 %   21 09 (!) 158/83 99.6 °F (37.6 °C) Oral 81 16 96 %     Const: Alert. No distress, pleasant. HEENT: Normocephalic, atraumatic. Normal sclera/conjunctiva. MMM. CV: Regular rate and rhythm. Resp: No respiratory distress. Lungs CTAB. Abd: Soft, nontender, nondistended, NABS+   Ext: No edema. Neuro: Alert, oriented, appropriately interactive. Psych: Cooperative, appropriate mood and affect    Laboratory data: Available via EMR.    Last 24 hour lab  Recent Results (from the past 24 hour(s))   POCT Glucose    Collection Time: 21  8:46 AM   Result Value Ref Range    POC Glucose 178 (H) 70 - 99 mg/dl    Performed on ACCU-CHEK    POCT Glucose    Collection Time: 21 11:45 AM   Result Value Ref Range    POC Glucose 124 (H) 70 - 99 mg/dl    Performed on ACCU-CHEK    POCT Glucose    Collection Time: 21  4:31 PM   Result Value Ref Range    POC Glucose 145 (H) 70 - 99 mg/dl    Performed on ACCU-CHEK    POCT Glucose    Collection Time: 21  8:24 PM   Result Value Ref Range    POC Glucose 134 (H) 70 - 99 mg/dl    Performed on ACCU-CHEK    POCT Glucose    Collection Time: 21  7:27 AM   Result Value Ref Range    POC Glucose 146 (H) 70 - 99 mg/dl    Performed on ACCU-CHEK    Basic Metabolic Panel w/ Reflex to MG    Collection Time: 21  7:34 AM   Result Value Ref Range    Sodium 137 136 - 145 mmol/L    Potassium reflex Magnesium 3.8 3.5 - 5.1 mmol/L    Chloride 102 99 - 110 mmol/L    CO2 27 21 - 32 mmol/L    Anion Gap 8 3 - 16    Glucose 136 (H) 70 - 99 mg/dL    BUN 12 7 - 20 mg/dL    CREATININE 0.9 0.8 - 1.3 mg/dL    GFR Non-African American >60 >60    GFR African American >60 >60    Calcium 9.8 8.3 - 10.6 mg/dL       Therapy progress:  PT  Required Braces or Orthoses  Cervical: c-collar  Position Activity Restriction  Other position/activity restrictions: activity as tolerated. Cervical collar: Wear collar at all times except for showering  Objective     Sit to Stand: Contact guard assistance, Stand by assistance  Stand to sit: Stand by assistance  Device: Rolling Walker  Other Apparatus: ([employer])  Assistance: Contact guard assistance, Stand by assistance  Distance: 340'(including up/down 70' ramp) + 500'  OT  PT Equipment Recommendations  Other: will continue to assess pending progress     Assessment        SLP          Body mass index is 35.28 kg/m².     Assessment and Plan:     Assessment and Plan:  Cervical spinal central canal stenosis    Lumbo-sacral spinal canal stenosis  Bilateral Lower extremity weakness and numbness  - Neurosurgery following  -Status post C3-6 posterior decompression/fusion/fixation- successful   - Pain control  - Requires extensive PT/OT in ARU setting      UTI  -Completed Rocephin course  - Follow up      Diabetes mellitus  - LSSI     HTN  - Continue home amlodipine     HLD  - Continue home statin       Rehab Progress: Improving  Anticipated Dispo: home  Services/DME: TBD  ELOS: TBCHARLENE EarlP.H  PM&R  2/5/2021  8:15 AM

## 2021-02-05 NOTE — PROGRESS NOTES
Physical Therapy  Facility/Department: [facility] ACUTE REHAB UNIT  Daily Treatment Note  NAME: Cheyenne Fagan  : 1960  MRN: [id number]    Date of Service: 2021    Discharge Recommendations:  Home with assist PRN, Outpatient PT, Continue to assess pending progress   PT Equipment Recommendations  Other: will continue to assess pending progress    Assessment   Body structures, Functions, Activity limitations: Decreased functional mobility ; Decreased balance;Decreased strength;Decreased endurance  Assessment: Pt is progressing well and is well motivated to resume PLOF . Pt is limited by decreased strength and endurance as well as pain. Pt is below his baseline and would benefit from further skilled PT to maximize safety and independence with functional mobility. Treatment Diagnosis: Decreased functional mobility  Prognosis: Good  Decision Making: Medium Complexity  PT Education: Goals;PT Role;Gait Training;General Safety;Plan of Care;Transfer Training;Energy Conservation; Functional Mobility Training  Barriers to Learning: none  REQUIRES PT FOLLOW UP: Yes  Activity Tolerance  Activity Tolerance: Patient limited by fatigue;Patient limited by pain; Patient limited by endurance(Pt self monitors and asks for rests appropriately)     Patient Diagnosis(es): There were no encounter diagnoses. has a past medical history of Diabetes mellitus type 1 ([facility] [address] Hyperlipidemia, and Hypertension. has a past surgical history that includes Gastric bypass surgery and cervical laminectomy (N/A, 2021). Restrictions  Restrictions/Precautions  Required Braces or Orthoses?: Yes  Required Braces or Orthoses  Cervical: c-collar  Position Activity Restriction  Other position/activity restrictions: activity as tolerated. Cervical collar: Wear collar at all times except for showering  Subjective   General  Chart Reviewed: Yes  Additional Pertinent Hx: Pt is a 62 y/o male admitted acute LBP and possibe spinal stenosis. Transferred from [facility] ED post fall on 1/26 to [facility] had C3-6 cervicl decompression on 1/28. 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 Recent sciatica 1/22/21. Admitted to ARU on 2/3. PMHx: DM, HTN. Family / Caregiver Present: No  Referring Practitioner: DO Nela  Subjective  Subjective: Pt reports that he has pain in the neck but not all of the time. General Comment  Comments: Pt sitting in BS chair when PT arrived. Pt agreeable to therapy  Pain Screening  Patient Currently in Pain: Yes  Pain Assessment  Pain Level: (Did not rate)  Pain Location: Neck  Pain Descriptors: Sore  Pain Frequency: Intermittent  Pain Onset: On-going  Clinical Progression: Not changed  Functional Pain Assessment: Activities are not prevented  Non-Pharmaceutical Pain Intervention(s): Ambulation/Increased Activity; Emotional support;Repositioned; Therapeutic presence  Vital Signs  Patient Currently in Pain: Yes       Orientation  Orientation  Overall Orientation Status: Within Normal Limits  Cognition      Objective     Transfers  Sit to Stand: Stand by assistance(Trnaf from [facility] chair and armed chair to a RW)  Stand to sit: Stand by assistance  Ambulation  Ambulation?: Yes  Ambulation 1  Surface: level tile  Device: Rolling Walker  Other Apparatus: (francia ZAMARRIPA)  Assistance: Stand by assistance  Gait Deviations: Slow Jaqueline; Increased TERRELL; Decreased step length;Decreased step height  Distance: 700' (including up/down 70' ramp) + 200' x2  Comments: Appeared unsteady with changes in direction with the RW  Stairs/Curb  Stairs?: Yes  Stairs  # Steps : 17(8+9)  Rails: Bilateral  Device: No Device  Assistance: Contact guard assistance;Stand by assistance  Comment: Reciprocal pattern to ascend and step to gait to descend leading with LLE 2/2 to pt report of increased L ankle pain when descending leading with RLE. Comment: Pt instructed to nitin shoes. 2/2 to pt's decreased sensation in B hands, pt struggled with shoes so PT assisted donning the shoes. Pt was unable to doff gripper socks   PT instructed pt with the following sitting ex;   1. TR/HR   2. LAQS  3. Marching  4. Hip abd/ add  Hunter 10 reps of each to BLES     Second session: Pt sitting in BS chair when PT arrived. Pt agreeable to therapy. Bed mobility  Rolling to Right: Supervision(VC for step by step technique)  Supine to Sit: Supervision(VC for step by step technique)  Sit to Supine: Supervision(VC for step by step technique)  Scooting: Supervision(VC for step by step technique including lat WS to advance hips to the EOS when in short sitting.)  Comment: Bed mobility performed on the mat table and not in the bed  Transfers  Sit to Stand: Stand by assistance(Transf  from BS chair, mat table and armed chair to a RW)  Stand to sit: Stand by assistance  Ambulation 1  Surface: level tile  Device: SPC   Other Apparatus: (francia ZAMARRIPA)  Assistance: CGA   Gait Deviations: Slow Jaqueline;Decreased step length;Decreased step height  Distance:185' x1. 200' x1   Amb with RW to the gym ( ~40') with SBA   PT instructed pt with the following ex performed in supine to BLES:  1. AP/ QS/ GS  ( combination isometrics)  2. Bridges with hold of \"3\" ( UES across chest)  3. Alternating hip/knee flex/ext  4. Hip abd with knee extended  5. SLRs    Hunter 10 reps of each. Pt instructed to cont with bridges, SLRs and alt/ hip/knee flex/ext for ex to complete over the weekend. Pt instructed with using the bottom step of a flight of steps and performing the following: alternating toe tapping in a forwards direction x 10 reps, sideways( hip abd and adduction)  direction x 10 reps  4 step ex alternating using the bottom 2 steps x10 reps, and alternating heel tapping  X 10 reps. Pt req rests between. One  handrail used. CGA/ SBA  Pt returned to room and positioned in BS chair. Call light and phone placed within reach.  Chair alarm reactivated  G-Code     OutComes Score                                                     AM-PAC Score             Goals  Short term goals  Time Frame for Short term goals: STG=LTG  Long term goals  Time Frame for Long term goals : 7-10 days( all goals are ongoing)  Long term goal 1: bed mobiliy independent  Long term goal 2: sit<>stand independent  Long term goal 3: amb 150' mod I with LRAD  Long term goal 4: ascend/descend 12 stairs mod I with HR  Patient Goals   Patient goals : move better and improve balance    Plan    Plan  Times per week: 5x/week 90min/day  Times per day: Daily  Current Treatment Recommendations: Strengthening, Safety Education & Training, Balance Training, Endurance Training, Functional Mobility Training, Equipment Evaluation, Education, & procurement, Transfer Training, Gait Training, Stair training  Safety Devices  Type of devices: Gait belt, Nurse notified, Chair alarm in place, Left in chair, Call light within reach     Therapy Time   Individual Concurrent Group Co-treatment   Time In 0830         Time Out 0900         Minutes 30              Second Session Therapy Time:   Individual Concurrent Group Co-treatment   Time In 1100         Time Out 1200         Minutes 60           Timed Code Treatment Minutes:  30+60    Total Treatment Minutes: Solomon Montano, PT

## 2021-02-06 LAB
GLUCOSE BLD-MCNC: 110 MG/DL (ref 70–99)
GLUCOSE BLD-MCNC: 140 MG/DL (ref 70–99)
GLUCOSE BLD-MCNC: 217 MG/DL (ref 70–99)
GLUCOSE BLD-MCNC: 89 MG/DL (ref 70–99)
PERFORMED ON: ABNORMAL
PERFORMED ON: NORMAL

## 2021-02-06 PROCEDURE — 97112 NEUROMUSCULAR REEDUCATION: CPT

## 2021-02-06 PROCEDURE — 1280000000 HC REHAB R&B

## 2021-02-06 PROCEDURE — 6360000002 HC RX W HCPCS: Performed by: PHYSICAL MEDICINE & REHABILITATION

## 2021-02-06 PROCEDURE — 97530 THERAPEUTIC ACTIVITIES: CPT

## 2021-02-06 PROCEDURE — 97535 SELF CARE MNGMENT TRAINING: CPT

## 2021-02-06 PROCEDURE — 97116 GAIT TRAINING THERAPY: CPT

## 2021-02-06 PROCEDURE — 97110 THERAPEUTIC EXERCISES: CPT

## 2021-02-06 PROCEDURE — 99232 SBSQ HOSP IP/OBS MODERATE 35: CPT | Performed by: PHYSICAL MEDICINE & REHABILITATION

## 2021-02-06 PROCEDURE — 6370000000 HC RX 637 (ALT 250 FOR IP): Performed by: PHYSICAL MEDICINE & REHABILITATION

## 2021-02-06 RX ADMIN — AMLODIPINE BESYLATE 5 MG: 5 TABLET ORAL at 10:03

## 2021-02-06 RX ADMIN — PANTOPRAZOLE SODIUM 40 MG: 40 TABLET, DELAYED RELEASE ORAL at 10:04

## 2021-02-06 RX ADMIN — OXYCODONE 5 MG: 5 TABLET ORAL at 01:27

## 2021-02-06 RX ADMIN — FAMOTIDINE 20 MG: 20 TABLET, FILM COATED ORAL at 20:02

## 2021-02-06 RX ADMIN — OXYCODONE 10 MG: 5 TABLET ORAL at 14:41

## 2021-02-06 RX ADMIN — ENOXAPARIN SODIUM 40 MG: 40 INJECTION SUBCUTANEOUS at 10:04

## 2021-02-06 RX ADMIN — OXYCODONE 5 MG: 5 TABLET ORAL at 20:03

## 2021-02-06 RX ADMIN — INSULIN LISPRO 1 UNITS: 100 INJECTION, SOLUTION INTRAVENOUS; SUBCUTANEOUS at 20:02

## 2021-02-06 RX ADMIN — OXYCODONE 10 MG: 5 TABLET ORAL at 08:42

## 2021-02-06 RX ADMIN — INSULIN LISPRO 1 UNITS: 100 INJECTION, SOLUTION INTRAVENOUS; SUBCUTANEOUS at 08:24

## 2021-02-06 RX ADMIN — FAMOTIDINE 20 MG: 20 TABLET, FILM COATED ORAL at 10:04

## 2021-02-06 ASSESSMENT — PAIN DESCRIPTION - PROGRESSION
CLINICAL_PROGRESSION: NOT CHANGED

## 2021-02-06 ASSESSMENT — PAIN DESCRIPTION - FREQUENCY: FREQUENCY: INTERMITTENT

## 2021-02-06 ASSESSMENT — PAIN DESCRIPTION - DESCRIPTORS
DESCRIPTORS: SORE

## 2021-02-06 ASSESSMENT — PAIN DESCRIPTION - ONSET
ONSET: ON-GOING
ONSET: ON-GOING

## 2021-02-06 ASSESSMENT — PAIN DESCRIPTION - PAIN TYPE: TYPE: SURGICAL PAIN

## 2021-02-06 ASSESSMENT — PAIN DESCRIPTION - LOCATION
LOCATION: NECK
LOCATION: NECK

## 2021-02-06 ASSESSMENT — PAIN SCALES - GENERAL
PAINLEVEL_OUTOF10: 7
PAINLEVEL_OUTOF10: 4
PAINLEVEL_OUTOF10: 7

## 2021-02-06 ASSESSMENT — PAIN DESCRIPTION - ORIENTATION
ORIENTATION: POSTERIOR
ORIENTATION: POSTERIOR

## 2021-02-06 NOTE — PLAN OF CARE
Problem: Pain:  Goal: Pain level will decrease  Description: Pain level will decrease  2/6/2021 1029 by Viviana Bower RN  Outcome: Ongoing  Note: PRN oxycodone given for pain, pt satisfied with response at this time     Problem: Falls - Risk of:  Goal: Will remain free from falls  Description: Will remain free from falls  2/6/2021 1029 by Viviana Bower RN  Outcome: Ongoing  Note: Nonskid footwear on,  chair alarm on, call light and bed table within reach. Patient is high fall risk, x1 assist with walker for safe ambulation     Problem: Falls - Risk of:  Goal: Absence of physical injury  Description: Absence of physical injury  2/6/2021 1029 by Viviana Bower RN  Outcome: Ongoing  Note: Fall precautions in place, room free of clutter, patient oriented to room and call light use.

## 2021-02-06 NOTE — PLAN OF CARE
Problem: Skin Integrity:  Goal: Will show no infection signs and symptoms  Description: Will show no infection signs and symptoms  Outcome: Ongoing     Problem: Pain:  Goal: Pain level will decrease  Description: Pain level will decrease  Outcome: Ongoing  Note: Patient complains of pain at level 6/10. Patient describes pain as sore. Patient requests pain medication. Patient medicated with oxycodone. Goal: Control of acute pain  Description: Control of acute pain  Outcome: Ongoing  Goal: Control of chronic pain  Description: Control of chronic pain  Outcome: Ongoing     Problem: Falls - Risk of:  Goal: Will remain free from falls  Description: Will remain free from falls  Outcome: Ongoing  Note: Patient is a fall risk. Patient is a x 1 gait belt with walker. See Fall Risk assessment for details. Bed is in low, lock position; call light/belongings within reach. No attempts to get out of bed have been made, calls appropriately when assistance is needed. Bed alarm and hourly rounds in place for safety.      Goal: Absence of physical injury  Description: Absence of physical injury  Outcome: Ongoing

## 2021-02-06 NOTE — PROGRESS NOTES
Physical Therapy  Facility/Department: [facility] ACUTE REHAB UNIT  Daily Treatment Note  NAME: Delisa Newman  : 1960  MRN: [id number]    Date of Service: 2021    Discharge Recommendations:  Home with assist PRN, Outpatient PT, Continue to assess pending progress   PT Equipment Recommendations  Other: will continue to assess pending progress    Assessment   Body structures, Functions, Activity limitations: Decreased functional mobility ; Decreased balance;Decreased strength;Decreased endurance  Assessment: The pt continues to improve his balance with stairs and activities with multiple 1 HHA activities performed without LOB. Pt is limited by balance, endurance, and strength from his baseline level of independence. He would benefit from further skilled PT to improve his safety and independence with functional mobility. Treatment Diagnosis: Decreased functional mobility  Prognosis: Good  PT Education: Goals;PT Role;Gait Training;General Safety;Plan of Care;Transfer Training;Energy Conservation; Functional Mobility Training  Barriers to Learning: none  REQUIRES PT FOLLOW UP: Yes  Activity Tolerance  Activity Tolerance: Patient limited by fatigue;Patient limited by pain; Patient limited by endurance     Patient Diagnosis(es): There were no encounter diagnoses. has a past medical history of Diabetes mellitus type 1 ([facility] [address] Hyperlipidemia, and Hypertension. has a past surgical history that includes Gastric bypass surgery and cervical laminectomy (N/A, 2021). Restrictions  Restrictions/Precautions  Required Braces or Orthoses?: Yes  Required Braces or Orthoses  Cervical: c-collar  Position Activity Restriction  Other position/activity restrictions: activity as tolerated. Cervical collar: Wear collar at all times except for showering  Subjective   General  Chart Reviewed: Yes  Additional Pertinent Hx: Pt is a 62 y/o male admitted acute LBP and possibe spinal stenosis.  Transferred from [facility] ED post fall on 1/26 to [facility] had C3-6 cervicl decompression on 1/28. 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 Recent sciatica 1/22/21. Admitted to ARU on 2/3. PMHx: DM, HTN. Family / Caregiver Present: No  Referring Practitioner: Nela, DO  Subjective  Subjective: Pt states that he is ready for therapy. General Comment  Comments: Pt presents in recliner when PT arrives and is agreeable to PT. Pain Screening  Patient Currently in Pain: Denies  Vital Signs  Patient Currently in Pain: Denies       Orientation     Cognition   Cognition  Overall Cognitive Status: WFL  Objective      Transfers  Sit to Stand: Stand by assistance(from bedside chair, from standard height chair in gym)  Stand to sit: Stand by assistance  Ambulation  Ambulation?: Yes  Ambulation 1  Surface: level tile  Device: Rolling Walker  Other Apparatus: (cervical collar)  Assistance: Stand by assistance  Gait Deviations: Slow Jaqueline; Increased TERRELL; Decreased step length;Decreased step height  Distance: 500' (including up/down 79' ramp) +short distances in gym and 30' into room  Comments: steady; no LOB  Stairs/Curb  Stairs?: Yes  Stairs  # Steps : 15  Stairs Height: 6\"  Rails: (bilateral for first 3, 1 rail for next 12)  Device: No Device  Assistance: Stand by assistance;Contact guard assistance  Comment: Reciprocal pattern to ascend and step to gait to descend leading with LLE 2/2 to pt report of increased L ankle pain when descending leading with RLE.   Pt pausing slightly at times     Balance  Comments: balloon toss to self in standing with CGA and no LOB x 3 min, balance on foam in normal stance, romberg stance, semitandem stance x 1 min each moises, side steps at rail with 2 HHA x40' and then 1 HHA x 120'  Exercises  Comments: sit to stands without HHA, marches, heel raises, hip abd in standing with light HHA on walker x 10 each    2nd session: Pt presents sitting up in chair and willing to work with therapy. He stood from chair with SBA and then ambulated into ramirez with walker x 400' SBA before resting on Nustep chair. He pedaled on Nustep x 10 minutes and 45 seconds (reaching 1000 steps was his goal that he reached) before resting. After a rest he practiced ambulation in //bars with 1 HHA x 60' and no HHA x 60'. Pt with decreased step height and length more significant without HHA than with 1 HHA but did not have LOB and was steady. At end of session he ambulated back to room with walker and SBA x 30'. He requested to go to bed and was SBA for stand>sit and supervision for sit>supine in bed with HOB flat and without use of rail. He was left with bed alarm on, call light within reach, and all needs met.   Goals  Short term goals  Time Frame for Short term goals: STG=LTG  Long term goals  Time Frame for Long term goals : 7-10 days( all goals are ongoing)  Long term goal 1: bed mobiliy independent  Long term goal 2: sit<>stand independent  Long term goal 3: amb 150' mod I with LRAD  Long term goal 4: ascend/descend 12 stairs mod I with HR  Patient Goals   Patient goals : move better and improve balance    Plan    Plan  Times per week: 5x/week 90min/day  Times per day: Daily  Current Treatment Recommendations: Strengthening, Safety Education & Training, Balance Training, Endurance Training, Functional Mobility Training, Equipment Evaluation, Education, & procurement, Transfer Training, Gait Training, Stair training  Safety Devices  Type of devices: Gait belt, Nurse notified, Chair alarm in place, Left in chair, Call light within reach     Therapy Time   Individual Concurrent Group Co-treatment   Time In 0845         Time Out 0945         Minutes 60         Timed Code Treatment Minutes: 60 Minutes    Second Session Therapy Time:   Individual Concurrent Group Co-treatment   Time In 1415         Time Out 1345         Minutes 30           Timed Code Treatment Minutes:  60+30=90 minutes    Total Treatment Minutes:  90 minutes      Martin Class, PT

## 2021-02-06 NOTE — PROGRESS NOTES
Assessment completed, see flowsheets. VSS, pt reporting decrease in pain following administration of prn oxycodone. Morning medications given. Pt resting comfortably in chair with word search, denies needs at this time. Will continue to monitor.

## 2021-02-06 NOTE — PROGRESS NOTES
[facility] Acute Rehab Unit  Occupational Therapy  Daily Treatment Note  Patient Name: William Hunt  MRN: [id number]     Assessment: Pt is progressing well. SBA for majority of functional mobility and ADLs, needing min assist only briefly during bathing and dressing tasks. Anticipate continued improvement. Discharge Recommendations: prn assist, outpatient OT (pending progress)  Equipment Needs:  RTS with rails, tub transfer bench (continue to assess)    Chart Reviewed: Yes     Other position/activity restrictions: activity as tolerated. Cervical collar: Wear collar at all times except for showering   Additional Pertinent Hx: Pt is a 60 y/o male admitted acute LBP and possibe spinal stenosis. Transferred from [facility] ED post fall on 1/26 to [facility] 73 had C3-6 cervicl decompression on 1/28. 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 Recent sciatica 1/22/21. Admitted to ARU on 2/3. PMHx: DM, HTN. Diagnosis: C3-4, C4-5, C5-6 POSTERIOR CERVICAL DECOMPRESSION, FUSION AND FIXATION  Treatment Diagnosis: impaired ADLs /functional transfers/ decreaed endurance 2/2 C3-C-6 posterior cervical decompression fusion and fixation sx    Subjective: Pt in bed on arrival. Pleasant and ready for OT. Would like to take a shower. Pain: No c/o pain      Objective:    Cognition/Orientation: WFL    Bed mobility   Supine to sit: Mod I, HOB partially raised    Functional Mobility   Sit to Stand: SBA  Stand to Sit: SBA  Chair Transfer: SBA  Shower transfer: SBA to/from shower chair (sat on outside then swung legs in)  Other: Ambulation to/from and within bathroom SBA with walker. ADLs   Grooming: Mod I, set up (oral hygiene in chair)  Bathing: Min assist, set up (reports able to wash all areas in shower except back and butt; used shower chair, grab bars, HHS)  UB dressing:  Mod I, set up (min assist for collar)  LB dressing: Min assist, set

## 2021-02-06 NOTE — PROGRESS NOTES
Patient is a x 1 with gait belt and walker, is continent of bowel and bladder. Calls appropriately, bed in lowest position, call light with in reach, bed alarm on. Vital signs stable.

## 2021-02-06 NOTE — PROGRESS NOTES
Department of Physical Medicine & Rehabilitation  Progress Note    Patient Identification:  Jazmyne Mcqueen  [id number]  : 1960  Admit date: 2/3/2021    Chief Complaint: NTSCI s/p fixation     Subjective:   Doing well today. No new complaints overnight. Continues to work in therapy and states his neck pain is getting better. No new issues. ROS: No f/c, n/v, cp     Objective:  Patient Vitals for the past 24 hrs:   BP Temp Temp src Pulse Resp SpO2   21 1000 135/83 98.8 °F (37.1 °C) Oral 90 16 94 %   21 (!) 146/87 98.2 °F (36.8 °C) Oral 73 16 95 %     Const: Alert. No distress, pleasant. HEENT: Normocephalic, atraumatic. Normal sclera/conjunctiva. MMM. CV: Regular rate and rhythm. Resp: No respiratory distress. Lungs CTAB. Abd: Soft, nontender, nondistended, NABS+   Ext: No edema. Neuro: Alert, oriented, appropriately interactive. Psych: Cooperative, appropriate mood and affect    Laboratory data: Available via EMR. Last 24 hour lab  Recent Results (from the past 24 hour(s))   POCT Glucose    Collection Time: 21  4:49 PM   Result Value Ref Range    POC Glucose 111 (H) 70 - 99 mg/dl    Performed on ACCU-CHEK    POCT Glucose    Collection Time: 21  8:20 PM   Result Value Ref Range    POC Glucose 157 (H) 70 - 99 mg/dl    Performed on ACCU-CHEK    POCT Glucose    Collection Time: 21  8:23 AM   Result Value Ref Range    POC Glucose 140 (H) 70 - 99 mg/dl    Performed on ACCU-CHEK    POCT Glucose    Collection Time: 21 11:57 AM   Result Value Ref Range    POC Glucose 89 70 - 99 mg/dl    Performed on ACCU-CHEK        Therapy progress:  PT  Required Braces or Orthoses  Cervical: c-collar  Position Activity Restriction  Other position/activity restrictions: activity as tolerated.  Cervical collar: Wear collar at all times except for showering  Objective     Sit to Stand: Stand by assistance(Trnaf from [facility] chair and armed chair to a )  Stand to sit: Stand by assistance  Device: Rolling Walker  Other Apparatus: ([employer])  Assistance: Stand by assistance  Distance: 700' (including up/down 79' ramp) + 200' x2  OT  PT Equipment Recommendations  Other: will continue to assess pending progress     Assessment        SLP          Body mass index is 35.28 kg/m².     Assessment and Plan:     Assessment and Plan:  Cervical spinal central canal stenosis    Lumbo-sacral spinal canal stenosis  Bilateral Lower extremity weakness and numbness  - Neurosurgery following  -Status post C3-6 posterior decompression/fusion/fixation- successful   - Pain control  - Requires extensive PT/OT in ARU setting      UTI  -Completed Rocephin course  - Follow up      Diabetes mellitus  - LSSI     HTN  - Continue home amlodipine     HLD  - Continue home statin       Rehab Progress: Improving  Anticipated Dispo: home  Services/DME: [facility]  ELOS: CEDRIC Stephens D.O. M.P.H  PM&R  2/6/2021  12:22 PM

## 2021-02-07 LAB
GLUCOSE BLD-MCNC: 110 MG/DL (ref 70–99)
GLUCOSE BLD-MCNC: 123 MG/DL (ref 70–99)
GLUCOSE BLD-MCNC: 210 MG/DL (ref 70–99)
GLUCOSE BLD-MCNC: 89 MG/DL (ref 70–99)
PERFORMED ON: ABNORMAL
PERFORMED ON: NORMAL

## 2021-02-07 PROCEDURE — 6370000000 HC RX 637 (ALT 250 FOR IP): Performed by: PHYSICAL MEDICINE & REHABILITATION

## 2021-02-07 PROCEDURE — 6360000002 HC RX W HCPCS: Performed by: PHYSICAL MEDICINE & REHABILITATION

## 2021-02-07 PROCEDURE — 1280000000 HC REHAB R&B

## 2021-02-07 PROCEDURE — 99232 SBSQ HOSP IP/OBS MODERATE 35: CPT | Performed by: PHYSICAL MEDICINE & REHABILITATION

## 2021-02-07 RX ADMIN — FAMOTIDINE 20 MG: 20 TABLET, FILM COATED ORAL at 20:04

## 2021-02-07 RX ADMIN — ENOXAPARIN SODIUM 40 MG: 40 INJECTION SUBCUTANEOUS at 09:53

## 2021-02-07 RX ADMIN — METHOCARBAMOL 750 MG: 750 TABLET ORAL at 14:00

## 2021-02-07 RX ADMIN — PANTOPRAZOLE SODIUM 40 MG: 40 TABLET, DELAYED RELEASE ORAL at 09:53

## 2021-02-07 RX ADMIN — AMLODIPINE BESYLATE 5 MG: 5 TABLET ORAL at 09:53

## 2021-02-07 RX ADMIN — OXYCODONE 10 MG: 5 TABLET ORAL at 09:53

## 2021-02-07 RX ADMIN — OXYCODONE 5 MG: 5 TABLET ORAL at 20:04

## 2021-02-07 RX ADMIN — OXYCODONE 10 MG: 5 TABLET ORAL at 00:08

## 2021-02-07 RX ADMIN — METHOCARBAMOL 750 MG: 750 TABLET ORAL at 20:04

## 2021-02-07 RX ADMIN — FAMOTIDINE 20 MG: 20 TABLET, FILM COATED ORAL at 09:53

## 2021-02-07 RX ADMIN — INSULIN LISPRO 1 UNITS: 100 INJECTION, SOLUTION INTRAVENOUS; SUBCUTANEOUS at 20:05

## 2021-02-07 RX ADMIN — OXYCODONE 10 MG: 5 TABLET ORAL at 14:00

## 2021-02-07 ASSESSMENT — PAIN DESCRIPTION - FREQUENCY
FREQUENCY: INTERMITTENT
FREQUENCY: INTERMITTENT

## 2021-02-07 ASSESSMENT — PAIN DESCRIPTION - LOCATION
LOCATION: NECK
LOCATION: NECK

## 2021-02-07 ASSESSMENT — PAIN DESCRIPTION - PROGRESSION
CLINICAL_PROGRESSION: NOT CHANGED

## 2021-02-07 ASSESSMENT — PAIN DESCRIPTION - DESCRIPTORS: DESCRIPTORS: SORE

## 2021-02-07 ASSESSMENT — PAIN DESCRIPTION - ORIENTATION
ORIENTATION: POSTERIOR
ORIENTATION: POSTERIOR

## 2021-02-07 ASSESSMENT — PAIN DESCRIPTION - PAIN TYPE
TYPE: SURGICAL PAIN
TYPE: SURGICAL PAIN

## 2021-02-07 ASSESSMENT — PAIN SCALES - GENERAL
PAINLEVEL_OUTOF10: 0
PAINLEVEL_OUTOF10: 7

## 2021-02-07 NOTE — PLAN OF CARE
Problem: Pain:  Goal: Pain level will decrease  Description: Pain level will decrease  Outcome: Ongoing  Note: Patient complains of pain at level 7/10. Patient describes pain as sore. Patient requests pain medication. Patient medicated with oxycodone. Will continue to monitor. Goal: Control of acute pain  Description: Control of acute pain  Outcome: Ongoing     Problem: Falls - Risk of:  Goal: Will remain free from falls  Description: Will remain free from falls  Outcome: Ongoing  Note: Patient is a fall risk. Patient is a x 1 with walker and gait belt. See Fall Risk assessment for details. Bed is in low, lock position; call light/belongings within reach. No attempts to get out of bed have been made, calls appropriately when assistance is needed. Bed alarm and hourly rounds in place for safety.      Goal: Absence of physical injury  Description: Absence of physical injury  Outcome: Ongoing

## 2021-02-07 NOTE — PROGRESS NOTES
Department of Physical Medicine & Rehabilitation  Progress Note    Patient Identification:  Dayna Payan  [id number]  : 1960  Admit date: 2/3/2021    Chief Complaint: NTSCI s/p fixation     Subjective:   Feeling well today. No new complaints overnight. Improving in therapy. 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 ROS: No f/c, n/v, cp     Objective:  Patient Vitals for the past 24 hrs:   BP Temp Temp src Pulse Resp SpO2   21 0945 (!) 152/73 98.3 °F (36.8 °C) -- 80 16 97 %   21 194 (!) 147/76 98.5 °F (36.9 °C) Oral 82 16 96 %     Const: Alert. No distress, pleasant. HEENT: Normocephalic, atraumatic. Normal sclera/conjunctiva. MMM. CV: Regular rate and rhythm. Resp: No respiratory distress. Lungs CTAB. Abd: Soft, nontender, nondistended, NABS+   Ext: No edema. Neuro: Alert, oriented, appropriately interactive. Psych: Cooperative, appropriate mood and affect    Laboratory data: Available via EMR. Last 24 hour lab  Recent Results (from the past 24 hour(s))   POCT Glucose    Collection Time: 21  5:31 PM   Result Value Ref Range    POC Glucose 110 (H) 70 - 99 mg/dl    Performed on ACCU-CHEK    POCT Glucose    Collection Time: 21  8:01 PM   Result Value Ref Range    POC Glucose 217 (H) 70 - 99 mg/dl    Performed on ACCU-CHEK    POCT Glucose    Collection Time: 21  7:55 AM   Result Value Ref Range    POC Glucose 123 (H) 70 - 99 mg/dl    Performed on ACCU-CHEK        Therapy progress:  PT  Required Braces or Orthoses  Cervical: c-collar  Position Activity Restriction  Other position/activity restrictions: activity as tolerated.  Cervical collar: Wear collar at all times except for showering  Objective     Sit to Stand: Stand by assistance(from bedside chair, from standard height chair in gym)  Stand to sit: Stand by assistance  Device: Rolling Walker  Other Apparatus: (cervical collar)  Assistance: Stand by assistance  Distance: 500' (including up/down 70' ramp) +short distances in gym and 30' into room  OT  PT Equipment Recommendations  Other: will continue to assess pending progress     Assessment        SLP          Body mass index is 35.28 kg/m².     Assessment and Plan:     Assessment and Plan:  Cervical spinal central canal stenosis    Lumbo-sacral spinal canal stenosis  Bilateral Lower extremity weakness and numbness  - Neurosurgery following  -Status post C3-6 posterior decompression/fusion/fixation- successful   - Pain control  - Requires extensive PT/OT in ARU setting      UTI  -Completed Rocephin course  - Follow up      Diabetes mellitus  - LSSI     HTN  - Continue home amlodipine     HLD  - Continue home statin       Rehab Progress: Improving  Anticipated Dispo: home  Services/DME: [facility]  ELOS: CEDRIC Perez D.O. M.P.H  PM&R  2/7/2021  11:58 AM

## 2021-02-08 LAB
ANION GAP SERPL CALCULATED.3IONS-SCNC: 10 MMOL/L (ref 3–16)
BASOPHILS ABSOLUTE: 0 K/UL (ref 0–0.2)
BASOPHILS RELATIVE PERCENT: 0.3 %
BUN BLDV-MCNC: 15 MG/DL (ref 7–20)
CALCIUM SERPL-MCNC: 9.4 MG/DL (ref 8.3–10.6)
CHLORIDE BLD-SCNC: 106 MMOL/L (ref 99–110)
CO2: 24 MMOL/L (ref 21–32)
CREAT SERPL-MCNC: 0.9 MG/DL (ref 0.8–1.3)
EOSINOPHILS ABSOLUTE: 0.1 K/UL (ref 0–0.6)
EOSINOPHILS RELATIVE PERCENT: 2.4 %
GFR AFRICAN AMERICAN: >60
GFR NON-AFRICAN AMERICAN: >60
GLUCOSE BLD-MCNC: 109 MG/DL (ref 70–99)
GLUCOSE BLD-MCNC: 122 MG/DL (ref 70–99)
GLUCOSE BLD-MCNC: 128 MG/DL (ref 70–99)
GLUCOSE BLD-MCNC: 134 MG/DL (ref 70–99)
GLUCOSE BLD-MCNC: 95 MG/DL (ref 70–99)
HCT VFR BLD CALC: 31.8 % (ref 40.5–52.5)
HEMOGLOBIN: 10.8 G/DL (ref 13.5–17.5)
LYMPHOCYTES ABSOLUTE: 1 K/UL (ref 1–5.1)
LYMPHOCYTES RELATIVE PERCENT: 23.7 %
MCH RBC QN AUTO: 33.5 PG (ref 26–34)
MCHC RBC AUTO-ENTMCNC: 34.1 G/DL (ref 31–36)
MCV RBC AUTO: 98.3 FL (ref 80–100)
MONOCYTES ABSOLUTE: 0.5 K/UL (ref 0–1.3)
MONOCYTES RELATIVE PERCENT: 10.3 %
NEUTROPHILS ABSOLUTE: 2.8 K/UL (ref 1.7–7.7)
NEUTROPHILS RELATIVE PERCENT: 63.3 %
PDW BLD-RTO: 13.7 % (ref 12.4–15.4)
PERFORMED ON: ABNORMAL
PERFORMED ON: NORMAL
PLATELET # BLD: 364 K/UL (ref 135–450)
PMV BLD AUTO: 7.6 FL (ref 5–10.5)
POTASSIUM REFLEX MAGNESIUM: 3.9 MMOL/L (ref 3.5–5.1)
RBC # BLD: 3.23 M/UL (ref 4.2–5.9)
SODIUM BLD-SCNC: 140 MMOL/L (ref 136–145)
WBC # BLD: 4.4 K/UL (ref 4–11)

## 2021-02-08 PROCEDURE — 97530 THERAPEUTIC ACTIVITIES: CPT | Performed by: PHYSICAL THERAPIST

## 2021-02-08 PROCEDURE — 97535 SELF CARE MNGMENT TRAINING: CPT

## 2021-02-08 PROCEDURE — 80048 BASIC METABOLIC PNL TOTAL CA: CPT

## 2021-02-08 PROCEDURE — 36415 COLL VENOUS BLD VENIPUNCTURE: CPT

## 2021-02-08 PROCEDURE — 97530 THERAPEUTIC ACTIVITIES: CPT

## 2021-02-08 PROCEDURE — 85025 COMPLETE CBC W/AUTO DIFF WBC: CPT

## 2021-02-08 PROCEDURE — 99231 SBSQ HOSP IP/OBS SF/LOW 25: CPT | Performed by: PHYSICAL MEDICINE & REHABILITATION

## 2021-02-08 PROCEDURE — 1280000000 HC REHAB R&B

## 2021-02-08 PROCEDURE — 97116 GAIT TRAINING THERAPY: CPT | Performed by: PHYSICAL THERAPIST

## 2021-02-08 PROCEDURE — 6360000002 HC RX W HCPCS: Performed by: PHYSICAL MEDICINE & REHABILITATION

## 2021-02-08 PROCEDURE — 6370000000 HC RX 637 (ALT 250 FOR IP): Performed by: PHYSICAL MEDICINE & REHABILITATION

## 2021-02-08 RX ADMIN — METHOCARBAMOL 750 MG: 750 TABLET ORAL at 11:21

## 2021-02-08 RX ADMIN — FAMOTIDINE 20 MG: 20 TABLET, FILM COATED ORAL at 20:25

## 2021-02-08 RX ADMIN — ENOXAPARIN SODIUM 40 MG: 40 INJECTION SUBCUTANEOUS at 11:20

## 2021-02-08 RX ADMIN — PANTOPRAZOLE SODIUM 40 MG: 40 TABLET, DELAYED RELEASE ORAL at 11:21

## 2021-02-08 RX ADMIN — OXYCODONE 10 MG: 5 TABLET ORAL at 03:07

## 2021-02-08 RX ADMIN — METHOCARBAMOL 750 MG: 750 TABLET ORAL at 03:07

## 2021-02-08 RX ADMIN — OXYCODONE 10 MG: 5 TABLET ORAL at 11:21

## 2021-02-08 RX ADMIN — OXYCODONE 10 MG: 5 TABLET ORAL at 20:26

## 2021-02-08 RX ADMIN — AMLODIPINE BESYLATE 5 MG: 5 TABLET ORAL at 11:21

## 2021-02-08 RX ADMIN — METHOCARBAMOL 750 MG: 750 TABLET ORAL at 20:25

## 2021-02-08 RX ADMIN — FAMOTIDINE 20 MG: 20 TABLET, FILM COATED ORAL at 11:21

## 2021-02-08 ASSESSMENT — PAIN DESCRIPTION - DESCRIPTORS: DESCRIPTORS: DISCOMFORT;SORE

## 2021-02-08 ASSESSMENT — PAIN DESCRIPTION - ORIENTATION
ORIENTATION: POSTERIOR

## 2021-02-08 ASSESSMENT — PAIN DESCRIPTION - PROGRESSION
CLINICAL_PROGRESSION: NOT CHANGED
CLINICAL_PROGRESSION: NOT CHANGED

## 2021-02-08 ASSESSMENT — PAIN DESCRIPTION - FREQUENCY
FREQUENCY: INTERMITTENT
FREQUENCY: INTERMITTENT

## 2021-02-08 ASSESSMENT — PAIN DESCRIPTION - PAIN TYPE
TYPE: SURGICAL PAIN

## 2021-02-08 ASSESSMENT — PAIN SCALES - GENERAL
PAINLEVEL_OUTOF10: 0
PAINLEVEL_OUTOF10: 3
PAINLEVEL_OUTOF10: 7

## 2021-02-08 ASSESSMENT — PAIN DESCRIPTION - ONSET
ONSET: ON-GOING
ONSET: ON-GOING

## 2021-02-08 ASSESSMENT — PAIN DESCRIPTION - LOCATION: LOCATION: NECK

## 2021-02-08 NOTE — PROGRESS NOTES
Department of Physical Medicine & Rehabilitation  Progress Note    Patient Identification:  Vishal Dunham  [id number]  : 1960  Admit date: 2/3/2021    Chief Complaint: NTSCI s/p fixation     Subjective:   Doing well. No new complaints overnight. He is participating well in therapy and improving in PT. Continues to work on balance and endurance. ROS: No f/c, n/v, cp     Objective:  Patient Vitals for the past 24 hrs:   BP Temp Temp src Pulse Resp SpO2 Weight   21 0657 -- -- -- -- -- -- 224 lb 3.3 oz (101.7 kg)   21 1937 (!) 157/80 98.7 °F (37.1 °C) Oral 96 16 95 % --     Const: Alert. No distress, pleasant. HEENT: Normocephalic, atraumatic. Normal sclera/conjunctiva. MMM. CV: Regular rate and rhythm. Resp: No respiratory distress. Lungs CTAB. Abd: Soft, nontender, nondistended, NABS+   Ext: No edema. Neuro: Alert, oriented, appropriately interactive. Psych: Cooperative, appropriate mood and affect    Laboratory data: Available via EMR.    Last 24 hour lab  Recent Results (from the past 24 hour(s))   POCT Glucose    Collection Time: 21 12:08 PM   Result Value Ref Range    POC Glucose 110 (H) 70 - 99 mg/dl    Performed on ACCU-CHEK    POCT Glucose    Collection Time: 21  5:22 PM   Result Value Ref Range    POC Glucose 89 70 - 99 mg/dl    Performed on ACCU-CHEK    POCT Glucose    Collection Time: 21  7:43 PM   Result Value Ref Range    POC Glucose 210 (H) 70 - 99 mg/dl    Performed on ACCU-CHEK    Basic Metabolic Panel w/ Reflex to MG    Collection Time: 21  6:52 AM   Result Value Ref Range    Sodium 140 136 - 145 mmol/L    Potassium reflex Magnesium 3.9 3.5 - 5.1 mmol/L    Chloride 106 99 - 110 mmol/L    CO2 24 21 - 32 mmol/L    Anion Gap 10 3 - 16    Glucose 128 (H) 70 - 99 mg/dL    BUN 15 7 - 20 mg/dL    CREATININE 0.9 0.8 - 1.3 mg/dL    GFR Non-African American >60 >60    GFR African American >60 >60    Calcium 9.4 8.3 - 10.6 mg/dL   CBC auto differential Collection Time: 02/08/21  6:52 AM   Result Value Ref Range    WBC 4.4 4.0 - 11.0 K/uL    RBC 3.23 (L) 4.20 - 5.90 M/uL    Hemoglobin 10.8 (L) 13.5 - 17.5 g/dL    Hematocrit 31.8 (L) 40.5 - 52.5 %    MCV 98.3 80.0 - 100.0 fL    MCH 33.5 26.0 - 34.0 pg    MCHC 34.1 31.0 - 36.0 g/dL    RDW 13.7 12.4 - 15.4 %    Platelets 974 338 - 062 K/uL    MPV 7.6 5.0 - 10.5 fL    Neutrophils % 63.3 %    Lymphocytes % 23.7 %    Monocytes % 10.3 %    Eosinophils % 2.4 %    Basophils % 0.3 %    Neutrophils Absolute 2.8 1.7 - 7.7 K/uL    Lymphocytes Absolute 1.0 1.0 - 5.1 K/uL    Monocytes Absolute 0.5 0.0 - 1.3 K/uL    Eosinophils Absolute 0.1 0.0 - 0.6 K/uL    Basophils Absolute 0.0 0.0 - 0.2 K/uL   POCT Glucose    Collection Time: 02/08/21  7:12 AM   Result Value Ref Range    POC Glucose 109 (H) 70 - 99 mg/dl    Performed on ACCU-CHEK        Therapy progress:  PT  Required Braces or Orthoses  Cervical: c-collar  Position Activity Restriction  Other position/activity restrictions: activity as tolerated. Cervical collar: Wear collar at all times except for showering  Objective     Sit to Stand: Stand by assistance(from bedside chair, from standard height chair in gym)  Stand to sit: Stand by assistance  Device: Rolling Walker  Other Apparatus: (cervical collar)  Assistance: Stand by assistance  Distance: 500' (including up/down 79' ramp) +short distances in gym and 30' into room  OT  PT Equipment Recommendations  Other: will continue to assess pending progress     Assessment        SLP          Body mass index is 34.09 kg/m².     Assessment and Plan:     Assessment and Plan:  Cervical spinal central canal stenosis    Lumbo-sacral spinal canal stenosis  Bilateral Lower extremity weakness and numbness  - Neurosurgery following  -Status post C3-6 posterior decompression/fusion/fixation- successful   - Pain control  - Requires extensive PT/OT in ARU setting      UTI  -Completed Rocephin course  - Follow up      Diabetes mellitus  - LSSI     HTN  - Continue home amlodipine     HLD  - Continue home statin       Rehab Progress: Improving  Anticipated Dispo: home  Services/DME: TBROBERTO CARLOS  ELOS: CHARLENE VernonP.H  PM&R  2/8/2021  10:30 AM

## 2021-02-08 NOTE — PROGRESS NOTES
Physical Therapy  Facility/Department: [facility] ACUTE REHAB UNIT  Daily Treatment Note  NAME: Bud Fuchs  : 1960  MRN: [id number]    Date of Service: 2021    Discharge Recommendations:  Home with assist PRN, Outpatient PT, Continue to assess pending progress   PT Equipment Recommendations  Other: will continue to assess pending progress    Assessment   Body structures, Functions, Activity limitations: Decreased functional mobility ; Decreased balance;Decreased strength;Decreased endurance  Assessment: Pt is progressing well and is well motivated to resume PLOF . Pt is limited by decreased strength and endurance. Despite improvements with standing balance and walking balance, pt was apprehensive in am with use of SPC but appeared more comfortable with using it in the pm. Pt is below his baseline and would benefit from further skilled PT to maximize safety and independence with functional mobility. Treatment Diagnosis: Decreased functional mobility  Prognosis: Good  Decision Making: Medium Complexity  PT Education: Goals;PT Role;Gait Training;General Safety;Plan of Care;Transfer Training;Energy Conservation; Functional Mobility Training  Barriers to Learning: none  REQUIRES PT FOLLOW UP: Yes  Activity Tolerance  Activity Tolerance: Patient limited by fatigue;Patient limited by pain; Patient limited by endurance     Patient Diagnosis(es): There were no encounter diagnoses. has a past medical history of Diabetes mellitus type 1 ([facility] [address] Hyperlipidemia, and Hypertension. has a past surgical history that includes Gastric bypass surgery and cervical laminectomy (N/A, 2021). Restrictions  Restrictions/Precautions  Required Braces or Orthoses?: Yes  Required Braces or Orthoses  Cervical: c-collar  Position Activity Restriction  Other position/activity restrictions: activity as tolerated.  Cervical collar: Wear collar at all times except for showering  Subjective   General  Chart Reviewed: Yes  Additional Pertinent Hx: Pt is a 60 y/o male admitted acute LBP and possibe spinal stenosis. Transferred from [facility] ED post fall on 1/26 to [facility] had C3-6 cervicl decompression on 1/28. 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 Recent sciatica 1/22/21. Admitted to ARU on 2/3. PMHx: DM, HTN. Family / Caregiver Present: No  Referring Practitioner: DO Nela  Subjective  Subjective: Pt reports that he did not get a chance to do the instructed ex over the weekend. Pt reports being too fatigued  General Comment  Comments: Pt sitting in BS chair  when PT arrived. Pt  is agreeable to PT. Pain Screening  Patient Currently in Pain: Denies  Vital Signs  Patient Currently in Pain: Denies       Orientation  Orientation  Overall Orientation Status: Within Normal Limits  Cognition      Objective      Transfers  Sit to Stand: Stand by assistance(Transf from [facility] chair and armed chair to a RW)  Stand to sit: Stand by assistance  Ambulation  Ambulation?: Yes  WB Status: No WB restrictions noted in chart  Ambulation 1  Surface: level tile  Device: Rolling Walker  Other Apparatus: (miami J)  Assistance: Supervision  Gait Deviations: Slow Jaqueline; Increased TERRELL; Decreased step length;Decreased step height  Distance: 700' x1, 250' x2  Comments: PT instructed pt with using orange TB wrapped aroung thighs to facilitate increased step length and improve upper body stability when walking. VC req for increased step length and decreased UE dependence on RW as a weaning to a LRAD  Stairs/Curb  Stairs?: Yes  Stairs  # Steps : 17(8+9)  Stairs Height: 6\"  Rails: Bilateral  Device: No Device  Assistance: Stand by assistance  Comment: Reciprocal pattern to ascend and step to gait to descend leading with LLE 2/2 to pt report of increased L ankle pain when descending leading with RLE.    PT instructed pt with a standing balance activity including positioned in BS chair. Call light and phone placed within reach.  Chair alarm reactivated                              G-Code     OutComes Score                                                     AM-PAC Score             Goals  Short term goals  Time Frame for Short term goals: STG=LTG  Long term goals  Time Frame for Long term goals : 7-10 days( all goals are ongoing)  Long term goal 1: bed mobiliy independent  Long term goal 2: sit<>stand independent  Long term goal 3: amb 150' mod I with LRAD  Long term goal 4: ascend/descend 12 stairs mod I with HR  Patient Goals   Patient goals : move better and improve balance    Plan    Plan  Times per week: 5x/week 90min/day  Times per day: Daily  Current Treatment Recommendations: Strengthening, Safety Education & Training, Balance Training, Endurance Training, Functional Mobility Training, Equipment Evaluation, Education, & procurement, Transfer Training, Gait Training, Stair training  Safety Devices  Type of devices: Nurse notified, Chair alarm in place, Left in chair, Call light within reach, All fall risk precautions in place     Therapy Time   Individual Concurrent Group Co-treatment   Time In 0915         Time Out 1015         Minutes 60           Second Session Therapy Time:   Individual Concurrent Group Co-treatment   Time In 1345         Time Out 1415         Minutes 30           Timed Code Treatment Minutes:  60+30    Total Treatment Minutes:  [address] Ingram, PT

## 2021-02-08 NOTE — PROGRESS NOTES
Patient's vitals were stable, but the blood pressure was elevated. The patient had a large BM, gets up via gait belt and walker x 1 assist. Gave corrective dose of insulin. Gave pain medicine that is ordered PRN because pain was 6/10 and robaxin PRN.

## 2021-02-08 NOTE — PLAN OF CARE
Problem: Skin Integrity:  Goal: Will show no infection signs and symptoms  Description: Will show no infection signs and symptoms  Outcome: Ongoing     Problem: Pain:  Goal: Pain level will decrease  Description: Pain level will decrease  Outcome: Ongoing  Note: Pain being managed with PRN and scheduled medications. Non-pharm measures of rest and repositioning encouraged throughout shift. Problem: Falls - Risk of:  Goal: Will remain free from falls  Description: Will remain free from falls  Outcome: Ongoing  Note: Remains free from falls this shift. Fall precautions in place. Patient calls out appropriately for assistance, call light within reach.

## 2021-02-08 NOTE — PATIENT CARE CONFERENCE
Inpatient Rehabilitation  Weekly Team Conference Note  The [address] [place] [address],[address]  [phone]  Patient Name: Matthew Hillman        MRN: [id number]    : 1960  (61 y.o.)  Gender: male   Referring Practitioner: DO Nela  Diagnosis: s/p C-spine fusion    The team conference for this patient was held on 2021 at 10:30am by:  Pasquale Del Rio. DO Nela    PHYSICAL THERAPY:  Bed Mobility: Scooting: Supervision(VC for step by step technique including lat WS to advance hips to the EOS when in short sitting.)    Transfers:  Sit to Stand: Supervision(Transf from [facility] chair and armed chair to a [facility]. Still intermittently req VC for hand placement)  Stand to sit: Supervision  Comment: Pt instructed to nitin shoes. 2/2 to pt's decreased sensation in B hands, pt struggled with shoes so PT assisted donning the shoes. Pt was unable to doff gripper socks    WB Status: No WB restrictions noted in chart  Ambulation 1  Surface: level tile  Device: Single point cane  Other Apparatus: (Abacus [employer]sm[employer] J)  Assistance: Stand by assistance  Gait Deviations: Slow Jaqueline, Increased TERRELL, Decreased step length, Decreased step height  Distance: 350' x 2( includes amb up and down a ramp x75' in each direction, 400' x1. Comments: PT instructed pt with stops and starts and quick reversals to challenge pt's overall balance    Stairs  # Steps : 17(8+9)  Stairs Height: 6\"  Rails: Bilateral  Device: Single pt cane  Assistance: Supervision(Progressing to MI)  Comment: Reciprocal pattern to ascend and step to gait to descend leading with LLE 2/2 to pt report of increased L ankle pain when descending leading with RLE.     QM:  Roll Left and Right  Assistance Needed: Supervision or touching assistance  CARE Score: 4  Discharge Goal: Independent  Sit to Lying  Assistance Needed: Supervision or touching assistance  CARE Score: 4  Discharge Goal: Independent  Lying to Sitting on Side of Bed  Assistance Needed: Supervision or touching assistance  CARE Score: 4  Discharge Goal: Independent  Sit to Stand  Assistance Needed: Supervision or touching assistance  CARE Score: 4  Discharge Goal: Independent  Chair/Bed-to-Chair Transfer  Assistance Needed: Supervision or touching assistance  CARE Score: 4  Discharge Goal: Independent  Car Transfer  Assistance Needed: Supervision or touching assistance  CARE Score: 4  Discharge Goal: Independent  Walk 10 Feet  Assistance Needed: Supervision or touching assistance  CARE Score: 4  Discharge Goal: Independent  Walk 50 Feet with Two Turns  Assistance Needed: Supervision or touching assistance  CARE Score: 4  Discharge Goal: Independent  Walk 150 Feet  Assistance Needed: Supervision or touching assistance  CARE Score: 4  Discharge Goal: Independent  Walking 10 Feet on Uneven Surfaces  Assistance Needed: Supervision or touching assistance  CARE Score: 4  Discharge Goal: Independent  1 Step (Curb)  Assistance Needed: Supervision or touching assistance  CARE Score: 4  Discharge Goal: Independent  4 Steps  Assistance Needed: Supervision or touching assistance  CARE Score: 4  Discharge Goal: Independent  12 Steps  Assistance Needed: Supervision or touching assistance  CARE Score: 4  Discharge Goal: Independent  Picking Up Object  Reason if not Attempted: Not attempted due to medical condition or safety concerns  CARE Score: 88  Discharge Goal: Independent    OCCUPATIONAL THERAPY:  ADL  Equipment Provided: Reacher, Sock aid, Long-handled sponge  Feeding: Independent(finishing lunch upon arrival)  Grooming: Supervision  UE Bathing: Modified independent   LE Bathing: Modified independent   UE Dressing: Setup  LE Dressing: Supervision  Toileting: Stand by assistance(pt voided in stance)    Tub/ShowerTransfers:  Tub Transfers  Tub - Transfer From: Rolling walker  Tub - Transfer To:  Transfer tub bench  Tub - Technique: Ambulating  Tub Transfers: Stand by assistance  Tub Transfers Comments: Pt completed dry tub transfer to simulate home envrionment as pt verbalized interest in acquiring a TTB for d/c, pt able to complete with VCs for sit<>swivel tech without difficulty managing BLEs in/out of tub, pt able to stand from TTB with SBA to [employer] - Transfer From: [employer] - Transfer Type: To and From  Shower - Transfer To: Transfer tub bench  Shower - Technique: Ambulating  Shower Transfers: Contact Guard  Shower Transfers Comments: VCs for [employer], lateral step into shower threshold with use of GB    QM:  Eating  Assistance Needed: Independent  CARE Score: 6  Discharge Goal: Independent  Oral Hygiene  Assistance Needed: Supervision or touching assistance  Comment: in stance at sink  CARE Score: 4  Discharge Goal: [address] Needed: Supervision or touching assistance  Comment: in stance to void at toilet  CARE Score: 4  Discharge Goal: Independent  Toilet Transfer  Assistance Needed: Supervision or touching assistance  CARE Score: 4  Discharge Goal: Independent  Shower/Bathe Self  Assistance Needed: Independent  CARE Score: 6  Discharge Goal: Independent  Upper Body Dressing  Assistance Needed: Setup or clean-up assistance  Comment: assist to don C-collar  CARE Score: 5  Discharge Goal: Independent  Lower Body Dressing  Assistance Needed: Supervision or touching assistance  Comment: assist to tie drawstring  CARE Score: 4  Discharge Goal: Independent  Putting On/Taking Off Footwear  Assistance Needed: Setup or clean-up assistance  CARE Score: 5  Discharge Goal: Independent    NUTRITION:  Please see nutrition note for details. Weight: 224 lb 3.3 oz (101.7 kg) / Body mass index is 34.09 kg/m².   Diet Level/Order: DIET GENERAL; Carb Control: 4 carb choices (60 gms)/meal  PO Meals Eaten (%): 76 - 100%    NURSING:  QM: Bladder Continence: Always continent  Bowel Continence: Always continent  Ogden Fall Risk Score: 85  Wounds/Incisions/Ulcers: sx site to posterior neck, abrasions to elbows/knees  Medication Review: reviewed with pt  Pain: neck pain complaints  Consultations/Labs/X-rays:   covid-19 2/8   CBC and BMP MWF    Patient/Family Education provided by team:  Role of PT/OT,     CASE MANAGEMENT:  Assessment  SW will continue to follow to update patient and family and assist with discharge planning. TEAM SUMMARY: Will continue with current poc & goals until anticipated d/c date of 2/12/2021. Discharge Plan:  Risk for Readmission: 13, Moderate (10-19)  Critical Items: If High Risk, consider the following recommendations:Patient not at high risk  Estimated Length of Stay: 10 days  Destination: home   Services at Discharge: Outpatient Physical Therapy and Occupational Therapy 2x week  Equipment at Discharge: bath bench, raised toilet seat, cane  Community Resources: none  Factors facilitating achievement of predicted outcomes: Family support, Motivated, Cooperative, Pleasant, Sense of humor and Good insight into deficits  Barriers to the achievement of predicted outcomes: Pain, Decreased endurance, Decreased sensation, Decreased proprioception, Upper extremity weakness and Lower extremity weakness    Interdisciplinary Individualized Plan of Care Review:    · Continue Current Plan of Care: Yes    · Modifications:_____________________________     Special Needs in the Upcoming Week:    [x] Family/Caregiver Education  [] Home visit  []Therapeutic Pass   [] Consults:_______   [] Family Training  [] Other;_______     Patient Goals for Rehab stay:  1. Improve balance and get stronger     Rehab Team Goals for patient for the Upcoming Week:  1. Increase independence with ADLs    Rehab Team Members in attendance for Team Conference:  :  SIRIA Peterson    Therapy Manager:  Dejan Rodriguez, PT, DPT    Social Work:  Paco Nina [place]    Nursing:   Luis Agee, RN  Ana Garza, TONYA    Therapy:  Aubry Skiff, ISAMAR Law/KATINA Abdi Starla Mackey MA/[facility]-SLP    Nutrition:  Kamille Lee RD LD    I approve the established interdisciplinary plan of care as documented within the medical record of Esme Pierre MD Signature Velia Campbell D.O. M.P.H  PM&R  2/9/2021  11:10 AM

## 2021-02-08 NOTE — PROGRESS NOTES
Occupational Therapy  Facility/Department: [facility] ACUTE REHAB UNIT  Daily Treatment Note  NAME: Marisol Scott  : 1960  MRN: [id number]    Date of Service: 2021    Discharge Recommendations:  Continue to assess pending progress, Outpatient OT, Home with assist PRN  OT Equipment Recommendations  Equipment Needed: Yes  ADL Assistive Devices: Transfer Tub Bench; Toileting - Raised Toilet Seat with arms  Other: pt reported interest in RTS as pt does not have GBs and does not want to rely on sink for leverage    Assessment   Performance deficits / Impairments: Decreased functional mobility ; Decreased ADL status; Decreased endurance;Decreased sensation;Decreased coordination;Decreased strength;Decreased high-level IADLs;Decreased fine motor control;Decreased balance  Assessment: Pt progressing with therapy, pt able to [employer] and t-shirt with setup. Pt bathed UB/LB both at Mod I and dressing LB clothing setup/Supervision. Pt noted improvement with fine motor activities, stating \"I couldn't have done this last week with bead stringing activity; pt expresses more fine motor deficits in left hand vs right hand. Pt continues to benefit from inpt therapy to maximize functional independence, pt lives alone. Continue with POC. Treatment Diagnosis: impaired ADLs /functional transfers/ decreaed endurance 2/2 C3-C-6 posterior cervical decompression fusion and fixation sx  Prognosis: Good  OT Education: OT Role;Plan of Care;ADL Adaptive Strategies(doffing/donning [place] Collar)  Patient Education: Pt demonstrated inderstanding  REQUIRES OT FOLLOW UP: Yes  Activity Tolerance  Activity Tolerance: Patient Tolerated treatment well  Safety Devices  Safety Devices in place: Yes  Type of devices: All fall risk precautions in place;Call light within reach; Left in chair;Chair alarm in place;Nurse notified         Patient Diagnosis(es): There were no encounter diagnoses.       has a past medical history of Diabetes mellitus type 1 ([facility] [address] Hyperlipidemia, and Hypertension. has a past surgical history that includes Gastric bypass surgery and cervical laminectomy (N/A, 1/28/2021). Restrictions  Restrictions/Precautions  Required Braces or Orthoses?: Yes  Required Braces or Orthoses  Cervical: c-collar  Position Activity Restriction  Other position/activity restrictions: activity as tolerated. Cervical collar: Wear collar at all times except for showering  Subjective   General  Chart Reviewed: Yes  Patient assessed for rehabilitation services?: Yes  Additional Pertinent Hx: Pt is a 60 y/o male admitted acute LBP and possibe spinal stenosis. Transferred from [facility] ED post fall on 1/26 to [place] [address] had C3-6 cervicl decompression on 1/28. 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 Recent sciatica 1/22/21. Admitted to ARU on 2/3. PMHx: DM, HTN, gastric bypass sx  Response to previous treatment: Patient with no complaints from previous session  Family / Caregiver Present: No  Referring Practitioner: Dr Bismark Beaver  Diagnosis: C3-4, C4-5, C5-6 POSTERIOR CERVICAL DECOMPRESSION, FUSION AND FIXATION  Subjective  Subjective: Pt seated in chair upon entry, pleasant and agreeable to therapy session and requesting to shower. General Comment  Comments: Pt sit to stand Supervision, pt ambulated with rw at SBA ~18 ft to bathroom shower. Pt shower transfer SBA. Pt washed UB/LB (LHS for feet) Mod I on TTB (pt washed buttocks in partial stance with use of grab bar). Pt donned t-shirt at setup and [place] Collar at setup. Pt donned hospital pants at setup/Supervision and donned  socks with sock aid at setup. Pt in stance at sink Supervision ~4 min with oral care for dentures. Pt ambulated back to recliner SBA. Pt stand to sit SBA. Pt doffed  socks and donned personal socks/boots at setup with use of sock aid.  Pt with the following fine motor activity to improve tip to top  with both hands: pt strung 13 pony bead on paper clip x 2 trials picking u0p pony beads with right hand 1 trial and left hand 2nd trial, pt did the aformentioned fine motor task again, but this time used a shoe lace to string the pony beads with each hand. Pt noted more difficulty with tip to tip pinch  with left hand vs right hand. Pt's breakfast served, pt ate at [address]. Call light in reach and chair alarm on. Pain Assessment  Pain Level: 6  Pain Type: Surgical pain  Pain Location: Neck  Pain Orientation: Posterior  Pain Descriptors: Discomfort; Sore  Pain Frequency: Intermittent  Pre Treatment Pain Screening  Intervention List: Patient able to continue with treatment;Nurse called to administer meds  Vital Signs  Patient Currently in Pain: Yes   Orientation  Orientation  Overall Orientation Status: Within Normal Limits  Objective    ADL  Equipment Provided: Reacher;Sock aid;Long-handled sponge  Grooming: Supervision  UE Bathing: Modified independent   LE Bathing: Modified independent   UE Dressing: Setup  LE Dressing: Supervision        Balance  Sitting Balance: Independent  Standing Balance: Supervision  Standing Balance  Time: ~18 min total  Activity: to/from bathroom, shower transfer, LB ADLs, in stance at sink to groom  Functional Mobility  Functional - Mobility Device: Rolling Walker  Activity: To/from bathroom  Assist Level: Stand by assistance  Tub Transfers  Tub - Transfer To: Transfer tub bench  Tub - Technique: Ambulating  Tub Transfers: Stand by assistance     Transfers  Sit to stand: Supervision  Stand to sit: Stand by assistance                       Cognition  Overall Cognitive Status: WFL        Second Session: Pt sitting in bedside recliner upon arrival, pleasant and agreeable to OT session. Pt requesting to use toilet. Functional transfers: STS from recliner and standard chair to RW with SBA, stand<sit with SBA.  Pt completed toilet transfer from standard toilet with SBA using GB upon descent to RW. Functional mobility: Pt ambulated to/from bathroom using RW and to/from therapy gym using RW with SBA. Fine Motor: Pt completed tabletop [facility] task to increase intrinsic hand strength, sensation and in-hand manipulation skills required for dressing. Pt used nut and bolt board to assemble 4 large nuts, bolts and washers switching between hands for active  vs stabilizer. First 2 trials pt completed with bolt facing up for increased assistance using visual system, however the second 2 trials pt instructed to complete with vision occluded and pt unable to assemble the nut onto the bolt d/t decreased sensation requiring assist to place nut and then pt able to tighten the rest of the way. Pt with increased coordination picking up items using pincer grasp R>L. Pt discussed current barriers and hope for d/c date stating he thinks he needs ~1 more week d/t still feeling like his BLEs are weak and he is not as steady as he would like. However pt is very pleased with his progress with ADLs including donning/doffing the c-collar. Pt reports using velcro fidget over weekend which makes donning and doffing brace easier. Pt left in bedside recliner at end of session with chair alarm engaged, call light within reach and all needs met.        Plan   Plan  Times per week: 90 mintes per day 5 days/week  Times per day: Daily  Current Treatment Recommendations: Endurance Training, Safety Education & Training, Self-Care / ADL, Equipment Evaluation, Education, & procurement, Patient/Caregiver Education & Training, Strengthening, Home Management Training, Balance Training, Functional Mobility Training  G-Code     OutComes Score                                                  AM-PAC Score             Goals  Short term goals  Time Frame for Short term goals: STGs=LTGs  Long term goals  Time Frame for Long term goals : 7-10 days  Long term goal 1: Pt will complete UB dressing with mod I including C- goal met setup with c-collar 2/8  Long term goal 2: Pt will complete LB dressing with mod I-ongoing  Long term goal 3: Pt will complete dry tub transfer with mod I-ongoing  Long term goal 4: Pt will complete bathing with mod I- goal met 2/8  Long term goal 5: Pt will be independent with [facility] HEP to increase independence with dressing-ongoing  Patient Goals   Patient goals : get stronger and increase balance       Therapy Time   Individual Second Session Group Co-treatment   Time In 0730  1300       Time Out 0830  1338       Minutes 60  38       Timed Code Treatment Minutes: 60 Minutes + 38  Total Treatment Minutes: Dominique Gleason, [place] [address] (Treatment and documentation for 1st therapy session)  Анна Hernandez OTR/L (second session only)

## 2021-02-09 LAB
GLUCOSE BLD-MCNC: 103 MG/DL (ref 70–99)
GLUCOSE BLD-MCNC: 106 MG/DL (ref 70–99)
GLUCOSE BLD-MCNC: 134 MG/DL (ref 70–99)
GLUCOSE BLD-MCNC: 200 MG/DL (ref 70–99)
PERFORMED ON: ABNORMAL

## 2021-02-09 PROCEDURE — 97116 GAIT TRAINING THERAPY: CPT | Performed by: PHYSICAL THERAPIST

## 2021-02-09 PROCEDURE — 97530 THERAPEUTIC ACTIVITIES: CPT | Performed by: PHYSICAL THERAPIST

## 2021-02-09 PROCEDURE — 99232 SBSQ HOSP IP/OBS MODERATE 35: CPT | Performed by: PHYSICAL MEDICINE & REHABILITATION

## 2021-02-09 PROCEDURE — 6370000000 HC RX 637 (ALT 250 FOR IP): Performed by: PHYSICAL MEDICINE & REHABILITATION

## 2021-02-09 PROCEDURE — 97110 THERAPEUTIC EXERCISES: CPT

## 2021-02-09 PROCEDURE — 97110 THERAPEUTIC EXERCISES: CPT | Performed by: PHYSICAL THERAPIST

## 2021-02-09 PROCEDURE — 1280000000 HC REHAB R&B

## 2021-02-09 PROCEDURE — 6360000002 HC RX W HCPCS: Performed by: PHYSICAL MEDICINE & REHABILITATION

## 2021-02-09 PROCEDURE — 97530 THERAPEUTIC ACTIVITIES: CPT

## 2021-02-09 PROCEDURE — 97535 SELF CARE MNGMENT TRAINING: CPT

## 2021-02-09 RX ADMIN — OXYCODONE 10 MG: 5 TABLET ORAL at 08:18

## 2021-02-09 RX ADMIN — INSULIN LISPRO 1 UNITS: 100 INJECTION, SOLUTION INTRAVENOUS; SUBCUTANEOUS at 20:39

## 2021-02-09 RX ADMIN — ACETAMINOPHEN 650 MG: 325 TABLET ORAL at 18:46

## 2021-02-09 RX ADMIN — FAMOTIDINE 20 MG: 20 TABLET, FILM COATED ORAL at 20:36

## 2021-02-09 RX ADMIN — PANTOPRAZOLE SODIUM 40 MG: 40 TABLET, DELAYED RELEASE ORAL at 08:19

## 2021-02-09 RX ADMIN — METHOCARBAMOL 750 MG: 750 TABLET ORAL at 08:18

## 2021-02-09 RX ADMIN — OXYCODONE 5 MG: 5 TABLET ORAL at 18:46

## 2021-02-09 RX ADMIN — FAMOTIDINE 20 MG: 20 TABLET, FILM COATED ORAL at 08:18

## 2021-02-09 RX ADMIN — ENOXAPARIN SODIUM 40 MG: 40 INJECTION SUBCUTANEOUS at 08:18

## 2021-02-09 RX ADMIN — BISACODYL 5 MG: 5 TABLET, COATED ORAL at 08:19

## 2021-02-09 RX ADMIN — AMLODIPINE BESYLATE 5 MG: 5 TABLET ORAL at 08:17

## 2021-02-09 RX ADMIN — METHOCARBAMOL 750 MG: 750 TABLET ORAL at 18:46

## 2021-02-09 ASSESSMENT — PAIN SCALES - GENERAL
PAINLEVEL_OUTOF10: 4
PAINLEVEL_OUTOF10: 5
PAINLEVEL_OUTOF10: 4

## 2021-02-09 ASSESSMENT — PAIN DESCRIPTION - LOCATION: LOCATION: NECK

## 2021-02-09 NOTE — PROGRESS NOTES
Physical Therapy  Facility/Department: [facility] ACUTE REHAB UNIT  Daily Treatment Note  NAME: Amy Lala  : 1960  MRN: [id number]    Date of Service: 2021    Discharge Recommendations:  Home with assist PRN, Outpatient PT   PT Equipment Recommendations  Other: will continue to assess pending progress    Assessment   Body structures, Functions, Activity limitations: Decreased functional mobility ; Decreased balance;Decreased strength;Decreased endurance  Assessment: Pt is progressing well and is well motivated to resume PLOF. Pt is functioning at SBA/ MI with most activities. Pt  req increased confidence with amb with SPC. Pt is below his baseline and would benefit from further skilled PT to maximize safety and independence with functional mobility. Treatment Diagnosis: Decreased functional mobility  Prognosis: Good  Decision Making: Medium Complexity  PT Education: Gait Training;General Safety;Transfer Training;Energy Conservation; Functional Mobility Training; Adaptive Device Training;Precautions  Barriers to Learning: none  REQUIRES PT FOLLOW UP: Yes  Activity Tolerance  Activity Tolerance: Patient limited by endurance     Patient Diagnosis(es): There were no encounter diagnoses. has a past medical history of Diabetes mellitus type 1 ([facility] [address] Hyperlipidemia, and Hypertension. has a past surgical history that includes Gastric bypass surgery and cervical laminectomy (N/A, 2021). Restrictions  Restrictions/Precautions  Required Braces or Orthoses?: Yes  Required Braces or Orthoses  Cervical: c-collar  Position Activity Restriction  Other position/activity restrictions: activity as tolerated. Cervical collar: Wear collar at all times except for showering  Subjective   General  Chart Reviewed: Yes  Additional Pertinent Hx: Pt is a 60 y/o male admitted acute LBP and possibe spinal stenosis.  Transferred from [facility] ED post fall on  to [facility], [facility].. Pt had C3-6 cervicl decompression on 1/28. 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 Recent sciatica 1/22/21. Admitted to ARU on 2/3. PMHx: DM, HTN. Family / Caregiver Present: No  Referring Practitioner: DO Nela  Subjective  Subjective: Pt reports that he is happy that he is now using a SPC. Pt states that he wants to get more secure with it  General Comment  Comments: Pt sitting in BS chair  when PT arrived. Pt  is agreeable to PT. Pain Screening  Patient Currently in Pain: Denies  Vital Signs  Patient Currently in Pain: Denies       Orientation  Orientation  Overall Orientation Status: Within Normal Limits  Cognition      Objective      Transfers  Sit to Stand: Supervision(Transf from [facility] chair and armed chair to a SPC. Still intermittently req VC for hand placement)  Stand to sit: Supervision  Ambulation  Ambulation?: Yes  WB Status: No WB restrictions noted in chart  Ambulation 1  Surface: level tile  Device: Single point cane  Other Apparatus: (musami DALLIN)  Assistance: Stand by assistance  Distance: 350' x 2( includes amb up and down a ramp x75' in each direction, 400' x1. Comments: PT instructed pt with stops and starts and quick reversals to challenge pt's overall balance  Stairs/Curb  Stairs?: Yes  Stairs  # Steps : 17(8+9)  Stairs Height: 6\"  Rails: Bilateral  Device: Single pt cane  Assistance: Supervision(Progressing to MI)  Comment: Reciprocal pattern to ascend and step to gait to descend leading with LLE 2/2 to pt report of increased L ankle pain when descending leading with RLE. Second session:     Pt sitting in BS Chair when PT arrived. Pt agreeable  to therapy. Transfers  Sit to Stand:SupervisionI(Transf from [facility] chair and armed chair, and seated stepper to a SPC.  Still intermittently req VC for hand placement)  Stand to sit: Supervision  Ambulation  Ambulation?: Yes  WB Status: No WB restrictions noted in chart  Ambulation 1  Surface: level Recommendations: Strengthening, Safety Education & Training, Balance Training, Endurance Training, Functional Mobility Training, Equipment Evaluation, Education, & procurement, Transfer Training, Gait Training, Stair training  Safety Devices  Type of devices: Chair alarm in place, Left in chair, Call light within reach, All fall risk precautions in place     Therapy Time   Individual Concurrent Group Co-treatment   Time In 0830         Time Out 0900         Minutes 30           Second Session Therapy Time:   Individual Concurrent Group Co-treatment   Time In 1100         Time Out 1210         Minutes 70           Timed Code Treatment Minutes:  30+70    Total Treatment Minutes: 221 Carlitos Kumar, PT

## 2021-02-09 NOTE — PROGRESS NOTES
Occupational Therapy  Facility/Department: [facility] ACUTE REHAB UNIT  Daily Treatment Note  NAME: Jamir Coronado  : 1960  MRN: [id number]    Date of Service: 2021    Discharge Recommendations:  Continue to assess pending progress, Outpatient OT, Home with assist PRN  OT Equipment Recommendations  Equipment Needed: Yes  ADL Assistive Devices: Transfer Tub Bench  Other: continue to assess    Assessment   Performance deficits / Impairments: Decreased functional mobility ; Decreased ADL status; Decreased endurance;Decreased sensation;Decreased coordination;Decreased strength;Decreased high-level IADLs;Decreased fine motor control;Decreased balance  Assessment: Pt progressing with functional mobility using SPC with SBA-spvn assist. Pt able to complete item retrieval and transport using tote bag to simulate laundry task using SPC with SBA. Pt stating feeling more confident with all ADLs and ambulation as well as increased  strength compared to initial arrival to ARU. Pt able to stand from low surface toilet with spvn assist. Pt motivated and progressing well. Cont OT POC. Treatment Diagnosis: impaired ADLs /functional transfers/ decreaed endurance 2/2 C3-C-6 posterior cervical decompression fusion and fixation sx  Prognosis: Good     OT Education: Plan of Care;ADL Adaptive Strategies; Home Exercise Program  Patient Education: pt educated on pricing, various models and resources to purchase RTS and TTB, however pt able to stand from low surface toilet without use of UE support on GB with spvn-pt in agreement without need for RTS, verb understanding; pt educated on HEP using pink theraputty for intrinsic hand strenghtening, pt verb and demo independence with following handout  REQUIRES OT FOLLOW UP: Yes  Activity Tolerance  Activity Tolerance: Patient Tolerated treatment well  Safety Devices  Safety Devices in place: Yes  Type of devices: Left in bed; All fall risk precautions in place; Bed alarm in place;Call light within reach         Patient Diagnosis(es): There were no encounter diagnoses. has a past medical history of Diabetes mellitus type 1 ([facility] [address] Hyperlipidemia, and Hypertension. has a past surgical history that includes Gastric bypass surgery and cervical laminectomy (N/A, 1/28/2021). Restrictions  Restrictions/Precautions  Required Braces or Orthoses?: Yes  Required Braces or Orthoses  Cervical: c-collar  Position Activity Restriction  Other position/activity restrictions: activity as tolerated. Cervical collar: Wear collar at all times except for showering     Subjective   General  Chart Reviewed: Yes  Patient assessed for rehabilitation services?: Yes  Additional Pertinent Hx: Pt is a 60 y/o male admitted acute LBP and possibe spinal stenosis. Transferred from [facility] ED post fall on 1/26 to [place] [id number] had C3-6 cervicl decompression on 1/28. 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 Recent sciatica 1/22/21. Admitted to ARU on 2/3. PMHx: DM, HTN, gastric bypass sx  Response to previous treatment: Patient with no complaints from previous session  Family / Caregiver Present: No  Referring Practitioner: Dr Timothy Romero  Diagnosis: C3-4, C4-5, C5-6 POSTERIOR CERVICAL DECOMPRESSION, FUSION AND FIXATION  Subjective  Subjective: Pt seated in chair upon entry, pleasant and agreeable to therapy session. General Comment  Comments: Sarah Mccoy   Vital Signs  Patient Currently in Pain: Denies     Orientation  Orientation  Overall Orientation Status: Within Normal Limits  Objective    ADL  LE Dressing: Supervision;Modified independent (pt doffed/donned pants in stance to manage on/off hips with spvn assist; pt doffed socks and shoes seated on recliner i'ly using figure 4 to doff socks)        Balance  Sitting Balance: Independent  Standing Balance: Supervision  Standing Balance  Time: ~30 min total  Activity: functional mobility to/from bathroom; to/from dining room ~120 feet; ambulation within gift shop  Comment: Pt collecting dirty clothing items within room and placed into tote bag with SBA to simulate safe laundry task with use of SPC, pt transported clothes while wearing tote bag on R shoulder to dining room with SBA, however attempt to complete laundry by placing clothes into washing machine was not feasible as laundry machine was currently in use, pt will benefit from completing at later date  Functional Mobility  Functional - Mobility Device: Cane  Activity: To/from bathroom; Other(dining room ~120 feet; within gift shop)  Assist Level: Stand by assistance  Toilet Transfers  Toilet - Technique: Ambulating  Equipment Used: Standard toilet  Toilet Transfer: Supervision  Toilet Transfers Comments: Pt instructed not to use GB as pt does not have at home in order to assess need for RTS-pt with no difficulty standing from low surface toilet  Bed mobility  Sit to Supine: Modified independent(HOB flat, no use of bed rails)  Transfers  Sit to stand: Supervision  Stand to sit: Stand by assistance                       Cognition  Overall Cognitive Status: WNL              Additional Activities Comment  Additional Activities: Other  Additional Activities: Pt completed functional mobility within gift [facility] for community reintegration task using SPC with SBA navigating small spaced and uneven surfaces, pt with no overt LOB and good adherence to spinal precautions to turn body versus neck when looking at items. Pt tolerated ~8 minutes in stance/ambulating within [employer]. First Session: Pt sitting in bedside recliner upon arrival, pleasant and agreeable to OT session. STS from recliner with spvn assist using SPC. Functional mobility to/from therapy gym using [facility] with SBA.  Stand<sit to standard chair with spvn assist. Pt instructed on HEP provided with handout using pink theraputty to increase intrinsic hand strength as pt reports decreased sensation,  strength and [facility]. Pt completed x 8 of the following exercises with BUEs: finger flexion, thumb flexion, lateral pinch, lumbrical pinch, finger extension, finger abduction, finger adduction and finger tip pinch. Pt able to read instructions from handout and independently complete appropriately. Pt provided with handout and putty in room and verb understanding to continue at home upon d/c. STS from standard chair with spvn. Stand<sit to recliner with spvn. Pt left in bedside recliner at end of session with chair alarm engaged, call light within reach and all needs met.        Plan   Plan  Times per week: 90 mintes per day 5 days/week  Times per day: Daily  Current Treatment Recommendations: Endurance Training, Safety Education & Training, Self-Care / ADL, Equipment Evaluation, Education, & procurement, Patient/Caregiver Education & Training, Strengthening, Home Management Training, Balance Training, Functional Mobility Training  G-Code     OutComes Score                                                  AM-PAC Score             Goals  Short term goals  Time Frame for Short term goals: STGs=LTGs  Long term goals  Time Frame for Long term goals : 7-10 days  Long term goal 1: Pt will complete UB dressing with mod I including C- goal met setup with c-collar 2/8  Long term goal 2: Pt will complete LB dressing with mod I-ongoing  Long term goal 3: Pt will complete dry tub transfer with mod I-ongoing  Long term goal 4: Pt will complete bathing with mod I- goal met 2/8  Long term goal 5: Pt will be independent with [facility] HEP to increase independence with dressing-goal met 2/9  Patient Goals   Patient goals : get stronger and increase balance       Therapy Time   Individual Concurrent Group Co-treatment   Time In 0945         Time Out 1015         Minutes 30         Timed Code Treatment Minutes: 30 Minutes     Second Session Therapy Time:   Individual Concurrent Group Co-treatment   Time In 1315         Time Out 1415

## 2021-02-09 NOTE — PROGRESS NOTES
Pt A&Ox4. VSS. Up x1 w/ walker and GB. Oxycodone and robaxin given for pain. Bed side table and call light within reach. Fall precautions in place. Bed in lowest position. No further needs at this time. Will continue to monitor.

## 2021-02-09 NOTE — PROGRESS NOTES
Pt assessment and vitals completed. Medications administered as ordered. Pt has no complaints at this time, resting comfortably in bed. Offered to remove IV as it is not being used. Pt wants to keep IV. Will continue to monitor.

## 2021-02-09 NOTE — PROGRESS NOTES
Department of Physical Medicine & Rehabilitation  Progress Note    Patient Identification:  Jitendra Reddy  [id number]  : 1960  Admit date: 2/3/2021    Chief Complaint: NTSCI s/p fixation     Subjective:   No new complaints overnight. Plan to continue therapy with discharge later this week. He is participating well and improving every day. ROS: No f/c, n/v, cp     Objective:  Patient Vitals for the past 24 hrs:   BP Temp Temp src Pulse Resp SpO2   21 0813 (!) 153/92 98.7 °F (37.1 °C) Oral 71 16 95 %   21 (!) 149/90 98.5 °F (36.9 °C) Oral 70 16 96 %   21 1115 138/85 98.5 °F (36.9 °C) Oral 79 16 95 %     Const: Alert. No distress, pleasant. HEENT: Normocephalic, atraumatic. Normal sclera/conjunctiva. MMM. CV: Regular rate and rhythm. Resp: No respiratory distress. Lungs CTAB. Abd: Soft, nontender, nondistended, NABS+   Ext: No edema. Neuro: Alert, oriented, appropriately interactive. Psych: Cooperative, appropriate mood and affect    Laboratory data: Available via EMR. Last 24 hour lab  Recent Results (from the past 24 hour(s))   POCT Glucose    Collection Time: 21 11:45 AM   Result Value Ref Range    POC Glucose 122 (H) 70 - 99 mg/dl    Performed on ACCU-CHEK    POCT Glucose    Collection Time: 21  5:22 PM   Result Value Ref Range    POC Glucose 95 70 - 99 mg/dl    Performed on ACCU-CHEK    POCT Glucose    Collection Time: 21  8:29 PM   Result Value Ref Range    POC Glucose 134 (H) 70 - 99 mg/dl    Performed on ACCU-CHEK    POCT Glucose    Collection Time: 21  7:39 AM   Result Value Ref Range    POC Glucose 134 (H) 70 - 99 mg/dl    Performed on ACCU-CHEK        Therapy progress:  PT  Required Braces or Orthoses  Cervical: c-collar  Position Activity Restriction  Other position/activity restrictions: activity as tolerated.  Cervical collar: Wear collar at all times except for showering  Objective     Sit to Stand: Supervision(Transf from [facility] chair and armed chair to a [facility]. Still intermittently req VC for hand placement)  Stand to sit: Supervision  Device: Single point cane  Other Apparatus: (francia ZAMARRIPA)  Assistance: Stand by assistance  Distance: 350' x 2( includes amb up and down a ramp x75' in each direction, 400' x1.  OT  PT Equipment Recommendations  Other: will continue to assess pending progress     Assessment        SLP          Body mass index is 34.09 kg/m².     Assessment and Plan:     Assessment and Plan:  Cervical spinal central canal stenosis    Lumbo-sacral spinal canal stenosis  Bilateral Lower extremity weakness and numbness  - Neurosurgery following  -Status post C3-6 posterior decompression/fusion/fixation- successful   - Pain control  - Requires extensive PT/OT in ARU setting      UTI  -Completed Rocephin course  - Follow up      Diabetes mellitus  - LSSI     HTN  - Continue home amlodipine     HLD  - Continue home statin       Rehab Progress: Improving  Anticipated Dispo: home  Services/DME: CEDRIC  ELOS: CEDRIC Bailey D.O. M.P.H  PM&R  2/9/2021  10:31 AM

## 2021-02-09 NOTE — PATIENT CARE CONFERENCE
Inpatient Rehabilitation  Weekly Team Conference Note  The [address], [address] [place] [address] Ave  [phone]  Patient Name: Amy Lala        MRN: [id number]    : 1960  (61 y.o.)  Gender: male   Referring Practitioner: DO Nela  Diagnosis: s/p C-spine fusion    The team conference for this patient was held on 2021 at 10:30am by:  Alisha Domínguez. DO Nela    PHYSICAL THERAPY:  Bed Mobility: Scooting: Supervision(VC for step by step technique including lat WS to advance hips to the EOS when in short sitting.)    Transfers:  Sit to Stand: Supervision(Transf from [facility] chair and armed chair to a [facility]. Still intermittently req VC for hand placement)  Stand to sit: Supervision  Comment: Pt instructed to nitin shoes. 2/2 to pt's decreased sensation in B hands, pt struggled with shoes so PT assisted donning the shoes. Pt was unable to doff gripper socks    WB Status: No WB restrictions noted in chart  Ambulation 1  Surface: level tile  Device: Single point cane  Other Apparatus: ([place])  Assistance: Stand by assistance  Gait Deviations: Slow Jaqueline, Increased TERRELL, Decreased step length, Decreased step height  Distance: 350' x 2( includes amb up and down a ramp x75' in each direction, 400' x1. Comments: PT instructed pt with stops and starts and quick reversals to challenge pt's overall balance    Stairs  # Steps : 17(8+9)  Stairs Height: 6\"  Rails: Bilateral  Device: Single pt cane  Assistance: Supervision(Progressing to MI)  Comment: Reciprocal pattern to ascend and step to gait to descend leading with LLE 2/2 to pt report of increased L ankle pain when descending leading with RLE.     QM:  Roll Left and Right  Assistance Needed: Supervision or touching assistance  CARE Score: 4  Discharge Goal: Independent  Sit to Lying  Assistance Needed: Supervision or touching assistance  CARE Score: 4  Discharge Goal: Independent  Lying to Sitting on Side of Bed  Assistance Needed: Supervision or touching assistance  CARE Score: 4  Discharge Goal: Independent  Sit to Stand  Assistance Needed: Supervision or touching assistance  CARE Score: 4  Discharge Goal: Independent  Chair/Bed-to-Chair Transfer  Assistance Needed: Supervision or touching assistance  CARE Score: 4  Discharge Goal: Independent  Car Transfer  Assistance Needed: Supervision or touching assistance  CARE Score: 4  Discharge Goal: Independent  Walk 10 Feet  Assistance Needed: Supervision or touching assistance  CARE Score: 4  Discharge Goal: Independent  Walk 50 Feet with Two Turns  Assistance Needed: Supervision or touching assistance  CARE Score: 4  Discharge Goal: Independent  Walk 150 Feet  Assistance Needed: Supervision or touching assistance  CARE Score: 4  Discharge Goal: Independent  Walking 10 Feet on Uneven Surfaces  Assistance Needed: Supervision or touching assistance  CARE Score: 4  Discharge Goal: Independent  1 Step (Curb)  Assistance Needed: Supervision or touching assistance  CARE Score: 4  Discharge Goal: Independent  4 Steps  Assistance Needed: Supervision or touching assistance  CARE Score: 4  Discharge Goal: Independent  12 Steps  Assistance Needed: Supervision or touching assistance  CARE Score: 4  Discharge Goal: Independent  Picking Up Object  Reason if not Attempted: Not attempted due to medical condition or safety concerns  CARE Score: 88  Discharge Goal: Independent    OCCUPATIONAL THERAPY:  ADL  Equipment Provided: Reacher, Sock aid, Long-handled sponge  Feeding: Independent(finishing lunch upon arrival)  Grooming: Supervision  UE Bathing: Modified independent   LE Bathing: Modified independent   UE Dressing: Setup  LE Dressing: Supervision  Toileting: Stand by assistance(pt voided in stance)    Tub/ShowerTransfers:  Tub Transfers  Tub - Transfer From: Rolling walker  Tub - Transfer To:  Transfer tub bench  Tub - Technique: Ambulating  Tub Transfers: Stand by assistance  Tub Transfers Comments: Pt completed dry tub transfer to simulate home envrionment as pt verbalized interest in acquiring a TTB for d/c, pt able to complete with VCs for sit<>swivel tech without difficulty managing BLEs in/out of tub, pt able to stand from TTB with SBA to [employer] - Transfer From: [place] - Transfer Type: To and From  Shower - Transfer To: Transfer tub bench  Shower - Technique: Ambulating  Shower Transfers: Contact Guard  Shower Transfers Comments: VCs for [employer], lateral step into shower threshold with use of GB    QM:  Eating  Assistance Needed: Independent  CARE Score: 6  Discharge Goal: Independent  Oral Hygiene  Assistance Needed: Supervision or touching assistance  Comment: in stance at sink  CARE Score: 4  Discharge Goal: [address] Needed: Supervision or touching assistance  Comment: in stance to void at toilet  CARE Score: 4  Discharge Goal: Independent  Toilet Transfer  Assistance Needed: Supervision or touching assistance  CARE Score: 4  Discharge Goal: Independent  Shower/Bathe Self  Assistance Needed: Independent  CARE Score: 6  Discharge Goal: Independent  Upper Body Dressing  Assistance Needed: Setup or clean-up assistance  Comment: assist to don C-collar  CARE Score: 5  Discharge Goal: Independent  Lower Body Dressing  Assistance Needed: Supervision or touching assistance  Comment: assist to tie drawstring  CARE Score: 4  Discharge Goal: Independent  Putting On/Taking Off Footwear  Assistance Needed: Setup or clean-up assistance  CARE Score: 5  Discharge Goal: Independent    NUTRITION:  Please see nutrition note for details. Weight: 224 lb 3.3 oz (101.7 kg) / Body mass index is 34.09 kg/m².   Diet Level/Order: DIET GENERAL; Carb Control: 4 carb choices (60 gms)/meal  PO Meals Eaten (%): 76 - 100%    NURSING:  QM: Bladder Continence: Always continent  Bowel Continence: Always continent  Ogden Fall Risk Score: 85  Wounds/Incisions/Ulcers: sx site to posterior neck, abrasions to elbows/knees  Medication Review: reviewed with pt  Pain: neck pain complaints  Consultations/Labs/X-rays:   covid-19 2/8   CBC and BMP MWF    Patient/Family Education provided by team:  Role of PT/OT,     CASE MANAGEMENT:  Assessment  SW will continue to follow to update patient and family and assist with discharge planning. TEAM SUMMARY: Will continue with current poc & goals until anticipated d/c date of 2/12/2021. Discharge Plan:  Risk for Readmission: 13, Moderate (10-19)  Critical Items: If High Risk, consider the following recommendations:Patient not at high risk  Estimated Length of Stay: 10 days  Destination: home   Services at Discharge: Outpatient Physical Therapy and Occupational Therapy 2x week  Equipment at Discharge: bath bench, raised toilet seat, cane  Community Resources: none  Factors facilitating achievement of predicted outcomes: Family support, Motivated, Cooperative, Pleasant, Sense of humor and Good insight into deficits  Barriers to the achievement of predicted outcomes: Pain, Decreased endurance, Decreased sensation, Decreased proprioception, Upper extremity weakness and Lower extremity weakness    Interdisciplinary Individualized Plan of Care Review:    · Continue Current Plan of Care: Yes    · Modifications:_____________________________     Special Needs in the Upcoming Week:    [x] Family/Caregiver Education  [] Home visit  []Therapeutic Pass   [] Consults:_______   [] Family Training  [] Other;_______     Patient Goals for Rehab stay:  1. Improve balance and get stronger     Rehab Team Goals for patient for the Upcoming Week:  1. Increase independence with ADLs    Rehab Team Members in attendance for Team Conference:  :  SIRIA Blackman    Therapy Manager:  Sophia Franz, PT, DPT    Social Work:  Noé Rowell [place]    Nursing:   Virginia Jackson, RN  Suzy Merlin Diver, RN    Therapy:  Brian Celestin, PT  ISAMAR Bunn/KATINA Downey Wenceslao Dawson MA/[facility]-SLP    Nutrition:  Jason Horne RD LD    I approve the established interdisciplinary plan of care as documented within the medical record of Darshan Mullins.     MD Signature Anselmo Gann D.O. M.P.H  PM&R  2/9/2021  11:10 AM

## 2021-02-09 NOTE — PLAN OF CARE
Problem: Pain:  Goal: Control of acute pain  Description: Control of acute pain  Outcome: Ongoing  Note: Pt voices pain needs appropriately, pain is assessed during shift. Problem: Falls - Risk of:  Goal: Will remain free from falls  Description: Will remain free from falls  2/9/2021 0209 by Elis Weeks RN  Outcome: Ongoing  Note: Client remains free from falls, bed/chair alarm in place, door open, encouraged to use call light for needs, call light is within reach, bed locked in lowest position,  Will continue to monitor.

## 2021-02-10 LAB
ANION GAP SERPL CALCULATED.3IONS-SCNC: 8 MMOL/L (ref 3–16)
BASOPHILS ABSOLUTE: 0 K/UL (ref 0–0.2)
BASOPHILS RELATIVE PERCENT: 0.6 %
BUN BLDV-MCNC: 16 MG/DL (ref 7–20)
CALCIUM SERPL-MCNC: 9.4 MG/DL (ref 8.3–10.6)
CHLORIDE BLD-SCNC: 109 MMOL/L (ref 99–110)
CO2: 27 MMOL/L (ref 21–32)
CREAT SERPL-MCNC: 0.9 MG/DL (ref 0.8–1.3)
EOSINOPHILS ABSOLUTE: 0.1 K/UL (ref 0–0.6)
EOSINOPHILS RELATIVE PERCENT: 2.8 %
GFR AFRICAN AMERICAN: >60
GFR NON-AFRICAN AMERICAN: >60
GLUCOSE BLD-MCNC: 107 MG/DL (ref 70–99)
GLUCOSE BLD-MCNC: 131 MG/DL (ref 70–99)
GLUCOSE BLD-MCNC: 143 MG/DL (ref 70–99)
GLUCOSE BLD-MCNC: 175 MG/DL (ref 70–99)
GLUCOSE BLD-MCNC: 94 MG/DL (ref 70–99)
HCT VFR BLD CALC: 32.7 % (ref 40.5–52.5)
HEMOGLOBIN: 11.2 G/DL (ref 13.5–17.5)
LYMPHOCYTES ABSOLUTE: 0.9 K/UL (ref 1–5.1)
LYMPHOCYTES RELATIVE PERCENT: 19.7 %
MCH RBC QN AUTO: 33.3 PG (ref 26–34)
MCHC RBC AUTO-ENTMCNC: 34.1 G/DL (ref 31–36)
MCV RBC AUTO: 97.7 FL (ref 80–100)
MONOCYTES ABSOLUTE: 0.4 K/UL (ref 0–1.3)
MONOCYTES RELATIVE PERCENT: 8.8 %
NEUTROPHILS ABSOLUTE: 3.2 K/UL (ref 1.7–7.7)
NEUTROPHILS RELATIVE PERCENT: 68.1 %
PDW BLD-RTO: 13.7 % (ref 12.4–15.4)
PERFORMED ON: ABNORMAL
PERFORMED ON: NORMAL
PLATELET # BLD: 413 K/UL (ref 135–450)
PMV BLD AUTO: 7.7 FL (ref 5–10.5)
POTASSIUM REFLEX MAGNESIUM: 3.9 MMOL/L (ref 3.5–5.1)
RBC # BLD: 3.35 M/UL (ref 4.2–5.9)
SODIUM BLD-SCNC: 144 MMOL/L (ref 136–145)
WBC # BLD: 4.6 K/UL (ref 4–11)

## 2021-02-10 PROCEDURE — 85025 COMPLETE CBC W/AUTO DIFF WBC: CPT

## 2021-02-10 PROCEDURE — 97530 THERAPEUTIC ACTIVITIES: CPT

## 2021-02-10 PROCEDURE — 6370000000 HC RX 637 (ALT 250 FOR IP): Performed by: PHYSICAL MEDICINE & REHABILITATION

## 2021-02-10 PROCEDURE — 97110 THERAPEUTIC EXERCISES: CPT | Performed by: PHYSICAL THERAPIST

## 2021-02-10 PROCEDURE — 99232 SBSQ HOSP IP/OBS MODERATE 35: CPT | Performed by: PHYSICAL MEDICINE & REHABILITATION

## 2021-02-10 PROCEDURE — 36415 COLL VENOUS BLD VENIPUNCTURE: CPT

## 2021-02-10 PROCEDURE — 97530 THERAPEUTIC ACTIVITIES: CPT | Performed by: PHYSICAL THERAPIST

## 2021-02-10 PROCEDURE — 80048 BASIC METABOLIC PNL TOTAL CA: CPT

## 2021-02-10 PROCEDURE — 97116 GAIT TRAINING THERAPY: CPT | Performed by: PHYSICAL THERAPIST

## 2021-02-10 PROCEDURE — 1280000000 HC REHAB R&B

## 2021-02-10 PROCEDURE — 97535 SELF CARE MNGMENT TRAINING: CPT

## 2021-02-10 PROCEDURE — 6360000002 HC RX W HCPCS: Performed by: PHYSICAL MEDICINE & REHABILITATION

## 2021-02-10 RX ADMIN — AMLODIPINE BESYLATE 5 MG: 5 TABLET ORAL at 08:21

## 2021-02-10 RX ADMIN — FAMOTIDINE 20 MG: 20 TABLET, FILM COATED ORAL at 19:55

## 2021-02-10 RX ADMIN — METHOCARBAMOL 750 MG: 750 TABLET ORAL at 20:06

## 2021-02-10 RX ADMIN — OXYCODONE 5 MG: 5 TABLET ORAL at 20:06

## 2021-02-10 RX ADMIN — FAMOTIDINE 20 MG: 20 TABLET, FILM COATED ORAL at 08:21

## 2021-02-10 RX ADMIN — ENOXAPARIN SODIUM 40 MG: 40 INJECTION SUBCUTANEOUS at 08:21

## 2021-02-10 RX ADMIN — OXYCODONE 5 MG: 5 TABLET ORAL at 08:20

## 2021-02-10 RX ADMIN — INSULIN LISPRO 1 UNITS: 100 INJECTION, SOLUTION INTRAVENOUS; SUBCUTANEOUS at 08:10

## 2021-02-10 RX ADMIN — INSULIN LISPRO 1 UNITS: 100 INJECTION, SOLUTION INTRAVENOUS; SUBCUTANEOUS at 19:54

## 2021-02-10 RX ADMIN — METHOCARBAMOL 750 MG: 750 TABLET ORAL at 08:38

## 2021-02-10 RX ADMIN — PANTOPRAZOLE SODIUM 40 MG: 40 TABLET, DELAYED RELEASE ORAL at 08:21

## 2021-02-10 ASSESSMENT — PAIN DESCRIPTION - PROGRESSION: CLINICAL_PROGRESSION: NOT CHANGED

## 2021-02-10 ASSESSMENT — PAIN DESCRIPTION - DESCRIPTORS: DESCRIPTORS: SORE;DISCOMFORT

## 2021-02-10 ASSESSMENT — PAIN DESCRIPTION - LOCATION: LOCATION: NECK

## 2021-02-10 ASSESSMENT — PAIN SCALES - GENERAL
PAINLEVEL_OUTOF10: 6
PAINLEVEL_OUTOF10: 6
PAINLEVEL_OUTOF10: 0

## 2021-02-10 ASSESSMENT — PAIN DESCRIPTION - PAIN TYPE: TYPE: SURGICAL PAIN

## 2021-02-10 NOTE — PROGRESS NOTES
Department of Physical Medicine & Rehabilitation  Progress Note    Patient Identification:  Diane Pablo  [id number]  : 1960  Admit date: 2/3/2021    Chief Complaint: NTSCI s/p fixation     Subjective:   Has been advancing with physical therapy well. Reports stable tingling in hands bilaterally and neck pain. Is supposed to have suture removal tomorrow . With improvement in neck mobility pt will be able to drive again, but should follow up with the Neurosurgery team upon discharge. ROS: No f/c, n/v, cp     Objective:  Patient Vitals for the past 24 hrs:   BP Temp Temp src Pulse Resp SpO2   02/10/21 0821 (!) 155/85 -- -- -- -- --   21 (!) 154/87 98.3 °F (36.8 °C) Oral 74 16 93 %   21 1031 137/81 -- -- 79 -- --     Const: Alert. No distress, pleasant. HEENT: Normocephalic, atraumatic. Normal sclera/conjunctiva. MMM. CV: Regular rate and rhythm. Resp: No respiratory distress. Lungs CTAB. Abd: Soft, nontender, nondistended, NABS+   Ext: No edema. Neuro: Alert, oriented, appropriately interactive. Psych: Cooperative, appropriate mood and affect    Laboratory data: Available via EMR.    Last 24 hour lab  Recent Results (from the past 24 hour(s))   POCT Glucose    Collection Time: 21 12:06 PM   Result Value Ref Range    POC Glucose 103 (H) 70 - 99 mg/dl    Performed on ACCU-CHEK    POCT Glucose    Collection Time: 21  4:39 PM   Result Value Ref Range    POC Glucose 106 (H) 70 - 99 mg/dl    Performed on ACCU-CHEK    POCT Glucose    Collection Time: 21  8:38 PM   Result Value Ref Range    POC Glucose 200 (H) 70 - 99 mg/dl    Performed on ACCU-CHEK    Basic Metabolic Panel w/ Reflex to MG    Collection Time: 02/10/21  6:09 AM   Result Value Ref Range    Sodium 144 136 - 145 mmol/L    Potassium reflex Magnesium 3.9 3.5 - 5.1 mmol/L    Chloride 109 99 - 110 mmol/L    CO2 27 21 - 32 mmol/L    Anion Gap 8 3 - 16    Glucose 131 (H) 70 - 99 mg/dL    BUN 16 7 - 20 mg/dL    CREATININE 0.9 0.8 - 1.3 mg/dL    GFR Non-African American >60 >60    GFR African American >60 >60    Calcium 9.4 8.3 - 10.6 mg/dL   CBC auto differential    Collection Time: 02/10/21  6:09 AM   Result Value Ref Range    WBC 4.6 4.0 - 11.0 K/uL    RBC 3.35 (L) 4.20 - 5.90 M/uL    Hemoglobin 11.2 (L) 13.5 - 17.5 g/dL    Hematocrit 32.7 (L) 40.5 - 52.5 %    MCV 97.7 80.0 - 100.0 fL    MCH 33.3 26.0 - 34.0 pg    MCHC 34.1 31.0 - 36.0 g/dL    RDW 13.7 12.4 - 15.4 %    Platelets 350 108 - 184 K/uL    MPV 7.7 5.0 - 10.5 fL    Neutrophils % 68.1 %    Lymphocytes % 19.7 %    Monocytes % 8.8 %    Eosinophils % 2.8 %    Basophils % 0.6 %    Neutrophils Absolute 3.2 1.7 - 7.7 K/uL    Lymphocytes Absolute 0.9 (L) 1.0 - 5.1 K/uL    Monocytes Absolute 0.4 0.0 - 1.3 K/uL    Eosinophils Absolute 0.1 0.0 - 0.6 K/uL    Basophils Absolute 0.0 0.0 - 0.2 K/uL   POCT Glucose    Collection Time: 02/10/21  8:07 AM   Result Value Ref Range    POC Glucose 143 (H) 70 - 99 mg/dl    Performed on ACCU-CHEK        Therapy progress:  PT  Required Braces or Orthoses  Cervical: c-collar  Position Activity Restriction  Other position/activity restrictions: activity as tolerated. Cervical collar: Wear collar at all times except for showering  Objective     Sit to Stand: Supervision(Transf from [facility] chair and armed chair to a [facility].  Still intermittently req  for hand placement)  Stand to sit: Supervision  Device: Single point cane  Other Apparatus: (francia ZAMARRIPA)  Assistance: Stand by assistance  Distance: 350' x 2( includes amb up and down a ramp x75' in each direction, 400' x1.  OT  PT Equipment Recommendations  Other: will continue to assess pending progress  Toilet - Technique: Ambulating  Equipment Used: Standard toilet  Toilet Transfers Comments: Pt instructed not to use GB as pt does not have at home in order to assess need for RTS-pt with no difficulty standing from low surface toilet  Assessment        SLP          Body mass index is 34.09 kg/m².     Assessment and Plan:     Assessment and Plan:  Cervical spinal central canal stenosis    Lumbo-sacral spinal canal stenosis  Bilateral Lower extremity weakness and numbness  - Neurosurgery following  -Status post C3-6 posterior decompression/fusion/fixation- successful   - Pain control  - Requires extensive PT/OT in ARU setting      UTI  -Completed Rocephin course  - Follow up      Diabetes mellitus  - LSSI     HTN  - Continue home amlodipine     HLD  - Continue home statin       Rehab Progress: Improving  Anticipated Dispo: home  Services/DME: TBROBERTO CARLOS  ELOS: CEDRIC Newell MD PGY-1  2/10/2021      Celestino Cain D.O. M.P.H  PM&R  2/10/2021  8:34 AM

## 2021-02-10 NOTE — PLAN OF CARE
Problem: Pain:  Goal: Control of acute pain  Description: Control of acute pain  Outcome: Ongoing   Pt c/o pain rated 6/10, pt medicated with prn oxycodone 5mg. Upon reassessment pt satisfied with pain control, will continue to monitor.

## 2021-02-10 NOTE — PROGRESS NOTES
Occupational Therapy  Facility/Department: [facility] ACUTE REHAB UNIT  Daily Treatment Note  NAME: Dodie Gonzalez  : 1960  MRN: [id number]    Date of Service: 2/10/2021    Discharge Recommendations:  Outpatient OT, Home with assist PRN  OT Equipment Recommendations  ADL Assistive Devices: Transfer Tub Bench    Assessment   Performance deficits / Impairments: Decreased functional mobility ; Decreased ADL status; Decreased endurance;Decreased sensation;Decreased coordination;Decreased strength;Decreased high-level IADLs;Decreased fine motor control;Decreased balance  Assessment: Patient continues to demonstrate improvements with ADLs and bathroom mobility, and is able to complete IADL task of laundry with SBA and slight LOB. Would continue to benefit from increased UE/hand strengthening and coordination to increase ease and success with further IADL tasks. Would continue to benefit from OT services, cont POC. Treatment Diagnosis: impaired ADLs /functional transfers/ decreaed endurance 2/2 C3-C-6 posterior cervical decompression fusion and fixation sx  Prognosis: Good  OT Education: Transfer Training;IADL Safety;Plan of Care  Patient Education: verb understanding  REQUIRES OT FOLLOW UP: Yes  Activity Tolerance  Activity Tolerance: Patient Tolerated treatment well  Safety Devices  Safety Devices in place: Yes  Type of devices: Call light within reach; Left in chair;Chair alarm in place; All fall risk precautions in place         Patient Diagnosis(es): There were no encounter diagnoses. has a past medical history of Diabetes mellitus type 1 ([address] Hyperlipidemia, and Hypertension. has a past surgical history that includes Gastric bypass surgery and cervical laminectomy (N/A, 2021). Restrictions  Restrictions/Precautions  Required Braces or Orthoses?: Yes  Required Braces or Orthoses  Cervical: c-collar  Position Activity Restriction  Other position/activity restrictions: activity as tolerated.  Cervical collar: Wear collar at all times except for showering  Subjective   General  Chart Reviewed: Yes  Patient assessed for rehabilitation services?: Yes  Additional Pertinent Hx: Pt is a 62 y/o male admitted acute LBP and possibe spinal stenosis. Transferred from [facility] ED post fall on 1/26 to [facility] had C3-6 cervicl decompression on 1/28. 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 Recent sciatica 1/22/21. Admitted to ARU on 2/3. PMHx: DM, HTN, gastric bypass sx  Response to previous treatment: Patient with no complaints from previous session  Family / Caregiver Present: No  Referring Practitioner: Dr Jarek Randle  Diagnosis: C3-4, C4-5, C5-6 POSTERIOR CERVICAL DECOMPRESSION, FUSION AND FIXATION  Subjective  Subjective: Patient seated in chair upon arrival, requesting to complete shower. Agreeable to OT session. General Comment  Comments: Vinh Dirk Vital Signs  Patient Currently in Pain: Denies   Orientation  Orientation  Overall Orientation Status: Within Normal Limits  Objective    ADL  Grooming: Modified independent (seated following shower)  UE Bathing: Modified independent   LE Bathing: Modified independent   LE Dressing: Contact guard assistance (assist to tie drawstring to sufficient tightness)  Toileting: Supervision(-MOD I, continent of bowel and bladder)  Additional Comments: Assist required to don/doff cervical collar and change pads  Standing Balance  Comment: Completed item transport from room to laundry machine with SPVN. Placed items into washing machine while seated in order to increased stability and independence. Retrieved box of laundry pods from atop the machine (overhead) with SBA, slight LOB but able to self correct. Multiple attempts required to open container, decreased control when lid successfully removed resulting in it falling to the fall, increased difficulty grasping singular pod from container.  Would continue to benefit from UE strengthening and [facility] tasks for improved participation with IADLs. Functional Mobility  Functional - Mobility Device: No device  Activity: To/from bathroom  Assist Level: Contact guard assistance  Functional Mobility Comments: longer distance to laundry machine completed with use of cane and SPVN  Toilet Transfers  Toilet - Technique: Ambulating  Equipment Used: Standard toilet  Toilet Transfer: Modified independent  Toilet Transfers Comments: completed with grab bar  Shower Transfers  Shower - Transfer Type: To and From  Shower - Transfer To: Transfer tub bench  Shower - Technique: Ambulating  Shower Transfers: Supervision  Shower Transfers Comments: with use of grab bar  Transfers  Sit to stand: Modified independent  Stand to sit: Modified independent   Cognition  Overall Cognitive Status: WNL     Second session:  Patient seated in chair upon arrival, agreeable to OT services. Functional mobility to laundry area with SPVN and use of SPC. With use of chair, patient switched clothing from washer to dryer with SPVN-MOD I. Utilized theraputty to create x3 shapes in order to promote increased grasp/pinch strengthening in various patterns of movement, slight increased time to complete. Completed x5 finger<>palm translation of coins from tabletop with each hand, requiring multiple attempts to grasp 7/10 and increased difficulty with use of R hand > L hand. Completed x5 palm<>finger translation of coins with each hand to replace in slotted container, significant difficulty d/t decreased in-hand manipulation resulting in several dropped coins and increased compensatory movement patterns. Would benefit from continued practice in future sessions. Shuffled cards using lift/place method after x2 failed attempts at the bridge method. Completed simple card game ([phone]) in order to promote improved stabilization and in-hand manipulation skills.  Min cues required to appropriately recall rules of game play. Functional mobility back to room with SPVN. Toilet transfer MOD I with use of grab bar. Toileting SPVN-MOD I.  Left in chair, chair alarm activated, needs met and in reach, RN aware         Plan   Plan  Times per week: 90 mintes per day 5 days/week  Times per day: Daily  Current Treatment Recommendations: Endurance Training, Safety Education & Training, Self-Care / ADL, Equipment Evaluation, Education, & procurement, Patient/Caregiver Education & Training, Strengthening, Home Management Training, Balance Training, Functional Mobility Training    Goals  Short term goals  Time Frame for Short term goals: STGs=LTGs  Long term goals  Time Frame for Long term goals : 7-10 days  Long term goal 1: Pt will complete UB dressing with mod I including C- goal met setup with c-collar 2/8  Long term goal 2: Pt will complete LB dressing with mod I- goal met 2/10  Long term goal 3: Pt will complete dry tub transfer with mod I-ongoing  Long term goal 4: Pt will complete bathing with mod I- goal met 2/8  Long term goal 5: Pt will be independent with [address] HEP to increase independence with dressing-goal met 2/9  Patient Goals   Patient goals : get stronger and increase balance       Therapy Time   Individual Concurrent Group Co-treatment   Time In 1050         Time Out 1150         Minutes 60              Second Session Therapy Time:   Individual Concurrent Group Co-treatment   Time In 1245         Time Out 1315         Minutes 30           Timed Code Treatment Minutes:  60+30    Total Treatment Minutes:  90 min    TREVOR QUINTERO [facility], OTR/L #[id number]

## 2021-02-10 NOTE — PROGRESS NOTES
Physical Therapy  Facility/Department: [facility] ACUTE REHAB UNIT  Daily Treatment Note  NAME: Jazmyne Mcqueen  : 1960  MRN: [id number]    Date of Service: 2/10/2021    Discharge Recommendations:  Home with assist PRN, Outpatient PT   PT Equipment Recommendations  Equipment Needed: Yes  Mobility Devices: Canes  Other: SPC    Assessment   Body structures, Functions, Activity limitations: Decreased functional mobility ; Decreased balance;Decreased strength;Decreased endurance  Assessment: Pt is progressing well and is well motivated to resume PLOF. Pt is functioning at S/ MI with most activities. Pt  req increased confidence with amb with SPC on the various surfaces. Pt is below his baseline and would benefit from further skilled PT to maximize safety and independence with functional mobility. Treatment Diagnosis: Decreased functional mobility  Prognosis: Good  Decision Making: Medium Complexity  PT Education: Gait Training;General Safety;Transfer Training;Energy Conservation; Functional Mobility Training; Adaptive Device Training;Precautions  Barriers to Learning: none  REQUIRES PT FOLLOW UP: Yes  Activity Tolerance  Activity Tolerance: Patient Tolerated treatment well     Patient Diagnosis(es): There were no encounter diagnoses. has a past medical history of Diabetes mellitus type 1 ([facility] [address] Hyperlipidemia, and Hypertension. has a past surgical history that includes Gastric bypass surgery and cervical laminectomy (N/A, 2021). Restrictions  Restrictions/Precautions  Required Braces or Orthoses?: Yes  Required Braces or Orthoses  Cervical: c-collar  Position Activity Restriction  Other position/activity restrictions: activity as tolerated. Cervical collar: Wear collar at all times except for showering  Subjective   General  Chart Reviewed: Yes  Additional Pertinent Hx: Pt is a 62 y/o male admitted acute LBP and possibe spinal stenosis.  [facility] Pt had C3-6 cervicl decompression on 1/28. 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 Recent sciatica 1/22/21. Admitted to ARU on 2/3. PMHx: DM, HTN. Family / Caregiver Present: No  Referring Practitioner: DO Nela  Subjective  Subjective: Pt reports that he feels as though he is doing well. Pt ststes  General Comment  Comments: Pt sitting in BS chair  when PT arrived. Pt  is agreeable to PT. Pain Screening  Patient Currently in Pain: Denies  Vital Signs  Patient Currently in Pain: Denies       Orientation  Orientation  Overall Orientation Status: Within Normal Limits  Cognition      Objective   Transfers  Sit to Stand: Independent(Transf from [facility] chair and armed chair to [facility].)  Stand to sit: Independent  Ambulation  Ambulation?: Yes  WB Status: No WB restrictions noted in chart  More Ambulation?: Yes  Ambulation 1  Surface: level tile  Device: Single point cane  Other Apparatus: (francia ZAMARRIPA)  Assistance: Supervision;Modified Independent(Progressing to MI)  Gait Deviations: Decreased step length  Distance: 550'x2 ( includes amb up and down a ramp x75' in each direction, 400' x1. Comments: PT instructed pt with stops and starts and quick reversals to challenge pt's overall balance. This included amb up and down aisles in [employer] to navigate congested areas  Stairs/Curb  Stairs?: Yes  Stairs  # Steps : 17(8+9)  Stairs Height: 6\"  Rails: Left ascending  Device: Single pt cane  Assistance: Supervision;Modified independent (Progressing to MI)  Comment: Reciprocal pattern to ascend and step to gait to descend leading with LLE 2/2 to pt report of increased L ankle pain when descending leading with RLE. As dynamic standing balance activities, PT instructed pt with amb with the following activities:   1. Dribbling a ball with R hand down the ramirez x 185'    2. Dribbling a ball with L hand down the ramirez x 185'   3.   Throwing a ball R<> L down the ramirez x 185'   4. Chest passes with another person while amb down the ramirez x 185'  5. Placing a SPC behind pt with BUES in ER and extended position to approximate upper trunk   Pt req brief seated rests between each activity. Req CGA/ SBA for all activities. Pt reported being surprised that he was able to walk w/o support of AD. Pt reports being very please with progress. Comment: Pt instructed to nitin shoes. 2/2 to pt's decreased sensation in B hands, pt struggled with shoes req additional time. Pt was able to doff gripper socks      Second Session: Pt sitting in BS Chair when PT arrived. Pt agreeable to therapy. Pt requested to go and retrieve laundry out of the dryer. Pt demo good dynamic standing balance as he retrieved clothes and placed them on a table. Hunter ~6 minutes to fold laundry without use of UE support    Bed mobility  Bridging: Independent  Supine to Sit: Independent  Sit to Supine: Independent  Comment: Bed mobility peformed on mat table and not in the bed  Transfers  Sit to Stand: Independent(Transf from BS chair , armed chair  And mat table to a [facility].)  Stand to sit: Independent  Ambulation 1  Surface: level tile  Device: Single point cane  Other Apparatus: (francia ZAMARRIPA)  Assistance: Supervision;Modified Independent(Progressing to MI)  Gait Deviations: Decreased step length  Distance: 160' x2, short distances in the gym, 36'    PT cont to review the following ex to be completed daily:   1. Bridges with UES across chest  2. SLRS   3. Alternating hip/ knee flex/ ext  Hunter 10 reps of each with therapy requesting recall of the ex multiple times. Pt returned to room and positioned in BS chair. Call light and phone placed within reach.  Chair alarm reactivated     G-Code     OutComes Score                                                     AM-PAC Score             Goals  Short term goals  Time Frame for Short term goals: STG=LTG  Short term goal 1: pt will complete sit<>stand transfer with SUP to RW-Goal achieved  Short term goal 2: pt will amb 50 feet with SUP with RW-Goal achieved  Short term goal 3: pt will complete 5 steps with unilateral use of rail and SUP-Goal achieved  Long term goals  Time Frame for Long term goals : 7-10 days  Long term goal 1: bed mobiliy independent. Goal not addressed on this date  Long term goal 2: sit<>stand independent. Goal achieved  Long term goal 3: amb 150' mod I with LRAD. Ongoing  Long term goal 4: ascend/descend 12 stairs mod I with HR.  Ongoing  Patient Goals   Patient goals : move better and improve balance    Plan    Plan  Times per week: 5x/week 90min/day  Times per day: Daily  Current Treatment Recommendations: Strengthening, Safety Education & Training, Balance Training, Endurance Training, Functional Mobility Training, Equipment Evaluation, Education, & procurement, Transfer Training, Gait Training, Stair training  Safety Devices  Type of devices: Chair alarm in place, Left in chair, Call light within reach, All fall risk precautions in place     Therapy Time   Individual Concurrent Group Co-treatment   Time In 0730         Time Out 0830         Minutes 60              Second Session Therapy Time:   Individual Concurrent Group Co-treatment   Time In 1450         Time Out 1520         Minutes 30           Timed Code Treatment Minutes:  60+30    Total Treatment Minutes:  1800 Yanni Lewis PT

## 2021-02-11 VITALS
TEMPERATURE: 98 F | HEART RATE: 88 BPM | BODY MASS INDEX: 33.98 KG/M2 | HEIGHT: 68 IN | WEIGHT: 224.21 LBS | OXYGEN SATURATION: 97 % | RESPIRATION RATE: 16 BRPM | DIASTOLIC BLOOD PRESSURE: 70 MMHG | SYSTOLIC BLOOD PRESSURE: 157 MMHG

## 2021-02-11 LAB
GLUCOSE BLD-MCNC: 118 MG/DL (ref 70–99)
GLUCOSE BLD-MCNC: 90 MG/DL (ref 70–99)
PERFORMED ON: ABNORMAL
PERFORMED ON: NORMAL

## 2021-02-11 PROCEDURE — 97535 SELF CARE MNGMENT TRAINING: CPT

## 2021-02-11 PROCEDURE — 6360000002 HC RX W HCPCS: Performed by: PHYSICAL MEDICINE & REHABILITATION

## 2021-02-11 PROCEDURE — 97530 THERAPEUTIC ACTIVITIES: CPT

## 2021-02-11 PROCEDURE — 97110 THERAPEUTIC EXERCISES: CPT | Performed by: PHYSICAL THERAPIST

## 2021-02-11 PROCEDURE — 97116 GAIT TRAINING THERAPY: CPT | Performed by: PHYSICAL THERAPIST

## 2021-02-11 PROCEDURE — 6370000000 HC RX 637 (ALT 250 FOR IP): Performed by: PHYSICAL MEDICINE & REHABILITATION

## 2021-02-11 PROCEDURE — 97530 THERAPEUTIC ACTIVITIES: CPT | Performed by: PHYSICAL THERAPIST

## 2021-02-11 PROCEDURE — 99239 HOSP IP/OBS DSCHRG MGMT >30: CPT | Performed by: PHYSICAL MEDICINE & REHABILITATION

## 2021-02-11 RX ORDER — OXYCODONE HYDROCHLORIDE 5 MG/1
5 TABLET ORAL EVERY 4 HOURS PRN
Qty: 30 TABLET | Refills: 0 | Status: SHIPPED | OUTPATIENT
Start: 2021-02-11 | End: 2021-02-14

## 2021-02-11 RX ORDER — METHOCARBAMOL 750 MG/1
750 TABLET, FILM COATED ORAL EVERY 6 HOURS PRN
Qty: 90 TABLET | Refills: 0 | Status: SHIPPED | OUTPATIENT
Start: 2021-02-11 | End: 2021-03-06

## 2021-02-11 RX ORDER — POLYETHYLENE GLYCOL 3350 17 G/17G
17 POWDER, FOR SOLUTION ORAL DAILY PRN
Qty: 527 G | Refills: 1 | Status: SHIPPED | OUTPATIENT
Start: 2021-02-11 | End: 2021-03-13

## 2021-02-11 RX ADMIN — ENOXAPARIN SODIUM 40 MG: 40 INJECTION SUBCUTANEOUS at 09:19

## 2021-02-11 RX ADMIN — OXYCODONE 10 MG: 5 TABLET ORAL at 03:44

## 2021-02-11 RX ADMIN — AMLODIPINE BESYLATE 5 MG: 5 TABLET ORAL at 09:18

## 2021-02-11 RX ADMIN — FAMOTIDINE 20 MG: 20 TABLET, FILM COATED ORAL at 09:18

## 2021-02-11 ASSESSMENT — PAIN - FUNCTIONAL ASSESSMENT: PAIN_FUNCTIONAL_ASSESSMENT: ACTIVITIES ARE NOT PREVENTED

## 2021-02-11 ASSESSMENT — PAIN SCALES - GENERAL
PAINLEVEL_OUTOF10: 0
PAINLEVEL_OUTOF10: 3

## 2021-02-11 ASSESSMENT — PAIN DESCRIPTION - DESCRIPTORS: DESCRIPTORS: DISCOMFORT;SORE

## 2021-02-11 ASSESSMENT — 9 HOLE PEG TEST: TESTTIME_SECONDS: 42.5

## 2021-02-11 ASSESSMENT — PAIN DESCRIPTION - ONSET: ONSET: ON-GOING

## 2021-02-11 ASSESSMENT — PAIN DESCRIPTION - ORIENTATION: ORIENTATION: POSTERIOR

## 2021-02-11 NOTE — PLAN OF CARE
Problem: Skin Integrity:  Goal: Will show no infection signs and symptoms  Description: Will show no infection signs and symptoms  2/11/2021 1306 by Giulia Munoz RN  Outcome: Ongoing  Note:  Removed sutures from pt's posterior incision, and incision shows no drainage, redness, warmth. Pt tolerated well. Made patients follow up appointment with neurosurgery. bibems [place] [employer] [place] responds to tactile only. + aob. No obvious s/sx of injury.

## 2021-02-11 NOTE — PROGRESS NOTES
Physical Therapy  Facility/Department: [facility] UNIT  Discharge Summary/ Daily Treatment Note  NAME: Amy Lala  : 1960  MRN: [id number]    Date of Service: 2021    Discharge Recommendations:  Home with assist PRN, Outpatient PT   PT Equipment Recommendations  Equipment Needed: Yes  Other: SPC    Assessment   Body structures, Functions, Activity limitations: Decreased functional mobility ; Decreased balance;Decreased strength;Decreased endurance  Assessment: Pt has progressed well with therapy and is now Ind with functional mobility using a SPC. Pt has achieved all of the previously set goals and is confident with his d/c to home. Pt is still below his baseline and would benefit from further skilled PT to maximize safety and independence with functional mobility. Treatment Diagnosis: Decreased functional mobility  Prognosis: Good  Decision Making: Medium Complexity  PT Education: Gait Training;General Safety;Transfer Training;Energy Conservation; Functional Mobility Training; Adaptive Device Training;Precautions  Barriers to Learning: none  REQUIRES PT FOLLOW UP: Yes  Activity Tolerance  Activity Tolerance: Patient Tolerated treatment well     Patient Diagnosis(es): The encounter diagnosis was S/P cervical spinal fusion. has a past medical history of Diabetes mellitus type 1 ([facility] [address] Hyperlipidemia, and Hypertension. has a past surgical history that includes Gastric bypass surgery and cervical laminectomy (N/A, 2021). Restrictions  Restrictions/Precautions  Required Braces or Orthoses?: Yes  Required Braces or Orthoses  Cervical: c-collar  Position Activity Restriction  Other position/activity restrictions: activity as tolerated. Cervical collar: Wear collar at all times except for showering  Subjective   General  Chart Reviewed: Yes  Additional Pertinent Hx: Pt is a 60 y/o male admitted acute LBP and possibe spinal stenosis.  Transferred from [facility] ED post fall on  to [facility]. Pt had C3-6 cervicl decompression on 1/28. 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 Recent sciatica 1/22/21. Admitted to ARU on 2/3. PMHx: DM, HTN. Family / Caregiver Present: No  Referring Practitioner: DO Nela  Subjective  Subjective: Pt states that he feels ready to return home  General Comment  Comments: Pt sitting in BS chair  when PT arrived. Pt  is agreeable to PT. Pain Screening  Patient Currently in Pain: Denies  Vital Signs  Patient Currently in Pain: Denies       Orientation  Orientation  Overall Orientation Status: Within Normal Limits  Cognition      Objective   Bed mobility  Bridging: Independent  Rolling to Left: Independent  Rolling to Right: Independent  Supine to Sit: Independent  Sit to Supine: Independent  Scooting: Independent(Both in supine and short sitting position.)  Comment: Bed mobility performed with flat bed and no railings  Transfers  Sit to Stand: Independent(Transf from BS chair, mat table, bed  and armed chair to a [facility].)  Stand to sit: Independent  Bed to Chair: Independent(SPT w/o AD)  Stand Pivot Transfers: Independent  Car Transfer: Independent   Floor to mat transf: MI ( PT instructed pt with floor<>mat transf with a demo followed by pt teach back.  Pt demo Ind   Ambulation  Ambulation?: Yes  WB Status: No WB restrictions noted in chart  More Ambulation?: Yes  Ambulation 1  Surface: level tile  Device: Single point cane  Other Apparatus: (francia ZAMARRIPA)  Assistance: Modified Independent  Gait Deviations: Decreased step length  Distance: 500' x2 ( includes amb up and down a ramp x75' in each direction)  350' x2, short distances in the gym  Comments: PT instructed pt with stops and starts and quick reversals to challenge pt's overall balance  Stairs/Curb  Stairs?: Yes  Stairs  # Steps : 17(8+9)  Stairs Height: 6\"  Rails: Left ascending  Device: Single pt cane  Assistance: Modified independent (Progressing to MI)  Comment: Reciprocal pattern to ascend and step to gait to descend leading with LLE 2/2 to pt report of increased L ankle pain when descending leading with RLE. Comment: Pt requested to use the restroom. Pt amb into the restroom with SPC. Pt managed pants. Pt stood to urinate and then side stepped to sink  to  wash hands. All completed Indly   Second session: Pt sitting in BS chair when PT arrived. Pt agreeable to therapy. Transfers  Sit to Stand: Independent(Transf from [facility] chair, mat table to [facility].)  Stand to sit: Independent  Ambulation 1  Surface: level tile  Device: Single point cane  Other Apparatus: (Aplica)  Assistance: Modified Independent  Gait Deviations: Decreased step length  Distance:360' x1  Ambulation 2( pt instructed with carrying a large therapeutic ball)   Surface: level tile  Device: No AD   Other Apparatus: (miami J)  Assistance: SBA   Distance:360' x1  Ambulation 3 bouncing a ball and catching it while walking  Surface: level tile  Device: Single point cane  Other Apparatus: (miami J)  Assistance: SBA  Distance:360' x1   PT instructed pt with stops and starts and quick reversals to challenge pt's overall balance     For dynamic standing balance activities: PT instructed pt with :  1. Alternating toe tapping on styrofoam cups ( unstable surface)  X 10 reps  2. Vestibular board with R<>L lat WS x 10 reps  3. Vestibular board Anterior <>posterior WS standing tandem with R foot in front of L foot x 10 reps  4. Vestibular board Anterior <>posterior WS standing tandem with L foot in front of R foot x 10 reps   5. Standing on dynadisc and doing the following: BUE shld flexion, B shld abd, alternating forward punches, mini squats x 10 reps of each   Pt req CGA/ SBA for the stated activities. Pt returned to room and positioned in BS chair. Call light and phone placed within reach.  Chair alarm reactivated     G-Code     OutComes Score AM-PAC Score             Goals  Short term goals  Time Frame for Short term goals: STG=LTG  Short term goal 1: pt will complete sit<>stand transfer with SUP to RW-Goal achieved  Short term goal 2: pt will amb 50 feet with SUP with RW-Goal achieved  Short term goal 3: pt will complete 5 steps with unilateral use of rail and SUP-Goal achieved  Long term goals  Time Frame for Long term goals : 7-10 days  Long term goal 1: bed mobiliy independent. Goal achieved  Long term goal 2: sit<>stand independent. Goal achieved  Long term goal 3: amb 150' mod I with LRAD. Goal achieved  Long term goal 4: ascend/descend 12 stairs mod I with HR.  Goal achieved  Patient Goals   Patient goals : move better and improve balance    Plan    Plan  Times per week: 5x/week 90min/day  Times per day: Daily  Current Treatment Recommendations: Strengthening, Safety Education & Training, Balance Training, Endurance Training, Functional Mobility Training, Equipment Evaluation, Education, & procurement, Transfer Training, Gait Training, Stair training  Safety Devices  Type of devices: Chair alarm in place, Left in chair, Call light within reach, All fall risk precautions in place     Therapy Time   Individual Concurrent Group Co-treatment   Time In 0730         Time Out 0830         Minutes 60           Second Session Therapy Time:   Individual Concurrent Group Co-treatment   Time In 1410         Time Out 1445         Minutes 35           Timed Code Treatment Minutes:  60+30    Total Treatment Minutes:  [address] Dena Ingram, PT

## 2021-02-11 NOTE — DISCHARGE SUMMARY
Physical Medicine & Rehabilitation  Discharge Summary     Patient Identification:  Hernando Gordon  : 1960  Admit date: 2/3/2021  Discharge date:  21  Attending provider: Sherly Larson DO        Primary care provider: Josep Ventura     Discharge Diagnoses:   Patient Active Problem List   Diagnosis    Contusion of left ankle    Sciatica, right side    Acute low back pain with possible spinal stenosis of less than six weeks' duration    S/P cervical spinal fusion       History of Present Illness/Acute Hospital Course:  Patient is a 61-year-old male who presented to hospital for bilateral lower extremity weakness, MRI spine did show multilevel spinal stenosis. He underwent a C3-4, C4-5, C5-6 POSTERIOR CERVICAL DECOMPRESSION, FUSION AND FIXATION with Dr. Alisa Pichardo. Today he is feeling well and would like to come to the ARU for further treatment and therapy.        Inpatient Rehabilitation Course:   Hernando Gordon is a 61 y.o. male admitted to inpatient rehabilitation on 2/3/2021 with spinal stenosis. The patient participated in an aggressive multidisciplinary inpatient rehabilitation program involving 3 hours of therapy per day, at least 5 days per week. Impairments: Decreased functional mobility    Medical Management:  Cervical spinal central canal stenosis    Lumbo-sacral spinal canal stenosis  Bilateral Lower extremity weakness and numbness  - Neurosurgery following  -Status post C3-6 posterior decompression/fusion/fixation- successful   - Pain control  - Requires extensive PT/OT in ARU setting      UTI  -Completed Rocephin course  - Follow up      Diabetes mellitus  - LSSI     HTN  - Continue home amlodipine     HLD  - Continue home statin       Discharge Exam:  Constitutional: Alert, WDWN, Pleasant, no distress  Head: Normocephalic, atruamatic, MMM  Eyes: Conjunctiva noninjected, no icterus, no drainage  Pulm: CTA bilat. Respirations non-labored. CV: No murmurs noted. RRR.    Abd: Soft, nontender. NABS+  Ext: No edema, no varicosities  Neuro: Alert, fully oriented, appropriate   MSK: No joint abnormalities noted       Discharge Functional Status:    Physical therapy:  Bed Mobility: Scooting: Supervision(VC for step by step technique including lat WS to advance hips to the EOS when in short sitting.)  Transfers: Sit to Stand: Independent(Transf from [facility] chair and armed chair to a [facility].)  Stand to sit: Independent  Comment: Pt instructed to nitin shoes. 2/2 to pt's decreased sensation in B hands, pt struggled with shoes req additional time. Pt was able to doff gripper socks, WB Status: No WB restrictions noted in chart  Ambulation 1  Surface: level tile  Device: Single point cane  Other Apparatus: (francia ZAMARRIPA)  Assistance: Supervision, Modified Independent(Progressing to MI)  Gait Deviations: Decreased step length  Distance: 550' ( includes amb up and down a ramp x75' in each direction, 400' x1. Comments: PT instructed pt with stops and starts and quick reversals to challenge pt's overall balance, Stairs  # Steps : 17(8+9)  Stairs Height: 6\"  Rails: Left ascending  Device: Single pt cane  Assistance: Supervision, Modified independent (Progressing to MI)  Comment: Reciprocal pattern to ascend and step to gait to descend leading with LLE 2/2 to pt report of increased L ankle pain when descending leading with RLE. Mobility:  , PT Equipment Recommendations  Equipment Needed: Yes  Mobility Devices: Canes  Other: SPC, Assessment: Pt is progressing well and is well motivated to resume PLOF. Pt is functioning at S/ MI with most activities. Pt  req increased confidence with amb with SPC on the various surfaces. Pt is below his baseline and would benefit from further skilled PT to maximize safety and independence with functional mobility.     Occupational therapy:  ,  , Assessment: Patient continues to demonstrate improvements with ADLs and bathroom mobility, and is able to complete IADL task of laundry with SBA and slight LOB. Would continue to benefit from increased UE/hand strengthening and coordination to increase ease and success with further IADL tasks. Would continue to benefit from OT services, cont POC. Speech therapy:         Significant Diagnostics:   Lab Results   Component Value Date    CREATININE 0.9 02/10/2021    BUN 16 02/10/2021     02/10/2021    K 3.9 02/10/2021     02/10/2021    CO2 27 02/10/2021       Lab Results   Component Value Date    WBC 4.6 02/10/2021    HGB 11.2 (L) 02/10/2021    HCT 32.7 (L) 02/10/2021    MCV 97.7 02/10/2021     02/10/2021       Disposition:  home    Services:PT/OT  DME: walker    Discharge Condition: Stable    Follow-up:  See after visit summary from hospitalization    Discharge Medications:     Medication List      ASK your doctor about these medications    amLODIPine 5 MG tablet  Commonly known as: NORVASC     aspirin 81 MG EC tablet     famotidine 20 MG tablet  Commonly known as: PEPCID  Take 1 tablet by mouth 2 times daily     metFORMIN 500 MG extended release tablet  Commonly known as: GLUCOPHAGE-XR     methocarbamol 750 MG tablet  Commonly known as: ROBAXIN  Take 1 tablet by mouth 3 times daily as needed (muscle spasms)     omeprazole 40 MG delayed release capsule  Commonly known as: PRILOSEC     * oxyCODONE 5 MG immediate release tablet  Commonly known as: ROXICODONE  Take 1 tablet by mouth every 4 hours as needed for Pain for up to 3 days. Ask about: Should I take this medication? * oxyCODONE HCl 10 MG immediate release tablet  Commonly known as: OXY-IR  Take 1 tablet by mouth every 4 hours as needed for Pain for up to 3 days. Ask about: Should I take this medication?     valsartan 160 MG tablet  Commonly known as: DIOVAN         * This list has 2 medication(s) that are the same as other medications prescribed for you. Read the directions carefully, and ask your doctor or other care provider to review them with you.                   I spent over 35 minutes on this discharge encounter between counseling, coordination of care, and medication reconciliation. To comply with [facility] yanira [id number].[id number]:   Discharge order placed in advance to facilitate patients discharge needs.       Philippe Rondon, DO

## 2021-02-11 NOTE — PROGRESS NOTES
Restriction  Other position/activity restrictions: activity as tolerated. Cervical collar: Wear collar at all times except for showering     Subjective   General  Chart Reviewed: Yes  Patient assessed for rehabilitation services?: Yes  Additional Pertinent Hx: Pt is a 62 y/o male admitted acute LBP and possibe spinal stenosis. Transferred from [facility] ED post fall on 1/26 to [facility] had C3-6 cervicl decompression on 1/28. 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 Recent sciatica 1/22/21. Admitted to ARU on 2/3. PMHx: DM, HTN, gastric bypass sx  Response to previous treatment: Patient with no complaints from previous session  Family / Caregiver Present: No  Referring Practitioner: Dr Michele Doshi  Diagnosis: C3-4, C4-5, C5-6 POSTERIOR CERVICAL DECOMPRESSION, FUSION AND FIXATION  Subjective  Subjective: Patient seated in chair upon arrival, pleasant and agreeable to OT session. General Comment  Comments: Ruchi Rad Vital Signs  Patient Currently in Pain: Yes(4/10 surgical neck pain)     Orientation  Orientation  Overall Orientation Status: Within Normal Limits  Objective    ADL  Grooming: Modified independent (oral hygiene in stance at sink)  UE Bathing: Modified independent   LE Bathing: Modified independent (seated on TTB and in stance)  UE Dressing: Modified independent (to don t shirt and sweater, including doffing/donning c-collar, use of mirror in stance to tighten velcro straps)  LE Dressing: Modified independent   Toileting: Modified independent (continent of bowel and bladder; pericare hygiene seated)        Balance  Sitting Balance: Independent  Standing Balance: Modified independent   Standing Balance  Time: ~15 minutes total  Activity: functional mobility to/from bathroom; to/from therapy gym; in hallway  Functional Mobility  Functional - Mobility Device: Cane  Activity: To/from bathroom; To/From therapy gym  Assist Level: Modified independent     Toilet Transfers  Toilet - Technique: Ambulating  Equipment Used: Standard toilet  Toilet Transfer: Modified independent  Toilet Transfers Comments: completed with grab bar  Tub Transfers  Tub - Transfer From: [employer] - Transfer Type: To and From  Tub - Transfer To: Transfer tub bench  Tub - Technique: Ambulating  Tub Transfers: Modified independence  Tub Transfers Comments: Pt completed dry tub transfer to simulate home envrionment as pt received a TTB for d/c, pt able to complete with VCs for sit<>swivel tech without difficulty managing BLEs in/out of tub, pt able to stand from TTB without difficulty  Shower Transfers  Shower - Transfer From: [employer] - Transfer Type: To and From  Shower - Transfer To:  Transfer tub bench  Shower - Technique: Ambulating  Shower Transfers: Modified independence     Transfers  Sit to stand: Modified independent  Stand to sit: Modified independent                       Cognition  Overall Cognitive Status: WNL                             Left Hand Strength -  (lbs)  Handle Setting 2: 63  Left Hand Strength - Pinch (lbs)  Lateral: 18  Tip: 16  Palmar 3 point: 17  Left 9-Hole Peg Test  Left 9-Hole Peg Test: Impaired  Right Hand Strength -  (lbs)  Handle Setting 2: 68  Right Hand Strength - Pinch (lbs)  Lateral: 24  Tip: 22  Palmar 3 point: 21  Right 9-Hole Peg Test  Right 9-Hole Peg Test: Impaired  Fine Motor Skills  Left 9-Hole Peg Test: Impaired  Left 9 Hole Peg Test Time (secs): 42.5  Right 9-Hole Peg Test: Impaired  Right 9 Hole Peg Test Time (secs): 29.2  Fine Motor Comment: Pt demonstrated difficulty picking up pegs as evidenced by droping pegs x3 times during assessment and required + time for in-hand manipulation, however pt demonstrated significant improvement from evaluation in which pt required over 1 minute bilaterally to complete NHPT        Interventions completed over 2 sessions with 2nd session reassessing NHPT and grasp strength as well as dry tub transfer. Assist levels and measurements stated above.      Plan   Plan  Times per week: 90 mintes per day 5 days/week  Times per day: Daily  Current Treatment Recommendations: Endurance Training, Safety Education & Training, Self-Care / ADL, Equipment Evaluation, Education, & procurement, Patient/Caregiver Education & Training, Strengthening, Home Management Training, Balance Training, Functional Mobility Training  G-Code     OutComes Score                                                  AM-PAC Score             Goals  Short term goals  Time Frame for Short term goals: STGs=LTGs  Long term goals  Time Frame for Long term goals : 7-10 days  Long term goal 1: Pt will complete UB dressing with mod I including C- goal met setup with c-collar 2/8  Long term goal 2: Pt will complete LB dressing with mod I- goal met 2/10  Long term goal 3: Pt will complete dry tub transfer with mod I-goal met 2/11  Long term goal 4: Pt will complete bathing with mod I- goal met 2/8  Long term goal 5: Pt will be independent with [facility] HEP to increase independence with dressing-goal met 2/9  Patient Goals   Patient goals : get stronger and increase balance       Therapy Time   Individual Second Session Group Co-treatment   Time In 1100  1345       Time Out 1200  1415       Minutes 60  30       Timed Code Treatment Minutes: 60 Minutes + 30 Minutes  Total Treatment Minutes: [address] Loi Fernandes, OT

## 2021-02-11 NOTE — PROGRESS NOTES
Department of Physical Medicine & Rehabilitation  Progress Note    Patient Identification:  Thais Lopez  [id number]  : 1960  Admit date: 2/3/2021    Chief Complaint: NTSCI s/p fixation     Subjective:   Continues to improve with physical therapy. Reports stable neck soreness and will have sutures removed today. Has no new complaints. ROS: No f/c, n/v, cp     Objective:  Patient Vitals for the past 24 hrs:   BP Temp Temp src Pulse Resp SpO2   02/10/21 1945 (!) 154/79 98.1 °F (36.7 °C) Oral 74 16 97 %     Const: Alert. No distress, pleasant. HEENT: Normocephalic, atraumatic. Normal sclera/conjunctiva. MMM. CV: Regular rate and rhythm. Resp: No respiratory distress. Lungs CTAB. Abd: Soft, nontender, nondistended, NABS+   Ext: No edema. Neuro: Alert, oriented, appropriately interactive. Psych: Cooperative, appropriate mood and affect    Laboratory data: Available via EMR. Last 24 hour lab  Recent Results (from the past 24 hour(s))   POCT Glucose    Collection Time: 02/10/21 11:50 AM   Result Value Ref Range    POC Glucose 94 70 - 99 mg/dl    Performed on ACCU-CHEK    POCT Glucose    Collection Time: 02/10/21  5:38 PM   Result Value Ref Range    POC Glucose 107 (H) 70 - 99 mg/dl    Performed on ACCU-CHEK    POCT Glucose    Collection Time: 02/10/21  7:53 PM   Result Value Ref Range    POC Glucose 175 (H) 70 - 99 mg/dl    Performed on ACCU-CHEK    POCT Glucose    Collection Time: 21  8:34 AM   Result Value Ref Range    POC Glucose 118 (H) 70 - 99 mg/dl    Performed on ACCU-CHEK        Therapy progress:  PT  Required Braces or Orthoses  Cervical: c-collar  Position Activity Restriction  Other position/activity restrictions: activity as tolerated. Cervical collar: Wear collar at all times except for showering  Objective     Sit to Stand: Independent(Transf from [facility] chair and armed chair to [facility].)  Stand to sit:  Independent  Device: Single point cane  Other Apparatus: (miami J) Assistance: Supervision, Modified Independent(Progressing to MI)  Distance: 550' ( includes amb up and down a ramp x75' in each direction, 400' x1.  OT  PT Equipment Recommendations  Equipment Needed: Yes  Mobility Devices: Canes  Other: SPC  Toilet - Technique: Ambulating  Equipment Used: Standard toilet  Toilet Transfers Comments: completed with grab bar  Assessment        SLP          Body mass index is 34.09 kg/m².     Assessment and Plan:     Assessment and Plan:  Cervical spinal central canal stenosis    Lumbo-sacral spinal canal stenosis  Bilateral Lower extremity weakness and numbness  - Neurosurgery following  -Status post C3-6 posterior decompression/fusion/fixation- successful   - Pain control  - Requires extensive PT/OT in ARU setting      UTI  -Completed Rocephin course  - Follow up      Diabetes mellitus  - LSSI     HTN  - Continue home amlodipine     HLD  - Continue home statin       Rehab Progress: Improving  Anticipated Dispo: home  Services/DME: CEDRIC  ELOS: CEDRIC Castillo D.O. MCherP.H  PM&R  2/11/2021  8:41 AM

## 2021-02-11 NOTE — PROGRESS NOTES
Shift assessment complete. VSS, A/Ox4, fall precautions in place. Call light in reach. Pt c/o discomfort in neck. Was given robaxin for pain. Pt resting comfortably in bed.  Will continue to monitor      Vitals:    02/10/21 1945   BP: (!) 154/79   Pulse: 74   Resp: 16   Temp: 98.1 °F (36.7 °C)   SpO2: 97%

## 2021-02-11 NOTE — PLAN OF CARE
Problem: Skin Integrity:  Goal: Will show no infection signs and symptoms  Description: Will show no infection signs and symptoms  Outcome: Ongoing  Note: No s/s of infection     Problem: Pain:  Goal: Control of acute pain  Description: Control of acute pain  2/10/2021 2332 by Douglas Rondon RN  Outcome: Ongoing  Note: Pt's pain is controlled with prn pain meds. Problem: Falls - Risk of:  Goal: Will remain free from falls  Description: Will remain free from falls  Outcome: Ongoing  Note: Pt has remained free from falls during shift. Pt calls out appropriately for needs. Transfers w/ rw and gb.  Bed alarm is set and room free of clutter

## 2021-02-11 NOTE — DISCHARGE INSTR - COC
Continuity of Care Form    Patient Name: Dana Brar   :  1960  MRN:  [id number]    Admit date:  2/3/2021  Discharge date:  2021    Code Status Order: Full Code   Advance Directives:   Advance Care Flowsheet Documentation       Date/Time Healthcare Directive Type of Healthcare Directive Copy in [address] Agent's Name Healthcare Agent's Phone Number    21 1746  No, patient does not have an advance directive for healthcare treatment -- -- -- -- --            Admitting Physician:  Otto Fleischer, DO  PCP: [facility]    [facility]  Discharging Unit Phone Number: [phone]    Emergency Contact:   Extended Emergency Contact Information  Primary Emergency Contact: Bharatxenia 59, [address] Phone: [phone]  Relation: Brother/Sister  Secondary Emergency Contact: Gennett Listen  Mobile Phone: [phone]  Relation: Child  Preferred language: English   needed? No    Past Surgical History:  Past Surgical History:   Procedure Laterality Date    CERVICAL LAMINECTOMY N/A 2021    C3-4, C4-5, C5-6 POSTERIOR CERVICAL DECOMPRESSION, FUSION AND FIXATION performed by Guillermina Hyde MD at [address] [place] [address]         Immunization History: There is no immunization history for the selected administration types on file for this patient. Active Problems:  Patient Active Problem List   Diagnosis Code    Contusion of left ankle S90. 02XA    Sciatica, right side M54.31    Acute low back pain with possible spinal stenosis of less than six weeks' duration M54.5    S/P cervical spinal fusion Z98.1       Isolation/Infection:   Isolation            No Isolation          Patient Infection Status       Infection Onset Added Last Indicated Last Indicated By Review Planned Expiration Resolved Resolved By    None active    Resolved    COVID-19 Rule Out 21 COVID-19 (Ordered)   02/10/21 Rodriguez Mora RN    COVID-19 Rule Out 21 COVID-19 (Ordered)   21 Rule-Out Test Resulted            Nurse Assessment:  Last Vital Signs: BP (!) 157/70   Pulse 88   Temp 98 °F (36.7 °C) (Oral)   Resp 16   Ht 5' 8\" (1.727 m)   Wt 224 lb 3.3 oz (101.7 kg)   SpO2 97%   BMI 34.09 kg/m²     Last documented pain score (0-10 scale): Pain Level: 0  Last Weight:   Wt Readings from Last 1 Encounters:   21 224 lb 3.3 oz (101.7 kg)     Mental Status:  oriented and alert    IV Access:  - None    Nursing Mobility/ADLs:  Walking   Independent  Transfer  Independent  Bathing  Independent  Dressing  Independent  Toileting  Independent  Feeding  Independent  Med Admin  Independent  Med Delivery   whole    Wound Care Documentation and Therapy:        Elimination:  Continence:   · Bowel: {YES   · Bladder: {YES   Urinary Catheter: None   Colostomy/Ileostomy/Ileal Conduit: NO       Date of Last BM:2/10/2021     Intake/Output Summary (Last 24 hours) at 2021 1336  Last data filed at 2/10/2021 1738  Gross per 24 hour   Intake 240 ml   Output --   Net 240 ml     I/O last 3 completed shifts:   In: 480 [P.O.:480]  Out: -     Safety Concerns:     History of Falls (last 30 days)    Impairments/Disabilities:      None    Nutrition Therapy:  Current Nutrition Therapy:   - Oral Diet:  Carb Control 4 carbs/meal (1800kcals/day)    Routes of Feeding: Oral  Liquids: {Slp liquid thickness:48801}  Daily Fluid Restriction: no  Last Modified Barium Swallow with Video (Video Swallowing Test): not done    Treatments at the Time of Hospital Discharge:   Respiratory Treatments: ***  Oxygen Therapy:  {Therapy; copd oxygen:18420}  Ventilator:    { CHAZ Vent GDPB:683446912}    Rehab Therapies: {THERAPEUTIC INTERVENTION:2107293007}  Weight Bearing Status/Restrictions: {[facility] Weight Bearin}  Other Medical Equipment (for information only, NOT a DME order): {EQUIPMENT:409129885}  Other Treatments: ***    Patient's personal belongings (please select all that are sent with patient):  None    RN SIGNATURE:  Electronically signed by Adam Mcginnis RN on 2/11/21 at 3:10 PM EST    CASE MANAGEMENT/SOCIAL WORK SECTION    Inpatient Status Date: ***    Readmission Risk Assessment Score:  Readmission Risk              Risk of Unplanned Readmission:        13           Discharging to Facility/ Agency   · Name:   · Address:  · Phone:  · Fax:    Dialysis Facility (if applicable)   · Name:  · Address:  · Dialysis Schedule:  · Phone:  · Fax:    / signature: {Esignature:[id number]}    PHYSICIAN SECTION    Prognosis: Good    Condition at Discharge: Stable    Rehab Potential (if transferring to Rehab): Good    Recommended Labs or Other Treatments After Discharge: PT/OT    Physician Certification: I certify the above information and transfer of Esme Mg  is necessary for the continuing treatment of the diagnosis listed and that he requires Home Care for greater 30 days.      Update Admission H&P: No change in H&P    PHYSICIAN SIGNATURE:  Electronically signed by Percival Bamberger, DO on 2/11/21 at 2:42 PM EST

## 2021-02-24 ENCOUNTER — OFFICE VISIT (OUTPATIENT)
Dept: ORTHOPEDIC SURGERY | Age: 61
End: 2021-02-24
Payer: COMMERCIAL

## 2021-02-24 VITALS
HEIGHT: 68 IN | SYSTOLIC BLOOD PRESSURE: 138 MMHG | TEMPERATURE: 97.8 F | HEART RATE: 74 BPM | BODY MASS INDEX: 33.95 KG/M2 | WEIGHT: 224 LBS | DIASTOLIC BLOOD PRESSURE: 82 MMHG

## 2021-02-24 DIAGNOSIS — S93.402A SPRAIN OF LEFT ANKLE, UNSPECIFIED LIGAMENT, INITIAL ENCOUNTER: Primary | ICD-10-CM

## 2021-02-24 DIAGNOSIS — M54.31 RIGHT SIDED SCIATICA: ICD-10-CM

## 2021-02-24 PROCEDURE — 99214 OFFICE O/P EST MOD 30 MIN: CPT | Performed by: ORTHOPAEDIC SURGERY

## 2021-02-24 NOTE — PROGRESS NOTES
CHIEF COMPLAINT:   1- Left ankle pain/ Ankle sprain. 2- Right posterior hip pain/  Sciatica. DATE OF INJURY: 12/11/2020, Worker's Comp. HISTORY:  Esme Mg 61 y.o.  male presents today for follow-up visit for evaluation of a left ankle and right hip injury which occurred when he was at work and a pipe hit his left ankle and fell on right hip. He is complaining of lateral ankle pain and posterior right hip pain. He initially went to [facility] ER 12/15/2020 because of the pain. He was told that he has a sprain, and asked to f/u with Orthopedics. He reports today moderate pain and thinks his pain is worse since his swelling is worse. Pain increase with walking and decrease with rest and elevation. He also here for evaluation of right posterior hip pain that radiate to the leg. He reports that the pain is worse with activity and better with rest.  He reports that the pain is aching and dull in nature. He did have some improvement with the prednisone taper. Since his last visit, he had a cervical spine surgery at [facility] a couple weeks ago and is in a c-collar. He has a h/o LBP as an old Worker's Comp injury. No numbness or tingling sensation, and no other complaint.       Past Medical History:   Diagnosis Date    Diabetes mellitus type 1 ([address]     Hyperlipidemia     Hypertension        Past Surgical History:   Procedure Laterality Date    CERVICAL LAMINECTOMY N/A 1/28/2021    C3-4, C4-5, C5-6 POSTERIOR CERVICAL DECOMPRESSION, FUSION AND FIXATION performed by Charles Holland MD at [address] History     Socioeconomic History    Marital status:      Spouse name: Not on file    Number of children: Not on file    Years of education: Not on file    Highest education level: Not on file   Occupational History    Not on file   Social Needs    Financial resource strain: Not on file    Food insecurity     Worry: Not on file Inability: Not on file    Transportation needs     Medical: Not on file     Non-medical: Not on file   Tobacco Use    Smoking status: Never Smoker    Smokeless tobacco: Never Used   Substance and Sexual Activity    Alcohol use: Yes     Alcohol/week: 4.0 standard drinks     Types: 2 Cans of beer, 2 Shots of liquor per week    Drug use: No    Sexual activity: Not on file   Lifestyle    Physical activity     Days per week: Not on file     Minutes per session: Not on file    Stress: Not on file   Relationships    Social connections     Talks on phone: Not on file     Gets together: Not on file     Attends Yarsani service: Not on file     Active member of club or organization: Not on file     Attends meetings of clubs or organizations: Not on file     Relationship status: Not on file    Intimate partner violence     Fear of current or ex partner: Not on file     Emotionally abused: Not on file     Physically abused: Not on file     Forced sexual activity: Not on file   Other Topics Concern    Not on file   Social History Narrative    Not on file       History reviewed. No pertinent family history. Current Outpatient Medications on File Prior to Visit   Medication Sig Dispense Refill    polyethylene glycol (GLYCOLAX) 17 g packet Take 17 g by mouth daily as needed for Constipation 527 g 1    methocarbamol (ROBAXIN) 750 MG tablet Take 1 tablet by mouth every 6 hours as needed (spasticity) 90 tablet 0    famotidine (PEPCID) 20 MG tablet Take 1 tablet by mouth 2 times daily 60 tablet 3    amLODIPine (NORVASC) 5 MG tablet Take 5 mg by mouth daily      metFORMIN (GLUCOPHAGE-XR) 500 MG extended release tablet Take 500 mg by mouth daily (with breakfast)       No current facility-administered medications on file prior to visit. Pertinent items are noted in HPI  Review of systems reviewed from Patient History Form dated on 1/22/2021 and available in the patient's chart under the Media tab.  No change noted.        PHYSICAL EXAMINATION:  Mr. Mitchell Thayer is a very pleasant 61 y.o.  male who presents today in no acute distress, awake, alert, and oriented. He is well dressed, nourished and  groomed. Patient with normal affect. Height is  5' 8\" (1.727 m), weight is 224 lb (101.6 kg), Body mass index is 34.06 kg/m². Resting respiratory rate is 16. Examination of the gait, showed that the patient walks with a limp, WB left leg . Examination of both ankles showing a decreased range of motion of the left ankle compare to the other side because of pain. There is moderate 2-3+ pitting edema bilateral lower extremities. He has intact sensation and good pedal pulses. He has mild tenderness on deep palpation over the left ankle ATF. The ankles are stable to drawer test bilaterally, equally.  Ankle reflex 1+ bilaterally. He is nontender to palpation at the lateral aspect of either hip overlying the greater trochanteric bursa. Motor strength is 5/5 throughout both lower extremities. He has a positive straight leg raise at 80 degree. The skin overlying the right hip is intact without evidence of scar, lesion, laceration or abrasion. Distal pulses are 2+ and symmetric bilaterally. Sensation is grossly intact to light touch and symmetric over the bilateral lower extremities. IMAGING:  Xray's taken 1/25/2021 in the office, were reviewed, 3 views of the left ankle, and showed no acute fracture. Ankle mortise in anatomic position. Xrays taken 1/25/2021 in the office were reviewed. There is an AP view of the pelvis, and AP and lateral view of the right hip which demonstrates no significant arthritis. IMPRESSION:    1- Left ankle pain/ Ankle sprain. 2- Right posterior hip pain/  Sciatica. PLAN: For the ankle: I assured the patient that the xray is negative for acute fracture.  The xray findings were reviewed with the patient and explained to his that the pain is 2ry to ankle sprain, and that it should continue to improve the next few weeks. He can be WBAT and work on peroneal muscle strength. Compression hose to help with the swelling. I discussed with the patient that I think that he would really benefit from a course of physical therapy for further strengthening and stretching. An Rx for physical therapy was given to the patient. Follow-up in 2 months. For the sciatica: I have discussed the normal course and history of the sciatica condition with the patient, and various treatment options. We also discussed the use of NSAIDs and tylenol, as well as risks and benefits of PIERRE. F/U with our spine team if needed.       Anjelica Rosas MD

## 2021-02-27 PROBLEM — S93.402A SPRAIN OF LEFT ANKLE: Status: ACTIVE | Noted: 2021-02-27

## 2021-03-02 ENCOUNTER — HOSPITAL ENCOUNTER (OUTPATIENT)
Dept: PHYSICAL THERAPY | Age: 61
Setting detail: THERAPIES SERIES
Discharge: HOME | End: 2021-03-02
Payer: MEDICAID

## 2021-03-22 ENCOUNTER — HOSPITAL ENCOUNTER (OUTPATIENT)
Dept: PHYSICAL THERAPY | Age: 61
Setting detail: THERAPIES SERIES
Discharge: HOME | End: 2021-03-22
Payer: MEDICAID

## 2021-03-22 PROCEDURE — 97110 THERAPEUTIC EXERCISES: CPT

## 2021-03-22 PROCEDURE — 97163 PT EVAL HIGH COMPLEX 45 MIN: CPT

## 2021-03-22 PROCEDURE — G0283 ELEC STIM OTHER THAN WOUND: HCPCS

## 2021-03-22 NOTE — FLOWSHEET NOTE
[address] [facility] [place]. 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 Torie Boucher on 3/22/21    Date of Patient follow up with Physician:      Progress Report: []  Yes  [x]  No     Date Range for reporting period:  Beginning:  3/22/2021  Ending:      Progress report due (10 Rx/or 30 days whichever is less):     Recertification due (POC duration/ or 90 days whichever is less):      Visit # POC/Insurance Allowable Auth Needed   1 12 []Yes    [x]No     Functional Outcomes Measure:   Date Assessed:  Test: NDI  Score: 35    Pain level:  5/10     History of Injury: Pt reports that his \"legs went out on him and  he couldn't stand or walk\". This happened very suddenly and he came here to [facility] to ED and they sent him to [facility]. He had surgery at [facility] by Dr. Torie Boucher. on 21. He is now able to walk and is here for therapy.     SUBJECTIVE:  See eval     OBJECTIVE: See eval   Observation:    Test measurements:      RESTRICTIONS/PRECAUTIONS:     Exercises/Interventions:   Therapeutic Ex (84847)  Min: Resistance/Reps Cues/Notes   Seated cervical cervical retraction 5 sec x 10 Instructed for HEP   Chin tuck supine  Instructed for HEP   Upper trap stretch 5 sec x 10 Instructed for HEP        Manual Intervention  (62752)  Min: Gentle  STM over incision but pt is very sensitive              NMR re-education (25991)  Min:               Therapeutic Activity (47316)  Min:               Modalities:  Min     IFC w ith MHP Seated   X 15 min to neck           Other Therapeutic Activities:  Pt was educated on PT POC, Diagnosis, Prognosis, pathomechanics as well as frequency and duration of scheduling future physical therapy appointments. Time was also taken on this day to answer all patient questions and participation in PT. Reviewed appointment policy in detail with patient and patient verbalized understanding. Home Exercise Program:    Therapeutic Exercise and NMR EXR  [] (09803) Provided verbal/tactile cueing for activities related to strengthening, flexibility, endurance, ROM  for improvements in scapular, scapulothoracic and UE control with self care, reaching, carrying, lifting, house/yardwork, driving/computer work.    [] (01334) Provided verbal/tactile cueing for activities related to improving balance, coordination, kinesthetic sense, posture, motor skill, proprioception  to assist with  scapular, scapulothoracic and UE control with self care, reaching, carrying, lifting, house/yardwork, driving/computer work. Therapeutic Activities:    [] (94701 or 76759) Provided verbal/tactile cueing for activities related to improving balance, coordination, kinesthetic sense, posture, motor skill, proprioception and motor activation to allow for proper function of scapular, scapulothoracic and UE control with self care, carrying, lifting, driving/computer work.      Home Exercise Program:    [x] (02996) Reviewed/Progressed HEP activities related to strengthening, flexibility, endurance, ROM of scapular, scapulothoracic and UE control with self care, reaching, carrying, lifting, house/yardwork, driving/computer work  [] (66933) Reviewed/Progressed HEP activities related to improving balance, coordination, kinesthetic sense, posture, motor skill, proprioception of scapular, scapulothoracic and UE control with self care, reaching, carrying, lifting, house/yardwork, driving/computer work      Manual Treatments:  PROM / STM / Oscillations-Mobs:  G-I, II, III, IV (PA's, Inf., Post.)  [x] (70754) Provided manual therapy to mobilize soft tissue/joints of cervical/CT, scapular GHJ and UE for the purpose of modulating pain, promoting relaxation,  increasing ROM, reducing/eliminating soft tissue swelling/inflammation/restriction, improving soft tissue extensibility and allowing for proper ROM for normal function with self care, reaching, carrying, lifting, house/yardwork, driving/computer work    Charges:  Timed Code Treatment Minutes: 15   Total Treatment Minutes: 60       [] EVAL (LOW) 35498 (typically 20 minutes face-to-face)  [] EVAL (MOD) 04433 (typically 30 minutes face-to-face)  [x] EVAL (HIGH) 95983 (typically 45 minutes face-to-face)  [] RE-EVAL     [x] VY(14866) x  1   [] Dry needle 1 or 2 Muscles (73604)  [] NMR (43083) x     [] Dry needle 3+ Muscles (24684)  [] Manual (49614) x     [] Ultrasound (91865) x  [] TA (99442) x     [] Mech Traction (32043)  [] ES(attended) (10122)     [x] ES (un) (61147): 15 min  [] Vasopump (66329) [] Ionto (17290)   [] Other:    If [facility] Please Indicate Time In/Out  CPT Code Time in Time out                                   Risa Pfeiffer stated goal: \"To get my life together and get my walking and feeling in my fingers\"  []? Progressing: []? Met: []? Not Met: []? Adjusted     Therapist goals for Patient:   Short Term Goals: To be achieved in: 2 weeks  1. Independent in HEP and progression per patient tolerance, in order to prevent re-injury. []? Progressing: []? Met: []? Not Met: []? Adjusted  2. Patient will have a decrease in pain to facilitate improvement in movement, function, and ADLs as indicated by Functional Deficits. []? Progressing: []? Met: []? Not Met: []?  Adjusted     Long Term follow up with physician  [] Other    Prognosis for POC: [x] Good [] Fair  [] Poor    Patient requires continued skilled intervention: [x] Yes  [] No      PLAN: See eval  [] Continue per plan of care [] Alter current plan (see comments)  [x] Plan of care initiated [] Hold pending MD visit [] Discharge    Electronically signed by: Raulito Guillermina, PT     Note: If patient does not return for scheduled/recommended follow up visits, this note will serve as a discharge from care along with the most recent update on progress.

## 2021-03-22 NOTE — PLAN OF CARE
[facility] - Outpatient Rehabilitation & Therapy  [address] [place] [address]  Phone: [phone]   Fax:     [phone]          Physical Therapy Certification    Dear Referring Practitioner: Emmie Estrada MD,    We had the pleasure of evaluating the following patient for physical therapy services at [facility]. A summary of our findings can be found in the initial assessment below. This includes our plan of care. If you have any questions or concerns regarding these findings, please do not hesitate to contact me at the office phone number checked above.   Thank you for the referral.       Physician Signature:_______________________________Date:__________________  By signing above (or electronic signature), therapists plan is approved by physician            Patient: Matthew Hillman   : 1960   MRN: [id number]  Referring Physician: Referring Practitioner: Emmie Estrada MD      Evaluation Date: 3/22/2021      Medical Diagnosis Information:  Diagnosis: Post ACDF, IDC 10-Z98.1   Treatment Diagnosis: Pain in neck, difficulty with walking, ankle pain, numbness and tingling in hands, weakness in legs                                         Insurance information: PT Insurance Information: Ishan Jung    Precautions/ Contra-indications:   Latex Allergy:  [x]NO      []YES  Preferred Language for Healthcare:   [x]English       []other:    C-SSRS Triggered by Intake questionnaire (Past 2 wk assessment ):   [x] No, Questionnaire did not trigger screening.   [] Yes, Patient intake triggered C-SSRS Screening      [] C-SSRS Screening completed  [] PCP notified via Epic     SUBJECTIVE: Patient stated complaint: \"Stiffness in my neck\"    Relevant Medical History:Additional Pertinent Hx: PLOF: Independent  Functional Outcomes Measure: NDI= 35/CL    Pain Scale: 5/10  Easing factors: Nothing  Provocative factors: Looking down    Type: [x]Constant   []Intermittent  []Radiating []Localized []other:     Numbness/Tingling: Fingertips of both hands    Occupation/School: Not working at this time.     Living Status/Prior Level of Function: Independent with ADLs and IADLs,     OBJECTIVE:   Posture: Erect stance, forward head    Functional Mobility/Transfers:  Some difficulty, but can do it independently     Palpation: Tenderness and soreness on posterior neck incision    Bandages/Dressings/Incisions: 3 1/4 inch on  posterior neck  Gait: (include devices/WB status): WNL  With cane due to leg weakness    CERV ROM     Cervical Flexion 30    Cervical Extension 21     Left Right   Cervical SB 34 25   Cervical rotation 29 36   Quadrant     Dorsal Glide WNL WML    UE ROM Left Right   Shoulder Flex 150 160   Shoulder Abd 140 150   Shoulder ER 70 70   Shoulder IR 20 10   Elbow flex/ext WNL WNL   Wrist flex/ext/pro/sup WNL WNL   Finger flex/ext/opposition WNL WNL   Shoulder AROM WNL w OP     UE Strength  Left Right   Shoulder Flex 3+/5 3+/5   Shoulder Scap     Shoulder ABd (C5 Axillary) 4/5 4/5   Shoulder ER  4/5 4/5   Shoulder IR 4/5 4/5   Elbow Flex (C5 Musc) 4/5 4/5   Elbow Ext (C7 Radial) 4/5 4/5   Wrist Flex (C6 Radial)     Wrist Ext (C7 Radial)     Finger flex (C8 median)     Finger ext (C7 Radial-PIN)     APB (T1 Median)     Finger Abd (T1 Ulnar)     UE myotomes WNL        Reflexes Normal Abnormal Comments               S1-2 Seated achilles [] []    S1-2 Prone knee bend [] []    L3-4 Patellar tendon [] []    C5-6 Biceps [x] [] Left bicep- not responsive   C6 Brachioradialis [x] []    C7-8 Triceps [x] []      Reflexes/Sensation:    []Dermatomes/Myotomes intact    [x]Reflexes equal and normal bilaterally   [x]Other:fingertips of both hands are numb  Joint mobility:    []Normal    [x]Hypo   []Hyper    Neurodynamics:    Orthopedic Special Tests:     Cluster Testing  Normal Abnormal N/A Comments   Babinski Test [] [] []    Stafford Test [] [] [x] Inverted Sup Sign [] [] [x]    Alar Ligament Test [] [] [x]    Transverse Ligament Test [] [] [x]    Sharp-Jany Test [] [] []    Hautards Test [] [] []    Vertebral Artery Test [] [] []             Neural dynamic/ Tension testing Normal Abnormal N/A Comments   Spurling Maneuver:  [] [] [x]    Distraction testing: [] [] [x]    ULNT [] [] [x]    Shoulder Abd testing  [] [] []    Cerv Rot/Lat Flex- 1st Rib [] [] []    Deep Neck Flex/endurance testing [] [] []    Craniocerv Flex testing John Em [] [] []    End Range Tolerance testing. [] [] []     [] [] []                           [x] Patient history, allergies, meds reviewed. Medical chart reviewed. See intake form. Review Of Systems (ROS):  [x]Performed Review of systems (Integumentary, CardioPulmonary, Neurological) by intake and observation. Intake form has been scanned into medical record. Patient has been instructed to contact their primary care physician regarding ROS issues if not already being addressed at this time.       Co-morbidities/Complexities (which will affect course of rehabilitation):   []None           Arthritic conditions   []Rheumatoid arthritis (M05.9)  []Osteoarthritis (M19.91)   Cardiovascular conditions   [x]Hypertension (I10)  []Hyperlipidemia (E78.5)  []Angina pectoris (I20)  []Atherosclerosis (I70)  []CVA Musculoskeletal conditions   []Disc pathology   []Congenital spine pathologies   []Prior surgical intervention  []Osteoporosis (M81.8)  []Osteopenia (M85.8)   Endocrine conditions   []Hypothyroid (E03.9)  []Hyperthyroid Gastrointestinal conditions   []Constipation (C17.23)   Metabolic conditions   []Morbid obesity (E66.01)  [x]Diabetes type 1(E10.65) or 2 (E11.65)   []Neuropathy (G60.9)     Pulmonary conditions   []Asthma (J45)  []Coughing   []COPD (J44.9)   Psychological Disorders  []Anxiety (F41.9)  []Depression (F32.9)   []Other:   []Other:          Barriers to/and or personal factors that will affect rehab potential: activities   [x]Reduced participation in social activities. [x]Reduced participation in sport/recreational activities. Classification/Subgrouping:   [x]signs/symptoms consistent with neck pain with mobility deficits     []signs/symptoms consistent with neck pain with movement coordinated impairments    []signs/symptoms consistent with neck pain with radiating pain    [x]signs/symptoms consistent with neck pain with headaches (cervicogenic)    [x]Signs/symptoms consistent with nerve root involvement including myotome & dermatome dysfunction   []sign/symptoms consistent with facet dysfunction of cervical and thoracic spine    []signs/symptoms consistent suggesting central cord compression/UMN syndromes   []signs/symptoms consistent with discogenic cervical pain   []signs/symptoms consistent with rib dysfunction   []signs/symptoms consistent with postural dysfunction   []signs/symptoms consistent with shoulder pathology    [x]signs/symptoms consistent with post-surgical status including decreased ROM, strength and function.    []signs/symptoms consistent with pathology which may benefit from Dry Needling   []signs/symptoms which may limit the use of advanced manual therapy techniques: (Elevated CV risk profile, recent trauma, intolerance to end range positions, prior TIA, visual issues, UE neurological compromise )     Prognosis/Rehab Potential:      []Excellent   []Good    [x]Fair   []Poor    Tolerance of evaluation/treatment:    []Excellent   [x]Good    []Fair   []Poor    Physical Therapy Evaluation Complexity Justification  [x] A history of present problem with:  [x] no personal factors and/or comorbidities that impact the plan of care;  []1-2 personal factors and/or comorbidities that impact the plan of care  [x]3 personal factors and/or comorbidities that impact the plan of care  [x] An examination of body systems using standardized tests and measures addressing any of the following: body structures and functions (impairments), activity limitations, and/or participation restrictions;:  [] a total of 1-2 or more elements   [x] a total of 3 or more elements   [] a total of 4 or more elements   [x] A clinical presentation with:  [] stable and/or uncomplicated characteristics   [x] evolving clinical presentation with changing characteristics  [] unstable and unpredictable characteristics;   [x] Clinical decision making of [] low, [] moderate, [x] high complexity using standardized patient assessment instrument and/or measurable assessment of functional outcome. [] EVAL (LOW) 36462 (typically 20 minutes face-to-face)  [] EVAL (MOD) 91387 (typically 30 minutes face-to-face)  [x] EVAL (HIGH) 88170 (typically 45 minutes face-to-face)  [] RE-EVAL     PLAN:   Frequency/Duration:  2 days per week for 6 Weeks:  Interventions:  [x]  Therapeutic exercise including: strength training, ROM, for cervical spine,scapula, core and Upper extremity, including postural re-education. [x]  NMR activation and proprioception for Deep cervical flexors, periscapular and RC muscles and Core, including postural re-education. [x]  Manual therapy as indicated for C/T spine, ribs, Soft tissue to include: Dry Needling/IASTM, STM, PROM, Gr I-IV mobilizations, manipulation. [x] Modalities as needed that may include: thermal agents, E-stim, Biofeedback, US, iontophoresis as indicated  [x] Patient education on joint protection, postural re-education, activity modification, progression of HEP. HEP instruction:  (see scanned forms)  URL: [email or web address]: 03/22/2021Prepared by:  Kisha MedinaExerckole   Seated Cervical Retraction - 1 x daily - 7 x weekly - 10 reps - 3 sets   Supine Chin Tuck - 1 x daily - 7 x weekly - 10 reps - 3 sets   Seated Upper Trapezius Stretch - 1 x daily - 7 x weekly - 10 reps - 3 sets      GOALS:  Patient stated goal: \"To get my life together and get my walking and feeling in my fingers\"  [] Progressing: [] Met: [] Not Met: [] Adjusted    Therapist goals for Patient:   Short Term Goals: To be achieved in: 2 weeks  1. Independent in HEP and progression per patient tolerance, in order to prevent re-injury. [] Progressing: [] Met: [] Not Met: [] Adjusted  2. Patient will have a decrease in pain to facilitate improvement in movement, function, and ADLs as indicated by Functional Deficits. [] Progressing: [] Met: [] Not Met: [] Adjusted    Long Term Goals: To be achieved in: 6 weeks  1. Disability index score of 30% or less for the NDI to assist with reaching prior level of function. [] Progressing: [] Met: [] Not Met: [] Adjusted  2. Patient will demonstrate increased AROM to [facility] of cervical/thoracic spine to allow for proper joint functioning as indicated by patients Functional Deficits. [] Progressing: [] Met: [] Not Met: [] Adjusted  3. Patient will demonstrate an increase in postural awareness and control and activation of  Deep cervical stabilizers to allow for proper functional mobility as indicated by patients Functional Deficits. [] Progressing: [] Met: [] Not Met: [] Adjusted  4. Patient will return to 70% functional activities without increased symptoms or restriction. [] Progressing: [] Met: [] Not Met: [] Adjusted  5. Pt will increase strength of UE's and increase cervical ROM (patient specific functional goal)    [] Progressing: [] Met: [] Not Met: [] Adjusted         Electronically signed by:  Lynne Mcmanus PT      Note: If patient does not return for scheduled/recommended follow up visits, this note will serve as a discharge from care along with the most recent update on progress.

## 2021-03-26 ENCOUNTER — HOSPITAL ENCOUNTER (OUTPATIENT)
Dept: PHYSICAL THERAPY | Age: 61
Setting detail: THERAPIES SERIES
Discharge: HOME | End: 2021-03-26
Payer: MEDICAID

## 2021-03-26 PROCEDURE — 97140 MANUAL THERAPY 1/> REGIONS: CPT

## 2021-03-26 PROCEDURE — G0283 ELEC STIM OTHER THAN WOUND: HCPCS

## 2021-03-26 PROCEDURE — 97110 THERAPEUTIC EXERCISES: CPT

## 2021-03-26 NOTE — FLOWSHEET NOTE
[address]  Phone: [phone]   Fax:     [phone]        Physical Therapy Treatment Note/ Progress Report:       Date:  3/26/2021    Patient Name:  Leni Gallegos    :  1960  MRN: [id number]    Pertinent Medical History:     Medical/Treatment Diagnosis Information:  · Diagnosis: Post ACDF, IDC 10-Z98.1  · Treatment Diagnosis: Pain in neck, difficulty with walking, ankle pain, numbness and tingling in hands, weakness in legs    Insurance/Certification information:  PT Insurance Information: Arth[employer] Karsten  Physician Information:  Referring Practitioner: Huey Hilario MD  Plan of care signed (Y/N): Sent to Dr. Charisse Ziegler on 3/22/21    Date of Patient follow up with Physician:      Progress Report: []  Yes  [x]  No     Date Range for reporting period:  Beginning:  3/22/2021  Ending:      Progress report due (10 Rx/or 30 days whichever is less):     Recertification due (POC duration/ or 90 days whichever is less):      Visit # POC/Insurance Allowable Auth Needed   2 12 []Yes    [x]No     Functional Outcomes Measure:   Date Assessed:  Test: NDI  Score: 35    Pain level:  4/10     History of Injury: Pt reports that his \"legs went out on him and  he couldn't stand or walk\". This happened very suddenly and he came here to [facility] to ED and they sent him to [facility]. He had surgery at [facility] by Dr. Charisse Ziegler. on 21. He is now able to walk and is here for therapy. SUBJECTIVE:  21: Patient reports his both fingertips are still numb,neck area just stiff.      OBJECTIVE: See eval   Observation:    Test measurements:      RESTRICTIONS/PRECAUTIONS:     Exercises/Interventions:   Therapeutic Ex (58338)  Min: Resistance/Reps Cues/Notes   Seated cervical cervical retraction 5 sec x 10 Instructed for HEP   Chin tuck supine 5 sec x 10 Instructed for HEP   Upper trap stretch 5 sec x 10 Instructed for HEP   scap retraction 5 sec x 10    Manual Intervention  (89970)  Min: Gentle  STM over incision,UT,scalenes              NMR re-education (63729)  Min:               Therapeutic Activity (31776)  Min:               Modalities:  Min     IFC w ith MHP Seated   X 15 min to neck           Other Therapeutic Activities:  Pt was educated on PT POC, Diagnosis, Prognosis, pathomechanics as well as frequency and duration of scheduling future physical therapy appointments. Time was also taken on this day to answer all patient questions and participation in PT. Reviewed appointment policy in detail with patient and patient verbalized understanding. Home Exercise Program:    Therapeutic Exercise and NMR EXR  [] (97081) Provided verbal/tactile cueing for activities related to strengthening, flexibility, endurance, ROM  for improvements in scapular, scapulothoracic and UE control with self care, reaching, carrying, lifting, house/yardwork, driving/computer work.    [] (18111) Provided verbal/tactile cueing for activities related to improving balance, coordination, kinesthetic sense, posture, motor skill, proprioception  to assist with  scapular, scapulothoracic and UE control with self care, reaching, carrying, lifting, house/yardwork, driving/computer work. Therapeutic Activities:    [] (97525 or 08218) Provided verbal/tactile cueing for activities related to improving balance, coordination, kinesthetic sense, posture, motor skill, proprioception and motor activation to allow for proper function of scapular, scapulothoracic and UE control with self care, carrying, lifting, driving/computer work.      Home Exercise Program:    [x] (93635) Reviewed/Progressed HEP activities related to strengthening, flexibility, endurance, ROM of scapular, scapulothoracic and UE control with self care, reaching, carrying, lifting, house/yardwork, driving/computer work  [] (68358) Reviewed/Progressed HEP activities related to []? Not Met: []? Adjusted     Long Term Goals: To be achieved in: 6 weeks  1. Disability index score of 30% or less for the NDI to assist with reaching prior level of function. []? Progressing: []? Met: []? Not Met: []? Adjusted  2. Patient will demonstrate increased AROM to [facility] of cervical/thoracic spine to allow for proper joint functioning as indicated by patients Functional Deficits. []? Progressing: []? Met: []? Not Met: []? Adjusted  3. Patient will demonstrate an increase in postural awareness and control and activation of  Deep cervical stabilizers to allow for proper functional mobility as indicated by patients Functional Deficits. []? Progressing: []? Met: []? Not Met: []? Adjusted  4. Patient will return to 70% functional activities without increased symptoms or restriction. []? Progressing: []? Met: []? Not Met: []? Adjusted  5. Pt will increase strength of UE's and increase cervical ROM (patient specific functional goal)    []? Progressing: []? Met: []? Not Met: []? Adjusted           ASSESSMENT:  See eval. Patient had a posterior approach for cervical disc fusion, which occurred on 1/28/21. He had lack of feeling or function in LE's and still has paresthesia in both hands. He is now able to ambulate with cane. He also has ankle issues, (unrelated) which we will treat after neck is better. Treatment/Activity Tolerance:  [x] Patient tolerated treatment well [] Patient limited by fatique  [] Patient limited by pain  [] Patient limited by other medical complications  [] Other:     Overall Progression Towards Functional goals/ Treatment Progress Update:  [] Patient is progressing as expected towards functional goals listed. [] Progression is slowed due to complexities/Impairments listed. [] Progression has been slowed due to co-morbidities.   [x] Plan just implemented, too soon to assess goals progression <30days   [] Goals require adjustment due to lack of progress  [] Patient is not progressing as expected and requires additional follow up with physician  [] Other    Prognosis for POC: [x] Good [] Fair  [] Poor    Patient requires continued skilled intervention: [x] Yes  [] No      PLAN: See eval  [] Continue per plan of care [] Alter current plan (see comments)  [x] Plan of care initiated [] Hold pending MD visit [] Discharge    Electronically signed by: Kei Philippe PTA     Note: If patient does not return for scheduled/recommended follow up visits, this note will serve as a discharge from care along with the most recent update on progress.

## 2021-03-29 ENCOUNTER — HOSPITAL ENCOUNTER (OUTPATIENT)
Dept: PHYSICAL THERAPY | Age: 61
Setting detail: THERAPIES SERIES
Discharge: HOME | End: 2021-03-29
Payer: MEDICAID

## 2021-03-29 PROCEDURE — 97140 MANUAL THERAPY 1/> REGIONS: CPT

## 2021-03-29 PROCEDURE — 97110 THERAPEUTIC EXERCISES: CPT

## 2021-03-29 PROCEDURE — G0283 ELEC STIM OTHER THAN WOUND: HCPCS

## 2021-03-29 NOTE — FLOWSHEET NOTE
[address]  Phone: [phone]   Fax:     [phone]        Physical Therapy Treatment Note/ Progress Report:       Date:  3/29/2021    Patient Name:  Leni Gallegos    :  1960  MRN: [id number]    Pertinent Medical History:     Medical/Treatment Diagnosis Information:  · Diagnosis: Post ACDF, IDC 10-Z98.1  · Treatment Diagnosis: Pain in neck, difficulty with walking, ankle pain, numbness and tingling in hands, weakness in legs    Insurance/Certification information:  PT Insurance Information: Arthuro Karsten  Physician Information:  Referring Practitioner: Huey Hilario MD  Plan of care signed (Y/N): Sent to Dr. Charisse Ziegler on 3/22/21    Date of Patient follow up with Physician:      Progress Report: []  Yes  [x]  No     Date Range for reporting period:  Beginning:  3/22/2021  Ending:      Progress report due (10 Rx/or 30 days whichever is less):     Recertification due (POC duration/ or 90 days whichever is less):      Visit # POC/Insurance Allowable Auth Needed   3 12 []Yes    [x]No     Functional Outcomes Measure:   Date Assessed:  Test: NDI  Score: 35    Pain level:  4/10     History of Injury: Pt reports that his \"legs went out on him and  he couldn't stand or walk\". This happened very suddenly and he came here to [facility] to ED and they sent him to [facility]. He had surgery at [facility] by Dr. Charisse Ziegler. on 21. He is now able to walk and is here for therapy. SUBJECTIVE:  21: Patient reports his both fingertips are still numb,neck area just stiff.  3/29/21: PT REPORTS NO CHANGE IN NUMBNESS ON FINGERTIPS AND very little change on the neck mobility.       OBJECTIVE: See eval   Observation:    Test measurements:      RESTRICTIONS/PRECAUTIONS:     Exercises/Interventions:   Therapeutic Ex (29791)  Min: Resistance/Reps Cues/Notes   Seated cervical cervical retraction 5 sec x 10 Instructed for HEP   Chin tuck supine 5 sec x 10 Instructed for HEP   Upper trap stretch 5 sec x 10 Instructed for HEP   Nu steps - arms only 5 min    Rows with T slide Blue x 20 3/29/21:Add to HEP next time   scap retraction 5 sec x 10    Manual Intervention  (82651)  Min: Gentle  STM over incision,UT,scalenes, side glides, occipital release     GHJ distraction bilaterally         NMR re-education (32141)  Min:      Deep diaghramatic breathing with arms to sides         Therapeutic Activity (51522)  Min:               Modalities:  Min     IFC w ith MHP Seated   X 15 min to neck           Other Therapeutic Activities:  Pt was educated on PT POC, Diagnosis, Prognosis, pathomechanics as well as frequency and duration of scheduling future physical therapy appointments. Time was also taken on this day to answer all patient questions and participation in PT. Reviewed appointment policy in detail with patient and patient verbalized understanding. Home Exercise Program:    Therapeutic Exercise and NMR EXR  [] (51467) Provided verbal/tactile cueing for activities related to strengthening, flexibility, endurance, ROM  for improvements in scapular, scapulothoracic and UE control with self care, reaching, carrying, lifting, house/yardwork, driving/computer work.    [] (78161) Provided verbal/tactile cueing for activities related to improving balance, coordination, kinesthetic sense, posture, motor skill, proprioception  to assist with  scapular, scapulothoracic and UE control with self care, reaching, carrying, lifting, house/yardwork, driving/computer work. Therapeutic Activities:    [] (43489 or 63740) Provided verbal/tactile cueing for activities related to improving balance, coordination, kinesthetic sense, posture, motor skill, proprioception and motor activation to allow for proper function of scapular, scapulothoracic and UE control with self care, carrying, lifting, driving/computer work.      Home Exercise Independent in HEP and progression per patient tolerance, in order to prevent re-injury. []? Progressing: []? Met: []? Not Met: []? Adjusted  2. Patient will have a decrease in pain to facilitate improvement in movement, function, and ADLs as indicated by Functional Deficits. []? Progressing: []? Met: []? Not Met: []? Adjusted     Long Term Goals: To be achieved in: 6 weeks  1. Disability index score of 30% or less for the NDI to assist with reaching prior level of function. []? Progressing: []? Met: []? Not Met: []? Adjusted  2. Patient will demonstrate increased AROM to [facility] of cervical/thoracic spine to allow for proper joint functioning as indicated by patients Functional Deficits. []? Progressing: []? Met: []? Not Met: []? Adjusted  3. Patient will demonstrate an increase in postural awareness and control and activation of  Deep cervical stabilizers to allow for proper functional mobility as indicated by patients Functional Deficits. []? Progressing: []? Met: []? Not Met: []? Adjusted  4. Patient will return to 70% functional activities without increased symptoms or restriction. []? Progressing: []? Met: []? Not Met: []? Adjusted  5. Pt will increase strength of UE's and increase cervical ROM (patient specific functional goal)    []? Progressing: []? Met: []? Not Met: []? Adjusted           ASSESSMENT:  See eval. Patient had a posterior approach for cervical disc fusion, which occurred on 1/28/21. He had lack of feeling or function in LE's and still has paresthesia in both hands. He is now able to ambulate with cane. He also has ankle issues, (unrelated) which we will treat after neck is better.     Treatment/Activity Tolerance:  [x] Patient tolerated treatment well [] Patient limited by fatique  [] Patient limited by pain  [] Patient limited by other medical complications  [] Other:     Overall Progression Towards Functional goals/ Treatment Progress Update:  [] Patient is progressing as expected towards functional goals listed. [] Progression is slowed due to complexities/Impairments listed. [] Progression has been slowed due to co-morbidities. [x] Plan just implemented, too soon to assess goals progression <30days   [] Goals require adjustment due to lack of progress  [] Patient is not progressing as expected and requires additional follow up with physician  [] Other    Prognosis for POC: [x] Good [] Fair  [] Poor    Patient requires continued skilled intervention: [x] Yes  [] No      PLAN: See eval  [x] Continue per plan of care [] Alter current plan (see comments)  [] Plan of care initiated [] Hold pending MD visit [] Discharge    Electronically signed by: Stephanie Massey, PT     Note: If patient does not return for scheduled/recommended follow up visits, this note will serve as a discharge from care along with the most recent update on progress.

## 2021-03-31 ENCOUNTER — HOSPITAL ENCOUNTER (OUTPATIENT)
Dept: PHYSICAL THERAPY | Age: 61
Setting detail: THERAPIES SERIES
Discharge: HOME | End: 2021-03-31
Payer: MEDICAID

## 2021-03-31 NOTE — FLOWSHEET NOTE
Physical Therapy  Cancellation/No-show Note  Patient Name:  Jose Francisco James  :  1960   Date:  3/31/2021  Cancelled visits to date: 1  No-shows to date: 0    For today's appointment patient:  [x]  Cancelled  []  Rescheduled appointment  []  No-show     Reason given by patient:  []  Patient ill  []  Conflicting appointment  []  No transportation    []  Conflict with work  []  No reason given  [x]  Other:     Comments: Car trouble, cancelled at last minute.       Electronically signed by:  Joanna Broussard PT

## 2021-04-06 ENCOUNTER — HOSPITAL ENCOUNTER (OUTPATIENT)
Dept: PHYSICAL THERAPY | Age: 61
Setting detail: THERAPIES SERIES
Discharge: HOME | End: 2021-04-06
Payer: MEDICAID

## 2021-04-06 PROCEDURE — 97110 THERAPEUTIC EXERCISES: CPT

## 2021-04-06 PROCEDURE — 97140 MANUAL THERAPY 1/> REGIONS: CPT

## 2021-04-06 PROCEDURE — G0283 ELEC STIM OTHER THAN WOUND: HCPCS

## 2021-04-06 NOTE — FLOWSHEET NOTE
[facility] [place]. [address]  Phone: [phone]   Fax:     [phone]        Physical Therapy Treatment Note/ Progress Report:       Date:  2021    Patient Name:  Denny Chi    :  1960  MRN: [id number]    Pertinent Medical History:     Medical/Treatment Diagnosis Information:  · Diagnosis: Post ACDF, IDC 10-Z98.1  · Treatment Diagnosis: Pain in neck, difficulty with walking, ankle pain, numbness and tingling in hands, weakness in legs    Insurance/Certification information:  PT Insurance Information: [employer]  Physician Information:  Referring Practitioner: Divina Sharp MD  Plan of care signed (Y/N): Sent to Dr. Maribeth Coffey on 3/22/21    Date of Patient follow up with Physician:      Progress Report: []  Yes  [x]  No     Date Range for reporting period:  Beginning:  3/22/2021  Ending:      Progress report due (10 Rx/or 30 days whichever is less):     Recertification due (POC duration/ or 90 days whichever is less):      Visit # POC/Insurance Allowable Auth Needed   4 12 []Yes    [x]No     Functional Outcomes Measure:   Date Assessed:  Test: NDI  Score: 35    Pain level:  4/10     History of Injury: Pt reports that his \"legs went out on him and  he couldn't stand or walk\". This happened very suddenly and he came here to [facility] to ED and they sent him to [facility]. He had surgery at [facility] by Dr. Maribeth Coffey. on 21. He is now able to walk and is here for therapy. SUBJECTIVE:  21: Patient reports his both fingertips are still numb,neck area just stiff.  3/29/21: PT REPORTS NO CHANGE IN NUMBNESS ON FINGERTIPS AND very little change on the neck mobility. 21: Patient reports no change in numbness,neck mobility is a little better.      OBJECTIVE: See eval   Observation:    Test measurements:      RESTRICTIONS/PRECAUTIONS:     Exercises/Interventions:   Therapeutic Ex Adjusted     Therapist goals for Patient:   Short Term Goals: To be achieved in: 2 weeks  1. Independent in HEP and progression per patient tolerance, in order to prevent re-injury. []? Progressing: []? Met: []? Not Met: []? Adjusted  2. Patient will have a decrease in pain to facilitate improvement in movement, function, and ADLs as indicated by Functional Deficits. []? Progressing: []? Met: []? Not Met: []? Adjusted     Long Term Goals: To be achieved in: 6 weeks  1. Disability index score of 30% or less for the NDI to assist with reaching prior level of function. []? Progressing: []? Met: []? Not Met: []? Adjusted  2. Patient will demonstrate increased AROM to [facility] of cervical/thoracic spine to allow for proper joint functioning as indicated by patients Functional Deficits. []? Progressing: []? Met: []? Not Met: []? Adjusted  3. Patient will demonstrate an increase in postural awareness and control and activation of  Deep cervical stabilizers to allow for proper functional mobility as indicated by patients Functional Deficits. []? Progressing: []? Met: []? Not Met: []? Adjusted  4. Patient will return to 70% functional activities without increased symptoms or restriction. []? Progressing: []? Met: []? Not Met: []? Adjusted  5. Pt will increase strength of UE's and increase cervical ROM (patient specific functional goal)    []? Progressing: []? Met: []? Not Met: []? Adjusted           ASSESSMENT:  See eval. Patient had a posterior approach for cervical disc fusion, which occurred on 1/28/21. He had lack of feeling or function in LE's and still has paresthesia in both hands. He is now able to ambulate with cane. He also has ankle issues, (unrelated) which we will treat after neck is better.     Treatment/Activity Tolerance:  [x] Patient tolerated treatment well [] Patient limited by fatique  [] Patient limited by pain  [] Patient limited by other medical complications  [] Other:     Overall Progression

## 2021-04-08 ENCOUNTER — HOSPITAL ENCOUNTER (OUTPATIENT)
Dept: PHYSICAL THERAPY | Age: 61
Setting detail: THERAPIES SERIES
Discharge: HOME | End: 2021-04-08
Payer: MEDICAID

## 2021-04-08 PROCEDURE — 97140 MANUAL THERAPY 1/> REGIONS: CPT

## 2021-04-08 PROCEDURE — 97110 THERAPEUTIC EXERCISES: CPT

## 2021-04-08 PROCEDURE — G0283 ELEC STIM OTHER THAN WOUND: HCPCS

## 2021-04-08 NOTE — FLOWSHEET NOTE
[address] [facility] [place]. Michael Juarez [address]  Phone: [phone]   Fax:     [phone]        Physical Therapy Treatment Note/ Progress Report:       Date:  2021    Patient Name:  Umm Santa    :  1960  MRN: [id number]    Pertinent Medical History:     Medical/Treatment Diagnosis Information:  · Diagnosis: Post ACDF, IDC 10-Z98.1  · Treatment Diagnosis: Pain in neck, difficulty with walking, ankle pain, numbness and tingling in hands, weakness in legs    Insurance/Certification information:  PT Insurance Information: Ritesh  Physician Information:  Referring Practitioner: Chela Currie MD  Plan of care signed (Y/N): Sent to Dr. Tena Medina on 3/22/21    Date of Patient follow up with Physician:      Progress Report: []  Yes  [x]  No     Date Range for reporting period:  Beginning:  3/22/2021  Ending:      Progress report due (10 Rx/or 30 days whichever is less):     Recertification due (POC duration/ or 90 days whichever is less):      Visit # POC/Insurance Allowable Auth Needed   5 12 []Yes    [x]No     Functional Outcomes Measure:   Date Assessed:  Test: NDI  Score: 35    Pain level:  4/10     History of Injury: Pt reports that his \"legs went out on him and  he couldn't stand or walk\". This happened very suddenly and he came here to [facility] to ED and they sent him to [address] [place] [address]. He had surgery at [address] [place] [address] by Dr. Tena Medina. on 21. He is now able to walk and is here for therapy. SUBJECTIVE:  21: Patient reports his both fingertips are still numb,neck area just stiff.  3/29/21: PT REPORTS NO CHANGE IN NUMBNESS ON FINGERTIPS AND very little change on the neck mobility. 21: Patient reports no change in numbness,neck mobility is a little better. 21: Patient reports neck mobility is improving.      OBJECTIVE: See eval   Observation:    Test measurements: RESTRICTIONS/PRECAUTIONS:     Exercises/Interventions:   Therapeutic Ex (33947)  Min: Resistance/Reps Cues/Notes   Seated cervical cervical retraction 5 sec x 10 Instructed for HEP   Chin tuck supine 5 sec x 10 Instructed for HEP   Upper trap stretch 5 sec x 10 Instructed for HEP   Nu steps - arms only 5 min    Rows with T slide Blue x 20 3/29/21:Add to HEP next time   scap retraction 5 sec x 10    Manual Intervention  (62441)  Min: Gentle  STM over incision,UT,scalenes, side glides, occipital release     GHJ distraction bilaterally         NMR re-education (59562)  Min:      Deep diaghramatic breathing with arms to sides         Therapeutic Activity (37155)  Min:               Modalities:  Min     IFC w ith MHP Seated   X 15 min to neck           Other Therapeutic Activities:  Pt was educated on PT POC, Diagnosis, Prognosis, pathomechanics as well as frequency and duration of scheduling future physical therapy appointments. Time was also taken on this day to answer all patient questions and participation in PT. Reviewed appointment policy in detail with patient and patient verbalized understanding. Home Exercise Program:    Therapeutic Exercise and NMR EXR  [] (57616) Provided verbal/tactile cueing for activities related to strengthening, flexibility, endurance, ROM  for improvements in scapular, scapulothoracic and UE control with self care, reaching, carrying, lifting, house/yardwork, driving/computer work.    [] (38702) Provided verbal/tactile cueing for activities related to improving balance, coordination, kinesthetic sense, posture, motor skill, proprioception  to assist with  scapular, scapulothoracic and UE control with self care, reaching, carrying, lifting, house/yardwork, driving/computer work.     Therapeutic Activities:    [] (23325 or 41396) Provided verbal/tactile cueing for activities related to improving balance, coordination, kinesthetic sense, posture, motor skill, proprioception and motor feeling in my fingers\"  []? Progressing: []? Met: []? Not Met: []? Adjusted     Therapist goals for Patient:   Short Term Goals: To be achieved in: 2 weeks  1. Independent in HEP and progression per patient tolerance, in order to prevent re-injury. []? Progressing: []? Met: []? Not Met: []? Adjusted  2. Patient will have a decrease in pain to facilitate improvement in movement, function, and ADLs as indicated by Functional Deficits. []? Progressing: []? Met: []? Not Met: []? Adjusted     Long Term Goals: To be achieved in: 6 weeks  1. Disability index score of 30% or less for the NDI to assist with reaching prior level of function. []? Progressing: []? Met: []? Not Met: []? Adjusted  2. Patient will demonstrate increased AROM to [facility] of cervical/thoracic spine to allow for proper joint functioning as indicated by patients Functional Deficits. []? Progressing: []? Met: []? Not Met: []? Adjusted  3. Patient will demonstrate an increase in postural awareness and control and activation of  Deep cervical stabilizers to allow for proper functional mobility as indicated by patients Functional Deficits. []? Progressing: []? Met: []? Not Met: []? Adjusted  4. Patient will return to 70% functional activities without increased symptoms or restriction. []? Progressing: []? Met: []? Not Met: []? Adjusted  5. Pt will increase strength of UE's and increase cervical ROM (patient specific functional goal)    []? Progressing: []? Met: []? Not Met: []? Adjusted           ASSESSMENT:  See eval. Patient had a posterior approach for cervical disc fusion, which occurred on 1/28/21. He had lack of feeling or function in LE's and still has paresthesia in both hands. He is now able to ambulate with cane. He also has ankle issues, (unrelated) which we will treat after neck is better.     Treatment/Activity Tolerance:  [x] Patient tolerated treatment well [] Patient limited by fatique  [] Patient limited by pain  [] Patient limited by other medical complications  [] Other:     Overall Progression Towards Functional goals/ Treatment Progress Update:  [] Patient is progressing as expected towards functional goals listed. [] Progression is slowed due to complexities/Impairments listed. [] Progression has been slowed due to co-morbidities. [x] Plan just implemented, too soon to assess goals progression <30days   [] Goals require adjustment due to lack of progress  [] Patient is not progressing as expected and requires additional follow up with physician  [] Other    Prognosis for POC: [x] Good [] Fair  [] Poor    Patient requires continued skilled intervention: [x] Yes  [] No      PLAN: See eval  [x] Continue per plan of care [] Alter current plan (see comments)  [] Plan of care initiated [] Hold pending MD visit [] Discharge    Electronically signed by: Meño Tariq PTA     Note: If patient does not return for scheduled/recommended follow up visits, this note will serve as a discharge from care along with the most recent update on progress.

## 2021-04-13 ENCOUNTER — HOSPITAL ENCOUNTER (OUTPATIENT)
Dept: PHYSICAL THERAPY | Age: 61
Setting detail: THERAPIES SERIES
Discharge: HOME | End: 2021-04-13
Payer: MEDICAID

## 2021-04-13 PROCEDURE — 97110 THERAPEUTIC EXERCISES: CPT

## 2021-04-13 PROCEDURE — 97140 MANUAL THERAPY 1/> REGIONS: CPT

## 2021-04-13 PROCEDURE — G0283 ELEC STIM OTHER THAN WOUND: HCPCS

## 2021-04-13 PROCEDURE — 97112 NEUROMUSCULAR REEDUCATION: CPT

## 2021-04-13 NOTE — FLOWSHEET NOTE
[address] [facility] [place]. [address] [place] [address]  Phone: [phone]   Fax:     [phone]        Physical Therapy Treatment Note/ Progress Report:       Date:  2021    Patient Name:  Herrera Brown    :  1960  MRN: [id number]    Pertinent Medical History:     Medical/Treatment Diagnosis Information:  · Diagnosis: Post ACDF, IDC 10-Z98.1  · Treatment Diagnosis: Pain in neck, difficulty with walking, ankle pain, numbness and tingling in hands, weakness in legs    Insurance/Certification information:  PT Insurance Information: Ritesh  Physician Information:  Referring Practitioner: Karlee Rodriguez MD  Plan of care signed (Y/N): Sent to Dr. Brittany Banda on 3/22/21    Date of Patient follow up with Physician:      Progress Report: []  Yes  [x]  No     Date Range for reporting period:  Beginning:  3/22/2021  Ending:      Progress report due (10 Rx/or 30 days whichever is less):     Recertification due (POC duration/ or 90 days whichever is less):      Visit # POC/Insurance Allowable Auth Needed   6 12 []Yes    [x]No     Functional Outcomes Measure:   Date Assessed:  Test: NDI  Score: 35    Pain level:  4/10     History of Injury: Pt reports that his \"legs went out on him and  he couldn't stand or walk\". This happened very suddenly and he came here to [facility] to ED and they sent him to [facility]. He had surgery at [facility] by Dr. Brittany Banda. on 21. He is now able to walk and is here for therapy. SUBJECTIVE:  21: Patient reports his both fingertips are still numb,neck area just stiff.  3/29/21: PT REPORTS NO CHANGE IN NUMBNESS ON FINGERTIPS AND very little change on the neck mobility. 21: Patient reports no change in numbness,neck mobility is a little better. 21: Patient reports neck mobility is improving. 21: neck is a bit sore today. Notes he is doing ok with therapy.        OBJECTIVE: See eval   Observation:    Test measurements:      RESTRICTIONS/PRECAUTIONS:     Exercises/Interventions:   Therapeutic Ex (56050)  Min: 15 Resistance/Reps Cues/Notes   Seated cervical retraction 5 sec x 10 Instructed for HEP   Upper trap stretch 5 sec x 10 Instructed for HEP   Nu steps - arms only 5 min    Tslide:   Retraction                extension Blue x 20  Blue x 20 3/29/21:Add to HEP next time   Tband B ER Add     scap retraction 5 sec x 10         Supine cane flexion 10x    Manual Intervention  (15564)  Min: 15     STM UT/scalenes/SCM x15 mins    Cervical sideglides C2-4 Grade 2 5x each L/R         NMR re-education (58583)  Min: 8     Cervical isometrics Rot/SB 5x5 sec holds in supine    DCF activation 10x 5 sec holds Cueing needed for pt to keep head in neutral        Therapeutic Activity (77593)  Min:               Modalities:  Min     IFC with MHP Seated   x15 mins to neck           Other Therapeutic Activities:  Pt was educated on PT POC, Diagnosis, Prognosis, pathomechanics as well as frequency and duration of scheduling future physical therapy appointments. Time was also taken on this day to answer all patient questions and participation in PT. Reviewed appointment policy in detail with patient and patient verbalized understanding. Home Exercise Program:    Therapeutic Exercise and NMR EXR  [] (10830) Provided verbal/tactile cueing for activities related to strengthening, flexibility, endurance, ROM  for improvements in scapular, scapulothoracic and UE control with self care, reaching, carrying, lifting, house/yardwork, driving/computer work.    [] (66799) Provided verbal/tactile cueing for activities related to improving balance, coordination, kinesthetic sense, posture, motor skill, proprioception  to assist with  scapular, scapulothoracic and UE control with self care, reaching, carrying, lifting, house/yardwork, driving/computer work.     Therapeutic Activities:    [] (45853 or 57852) Provided verbal/tactile cueing for activities related to improving balance, coordination, kinesthetic sense, posture, motor skill, proprioception and motor activation to allow for proper function of scapular, scapulothoracic and UE control with self care, carrying, lifting, driving/computer work.      Home Exercise Program:    [x] (42915) Reviewed/Progressed HEP activities related to strengthening, flexibility, endurance, ROM of scapular, scapulothoracic and UE control with self care, reaching, carrying, lifting, house/yardwork, driving/computer work  [] (20733) Reviewed/Progressed HEP activities related to improving balance, coordination, kinesthetic sense, posture, motor skill, proprioception of scapular, scapulothoracic and UE control with self care, reaching, carrying, lifting, house/yardwork, driving/computer work      Manual Treatments:  PROM / STM / Oscillations-Mobs:  G-I, II, III, IV (PA's, Inf., Post.)  [x] (18191) Provided manual therapy to mobilize soft tissue/joints of cervical/CT, scapular GHJ and UE for the purpose of modulating pain, promoting relaxation,  increasing ROM, reducing/eliminating soft tissue swelling/inflammation/restriction, improving soft tissue extensibility and allowing for proper ROM for normal function with self care, reaching, carrying, lifting, house/yardwork, driving/computer work    Charges:  Timed Code Treatment Minutes: 38   Total Treatment Minutes: 53       [] EVAL (LOW) 09728 (typically 20 minutes face-to-face)  [] EVAL (MOD) 72148 (typically 30 minutes face-to-face)  [] EVAL (HIGH) 26007 (typically 45 minutes face-to-face)  [] RE-EVAL     [x] BF(04244) x  1   [] Dry needle 1 or 2 Muscles (76913)  [x] NMR (39745) x     [] Dry needle 3+ Muscles (04189)  [x] Manual (95207) x 1    [] Ultrasound (73949) x  [] TA (31157) x     [] Mech Traction (90986)  [] ES(attended) (08571)     [x] ES (un) (12971): 15 min  [] Vasopump (76398)  [] Ionto (01945)   [] Other:      Versie Mighty stated goal: \"To get my life together and get my walking and feeling in my fingers\"  []? Progressing: []? Met: []? Not Met: []? Adjusted     Therapist goals for Patient:   Short Term Goals: To be achieved in: 2 weeks  1. Independent in HEP and progression per patient tolerance, in order to prevent re-injury. []? Progressing: []? Met: []? Not Met: []? Adjusted  2. Patient will have a decrease in pain to facilitate improvement in movement, function, and ADLs as indicated by Functional Deficits. []? Progressing: []? Met: []? Not Met: []? Adjusted     Long Term Goals: To be achieved in: 6 weeks  1. Disability index score of 30% or less for the NDI to assist with reaching prior level of function. []? Progressing: []? Met: []? Not Met: []? Adjusted  2. Patient will demonstrate increased AROM to [facility] of cervical/thoracic spine to allow for proper joint functioning as indicated by patients Functional Deficits. []? Progressing: []? Met: []? Not Met: []? Adjusted  3. Patient will demonstrate an increase in postural awareness and control and activation of  Deep cervical stabilizers to allow for proper functional mobility as indicated by patients Functional Deficits. []? Progressing: []? Met: []? Not Met: []? Adjusted  4. Patient will return to 70% functional activities without increased symptoms or restriction. []? Progressing: []? Met: []? Not Met: []? Adjusted  5. Pt will increase strength of UE's and increase cervical ROM (patient specific functional goal)    []? Progressing: []? Met: []? Not Met: []? Adjusted           ASSESSMENT: Patient with significant UT/cervical paraspinal tightness. Good tolerance to initiation of cervical strengthening today. Patient with significant DCF weakness and fatigues quickly.       Treatment/Activity Tolerance:  [x] Patient tolerated treatment well [] Patient limited by fatique  [] Patient limited by pain  [] Patient limited by other medical complications  [] Other:     Overall Progression Towards Functional goals/ Treatment Progress Update:  [] Patient is progressing as expected towards functional goals listed. [] Progression is slowed due to complexities/Impairments listed. [] Progression has been slowed due to co-morbidities. [x] Plan just implemented, too soon to assess goals progression <30days   [] Goals require adjustment due to lack of progress  [] Patient is not progressing as expected and requires additional follow up with physician  [] Other    Prognosis for POC: [x] Good [] Fair  [] Poor    Patient requires continued skilled intervention: [x] Yes  [] No    PLAN: Add above as stated  [x] Continue per plan of care [] Alter current plan (see comments)  [] Plan of care initiated [] Hold pending MD visit [] Discharge    Electronically signed by: Cesar Bardales PT , OMT-C,  [id number]      Note: If patient does not return for scheduled/recommended follow up visits, this note will serve as a discharge from care along with the most recent update on progress.

## 2021-04-22 ENCOUNTER — HOSPITAL ENCOUNTER (OUTPATIENT)
Dept: PHYSICAL THERAPY | Age: 61
Setting detail: THERAPIES SERIES
Discharge: HOME | End: 2021-04-22
Payer: MEDICAID

## 2021-04-22 PROCEDURE — G0283 ELEC STIM OTHER THAN WOUND: HCPCS

## 2021-04-22 PROCEDURE — 97530 THERAPEUTIC ACTIVITIES: CPT

## 2021-04-22 PROCEDURE — 97110 THERAPEUTIC EXERCISES: CPT

## 2021-04-22 NOTE — PLAN OF CARE
[facility]. Michael Juarez [address]  Phone: [phone]   Fax:     [phone]     Physical Therapy Discharge Summary    Dear  Stepan Barahona,    We had the pleasure of treating the following patient for physical therapy services at [address] [place] [address]. A summary of our findings can be found in the discharge summary below. If you have any questions or concerns regarding these findings, please do not hesitate to contact me at the office phone number above.   Thank you for the referral.     Physician Signature:________________________________Date:__________________  By signing above (or electronic signature), therapists plan is approved by physician      Overall Response to Treatment:   [x]Patient is responding well to treatment and improvement is noted with regards  to goals   []Patient should continue to improve in reasonable time if they continue HEP   []Patient has plateaued and is no longer responding to skilled PT intervention    []Patient is getting worse and would benefit from return to referring MD   []Patient unable to adhere to initial POC   []Other:     Date range of Visits: 3/22/21-21  Total Visits: 7  :       Date:  2021    Patient Name:  Hernando Strickland    :  1960  MRN: [id number]    Pertinent Medical History:     Medical/Treatment Diagnosis Information:  · Diagnosis: Post ACDF, IDC 10-Z98.1  · Treatment Diagnosis: Pain in neck, difficulty with walking, ankle pain, numbness and tingling in hands, weakness in legs    Insurance/Certification information:  PT Insurance Information: [facility] [employer] [place]  Physician Information:  Referring Practitioner: Loretta Daniel MD  Plan of care signed (Y/N): Sent to Dr. Jeo Barahona on 3/22/21    Date of Patient follow up with Physician:      Progress Report: []  Yes  [x]  No     Date Range for reporting period:  Beginning: 3/22/2021  Endin21    Progress report due (10 Rx/or 30 days whichever is less):     Recertification due (POC duration/ or 90 days whichever is less):      Visit # POC/Insurance Allowable Auth Needed   7 12 []Yes    [x]No     Functional Outcomes Measure:   Date Assessed: dc on 21  Test: NDI  Score: 26    Pain level:  3/10     History of Injury: Pt reports that his \"legs went out on him and  he couldn't stand or walk\". This happened very suddenly and he came here to [facility] to ED and they sent him to [facility]. He had surgery at [facility] by Dr. Veronica Olsen. on 21. He is now able to walk and is here for therapy. SUBJECTIVE:  21: Patient reports his both fingertips are still numb,neck area just stiff.  3/29/21: PT REPORTS NO CHANGE IN NUMBNESS ON FINGERTIPS AND very little change on the neck mobility. 21: Patient reports no change in numbness,neck mobility is a little better. 21: Patient reports neck mobility is improving. 21: neck is a bit sore today. Notes he is doing ok with therapy. 21: Pt reports that his neck feels pretty well and is ready to be discharged.  OBJECTIVE:    Observation:    Test measurements:- Cervical ROM:   Neck flexion:  53  Ext: 40  SB L: 47 without pian   SB R: 37                                       without pain  ROT L: 33   ROT R:  50                                      -  UE strength:   LEFT:  Generally 4/5-5/5   Right : Flex and deltoid:  5/5                                                 IR/ER= 4/5                                       -  Tingling in both hands.     RESTRICTIONS/PRECAUTIONS:     Exercises/Interventions:   Therapeutic Ex (17367)  Min: 15 Resistance/Reps Cues/Notes   Seated cervical retraction 5 sec x 10 Instructed for HEP   Upper trap stretch 5 sec x 10 Instructed for HEP   Nu steps - arms only 5 min    Tslide:   Retraction                extension Blue x 20  Blue x 20 3/29/21, Add to HEP next time, 21: gave blue T band and written HEP   Tband B ER Add     scap retraction 5 sec x 10         Supine cane flexion 10x Added to HEP on 4/22/21   Manual Intervention  (83911)  Min: 15     STM UT/scalenes/SCM    Cervical sideglides C2-4         NMR re-education (39996)  Min: 8     Cervical isometrics Rot/SB    DCF activation Cueing needed for pt to keep head in neutral        Therapeutic Activity (43407)  Min:      Reviewed NDI and goals         Modalities:  Min     IFC with MHP Seated   x15 mins to neck           Other Therapeutic Activities:  Pt was educated on PT POC, Diagnosis, Prognosis, pathomechanics as well as frequency and duration of scheduling future physical therapy appointments. Time was also taken on this day to answer all patient questions and participation in PT. Reviewed appointment policy in detail with patient and patient verbalized understanding. Home Exercise Program:    Therapeutic Exercise and NMR EXR  [x] (29077) Provided verbal/tactile cueing for activities related to strengthening, flexibility, endurance, ROM  for improvements in scapular, scapulothoracic and UE control with self care, reaching, carrying, lifting, house/yardwork, driving/computer work.    [] (09419) Provided verbal/tactile cueing for activities related to improving balance, coordination, kinesthetic sense, posture, motor skill, proprioception  to assist with  scapular, scapulothoracic and UE control with self care, reaching, carrying, lifting, house/yardwork, driving/computer work. Therapeutic Activities:    [x] (25141 or 06649) Provided verbal/tactile cueing for activities related to improving balance, coordination, kinesthetic sense, posture, motor skill, proprioception and motor activation to allow for proper function of scapular, scapulothoracic and UE control with self care, carrying, lifting, driving/computer work.      Home Exercise Program:    [x] (99937) Reviewed/Progressed HEP activities related to strengthening, flexibility, endurance, ROM of scapular, scapulothoracic and UE control with self care, reaching, carrying, lifting, house/yardwork, driving/computer work  [] (49400) Reviewed/Progressed HEP activities related to improving balance, coordination, kinesthetic sense, posture, motor skill, proprioception of scapular, scapulothoracic and UE control with self care, reaching, carrying, lifting, house/yardwork, driving/computer work      Manual Treatments:  PROM / STM / Oscillations-Mobs:  G-I, II, III, IV (PA's, Inf., Post.)  [x] (12637) Provided manual therapy to mobilize soft tissue/joints of cervical/CT, scapular GHJ and UE for the purpose of modulating pain, promoting relaxation,  increasing ROM, reducing/eliminating soft tissue swelling/inflammation/restriction, improving soft tissue extensibility and allowing for proper ROM for normal function with self care, reaching, carrying, lifting, house/yardwork, driving/computer work    Charges:  Timed Code Treatment Minutes: 38   Total Treatment Minutes: 53       [] EVAL (LOW) 98929 (typically 20 minutes face-to-face)  [] EVAL (MOD) 65946 (typically 30 minutes face-to-face)  [] EVAL (HIGH) 92189 (typically 45 minutes face-to-face)  [] RE-EVAL     [x] MB(12896) x  1   [] Dry needle 1 or 2 Muscles (05262)  [] NMR (91928) x     [] Dry needle 3+ Muscles (17237)  [] Manual (21940) x 1    [] Ultrasound (88044) x  [x] TA (25507) x  1   [] Mech Traction (96604)  [] ES(attended) (49460)     [x] ES (un) (46926): 15 min  [] Vasopump (04153)  [] Ionto (92905)   [] Other:      Harless Bamberger stated goal: \"To get my life together and get my walking and feeling in my fingers\"  [x]? Progressing: []? Met: []? Not Met: []? Adjusted (No change in fingers, but walking better with cane)    Therapist goals for Patient:   Short Term Goals: To be achieved in: 2 weeks  1. Independent in HEP and progression per patient tolerance, in order to prevent re-injury. []? Progressing: [x]? Met: []?  Not Met: []? Adjusted  2. Patient will have a decrease in pain to facilitate improvement in movement, function, and ADLs as indicated by Functional Deficits. []? Progressing: [x]? Met: []? Not Met: []? Adjusted     Long Term Goals: To be achieved in: 6 weeks  1. Disability index score of 30% or less for the NDI to assist with reaching prior level of function. [x]? Progressing: []? Met: []? Not Met: []? Adjusted  2. Patient will demonstrate increased AROM to [facility] of cervical/thoracic spine to allow for proper joint functioning as indicated by patients Functional Deficits. []? Progressing: [x]? Met: []? Not Met: []? Adjusted  3. Patient will demonstrate an increase in postural awareness and control and activation of  Deep cervical stabilizers to allow for proper functional mobility as indicated by patients Functional Deficits. []? Progressing: [x]? Met: []? Not Met: []? Adjusted  4. Patient will return to 70% functional activities without increased symptoms or restriction. [x]? Progressing: []? Met: []? Not Met: []? Adjusted  5. Pt will increase strength of UE's and increase cervical ROM (patient specific functional goal)    []? Progressing: [x]? Met: []? Not Met: []? Adjusted           ASSESSMENT: Patient with significant UT/cervical paraspinal tightness. Good tolerance to initiation of cervical strengthening today. Patient with significant DCF weakness and fatigues quickly. Treatment/Activity Tolerance:  [x] Patient tolerated treatment well [] Patient limited by fatique  [] Patient limited by pain  [] Patient limited by other medical complications  [] Other:     Overall Progression Towards Functional goals/ Treatment Progress Update:  [x] Patient is progressing as expected towards functional goals listed. [] Progression is slowed due to complexities/Impairments listed. [] Progression has been slowed due to co-morbidities.   [] Plan just implemented, too soon to assess goals progression <30days   [] Goals

## 2021-09-15 ENCOUNTER — OFFICE VISIT (OUTPATIENT)
Dept: INTERNAL MEDICINE CLINIC | Age: 61
End: 2021-09-15
Payer: MEDICAID

## 2021-09-15 VITALS
DIASTOLIC BLOOD PRESSURE: 80 MMHG | WEIGHT: 222 LBS | HEART RATE: 84 BPM | BODY MASS INDEX: 33.65 KG/M2 | OXYGEN SATURATION: 98 % | SYSTOLIC BLOOD PRESSURE: 128 MMHG | HEIGHT: 68 IN

## 2021-09-15 DIAGNOSIS — I10 ESSENTIAL HYPERTENSION: ICD-10-CM

## 2021-09-15 DIAGNOSIS — E11.69 OBESITY, DIABETES, AND HYPERTENSION SYNDROME (HCC): ICD-10-CM

## 2021-09-15 DIAGNOSIS — Z76.89 ENCOUNTER TO ESTABLISH CARE WITH NEW DOCTOR: ICD-10-CM

## 2021-09-15 DIAGNOSIS — E11.9 DIABETES MELLITUS WITH COINCIDENT HYPERTENSION (HCC): ICD-10-CM

## 2021-09-15 DIAGNOSIS — I10 DIABETES MELLITUS WITH COINCIDENT HYPERTENSION (HCC): ICD-10-CM

## 2021-09-15 DIAGNOSIS — M48.061 SPINAL STENOSIS, LUMBAR REGION, WITHOUT NEUROGENIC CLAUDICATION: ICD-10-CM

## 2021-09-15 DIAGNOSIS — E11.59 OBESITY, DIABETES, AND HYPERTENSION SYNDROME (HCC): ICD-10-CM

## 2021-09-15 DIAGNOSIS — E66.9 OBESITY, DIABETES, AND HYPERTENSION SYNDROME (HCC): ICD-10-CM

## 2021-09-15 DIAGNOSIS — K21.9 GERD WITHOUT ESOPHAGITIS: ICD-10-CM

## 2021-09-15 DIAGNOSIS — I15.2 OBESITY, DIABETES, AND HYPERTENSION SYNDROME (HCC): ICD-10-CM

## 2021-09-15 DIAGNOSIS — E11.9 TYPE 2 DIABETES MELLITUS WITHOUT COMPLICATION, WITHOUT LONG-TERM CURRENT USE OF INSULIN (HCC): Primary | ICD-10-CM

## 2021-09-15 PROBLEM — E66.01 MORBID OBESITY (HCC): Status: ACTIVE | Noted: 2021-09-15

## 2021-09-15 PROBLEM — M46.96 UNSPECIFIED INFLAMMATORY SPONDYLOPATHY, LUMBAR REGION (HCC): Status: ACTIVE | Noted: 2020-06-09

## 2021-09-15 PROBLEM — I87.2 CHRONIC VENOUS INSUFFICIENCY: Status: ACTIVE | Noted: 2020-08-11

## 2021-09-15 PROCEDURE — 90471 IMMUNIZATION ADMIN: CPT | Performed by: FAMILY MEDICINE

## 2021-09-15 PROCEDURE — 90756 CCIIV4 VACC ABX FREE IM: CPT | Performed by: FAMILY MEDICINE

## 2021-09-15 PROCEDURE — 99204 OFFICE O/P NEW MOD 45 MIN: CPT | Performed by: FAMILY MEDICINE

## 2021-09-15 PROCEDURE — 1036F TOBACCO NON-USER: CPT | Performed by: FAMILY MEDICINE

## 2021-09-15 PROCEDURE — G8427 DOCREV CUR MEDS BY ELIG CLIN: HCPCS | Performed by: FAMILY MEDICINE

## 2021-09-15 PROCEDURE — G8417 CALC BMI ABV UP PARAM F/U: HCPCS | Performed by: FAMILY MEDICINE

## 2021-09-15 PROCEDURE — 2022F DILAT RTA XM EVC RTNOPTHY: CPT | Performed by: FAMILY MEDICINE

## 2021-09-15 PROCEDURE — 3044F HG A1C LEVEL LT 7.0%: CPT | Performed by: FAMILY MEDICINE

## 2021-09-15 PROCEDURE — 3017F COLORECTAL CA SCREEN DOC REV: CPT | Performed by: FAMILY MEDICINE

## 2021-09-15 RX ORDER — METHOCARBAMOL 750 MG/1
750 TABLET, FILM COATED ORAL 3 TIMES DAILY
Qty: 90 TABLET | Refills: 0 | Status: SHIPPED | OUTPATIENT
Start: 2021-09-15 | End: 2021-10-15

## 2021-09-15 RX ORDER — AMLODIPINE BESYLATE 5 MG/1
5 TABLET ORAL DAILY
Qty: 30 TABLET | Refills: 0 | Status: SHIPPED | OUTPATIENT
Start: 2021-09-15 | End: 2021-10-13 | Stop reason: SINTOL

## 2021-09-15 RX ORDER — CELECOXIB 200 MG/1
200 CAPSULE ORAL DAILY
Qty: 60 CAPSULE | Refills: 0 | Status: SHIPPED | OUTPATIENT
Start: 2021-09-15 | End: 2021-11-30 | Stop reason: SDUPTHER

## 2021-09-15 RX ORDER — OMEPRAZOLE 20 MG/1
20 CAPSULE, DELAYED RELEASE ORAL DAILY
Qty: 30 CAPSULE | Refills: 0 | Status: SHIPPED | OUTPATIENT
Start: 2021-09-15 | End: 2021-11-03 | Stop reason: SDUPTHER

## 2021-09-15 RX ORDER — METHOCARBAMOL 750 MG/1
750 TABLET, FILM COATED ORAL
COMMUNITY
Start: 2021-09-13 | End: 2021-09-15 | Stop reason: SDUPTHER

## 2021-09-15 RX ORDER — POTASSIUM CHLORIDE 1500 MG/1
20 TABLET, FILM COATED, EXTENDED RELEASE ORAL
COMMUNITY
Start: 2021-08-14 | End: 2021-10-13

## 2021-09-15 RX ORDER — METFORMIN HYDROCHLORIDE 500 MG/1
500 TABLET, EXTENDED RELEASE ORAL
Qty: 30 TABLET | Refills: 0 | Status: SHIPPED | OUTPATIENT
Start: 2021-09-15 | End: 2021-10-06

## 2021-09-15 RX ORDER — CELECOXIB 200 MG/1
200 CAPSULE ORAL DAILY
COMMUNITY
Start: 2021-04-29 | End: 2021-09-15 | Stop reason: SDUPTHER

## 2021-09-15 RX ORDER — OMEPRAZOLE 20 MG/1
20 CAPSULE, DELAYED RELEASE ORAL DAILY
COMMUNITY
End: 2021-09-15 | Stop reason: SDUPTHER

## 2021-09-15 RX ORDER — FAMOTIDINE 20 MG/1
20 TABLET, FILM COATED ORAL 2 TIMES DAILY
Qty: 60 TABLET | Refills: 3 | Status: SHIPPED | OUTPATIENT
Start: 2021-09-15 | End: 2021-10-13 | Stop reason: SDUPTHER

## 2021-09-15 ASSESSMENT — ENCOUNTER SYMPTOMS
NAUSEA: 0
CONSTIPATION: 0
VOMITING: 0
COUGH: 0
SHORTNESS OF BREATH: 0
DIARRHEA: 0

## 2021-09-15 ASSESSMENT — PATIENT HEALTH QUESTIONNAIRE - PHQ9
SUM OF ALL RESPONSES TO PHQ QUESTIONS 1-9: 0
SUM OF ALL RESPONSES TO PHQ QUESTIONS 1-9: 0
2. FEELING DOWN, DEPRESSED OR HOPELESS: 0
SUM OF ALL RESPONSES TO PHQ QUESTIONS 1-9: 0
SUM OF ALL RESPONSES TO PHQ9 QUESTIONS 1 & 2: 0
1. LITTLE INTEREST OR PLEASURE IN DOING THINGS: 0

## 2021-09-15 NOTE — PROGRESS NOTES
Erica Howell (:  1960) is a 61 y.o. male,New patient, here for evaluation of the following chief complaint(s):  New Patient       SUBJECTIVE:  Saw Dr. Oxana Griffin, has been a long time. Has two rods in his neck,     Had colonoscopy, thinks it might have been a while ago possibly done in [place]. Negative per patient. Will hold off on ordering cologuard until we get results. Thinks cologuard was done 2 years ago. Will get flu shot today  Does have right lower leg swelling, + edema trace x 1 month. Not painful. Does improve with compression stockings at home. OBJECTIVE:  Review of Systems   Constitutional: Negative for chills and fever. Respiratory: Negative for cough and shortness of breath. Cardiovascular: Negative for leg swelling. Gastrointestinal: Negative for constipation, diarrhea, nausea and vomiting. Endocrine: Negative for polyuria. Genitourinary: Negative for frequency. Skin: Negative for rash. Vitals:    09/15/21 1130   BP: 128/80   Site: Right Upper Arm   Pulse: 84   SpO2: 98%   Weight: 222 lb (100.7 kg)   Height: 5' 8\" (1.727 m)      Body mass index is 33.75 kg/m². Physical Exam  Constitutional:       Appearance: Normal appearance. Cardiovascular:      Rate and Rhythm: Normal rate and regular rhythm. Pulses: Normal pulses. Heart sounds: Normal heart sounds. Pulmonary:      Effort: Pulmonary effort is normal.      Breath sounds: Normal breath sounds. Musculoskeletal:      Right lower leg: Right lower leg edema: trace. Left lower leg: No edema. Neurological:      General: No focal deficit present. Mental Status: He is alert. Mental status is at baseline. 1. Type 2 diabetes mellitus without complication, without long-term current use of insulin ([facility])  -     CBC Auto Differential; Future  -     Hemoglobin A1C; Future  -     Lipid Panel; Future  -     Comprehensive Metabolic Panel;  Future  -     Microalbumin / Creatinine Urine Ratio; Future  2. Encounter to establish care with new doctor  -     INFLUENZA, MDCK QUADV, 2 YRS AND OLDER, IM, MDV, 0.5ML (FLUCELVAX QUADV)  -     Hepatitis C Antibody; Future  -     HIV Screen; Future  3. Essential hypertension  -     famotidine (PEPCID) 20 MG tablet; Take 1 tablet by mouth 2 times daily, Disp-60 tablet, R-3Normal  -     amLODIPine (NORVASC) 5 MG tablet; Take 1 tablet by mouth daily, Disp-30 tablet, R-0Normal  4. GERD without esophagitis  -     omeprazole (PRILOSEC) 20 MG delayed release capsule; Take 1 capsule by mouth Daily 2 tablets daily, Disp-30 capsule, R-0Normal  5. Diabetes mellitus with coincident hypertension (HCC)  -     amLODIPine (NORVASC) 5 MG tablet; Take 1 tablet by mouth daily, Disp-30 tablet, R-0Normal  -     metFORMIN (GLUCOPHAGE-XR) 500 MG extended release tablet; Take 1 tablet by mouth daily (with breakfast), Disp-30 tablet, R-0Normal  6. Obesity, diabetes, and hypertension syndrome (HCC)  -     amLODIPine (NORVASC) 5 MG tablet; Take 1 tablet by mouth daily, Disp-30 tablet, R-0Normal  7. Spinal stenosis, lumbar region, without neurogenic claudication  -     celecoxib (CELEBREX) 200 MG capsule; Take 1 capsule by mouth daily, Disp-60 capsule, R-0Normal  -     methocarbamol (ROBAXIN) 750 MG tablet; Take 1 tablet by mouth 3 times daily, Disp-90 tablet, R-0Normal      Return in about 4 weeks (around 10/13/2021) for lab followup.     [facility] - Internal Medicine and Pediatrics  Dr. Pierce Silva D.O.  - Family Medicine and OMT      Electronically signed by Pierce Silva DO on 9/15/2021 at 11:56 AM.

## 2021-10-06 DIAGNOSIS — I10 DIABETES MELLITUS WITH COINCIDENT HYPERTENSION (HCC): ICD-10-CM

## 2021-10-06 DIAGNOSIS — E11.9 DIABETES MELLITUS WITH COINCIDENT HYPERTENSION (HCC): ICD-10-CM

## 2021-10-06 RX ORDER — METFORMIN HYDROCHLORIDE 500 MG/1
TABLET, EXTENDED RELEASE ORAL
Qty: 30 TABLET | Refills: 0 | Status: SHIPPED | OUTPATIENT
Start: 2021-10-06 | End: 2021-10-13 | Stop reason: SDUPTHER

## 2021-10-08 DIAGNOSIS — K21.9 GERD WITHOUT ESOPHAGITIS: ICD-10-CM

## 2021-10-11 RX ORDER — OMEPRAZOLE 20 MG/1
20 CAPSULE, DELAYED RELEASE ORAL DAILY
Qty: 30 CAPSULE | Refills: 0 | OUTPATIENT
Start: 2021-10-11

## 2021-10-13 ENCOUNTER — OFFICE VISIT (OUTPATIENT)
Dept: INTERNAL MEDICINE CLINIC | Age: 61
End: 2021-10-13
Payer: MEDICAID

## 2021-10-13 VITALS
SYSTOLIC BLOOD PRESSURE: 146 MMHG | WEIGHT: 233 LBS | OXYGEN SATURATION: 99 % | HEART RATE: 66 BPM | DIASTOLIC BLOOD PRESSURE: 81 MMHG | BODY MASS INDEX: 35.43 KG/M2

## 2021-10-13 DIAGNOSIS — I15.2 OBESITY, DIABETES, AND HYPERTENSION SYNDROME (HCC): ICD-10-CM

## 2021-10-13 DIAGNOSIS — I10 DIABETES MELLITUS WITH COINCIDENT HYPERTENSION (HCC): ICD-10-CM

## 2021-10-13 DIAGNOSIS — E11.59 OBESITY, DIABETES, AND HYPERTENSION SYNDROME (HCC): ICD-10-CM

## 2021-10-13 DIAGNOSIS — K21.9 GERD WITHOUT ESOPHAGITIS: ICD-10-CM

## 2021-10-13 DIAGNOSIS — M48.061 SPINAL STENOSIS, LUMBAR REGION, WITHOUT NEUROGENIC CLAUDICATION: ICD-10-CM

## 2021-10-13 DIAGNOSIS — E66.9 OBESITY, DIABETES, AND HYPERTENSION SYNDROME (HCC): ICD-10-CM

## 2021-10-13 DIAGNOSIS — I10 ESSENTIAL HYPERTENSION: Primary | ICD-10-CM

## 2021-10-13 DIAGNOSIS — E11.69 OBESITY, DIABETES, AND HYPERTENSION SYNDROME (HCC): ICD-10-CM

## 2021-10-13 DIAGNOSIS — E11.9 DIABETES MELLITUS WITH COINCIDENT HYPERTENSION (HCC): ICD-10-CM

## 2021-10-13 DIAGNOSIS — E11.9 TYPE 2 DIABETES MELLITUS WITHOUT COMPLICATION, WITHOUT LONG-TERM CURRENT USE OF INSULIN (HCC): ICD-10-CM

## 2021-10-13 PROCEDURE — G8482 FLU IMMUNIZE ORDER/ADMIN: HCPCS | Performed by: FAMILY MEDICINE

## 2021-10-13 PROCEDURE — 3044F HG A1C LEVEL LT 7.0%: CPT | Performed by: FAMILY MEDICINE

## 2021-10-13 PROCEDURE — 2022F DILAT RTA XM EVC RTNOPTHY: CPT | Performed by: FAMILY MEDICINE

## 2021-10-13 PROCEDURE — 1036F TOBACCO NON-USER: CPT | Performed by: FAMILY MEDICINE

## 2021-10-13 PROCEDURE — G8427 DOCREV CUR MEDS BY ELIG CLIN: HCPCS | Performed by: FAMILY MEDICINE

## 2021-10-13 PROCEDURE — G8417 CALC BMI ABV UP PARAM F/U: HCPCS | Performed by: FAMILY MEDICINE

## 2021-10-13 PROCEDURE — 3017F COLORECTAL CA SCREEN DOC REV: CPT | Performed by: FAMILY MEDICINE

## 2021-10-13 PROCEDURE — 99214 OFFICE O/P EST MOD 30 MIN: CPT | Performed by: FAMILY MEDICINE

## 2021-10-13 RX ORDER — FAMOTIDINE 20 MG/1
20 TABLET, FILM COATED ORAL 2 TIMES DAILY
Qty: 60 TABLET | Refills: 3 | Status: SHIPPED | OUTPATIENT
Start: 2021-10-13 | End: 2022-03-27 | Stop reason: SDUPTHER

## 2021-10-13 RX ORDER — LOSARTAN POTASSIUM 25 MG/1
25 TABLET ORAL DAILY
Qty: 30 TABLET | Refills: 0 | Status: SHIPPED | OUTPATIENT
Start: 2021-10-13 | End: 2021-11-03

## 2021-10-13 RX ORDER — METFORMIN HYDROCHLORIDE 500 MG/1
500 TABLET, EXTENDED RELEASE ORAL 2 TIMES DAILY
Qty: 30 TABLET | Refills: 0 | Status: SHIPPED | OUTPATIENT
Start: 2021-10-13 | End: 2021-11-03

## 2021-10-13 ASSESSMENT — ENCOUNTER SYMPTOMS
VOMITING: 0
NAUSEA: 0
SHORTNESS OF BREATH: 0
COUGH: 0
DIARRHEA: 0
CONSTIPATION: 0

## 2021-10-13 NOTE — PROGRESS NOTES
Monet Dozier (:  1960) is a 61 y.o. male,New patient, here for evaluation of the following chief complaint(s):  Follow-up       SUBJECTIVE:    10.13.21: Feeling better but still has swelling in legs. Wearing compression stockings. Has been amlodipine for some time for his HYPERTENSION, but this could be causing the swelling and is also not best line of med for DM. Would like handicap placard for his chronic neck pain and inability for walking greater than 250 yards without having to stop. 9.15.21 -- new patient. Hx treated prostate cancer. Saw Dr. Washington Wan, has been a long time. Has two rods in his neck, does need handicap placard. Had colonoscopy, thinks it might have been a while ago possibly done in [place]. Negative per patient. Will hold off on ordering cologuard until we get results. Thinks cologuard was done 2 years ago. Does have right lower leg swelling, + edema trace x 1 month. Not painful. Does improve a little bit with compression stockings at home. OBJECTIVE:  Review of Systems   Constitutional: Negative for chills and fever. Respiratory: Negative for cough and shortness of breath. Cardiovascular: Negative for leg swelling. Gastrointestinal: Negative for constipation, diarrhea, nausea and vomiting. Endocrine: Negative for polyuria. Genitourinary: Negative for frequency. Skin: Negative for rash. Vitals:    10/13/21 1123   BP: (!) 146/81   Pulse: 66   SpO2: 99%   Weight: 233 lb (105.7 kg)      Body mass index is 35.43 kg/m². Physical Exam  Constitutional:       Appearance: Normal appearance. Cardiovascular:      Rate and Rhythm: Normal rate and regular rhythm. Pulses: Normal pulses. Heart sounds: Normal heart sounds. Pulmonary:      Effort: Pulmonary effort is normal.      Breath sounds: Normal breath sounds. Musculoskeletal:      Right lower leg: Right lower leg edema: trace. Left lower leg: No edema.    Neurological: General: No focal deficit present. Mental Status: He is alert. Mental status is at baseline. 1. Essential hypertension  Assessment & Plan:   Borderline controlled, stop amlodipine due to swelling and start losartan to help with BP and for renal protection in setting of DM  Orders:  -     losartan (COZAAR) 25 MG tablet; Take 1 tablet by mouth daily, Disp-30 tablet, R-0Normal  -     famotidine (PEPCID) 20 MG tablet; Take 1 tablet by mouth 2 times daily, Disp-60 tablet, R-3Normal  2. Type 2 diabetes mellitus without complication, without long-term current use of insulin (HCC)  -     metFORMIN (GLUCOPHAGE-XR) 500 MG extended release tablet; Take 1 tablet by mouth 2 times daily, Disp-30 tablet, R-0Normal  3. Diabetes mellitus with coincident hypertension (HCC)  -     losartan (COZAAR) 25 MG tablet; Take 1 tablet by mouth daily, Disp-30 tablet, R-0Normal  -     metFORMIN (GLUCOPHAGE-XR) 500 MG extended release tablet; Take 1 tablet by mouth 2 times daily, Disp-30 tablet, R-0Normal  -     [facility] - Rootcolleen, Natalie Beltran, DPM, Podiatry, [facility]  4. Obesity, diabetes, and hypertension syndrome (HCC)  -     losartan (COZAAR) 25 MG tablet; Take 1 tablet by mouth daily, Disp-30 tablet, R-0Normal  -     metFORMIN (GLUCOPHAGE-XR) 500 MG extended release tablet; Take 1 tablet by mouth 2 times daily, Disp-30 tablet, R-0Normal  5. Spinal stenosis, lumbar region, without neurogenic claudication  -     Handicap [facility]; Starting Wed 10/13/2021, Disp-1 each, R-0, Print  6. GERD without esophagitis  -     famotidine (PEPCID) 20 MG tablet; Take 1 tablet by mouth 2 times daily, Disp-60 tablet, R-3Normal      Return in about 4 weeks (around 11/10/2021) for med follow up, swelling/blood pressure med recheck.     [facility] - Internal Medicine and Pediatrics  Dr. Blue Hawkins D.O.  - Family Medicine and OMT      Electronically signed by Blue Hawkins DO on 10/13/2021 at 12:20 PM.

## 2021-10-13 NOTE — ASSESSMENT & PLAN NOTE
Borderline controlled, stop amlodipine due to swelling and start losartan to help with BP and for renal protection in setting of DM

## 2021-11-01 DIAGNOSIS — E11.9 TYPE 2 DIABETES MELLITUS WITHOUT COMPLICATION, WITHOUT LONG-TERM CURRENT USE OF INSULIN (HCC): ICD-10-CM

## 2021-11-01 DIAGNOSIS — E11.59 OBESITY, DIABETES, AND HYPERTENSION SYNDROME (HCC): ICD-10-CM

## 2021-11-01 DIAGNOSIS — E11.69 OBESITY, DIABETES, AND HYPERTENSION SYNDROME (HCC): ICD-10-CM

## 2021-11-01 DIAGNOSIS — I15.2 OBESITY, DIABETES, AND HYPERTENSION SYNDROME (HCC): ICD-10-CM

## 2021-11-01 DIAGNOSIS — E11.9 DIABETES MELLITUS WITH COINCIDENT HYPERTENSION (HCC): ICD-10-CM

## 2021-11-01 DIAGNOSIS — E66.9 OBESITY, DIABETES, AND HYPERTENSION SYNDROME (HCC): ICD-10-CM

## 2021-11-01 DIAGNOSIS — I10 DIABETES MELLITUS WITH COINCIDENT HYPERTENSION (HCC): ICD-10-CM

## 2021-11-01 NOTE — TELEPHONE ENCOUNTER
Requested Prescriptions     Pending Prescriptions Disp Refills    metFORMIN (GLUCOPHAGE-XR) 500 MG extended release tablet [Pharmacy Med Name: METFORMIN HCL  MG TABLET] 30 tablet 0     Sig: TAKE 1 TABLET BY MOUTH EVERY DAY WITH BREAKFAST     Patient requesting a medication refill.   Last filled on: 10.13.2021  Pharmacy: [facility]  Next office visit:11/17/2021  Last regular office visit: 10/13/2021

## 2021-11-03 DIAGNOSIS — E66.9 OBESITY, DIABETES, AND HYPERTENSION SYNDROME (HCC): ICD-10-CM

## 2021-11-03 DIAGNOSIS — I15.2 OBESITY, DIABETES, AND HYPERTENSION SYNDROME (HCC): ICD-10-CM

## 2021-11-03 DIAGNOSIS — I10 ESSENTIAL HYPERTENSION: ICD-10-CM

## 2021-11-03 DIAGNOSIS — E11.69 OBESITY, DIABETES, AND HYPERTENSION SYNDROME (HCC): ICD-10-CM

## 2021-11-03 DIAGNOSIS — I10 DIABETES MELLITUS WITH COINCIDENT HYPERTENSION (HCC): ICD-10-CM

## 2021-11-03 DIAGNOSIS — E11.9 DIABETES MELLITUS WITH COINCIDENT HYPERTENSION (HCC): ICD-10-CM

## 2021-11-03 DIAGNOSIS — E11.59 OBESITY, DIABETES, AND HYPERTENSION SYNDROME (HCC): ICD-10-CM

## 2021-11-03 RX ORDER — LOSARTAN POTASSIUM 25 MG/1
TABLET ORAL
Qty: 30 TABLET | Refills: 0 | Status: SHIPPED | OUTPATIENT
Start: 2021-11-03 | End: 2021-11-17

## 2021-11-03 RX ORDER — METFORMIN HYDROCHLORIDE 500 MG/1
TABLET, EXTENDED RELEASE ORAL
Qty: 30 TABLET | Refills: 0 | Status: SHIPPED | OUTPATIENT
Start: 2021-11-03 | End: 2021-11-17 | Stop reason: SDUPTHER

## 2021-11-03 RX ORDER — OMEPRAZOLE 20 MG/1
40 CAPSULE, DELAYED RELEASE ORAL DAILY
Qty: 60 CAPSULE | Refills: 0 | Status: SHIPPED | OUTPATIENT
Start: 2021-11-03 | End: 2021-11-24 | Stop reason: SDUPTHER

## 2021-11-03 NOTE — TELEPHONE ENCOUNTER
Requested Prescriptions     Pending Prescriptions Disp Refills    omeprazole (PRILOSEC) 20 MG delayed release capsule 60 capsule 6     Sig: Take 2 capsules by mouth Daily     Refused Prescriptions Disp Refills    omeprazole (PRILOSEC) 20 MG delayed release capsule [Pharmacy Med Name: OMEPRAZOLE DR 20 MG CAPSULE] 30 capsule 0     Sig: TAKE 1 CAPSULE BY MOUTH DAILY 2 TABLETS DAILY     Refused By: Jaspreet Barr     Reason for Refusal: Patient has requested refill too soon       Patient called and stated Dr. Candelaria Yun. He is completely out of medicine.       Next OV:  11/17/21    Last OV:  9/15/2021

## 2021-11-08 RX ORDER — METHOCARBAMOL 750 MG/1
750 TABLET, FILM COATED ORAL 4 TIMES DAILY
Qty: 90 TABLET | Refills: 0 | Status: SHIPPED | OUTPATIENT
Start: 2021-11-08 | End: 2021-12-08

## 2021-11-08 RX ORDER — METHOCARBAMOL 750 MG/1
750 TABLET, FILM COATED ORAL 4 TIMES DAILY
COMMUNITY
End: 2021-11-08 | Stop reason: SDUPTHER

## 2021-11-08 NOTE — TELEPHONE ENCOUNTER
Requested Prescriptions     Pending Prescriptions Disp Refills    methocarbamol (ROBAXIN) 750 MG tablet       Sig: Take 1 tablet by mouth 4 times daily       Patient requesting a medication refill.   Last filled on: n/a  [facility]  Next office visit: 11/17/2021  Last regular office visit: 10/13/2021

## 2021-11-17 ENCOUNTER — OFFICE VISIT (OUTPATIENT)
Dept: INTERNAL MEDICINE CLINIC | Age: 61
End: 2021-11-17
Payer: MEDICAID

## 2021-11-17 VITALS
HEART RATE: 66 BPM | OXYGEN SATURATION: 98 % | BODY MASS INDEX: 35.12 KG/M2 | WEIGHT: 231 LBS | SYSTOLIC BLOOD PRESSURE: 162 MMHG | DIASTOLIC BLOOD PRESSURE: 83 MMHG

## 2021-11-17 DIAGNOSIS — I10 DIABETES MELLITUS WITH COINCIDENT HYPERTENSION (HCC): ICD-10-CM

## 2021-11-17 DIAGNOSIS — E11.59 OBESITY, DIABETES, AND HYPERTENSION SYNDROME (HCC): ICD-10-CM

## 2021-11-17 DIAGNOSIS — I10 ESSENTIAL HYPERTENSION: ICD-10-CM

## 2021-11-17 DIAGNOSIS — E11.69 OBESITY, DIABETES, AND HYPERTENSION SYNDROME (HCC): ICD-10-CM

## 2021-11-17 DIAGNOSIS — E66.9 OBESITY, DIABETES, AND HYPERTENSION SYNDROME (HCC): ICD-10-CM

## 2021-11-17 DIAGNOSIS — I15.2 OBESITY, DIABETES, AND HYPERTENSION SYNDROME (HCC): ICD-10-CM

## 2021-11-17 DIAGNOSIS — E11.9 DIABETES MELLITUS WITH COINCIDENT HYPERTENSION (HCC): ICD-10-CM

## 2021-11-17 DIAGNOSIS — E11.9 TYPE 2 DIABETES MELLITUS WITHOUT COMPLICATION, WITHOUT LONG-TERM CURRENT USE OF INSULIN (HCC): ICD-10-CM

## 2021-11-17 PROBLEM — S14.129A CENTRAL CORD SYNDROME AT UNSPECIFIED LEVEL OF CERVICAL SPINAL CORD, INITIAL ENCOUNTER (HCC): Status: ACTIVE | Noted: 2021-01-28

## 2021-11-17 PROBLEM — E66.3 OVERWEIGHT: Status: ACTIVE | Noted: 2021-02-22

## 2021-11-17 PROCEDURE — 3017F COLORECTAL CA SCREEN DOC REV: CPT | Performed by: FAMILY MEDICINE

## 2021-11-17 PROCEDURE — 2022F DILAT RTA XM EVC RTNOPTHY: CPT | Performed by: FAMILY MEDICINE

## 2021-11-17 PROCEDURE — G8482 FLU IMMUNIZE ORDER/ADMIN: HCPCS | Performed by: FAMILY MEDICINE

## 2021-11-17 PROCEDURE — G8417 CALC BMI ABV UP PARAM F/U: HCPCS | Performed by: FAMILY MEDICINE

## 2021-11-17 PROCEDURE — 99214 OFFICE O/P EST MOD 30 MIN: CPT | Performed by: FAMILY MEDICINE

## 2021-11-17 PROCEDURE — G8427 DOCREV CUR MEDS BY ELIG CLIN: HCPCS | Performed by: FAMILY MEDICINE

## 2021-11-17 PROCEDURE — 3044F HG A1C LEVEL LT 7.0%: CPT | Performed by: FAMILY MEDICINE

## 2021-11-17 PROCEDURE — 1036F TOBACCO NON-USER: CPT | Performed by: FAMILY MEDICINE

## 2021-11-17 RX ORDER — METFORMIN HYDROCHLORIDE 500 MG/1
1000 TABLET, EXTENDED RELEASE ORAL
Qty: 60 TABLET | Refills: 1 | Status: SHIPPED | OUTPATIENT
Start: 2021-11-17 | End: 2021-12-08 | Stop reason: SDUPTHER

## 2021-11-17 RX ORDER — LOSARTAN POTASSIUM 25 MG/1
50 TABLET ORAL DAILY
Qty: 60 TABLET | Refills: 1 | Status: SHIPPED | OUTPATIENT
Start: 2021-11-17 | End: 2021-12-08 | Stop reason: SDUPTHER

## 2021-11-17 ASSESSMENT — ENCOUNTER SYMPTOMS
NAUSEA: 0
COUGH: 0
CONSTIPATION: 0
SHORTNESS OF BREATH: 0
VOMITING: 0
DIARRHEA: 0

## 2021-11-17 NOTE — PROGRESS NOTES
Caleb Pro (:  1960) is a 61 y.o. male,Established patient, here for evaluation of the following chief complaint(s):  Follow-up        SUBJECTIVE:  21:  HYPERTENSION still present, blood pressure 162/83 in office. Having some numbness and tingling in his fingers, which occurred since he had the neck surgery. Surgery was approx 1 year ago. Dr. Radha Kohli was the one who did the cervical spinal fucsion of C3-C6. Has a follow up appt with that surgeon in early January. Waking up with headaches. Did not check his blood pressure when his headache is present. Discussed his need to be more vigilant with taking his BP at times where he is symptomatic.       10.13.21: Feeling better but still has swelling in legs. Wearing compression stockings. Has been amlodipine for some time for his HYPERTENSION, but this could be causing the swelling and is also not best line of med for DM. Would like handicap placard for his chronic neck pain and inability for walking greater than 250 yards without having to stop. 9.15.21 -- new patient. Hx treated prostate cancer. Saw Dr. Bebeto Fournier, has been a long time. Has two rods in his neck, does need handicap placard. Had colonoscopy, thinks it might have been a while ago possibly done in [place]. Negative per patient. Will hold off on ordering cologuard until we get results. Thinks cologuard was done 2 years ago. Does have right lower leg swelling, + edema trace x 1 month. Not painful. Does improve a little bit with compression stockings at home. OBJECTIVE:  Review of Systems   Constitutional: Negative for chills and fever. Respiratory: Negative for cough and shortness of breath. Cardiovascular: Negative for leg swelling. Gastrointestinal: Negative for constipation, diarrhea, nausea and vomiting. Endocrine: Negative for polyuria. Genitourinary: Negative for frequency. Skin: Negative for rash.        Vitals:    21 1302   BP: (!) 162/83 Pulse: 66   SpO2: 98%   Weight: 231 lb (104.8 kg)      Body mass index is 35.12 kg/m². Physical Exam  Constitutional:       Appearance: Normal appearance. Cardiovascular:      Rate and Rhythm: Normal rate and regular rhythm. Pulses: Normal pulses. Heart sounds: Normal heart sounds. Pulmonary:      Effort: Pulmonary effort is normal.      Breath sounds: Normal breath sounds. Musculoskeletal:      Right lower leg: Edema (trace) present. Left lower leg: Edema present. Neurological:      General: No focal deficit present. Mental Status: He is alert. Mental status is at baseline. 1. Essential hypertension  -     losartan (COZAAR) 25 MG tablet; Take 2 tablets by mouth daily, Disp-60 tablet, R-1Normal  2. Diabetes mellitus with coincident hypertension (HCC)  -     losartan (COZAAR) 25 MG tablet; Take 2 tablets by mouth daily, Disp-60 tablet, R-1Normal  -     metFORMIN (GLUCOPHAGE-XR) 500 MG extended release tablet; Take 2 tablets by mouth daily (with breakfast), Disp-60 tablet, R-1Normal  3. Obesity, diabetes, and hypertension syndrome (HCC)  -     losartan (COZAAR) 25 MG tablet; Take 2 tablets by mouth daily, Disp-60 tablet, R-1Normal  -     metFORMIN (GLUCOPHAGE-XR) 500 MG extended release tablet; Take 2 tablets by mouth daily (with breakfast), Disp-60 tablet, R-1Normal  4. Type 2 diabetes mellitus without complication, without long-term current use of insulin (HCC)  -     metFORMIN (GLUCOPHAGE-XR) 500 MG extended release tablet; Take 2 tablets by mouth daily (with breakfast), Disp-60 tablet, R-1Normal      Return in about 3 weeks (around 12/8/2021) for HTN follow up.     [facility] - Internal Medicine and Pediatrics  Dr. Ivelisse Chauhan D.O.  - Family Medicine and OMT      Electronically signed by Ivelisse Chauhan DO on 11/17/2021 at 1:17 PM.

## 2021-11-24 DIAGNOSIS — K21.9 GERD WITHOUT ESOPHAGITIS: ICD-10-CM

## 2021-11-24 RX ORDER — OMEPRAZOLE 20 MG/1
CAPSULE, DELAYED RELEASE ORAL
Qty: 60 CAPSULE | Refills: 0 | Status: SHIPPED | OUTPATIENT
Start: 2021-11-24 | End: 2021-12-21

## 2021-11-24 NOTE — TELEPHONE ENCOUNTER
Requested Prescriptions     Pending Prescriptions Disp Refills    omeprazole (PRILOSEC) 20 MG delayed release capsule [Pharmacy Med Name: OMEPRAZOLE DR 20 MG CAPSULE] 60 capsule 0     Sig: TAKE 2 CAPSULES BY MOUTH EVERY DAY     Patient requesting a medication refill.     Next office visit: 12/8/2021    Last regular office visit: 11/17/2021

## 2021-11-30 DIAGNOSIS — M48.061 SPINAL STENOSIS, LUMBAR REGION, WITHOUT NEUROGENIC CLAUDICATION: ICD-10-CM

## 2021-11-30 RX ORDER — CELECOXIB 200 MG/1
CAPSULE ORAL
Qty: 30 CAPSULE | Refills: 1 | Status: SHIPPED | OUTPATIENT
Start: 2021-11-30 | End: 2022-02-09

## 2021-11-30 NOTE — TELEPHONE ENCOUNTER
Requested Prescriptions     Pending Prescriptions Disp Refills    celecoxib (CELEBREX) 200 MG capsule [Pharmacy Med Name: CELECOXIB 200 MG CAPSULE] 30 capsule 1     Sig: TAKE 1 CAPSULE BY MOUTH EVERY DAY       Patient requesting a medication refill.   Last filled on: 09.15.2021  [facility]  Next office visit: 12/8/2021  Last regular office visit: 11/17/2021

## 2021-12-08 ENCOUNTER — OFFICE VISIT (OUTPATIENT)
Dept: INTERNAL MEDICINE CLINIC | Age: 61
End: 2021-12-08
Payer: MEDICAID

## 2021-12-08 VITALS
WEIGHT: 233 LBS | BODY MASS INDEX: 35.43 KG/M2 | SYSTOLIC BLOOD PRESSURE: 129 MMHG | DIASTOLIC BLOOD PRESSURE: 82 MMHG | OXYGEN SATURATION: 96 % | HEART RATE: 68 BPM

## 2021-12-08 DIAGNOSIS — M48.061 SPINAL STENOSIS, LUMBAR REGION, WITHOUT NEUROGENIC CLAUDICATION: ICD-10-CM

## 2021-12-08 DIAGNOSIS — E66.9 OBESITY, DIABETES, AND HYPERTENSION SYNDROME (HCC): ICD-10-CM

## 2021-12-08 DIAGNOSIS — E11.9 TYPE 2 DIABETES MELLITUS WITHOUT COMPLICATION, WITHOUT LONG-TERM CURRENT USE OF INSULIN (HCC): ICD-10-CM

## 2021-12-08 DIAGNOSIS — I10 DIABETES MELLITUS WITH COINCIDENT HYPERTENSION (HCC): Primary | ICD-10-CM

## 2021-12-08 DIAGNOSIS — E11.9 DIABETES MELLITUS WITH COINCIDENT HYPERTENSION (HCC): Primary | ICD-10-CM

## 2021-12-08 DIAGNOSIS — I87.2 CHRONIC VENOUS INSUFFICIENCY: ICD-10-CM

## 2021-12-08 DIAGNOSIS — E11.59 OBESITY, DIABETES, AND HYPERTENSION SYNDROME (HCC): ICD-10-CM

## 2021-12-08 DIAGNOSIS — Z12.11 SCREENING FOR COLON CANCER: ICD-10-CM

## 2021-12-08 DIAGNOSIS — E11.69 OBESITY, DIABETES, AND HYPERTENSION SYNDROME (HCC): ICD-10-CM

## 2021-12-08 DIAGNOSIS — I15.2 OBESITY, DIABETES, AND HYPERTENSION SYNDROME (HCC): ICD-10-CM

## 2021-12-08 DIAGNOSIS — I10 ESSENTIAL HYPERTENSION: ICD-10-CM

## 2021-12-08 PROBLEM — E66.3 OVERWEIGHT: Status: RESOLVED | Noted: 2021-02-22 | Resolved: 2021-12-08

## 2021-12-08 PROBLEM — S93.402A SPRAIN OF LEFT ANKLE: Status: RESOLVED | Noted: 2021-02-27 | Resolved: 2021-12-08

## 2021-12-08 PROCEDURE — 1036F TOBACCO NON-USER: CPT | Performed by: FAMILY MEDICINE

## 2021-12-08 PROCEDURE — 3017F COLORECTAL CA SCREEN DOC REV: CPT | Performed by: FAMILY MEDICINE

## 2021-12-08 PROCEDURE — 2022F DILAT RTA XM EVC RTNOPTHY: CPT | Performed by: FAMILY MEDICINE

## 2021-12-08 PROCEDURE — 3044F HG A1C LEVEL LT 7.0%: CPT | Performed by: FAMILY MEDICINE

## 2021-12-08 PROCEDURE — G8482 FLU IMMUNIZE ORDER/ADMIN: HCPCS | Performed by: FAMILY MEDICINE

## 2021-12-08 PROCEDURE — 99214 OFFICE O/P EST MOD 30 MIN: CPT | Performed by: FAMILY MEDICINE

## 2021-12-08 PROCEDURE — G8417 CALC BMI ABV UP PARAM F/U: HCPCS | Performed by: FAMILY MEDICINE

## 2021-12-08 PROCEDURE — G8427 DOCREV CUR MEDS BY ELIG CLIN: HCPCS | Performed by: FAMILY MEDICINE

## 2021-12-08 RX ORDER — METHOCARBAMOL 750 MG/1
TABLET, FILM COATED ORAL
Qty: 90 TABLET | Refills: 2 | Status: SHIPPED | OUTPATIENT
Start: 2021-12-08 | End: 2022-03-25

## 2021-12-08 RX ORDER — LOSARTAN POTASSIUM 25 MG/1
50 TABLET ORAL DAILY
Qty: 60 TABLET | Refills: 1 | Status: SHIPPED | OUTPATIENT
Start: 2021-12-08 | End: 2022-03-09

## 2021-12-08 RX ORDER — METFORMIN HYDROCHLORIDE 500 MG/1
1000 TABLET, EXTENDED RELEASE ORAL
Qty: 60 TABLET | Refills: 1 | Status: SHIPPED | OUTPATIENT
Start: 2021-12-08 | End: 2022-03-09

## 2021-12-08 ASSESSMENT — ENCOUNTER SYMPTOMS
CONSTIPATION: 0
COUGH: 0
VOMITING: 0
DIARRHEA: 0
NAUSEA: 0
SHORTNESS OF BREATH: 0

## 2021-12-08 NOTE — PROGRESS NOTES
Ronnie Santos (:  1960) is a 64 y.o. male,Established patient, here for evaluation of the following chief complaint(s):  Follow-up (pt went to foot doctor 10.25.21 & has another appt 21)        SUBJECTIVE:  21 -- has appt with neck doctor to discuss his numbness and tingling in his fingers since his neck surgery. Needs eye doctor for dilation/exam, and needs to get his teeth pulled/dentures. Blood pressure much bettre controlled today after increaseing the losartan to 50 mg losartan. No longer having any headaches in the mornings when he is waking up since increasing the dose on that medicine. 21:  HYPERTENSION still present, blood pressure 162/83 in office. Having some numbness and tingling in his fingers, which occurred since he had the neck surgery. Surgery was approx 1 year ago. Dr. Annmarie Pugh was the one who did the cervical spinal fucsion of C3-C6. Has a follow up appt with that surgeon in early January. Waking up with headaches. Did not check his blood pressure when his headache is present. Discussed his need to be more vigilant with taking his BP at times where he is symptomatic.       10.13.21: Feeling better but still has swelling in legs. Wearing compression stockings. Has been amlodipine for some time for his HYPERTENSION, but this could be causing the swelling and is also not best line of med for DM. Would like handicap placard for his chronic neck pain and inability for walking greater than 250 yards without having to stop. 9.15.21 -- new patient. Hx treated prostate cancer. Saw Dr. Lana Montano, has been a long time. Has two rods in his neck, does need handicap placard. Had colonoscopy, thinks it might have been a while ago possibly done in [place]. Negative per patient. Will hold off on ordering cologuard until we get results. Thinks cologuard was done 2 years ago. Does have right lower leg swelling, + edema trace x 1 month. Not painful.  Does improve a little bit with compression stockings at home. OBJECTIVE:  Review of Systems   Constitutional: Negative for chills and fever. Respiratory: Negative for cough and shortness of breath. Cardiovascular: Negative for leg swelling. Gastrointestinal: Negative for constipation, diarrhea, nausea and vomiting. Endocrine: Negative for polyuria. Genitourinary: Negative for frequency. Skin: Negative for rash. Vitals:    12/08/21 1246   BP: (!) 141/80   Pulse: 68   SpO2: 96%   Weight: 233 lb (105.7 kg)      Body mass index is 35.43 kg/m². Physical Exam  Constitutional:       Appearance: Normal appearance. Cardiovascular:      Rate and Rhythm: Normal rate and regular rhythm. Pulses: Normal pulses. Heart sounds: Normal heart sounds. Pulmonary:      Effort: Pulmonary effort is normal.      Breath sounds: Normal breath sounds. Musculoskeletal:      Right lower leg: Edema (trace) present. Left lower leg: Edema present. Neurological:      General: No focal deficit present. Mental Status: He is alert. Mental status is at baseline. 1. Diabetes mellitus with coincident hypertension ([facility]  -     [facility] - Laurent Argueta MD, Ophthalmology, [facility] [place]  -     losartan (COZAAR) 25 MG tablet; Take 2 tablets by mouth daily, Disp-60 tablet, R-1Normal  -     metFORMIN (GLUCOPHAGE-XR) 500 MG extended release tablet; Take 2 tablets by mouth daily (with breakfast), Disp-60 tablet, R-1Normal  2. Obesity, diabetes, and hypertension syndrome (HCC)  -     losartan (COZAAR) 25 MG tablet; Take 2 tablets by mouth daily, Disp-60 tablet, R-1Normal  -     metFORMIN (GLUCOPHAGE-XR) 500 MG extended release tablet; Take 2 tablets by mouth daily (with breakfast), Disp-60 tablet, R-1Normal  3. Screening for colon cancer  -     [facility] - Sherlyn Sanabria MD, Gastroenterology, [facility] [place]  4. Essential hypertension  -     losartan (COZAAR) 25 MG tablet;  Take 2 tablets by mouth daily, Disp-60 tablet, R-1Normal  5. Chronic venous insufficiency  6. Spinal stenosis, lumbar region, without neurogenic claudication  7. Type 2 diabetes mellitus without complication, without long-term current use of insulin (HCC)  -     metFORMIN (GLUCOPHAGE-XR) 500 MG extended release tablet; Take 2 tablets by mouth daily (with breakfast), Disp-60 tablet, R-1Normal      Return in about 3 months (around 3/8/2022) for med follow up.     [facility] - Internal Medicine and Pediatrics  Dr. Priscila Trejo D.O.  - Family Medicine and OMT      Electronically signed by Priscila Trejo DO on 12/8/2021 at 1:18 PM.

## 2021-12-21 DIAGNOSIS — K21.9 GERD WITHOUT ESOPHAGITIS: ICD-10-CM

## 2021-12-21 RX ORDER — OMEPRAZOLE 20 MG/1
CAPSULE, DELAYED RELEASE ORAL
Qty: 60 CAPSULE | Refills: 0 | Status: SHIPPED | OUTPATIENT
Start: 2021-12-21 | End: 2022-02-09

## 2022-02-08 DIAGNOSIS — M48.061 SPINAL STENOSIS, LUMBAR REGION, WITHOUT NEUROGENIC CLAUDICATION: ICD-10-CM

## 2022-02-08 DIAGNOSIS — K21.9 GERD WITHOUT ESOPHAGITIS: ICD-10-CM

## 2022-02-08 NOTE — TELEPHONE ENCOUNTER
Requested Prescriptions     Pending Prescriptions Disp Refills    celecoxib (CELEBREX) 200 MG capsule [Pharmacy Med Name: CELECOXIB 200 MG CAPSULE] 30 capsule 1     Sig: TAKE 1 CAPSULE BY MOUTH EVERY DAY       Patient requesting a medication refill.   Last filled on: 01.29.22  [facility]  Next office visit: 3/9/2022  Last regular office visit: 12/8/2021

## 2022-02-08 NOTE — TELEPHONE ENCOUNTER
Requested Prescriptions     Pending Prescriptions Disp Refills    omeprazole (PRILOSEC) 20 MG delayed release capsule [Pharmacy Med Name: OMEPRAZOLE DR 20 MG CAPSULE] 60 capsule 0     Sig: TAKE 2 CAPSULES BY MOUTH EVERY DAY       Patient requesting a medication refill.   Last filled on: n/a  [facility]  Next office visit: Visit date not found  Last regular office visit: 12/8/2021

## 2022-02-09 RX ORDER — OMEPRAZOLE 20 MG/1
CAPSULE, DELAYED RELEASE ORAL
Qty: 60 CAPSULE | Refills: 3 | Status: SHIPPED | OUTPATIENT
Start: 2022-02-09 | End: 2022-05-16 | Stop reason: SDUPTHER

## 2022-02-09 RX ORDER — CELECOXIB 200 MG/1
CAPSULE ORAL
Qty: 30 CAPSULE | Refills: 1 | Status: SHIPPED | OUTPATIENT
Start: 2022-02-09 | End: 2022-04-20 | Stop reason: SDUPTHER

## 2022-03-09 ENCOUNTER — OFFICE VISIT (OUTPATIENT)
Dept: INTERNAL MEDICINE CLINIC | Age: 62
End: 2022-03-09
Payer: MEDICAID

## 2022-03-09 VITALS
BODY MASS INDEX: 38.62 KG/M2 | SYSTOLIC BLOOD PRESSURE: 142 MMHG | WEIGHT: 254 LBS | DIASTOLIC BLOOD PRESSURE: 84 MMHG | HEART RATE: 65 BPM | OXYGEN SATURATION: 97 %

## 2022-03-09 DIAGNOSIS — E11.59 OBESITY, DIABETES, AND HYPERTENSION SYNDROME (HCC): Primary | ICD-10-CM

## 2022-03-09 DIAGNOSIS — E11.69 OBESITY, DIABETES, AND HYPERTENSION SYNDROME (HCC): ICD-10-CM

## 2022-03-09 DIAGNOSIS — E11.69 TYPE 2 DIABETES MELLITUS WITH HYPERLIPIDEMIA (HCC): ICD-10-CM

## 2022-03-09 DIAGNOSIS — R06.02 SHORTNESS OF BREATH: ICD-10-CM

## 2022-03-09 DIAGNOSIS — I10 DIABETES MELLITUS WITH COINCIDENT HYPERTENSION (HCC): ICD-10-CM

## 2022-03-09 DIAGNOSIS — E11.9 DIABETES MELLITUS WITH COINCIDENT HYPERTENSION (HCC): ICD-10-CM

## 2022-03-09 DIAGNOSIS — J30.2 SEASONAL ALLERGIES: ICD-10-CM

## 2022-03-09 DIAGNOSIS — G95.20 CERVICAL SPINAL CORD COMPRESSION (HCC): ICD-10-CM

## 2022-03-09 DIAGNOSIS — I10 ESSENTIAL HYPERTENSION: ICD-10-CM

## 2022-03-09 DIAGNOSIS — Z98.1 S/P CERVICAL SPINAL FUSION: ICD-10-CM

## 2022-03-09 DIAGNOSIS — E66.9 OBESITY, DIABETES, AND HYPERTENSION SYNDROME (HCC): ICD-10-CM

## 2022-03-09 DIAGNOSIS — I15.2 OBESITY, DIABETES, AND HYPERTENSION SYNDROME (HCC): ICD-10-CM

## 2022-03-09 DIAGNOSIS — M79.89 LOCALIZED SWELLING OF BOTH LOWER EXTREMITIES: ICD-10-CM

## 2022-03-09 DIAGNOSIS — E11.69 OBESITY, DIABETES, AND HYPERTENSION SYNDROME (HCC): Primary | ICD-10-CM

## 2022-03-09 DIAGNOSIS — I87.2 CHRONIC VENOUS INSUFFICIENCY: ICD-10-CM

## 2022-03-09 DIAGNOSIS — E78.5 TYPE 2 DIABETES MELLITUS WITH HYPERLIPIDEMIA (HCC): ICD-10-CM

## 2022-03-09 DIAGNOSIS — E11.59 OBESITY, DIABETES, AND HYPERTENSION SYNDROME (HCC): ICD-10-CM

## 2022-03-09 DIAGNOSIS — E66.9 OBESITY, DIABETES, AND HYPERTENSION SYNDROME (HCC): Primary | ICD-10-CM

## 2022-03-09 DIAGNOSIS — K21.9 GERD WITHOUT ESOPHAGITIS: ICD-10-CM

## 2022-03-09 DIAGNOSIS — Z13.0 SCREENING FOR DEFICIENCY ANEMIA: ICD-10-CM

## 2022-03-09 DIAGNOSIS — I15.2 OBESITY, DIABETES, AND HYPERTENSION SYNDROME (HCC): Primary | ICD-10-CM

## 2022-03-09 DIAGNOSIS — E11.9 TYPE 2 DIABETES MELLITUS WITHOUT COMPLICATION, WITHOUT LONG-TERM CURRENT USE OF INSULIN (HCC): ICD-10-CM

## 2022-03-09 DIAGNOSIS — E66.01 MORBID OBESITY (HCC): ICD-10-CM

## 2022-03-09 LAB
BASOPHILS ABSOLUTE: 0 K/UL (ref 0–0.2)
BASOPHILS RELATIVE PERCENT: 0.3 %
EOSINOPHILS ABSOLUTE: 0.1 K/UL (ref 0–0.6)
EOSINOPHILS RELATIVE PERCENT: 3.4 %
HCT VFR BLD CALC: 39.1 % (ref 40.5–52.5)
HEMOGLOBIN: 13.2 G/DL (ref 13.5–17.5)
LYMPHOCYTES ABSOLUTE: 1.3 K/UL (ref 1–5.1)
LYMPHOCYTES RELATIVE PERCENT: 28.9 %
MCH RBC QN AUTO: 31.4 PG (ref 26–34)
MCHC RBC AUTO-ENTMCNC: 33.7 G/DL (ref 31–36)
MCV RBC AUTO: 93.2 FL (ref 80–100)
MONOCYTES ABSOLUTE: 0.5 K/UL (ref 0–1.3)
MONOCYTES RELATIVE PERCENT: 10.5 %
NEUTROPHILS ABSOLUTE: 2.5 K/UL (ref 1.7–7.7)
NEUTROPHILS RELATIVE PERCENT: 56.9 %
PDW BLD-RTO: 13.9 % (ref 12.4–15.4)
PLATELET # BLD: 170 K/UL (ref 135–450)
PMV BLD AUTO: 9 FL (ref 5–10.5)
PRO-BNP: 29 PG/ML (ref 0–124)
RBC # BLD: 4.2 M/UL (ref 4.2–5.9)
WBC # BLD: 4.4 K/UL (ref 4–11)

## 2022-03-09 PROCEDURE — 1036F TOBACCO NON-USER: CPT | Performed by: FAMILY MEDICINE

## 2022-03-09 PROCEDURE — 99214 OFFICE O/P EST MOD 30 MIN: CPT | Performed by: FAMILY MEDICINE

## 2022-03-09 PROCEDURE — 3046F HEMOGLOBIN A1C LEVEL >9.0%: CPT | Performed by: FAMILY MEDICINE

## 2022-03-09 PROCEDURE — G8482 FLU IMMUNIZE ORDER/ADMIN: HCPCS | Performed by: FAMILY MEDICINE

## 2022-03-09 PROCEDURE — G8427 DOCREV CUR MEDS BY ELIG CLIN: HCPCS | Performed by: FAMILY MEDICINE

## 2022-03-09 PROCEDURE — G8417 CALC BMI ABV UP PARAM F/U: HCPCS | Performed by: FAMILY MEDICINE

## 2022-03-09 PROCEDURE — 2022F DILAT RTA XM EVC RTNOPTHY: CPT | Performed by: FAMILY MEDICINE

## 2022-03-09 PROCEDURE — 3017F COLORECTAL CA SCREEN DOC REV: CPT | Performed by: FAMILY MEDICINE

## 2022-03-09 RX ORDER — LOSARTAN POTASSIUM 25 MG/1
50 TABLET ORAL DAILY
Qty: 60 TABLET | Refills: 1 | Status: SHIPPED | OUTPATIENT
Start: 2022-03-09 | End: 2022-05-09

## 2022-03-09 RX ORDER — METFORMIN HYDROCHLORIDE 500 MG/1
1000 TABLET, EXTENDED RELEASE ORAL
Qty: 60 TABLET | Refills: 1 | Status: SHIPPED | OUTPATIENT
Start: 2022-03-09 | End: 2022-03-31 | Stop reason: SDUPTHER

## 2022-03-09 RX ORDER — HYDROCHLOROTHIAZIDE 25 MG/1
12.5 TABLET ORAL EVERY MORNING
Qty: 90 TABLET | Refills: 1 | Status: SHIPPED | OUTPATIENT
Start: 2022-03-09 | End: 2022-05-16 | Stop reason: SDUPTHER

## 2022-03-09 RX ORDER — CETIRIZINE HYDROCHLORIDE 10 MG/1
10 TABLET ORAL DAILY
Qty: 90 TABLET | Refills: 1 | Status: SHIPPED | OUTPATIENT
Start: 2022-03-09 | End: 2022-05-16 | Stop reason: SDUPTHER

## 2022-03-09 ASSESSMENT — ENCOUNTER SYMPTOMS
VOMITING: 0
COUGH: 0
SHORTNESS OF BREATH: 0
DIARRHEA: 0
CONSTIPATION: 0
NAUSEA: 0

## 2022-03-09 NOTE — PROGRESS NOTES
Eli Anders (:  1960) is a 64 y.o. male,Established patient, here for evaluation of the following chief complaint(s):  Follow-up (pt reports SOB & LEG SWELLING )        SUBJECTIVE:  3.9.22 - follow up   Did not yet go for GI colonoscopy -- has it scheduled for next Tuesday  Eye doctor appt is in April. No orthopnea, has some swelling in his legs and sleeps in his recliner not a bed. States he thinks this could be part of the problem  Shortness of breath occurs in the daytime, similar to asthma symptoms that he had as a kid -- only last 2-3 hours but then resolve. Occur when he is sitting up.     12..21 -- has appt with neck doctor to discuss his numbness and tingling in his fingers since his neck surgery. Needs eye doctor for dilation/exam, and needs to get his teeth pulled/dentures. Blood pressure much better controlled today after increaseing the losartan to 50 mg losartan. No longer having any headaches in the mornings when he is waking up since increasing the dose on that medicine. 17.21:  HYPERTENSION still present, blood pressure 162/83 in office. Having some numbness and tingling in his fingers, which occurred since he had the neck surgery. Surgery was approx 1 year ago. Dr. Сергей Santillan was the one who did the cervical spinal fucsion of C3-C6. Has a follow up appt with that surgeon in early January. Waking up with headaches. Did not check his blood pressure when his headache is present. Discussed his need to be more vigilant with taking his BP at times where he is symptomatic.     10.13.21: Feeling better but still has swelling in legs. Wearing compression stockings. Has been amlodipine for some time for his HYPERTENSION, but this could be causing the swelling and is also not best line of med for DM. Would like handicap placard for his chronic neck pain and inability for walking greater than 250 yards without having to stop. 15.21 -- new patient.  Hx treated prostate cancer. Saw Dr. Jarrell Isaacs, has been a long time. Has two rods in his neck, does need handicap placard. Had colonoscopy, thinks it might have been a while ago possibly done in [place]. Negative per patient. Will hold off on ordering cologuard until we get results. Thinks cologuard was done 2 years ago. Does have right lower leg swelling, + edema trace x 1 month. Not painful. Does improve a little bit with compression stockings at home. I have reviewed the chart notes available from myself and other providers. I have reviewed and addressed all active problems and created or updated the problems list in detail, as needed    I have extensively reviewed and reconciled the medication list, discontinued medications not taking or no longer appropriate, and updated the active meds list    OBJECTIVE:  Review of Systems   Constitutional: Negative for chills and fever. Respiratory: Negative for cough and shortness of breath. Cardiovascular: Negative for leg swelling. Gastrointestinal: Negative for constipation, diarrhea, nausea and vomiting. Endocrine: Negative for polyuria. Genitourinary: Negative for frequency. Skin: Negative for rash. Vitals:    03/09/22 1247   BP: (!) 142/84   Pulse: 65   SpO2: 97%   Weight: 254 lb (115.2 kg)      Body mass index is 38.62 kg/m². Physical Exam  Constitutional:       Appearance: Normal appearance. Cardiovascular:      Rate and Rhythm: Normal rate and regular rhythm. Pulses: Normal pulses. Heart sounds: Normal heart sounds. Pulmonary:      Effort: Pulmonary effort is normal.      Breath sounds: Normal breath sounds. Musculoskeletal:      Right lower leg: Edema (+1) present. Left lower leg: Edema (+1) present. Neurological:      General: No focal deficit present. Mental Status: He is alert. Mental status is at baseline.        Except as noted below, all chronic problems have been reviewed and are stable to continue medications or other therapy as previously documented in the patient's chart, with changes per orders or documentation below:  Patient received counseling and, if relevant, printed instructions for all symptoms listed in CC and HPI, as well as for all diagnoses brought onto today's visit note below. Typical counseling includes, but is not limited to, non-pharmacologic measures to manage listed symptoms and conditions; appropriate use, risks and benefits for all prescribed medications; potential interactions between medications both prescribed and OTC; diet; exercise; healthy behaviors; and goalsetting to improve health. Patient or responsible party was involved in shared decision making and had opportunity to have all questions answered. I have reconciled the medications in chart with what patient reports to be taking, and reviewed action/ side effects and how to take any new medications. Patient/caregiver understands purpose and side effects. A complete list of medications was provided in their after-visit summary. 1. Obesity, diabetes, and hypertension syndrome ([address]  -     Hemoglobin A1C; Future  2. Cervical spinal cord compression (HCC)  3. Diabetes mellitus with coincident hypertension ([address]  4. Type 2 diabetes mellitus with hyperlipidemia (HCC)  -     Hemoglobin A1C; Future  5. Chronic venous insufficiency  6. GERD without esophagitis  7. S/P cervical spinal fusion  8. Morbid obesity ([address]  9. Screening for deficiency anemia  -     CBC with Auto Differential; Future  10. Localized swelling of both lower extremities  -     hydroCHLOROthiazide (HYDRODIURIL) 25 MG tablet; Take 0.5 tablets by mouth every morning, Disp-90 tablet, R-1Normal  11. Shortness of breath  -     Brain Natriuretic Peptide; Future  12. Seasonal allergies  -     cetirizine (ZYRTEC) 10 MG tablet; Take 1 tablet by mouth daily, Disp-90 tablet, R-1Normal  13.  Essential hypertension      Problem List        Unprioritized    S/P cervical spinal fusion Chronic venous insufficiency    Essential hypertension    Relevant Medications    famotidine (PEPCID) 20 MG tablet    losartan (COZAAR) 25 MG tablet    GERD without esophagitis    Relevant Medications    famotidine (PEPCID) 20 MG tablet    omeprazole (PRILOSEC) 20 MG delayed release capsule    Morbid obesity (HCC)    Relevant Medications    metFORMIN (GLUCOPHAGE-XR) 500 MG extended release tablet    Obesity, diabetes, and hypertension syndrome (HCC) - Primary    Relevant Medications    losartan (COZAAR) 25 MG tablet    metFORMIN (GLUCOPHAGE-XR) 500 MG extended release tablet    Other Relevant Orders    Hemoglobin A1C    Cervical spinal cord compression (HCC)    Diabetes mellitus with coincident hypertension (HCC)    Relevant Medications    losartan (COZAAR) 25 MG tablet    metFORMIN (GLUCOPHAGE-XR) 500 MG extended release tablet    Type 2 diabetes mellitus with hyperlipidemia (HCC)    Relevant Medications    Aspirin Buf,CaCarb-MgCarb-MgO, 81 MG TABS    losartan (COZAAR) 25 MG tablet    metFORMIN (GLUCOPHAGE-XR) 500 MG extended release tablet    hydroCHLOROthiazide (HYDRODIURIL) 25 MG tablet    Other Relevant Orders    Hemoglobin A1C            Return in about 2 weeks (around 3/23/2022) for follow up on edema in legs, after starting HCTZ 12.5 . [facility] - Internal Medicine and Pediatrics  Dr. Ebony Castro D.O.  - Family Medicine and T    Usual doctor's hours are:     Monday 7:30 am to 6:00 pm           Tuesday  7:30 am to 5:00 pm                                               Wednesday 7:30 am to 5:00 pm                                               Thursday 7:30 am to 5:00 pm                                               Friday 7:30 am to 4:00 pm           Saturdays, Sundays, and after hours: E-Visits are available    We observe most federal holidays and Good Friday. We ask that you only contact the office one time per issue or question, and please allow one full business day for a call back. Calling us back multiple times keeps us from being able to complete the work efficiently for you and our other patients. For medication renewals, please call your pharmacist to contact us, and be sure to allow at least 3 business days for processing before you need to  your medication. If you are sick or need an appointment that hasn't been planned, same day appointments are available every day the office is open: Monday, Tuesday, Wednesday, Thursday, and Friday. Call during office hours to schedule, even if it may not be with your regular physician. You may also call the office after 8 am on office days if you need to be seen from an issue the night before. During hours when the office is not normally open, your call will go to the messaging service which cannot provide any service other than paging the doctor. No prescriptions or other nonurgent matters will be handled and no voicemail is available, so please call back during office hours for these matters.           Electronically signed by Breanna Childress DO on 3/9/2022 at 2:39 PM.

## 2022-03-09 NOTE — TELEPHONE ENCOUNTER
Requested Prescriptions     Pending Prescriptions Disp Refills    losartan (COZAAR) 25 MG tablet [Pharmacy Med Name: LOSARTAN POTASSIUM 25 MG TAB] 60 tablet 1     Sig: TAKE 2 TABLETS BY MOUTH DAILY    metFORMIN (GLUCOPHAGE-XR) 500 MG extended release tablet [Pharmacy Med Name: METFORMIN HCL  MG TABLET] 60 tablet 1     Sig: TAKE 2 TABLETS BY MOUTH DAILY (WITH BREAKFAST). Patient requesting a medication refill.   Last filled on: 02.13.22  [facility]  Next office visit: 3/9/2022  Last regular office visit: 12/8/2021

## 2022-03-10 LAB
ESTIMATED AVERAGE GLUCOSE: 174.3 MG/DL
HBA1C MFR BLD: 7.7 %

## 2022-03-25 DIAGNOSIS — I10 ESSENTIAL HYPERTENSION: ICD-10-CM

## 2022-03-25 DIAGNOSIS — K21.9 GERD WITHOUT ESOPHAGITIS: ICD-10-CM

## 2022-03-25 RX ORDER — METHOCARBAMOL 750 MG/1
TABLET, FILM COATED ORAL
Qty: 90 TABLET | Refills: 2 | Status: SHIPPED | OUTPATIENT
Start: 2022-03-25

## 2022-03-25 NOTE — TELEPHONE ENCOUNTER
Requested Prescriptions     Pending Prescriptions Disp Refills    famotidine (PEPCID) 20 MG tablet 60 tablet 3     Sig: Take 1 tablet by mouth 2 times daily

## 2022-03-27 RX ORDER — FAMOTIDINE 20 MG/1
20 TABLET, FILM COATED ORAL 2 TIMES DAILY
Qty: 60 TABLET | Refills: 3 | Status: SHIPPED | OUTPATIENT
Start: 2022-03-27 | End: 2022-05-16 | Stop reason: SDUPTHER

## 2022-03-31 ENCOUNTER — OFFICE VISIT (OUTPATIENT)
Dept: INTERNAL MEDICINE CLINIC | Age: 62
End: 2022-03-31
Payer: MEDICAID

## 2022-03-31 VITALS
WEIGHT: 249 LBS | SYSTOLIC BLOOD PRESSURE: 134 MMHG | HEART RATE: 76 BPM | OXYGEN SATURATION: 97 % | BODY MASS INDEX: 37.86 KG/M2 | DIASTOLIC BLOOD PRESSURE: 79 MMHG

## 2022-03-31 DIAGNOSIS — E11.9 DIABETES MELLITUS WITH COINCIDENT HYPERTENSION (HCC): ICD-10-CM

## 2022-03-31 DIAGNOSIS — E66.9 OBESITY, DIABETES, AND HYPERTENSION SYNDROME (HCC): ICD-10-CM

## 2022-03-31 DIAGNOSIS — E11.69 OBESITY, DIABETES, AND HYPERTENSION SYNDROME (HCC): ICD-10-CM

## 2022-03-31 DIAGNOSIS — I10 DIABETES MELLITUS WITH COINCIDENT HYPERTENSION (HCC): ICD-10-CM

## 2022-03-31 DIAGNOSIS — I15.2 OBESITY, DIABETES, AND HYPERTENSION SYNDROME (HCC): ICD-10-CM

## 2022-03-31 DIAGNOSIS — E11.69 TYPE 2 DIABETES MELLITUS WITH HYPERLIPIDEMIA (HCC): Primary | ICD-10-CM

## 2022-03-31 DIAGNOSIS — E11.9 TYPE 2 DIABETES MELLITUS WITHOUT COMPLICATION, WITHOUT LONG-TERM CURRENT USE OF INSULIN (HCC): ICD-10-CM

## 2022-03-31 DIAGNOSIS — E78.5 TYPE 2 DIABETES MELLITUS WITH HYPERLIPIDEMIA (HCC): Primary | ICD-10-CM

## 2022-03-31 DIAGNOSIS — E11.59 OBESITY, DIABETES, AND HYPERTENSION SYNDROME (HCC): ICD-10-CM

## 2022-03-31 PROCEDURE — G8482 FLU IMMUNIZE ORDER/ADMIN: HCPCS | Performed by: FAMILY MEDICINE

## 2022-03-31 PROCEDURE — 99214 OFFICE O/P EST MOD 30 MIN: CPT | Performed by: FAMILY MEDICINE

## 2022-03-31 PROCEDURE — G8417 CALC BMI ABV UP PARAM F/U: HCPCS | Performed by: FAMILY MEDICINE

## 2022-03-31 PROCEDURE — 2022F DILAT RTA XM EVC RTNOPTHY: CPT | Performed by: FAMILY MEDICINE

## 2022-03-31 PROCEDURE — 1036F TOBACCO NON-USER: CPT | Performed by: FAMILY MEDICINE

## 2022-03-31 PROCEDURE — 3051F HG A1C>EQUAL 7.0%<8.0%: CPT | Performed by: FAMILY MEDICINE

## 2022-03-31 PROCEDURE — G8427 DOCREV CUR MEDS BY ELIG CLIN: HCPCS | Performed by: FAMILY MEDICINE

## 2022-03-31 PROCEDURE — 3017F COLORECTAL CA SCREEN DOC REV: CPT | Performed by: FAMILY MEDICINE

## 2022-03-31 RX ORDER — METFORMIN HYDROCHLORIDE 500 MG/1
1000 TABLET, EXTENDED RELEASE ORAL 2 TIMES DAILY
Qty: 120 TABLET | Refills: 3 | Status: SHIPPED | OUTPATIENT
Start: 2022-03-31 | End: 2022-05-16 | Stop reason: SDUPTHER

## 2022-03-31 SDOH — ECONOMIC STABILITY: FOOD INSECURITY: WITHIN THE PAST 12 MONTHS, THE FOOD YOU BOUGHT JUST DIDN'T LAST AND YOU DIDN'T HAVE MONEY TO GET MORE.: NEVER TRUE

## 2022-03-31 SDOH — ECONOMIC STABILITY: FOOD INSECURITY: WITHIN THE PAST 12 MONTHS, YOU WORRIED THAT YOUR FOOD WOULD RUN OUT BEFORE YOU GOT MONEY TO BUY MORE.: NEVER TRUE

## 2022-03-31 ASSESSMENT — SOCIAL DETERMINANTS OF HEALTH (SDOH): HOW HARD IS IT FOR YOU TO PAY FOR THE VERY BASICS LIKE FOOD, HOUSING, MEDICAL CARE, AND HEATING?: NOT HARD AT ALL

## 2022-03-31 ASSESSMENT — ENCOUNTER SYMPTOMS
CONSTIPATION: 0
COUGH: 0
DIARRHEA: 0
SHORTNESS OF BREATH: 0
VOMITING: 0
NAUSEA: 0

## 2022-03-31 NOTE — PROGRESS NOTES
Addis Alvarez (:  1960) is a 64 y.o. male,Established patient, here for evaluation of the following chief complaint(s):  Follow-up (2 week )        SUBJECTIVE:  3.31.22 - follow up after lab work. A1c elevated now to 7.7. BNP was normal.   For DM -- will need to increase metformin to 1000 mg BID instead of 500 mg BID. Started on HCTZ after last visit -- improved significantly. No pain, no further swelling  Swelling improved but patient still sleeping in recliner. Discussed that he needs to see his spine surgeon to see if there is a better way for him to lay down without as much pain -- sleeping in a bed will elevate his feet up instead of them being dangling downwards otherwise. Had colonoscopy with Dr. Jacinto Hill on 3.15.22 -- found small grade 1 internal hemorrhoids and mild diverticulosis. Does have family hx of brother and father who both had colon cancer. Next colonoscopy is panned 5 years from now in 2027      3.9.22 - follow up   Did not yet go for GI colonoscopy -- has it scheduled for next Tuesday  Eye doctor appt is in April. No orthopnea, has some swelling in his legs and sleeps in his recliner not a bed. States he thinks this could be part of the problem  Shortness of breath occurs in the daytime, similar to asthma symptoms that he had as a kid -- only last 2-3 hours but then resolve. Occur when he is sitting up.     21 -- has appt with neck doctor to discuss his numbness and tingling in his fingers since his neck surgery. Needs eye doctor for dilation/exam, and needs to get his teeth pulled/dentures. Blood pressure much better controlled today after increaseing the losartan to 50 mg losartan. No longer having any headaches in the mornings when he is waking up since increasing the dose on that medicine. 21:  HYPERTENSION still present, blood pressure 162/83 in office.    Having some numbness and tingling in his fingers, which occurred since he had the neck surgery. Surgery was approx 1 year ago. Dr. Cheng Lim was the one who did the cervical spinal fucsion of C3-C6. Has a follow up appt with that surgeon in early January. Waking up with headaches. Did not check his blood pressure when his headache is present. Discussed his need to be more vigilant with taking his BP at times where he is symptomatic.     10.13.21: Feeling better but still has swelling in legs. Wearing compression stockings. Has been amlodipine for some time for his HYPERTENSION, but this could be causing the swelling and is also not best line of med for DM. Would like handicap placard for his chronic neck pain and inability for walking greater than 250 yards without having to stop. 9.15.21 -- new patient. Hx treated prostate cancer. Saw Dr. Ailyn James, has been a long time. Has two rods in his neck, does need handicap placard. Had colonoscopy, thinks it might have been a while ago possibly done in [place]. Negative per patient. Will hold off on ordering cologuard until we get results. Thinks cologuard was done 2 years ago. Does have right lower leg swelling, + edema trace x 1 month. Not painful. Does improve a little bit with compression stockings at home. I have reviewed the chart notes available from myself and other providers. I have reviewed and addressed all active problems and created or updated the problems list in detail, as needed    I have extensively reviewed and reconciled the medication list, discontinued medications not taking or no longer appropriate, and updated the active meds list    OBJECTIVE:  Review of Systems   Constitutional: Negative for chills and fever. Respiratory: Negative for cough and shortness of breath. Cardiovascular: Negative for leg swelling. Gastrointestinal: Negative for constipation, diarrhea, nausea and vomiting. Endocrine: Negative for polyuria. Genitourinary: Negative for frequency. Skin: Negative for rash.        Vitals:    03/31/22 1259   BP: 134/79   Pulse: 76   SpO2: 97%   Weight: 249 lb (112.9 kg)      Body mass index is 37.86 kg/m². Physical Exam  Constitutional:       Appearance: Normal appearance. Cardiovascular:      Rate and Rhythm: Normal rate and regular rhythm. Pulses: Normal pulses. Heart sounds: Normal heart sounds. Pulmonary:      Effort: Pulmonary effort is normal.      Breath sounds: Normal breath sounds. Neurological:      General: No focal deficit present. Mental Status: He is alert. Mental status is at baseline. Except as noted below, all chronic problems have been reviewed and are stable to continue medications or other therapy as previously documented in the patient's chart, with changes per orders or documentation below:  Patient received counseling and, if relevant, printed instructions for all symptoms listed in CC and HPI, as well as for all diagnoses brought onto today's visit note below. Typical counseling includes, but is not limited to, non-pharmacologic measures to manage listed symptoms and conditions; appropriate use, risks and benefits for all prescribed medications; potential interactions between medications both prescribed and OTC; diet; exercise; healthy behaviors; and goalsetting to improve health. Patient or responsible party was involved in shared decision making and had opportunity to have all questions answered. I have reconciled the medications in chart with what patient reports to be taking, and reviewed action/ side effects and how to take any new medications. Patient/caregiver understands purpose and side effects. A complete list of medications was provided in their after-visit summary. 1. Type 2 diabetes mellitus with hyperlipidemia (HCC)  -     metFORMIN (GLUCOPHAGE-XR) 500 MG extended release tablet; Take 2 tablets by mouth in the morning and at bedtime TAKE 2 TABLETS BY MOUTH twice daily, Disp-120 tablet, R-3Normal  2.  Obesity, diabetes, and hypertension syndrome ([address]  -     metFORMIN (GLUCOPHAGE-XR) 500 MG extended release tablet; Take 2 tablets by mouth in the morning and at bedtime TAKE 2 TABLETS BY MOUTH twice daily, Disp-120 tablet, R-3Normal  3. Diabetes mellitus with coincident hypertension ([address]  -     metFORMIN (GLUCOPHAGE-XR) 500 MG extended release tablet; Take 2 tablets by mouth in the morning and at bedtime TAKE 2 TABLETS BY MOUTH twice daily, Disp-120 tablet, R-3Normal  4. Type 2 diabetes mellitus without complication, without long-term current use of insulin (HCC)      Problem List        Unprioritized    Obesity, diabetes, and hypertension syndrome (HCC)    Relevant Medications    losartan (COZAAR) 25 MG tablet    metFORMIN (GLUCOPHAGE-XR) 500 MG extended release tablet    Diabetes mellitus with coincident hypertension (HCC)    Relevant Medications    losartan (COZAAR) 25 MG tablet    metFORMIN (GLUCOPHAGE-XR) 500 MG extended release tablet    Type 2 diabetes mellitus with hyperlipidemia (HCC) - Primary    Relevant Medications    Aspirin Buf,CaCarb-MgCarb-MgO, 81 MG TABS    losartan (COZAAR) 25 MG tablet    hydroCHLOROthiazide (HYDRODIURIL) 25 MG tablet    metFORMIN (GLUCOPHAGE-XR) 500 MG extended release tablet            Return in about 6 weeks (around 5/12/2022) for chronic condition follow up, metformin dosing follow up. [facility] - Internal Medicine and Pediatrics  Dr. Elvis Hughes D.O.  - Family Medicine and T    Usual doctor's hours are:     Monday 7:30 am to 6:00 pm           Tuesday  7:30 am to 5:00 pm                                               Wednesday 7:30 am to 5:00 pm                                               Thursday 7:30 am to 5:00 pm                                               Friday 7:30 am to 4:00 pm           Saturdays, Sundays, and after hours: E-Visits are available    We observe most federal holidays and Good Friday.     We ask that you only contact the office one time per issue or question, and please allow one full business day for a call back. Calling us back multiple times keeps us from being able to complete the work efficiently for you and our other patients. For medication renewals, please call your pharmacist to contact us, and be sure to allow at least 3 business days for processing before you need to  your medication. If you are sick or need an appointment that hasn't been planned, same day appointments are available every day the office is open: Monday, Tuesday, Wednesday, Thursday, and Friday. Call during office hours to schedule, even if it may not be with your regular physician. You may also call the office after 8 am on office days if you need to be seen from an issue the night before. During hours when the office is not normally open, your call will go to the messaging service which cannot provide any service other than paging the doctor. No prescriptions or other nonurgent matters will be handled and no voicemail is available, so please call back during office hours for these matters.           Electronically signed by Ziggy Martinez DO on 3/31/2022 at 1:12 PM.

## 2022-04-20 DIAGNOSIS — M48.061 SPINAL STENOSIS, LUMBAR REGION, WITHOUT NEUROGENIC CLAUDICATION: ICD-10-CM

## 2022-04-20 RX ORDER — CELECOXIB 200 MG/1
CAPSULE ORAL
Qty: 30 CAPSULE | Refills: 1 | Status: SHIPPED | OUTPATIENT
Start: 2022-04-20 | End: 2022-06-14

## 2022-04-20 NOTE — TELEPHONE ENCOUNTER
Requested Prescriptions     Pending Prescriptions Disp Refills    celecoxib (CELEBREX) 200 MG capsule [Pharmacy Med Name: CELECOXIB 200 MG CAPSULE] 30 capsule 1     Sig: TAKE 1 CAPSULE BY MOUTH EVERY DAY       Patient requesting a medication refill.   Last filled on: 03.23.22  [facility]  Next office visit: 5/12/2022  Last regular office visit: 3/31/2022

## 2022-05-07 DIAGNOSIS — E11.9 DIABETES MELLITUS WITH COINCIDENT HYPERTENSION (HCC): ICD-10-CM

## 2022-05-07 DIAGNOSIS — I10 ESSENTIAL HYPERTENSION: ICD-10-CM

## 2022-05-07 DIAGNOSIS — I15.2 OBESITY, DIABETES, AND HYPERTENSION SYNDROME (HCC): ICD-10-CM

## 2022-05-07 DIAGNOSIS — E11.59 OBESITY, DIABETES, AND HYPERTENSION SYNDROME (HCC): ICD-10-CM

## 2022-05-07 DIAGNOSIS — I10 DIABETES MELLITUS WITH COINCIDENT HYPERTENSION (HCC): ICD-10-CM

## 2022-05-07 DIAGNOSIS — E11.69 OBESITY, DIABETES, AND HYPERTENSION SYNDROME (HCC): ICD-10-CM

## 2022-05-07 DIAGNOSIS — E66.9 OBESITY, DIABETES, AND HYPERTENSION SYNDROME (HCC): ICD-10-CM

## 2022-05-09 RX ORDER — LOSARTAN POTASSIUM 25 MG/1
50 TABLET ORAL DAILY
Qty: 60 TABLET | Refills: 3 | Status: SHIPPED | OUTPATIENT
Start: 2022-05-09 | End: 2022-05-16 | Stop reason: SDUPTHER

## 2022-05-09 NOTE — TELEPHONE ENCOUNTER
Requested Prescriptions     Pending Prescriptions Disp Refills    losartan (COZAAR) 25 MG tablet [Pharmacy Med Name: LOSARTAN POTASSIUM 25 MG TAB] 60 tablet 1     Sig: TAKE 2 TABLETS BY MOUTH DAILY     Patient requesting a medication refill.     Next office visit: 5/16/2022    Last regular office visit: 3/31/2022

## 2022-05-16 ENCOUNTER — OFFICE VISIT (OUTPATIENT)
Dept: INTERNAL MEDICINE CLINIC | Age: 62
End: 2022-05-16
Payer: MEDICAID

## 2022-05-16 VITALS
HEART RATE: 74 BPM | SYSTOLIC BLOOD PRESSURE: 136 MMHG | BODY MASS INDEX: 37.25 KG/M2 | WEIGHT: 245 LBS | OXYGEN SATURATION: 99 % | DIASTOLIC BLOOD PRESSURE: 79 MMHG

## 2022-05-16 DIAGNOSIS — I10 DIABETES MELLITUS WITH COINCIDENT HYPERTENSION (HCC): ICD-10-CM

## 2022-05-16 DIAGNOSIS — J30.2 SEASONAL ALLERGIES: ICD-10-CM

## 2022-05-16 DIAGNOSIS — E11.69 TYPE 2 DIABETES MELLITUS WITH HYPERLIPIDEMIA (HCC): ICD-10-CM

## 2022-05-16 DIAGNOSIS — E11.59 OBESITY, DIABETES, AND HYPERTENSION SYNDROME (HCC): ICD-10-CM

## 2022-05-16 DIAGNOSIS — E11.69 OBESITY, DIABETES, AND HYPERTENSION SYNDROME (HCC): ICD-10-CM

## 2022-05-16 DIAGNOSIS — E66.9 OBESITY, DIABETES, AND HYPERTENSION SYNDROME (HCC): ICD-10-CM

## 2022-05-16 DIAGNOSIS — E11.9 DIABETES MELLITUS WITH COINCIDENT HYPERTENSION (HCC): ICD-10-CM

## 2022-05-16 DIAGNOSIS — M79.89 LOCALIZED SWELLING OF BOTH LOWER EXTREMITIES: ICD-10-CM

## 2022-05-16 DIAGNOSIS — I15.2 OBESITY, DIABETES, AND HYPERTENSION SYNDROME (HCC): ICD-10-CM

## 2022-05-16 DIAGNOSIS — K21.9 GERD WITHOUT ESOPHAGITIS: ICD-10-CM

## 2022-05-16 DIAGNOSIS — E78.5 TYPE 2 DIABETES MELLITUS WITH HYPERLIPIDEMIA (HCC): ICD-10-CM

## 2022-05-16 DIAGNOSIS — I10 ESSENTIAL HYPERTENSION: ICD-10-CM

## 2022-05-16 PROCEDURE — 90471 IMMUNIZATION ADMIN: CPT | Performed by: FAMILY MEDICINE

## 2022-05-16 PROCEDURE — 99214 OFFICE O/P EST MOD 30 MIN: CPT | Performed by: FAMILY MEDICINE

## 2022-05-16 PROCEDURE — 90732 PPSV23 VACC 2 YRS+ SUBQ/IM: CPT | Performed by: FAMILY MEDICINE

## 2022-05-16 PROCEDURE — G8427 DOCREV CUR MEDS BY ELIG CLIN: HCPCS | Performed by: FAMILY MEDICINE

## 2022-05-16 PROCEDURE — 1036F TOBACCO NON-USER: CPT | Performed by: FAMILY MEDICINE

## 2022-05-16 PROCEDURE — 3051F HG A1C>EQUAL 7.0%<8.0%: CPT | Performed by: FAMILY MEDICINE

## 2022-05-16 PROCEDURE — G8417 CALC BMI ABV UP PARAM F/U: HCPCS | Performed by: FAMILY MEDICINE

## 2022-05-16 PROCEDURE — 2022F DILAT RTA XM EVC RTNOPTHY: CPT | Performed by: FAMILY MEDICINE

## 2022-05-16 PROCEDURE — 3017F COLORECTAL CA SCREEN DOC REV: CPT | Performed by: FAMILY MEDICINE

## 2022-05-16 RX ORDER — FAMOTIDINE 20 MG/1
20 TABLET, FILM COATED ORAL 2 TIMES DAILY
Qty: 180 TABLET | Refills: 1 | Status: ON HOLD | OUTPATIENT
Start: 2022-05-16 | End: 2022-10-21 | Stop reason: HOSPADM

## 2022-05-16 RX ORDER — OMEPRAZOLE 20 MG/1
CAPSULE, DELAYED RELEASE ORAL
Qty: 180 CAPSULE | Refills: 1 | Status: SHIPPED | OUTPATIENT
Start: 2022-05-16

## 2022-05-16 RX ORDER — LOSARTAN POTASSIUM 25 MG/1
50 TABLET ORAL DAILY
Qty: 180 TABLET | Refills: 1 | Status: SHIPPED | OUTPATIENT
Start: 2022-05-16 | End: 2022-10-24

## 2022-05-16 RX ORDER — ATORVASTATIN CALCIUM 40 MG/1
40 TABLET, FILM COATED ORAL NIGHTLY
Qty: 90 TABLET | Refills: 1 | Status: SHIPPED | OUTPATIENT
Start: 2022-05-16 | End: 2022-09-28 | Stop reason: SDUPTHER

## 2022-05-16 RX ORDER — CETIRIZINE HYDROCHLORIDE 10 MG/1
10 TABLET ORAL DAILY
Qty: 90 TABLET | Refills: 1 | Status: SHIPPED | OUTPATIENT
Start: 2022-05-16

## 2022-05-16 RX ORDER — HYDROCHLOROTHIAZIDE 25 MG/1
12.5 TABLET ORAL EVERY MORNING
Qty: 45 TABLET | Refills: 1 | Status: SHIPPED | OUTPATIENT
Start: 2022-05-16 | End: 2022-09-28

## 2022-05-16 RX ORDER — METFORMIN HYDROCHLORIDE 500 MG/1
1000 TABLET, EXTENDED RELEASE ORAL 2 TIMES DAILY
Qty: 180 TABLET | Refills: 1 | Status: SHIPPED | OUTPATIENT
Start: 2022-05-16 | End: 2022-08-23

## 2022-05-16 ASSESSMENT — ENCOUNTER SYMPTOMS
VOMITING: 0
COUGH: 0
CONSTIPATION: 0
SHORTNESS OF BREATH: 0
NAUSEA: 0
DIARRHEA: 0

## 2022-05-16 NOTE — PROGRESS NOTES
Antoni Barrera (:  1960) is a 64 y.o. male,Established patient, here for evaluation of the following chief complaint(s):  Follow-up (6 week )        SUBJECTIVE:  22 - follow up after increased metformin dose to 1000 mg BID  Tolerating the increased dose well. No issues with blood pressure       3.31.22 - follow up after lab work. A1c elevated now to 7.7. BNP was normal.   For DM -- will need to increase metformin to 1000 mg BID instead of 500 mg BID. Started on HCTZ after last visit -- improved significantly. No pain, no further swelling  Swelling improved but patient still sleeping in recliner. Discussed that he needs to see his spine surgeon to see if there is a better way for him to lay down without as much pain -- sleeping in a bed will elevate his feet up instead of them being dangling downwards otherwise. Had colonoscopy with Dr. Sabra Valle on 3.15.22 -- found small grade 1 internal hemorrhoids and mild diverticulosis. Does have family hx of brother and father who both had colon cancer. Next colonoscopy is panned 5 years from now in 2027      3.9.22 - follow up   Did not yet go for GI colonoscopy -- has it scheduled for next Tuesday  Eye doctor appt is in April. No orthopnea, has some swelling in his legs and sleeps in his recliner not a bed. States he thinks this could be part of the problem  Shortness of breath occurs in the daytime, similar to asthma symptoms that he had as a kid -- only last 2-3 hours but then resolve. Occur when he is sitting up.     21 -- has appt with neck doctor to discuss his numbness and tingling in his fingers since his neck surgery. Needs eye doctor for dilation/exam, and needs to get his teeth pulled/dentures. Blood pressure much better controlled today after increaseing the losartan to 50 mg losartan. No longer having any headaches in the mornings when he is waking up since increasing the dose on that medicine. 11.17.21:  HYPERTENSION still present, blood pressure 162/83 in office. Having some numbness and tingling in his fingers, which occurred since he had the neck surgery. Surgery was approx 1 year ago. Dr. Brett Green was the one who did the cervical spinal fucsion of C3-C6. Has a follow up appt with that surgeon in early January. Waking up with headaches. Did not check his blood pressure when his headache is present. Discussed his need to be more vigilant with taking his BP at times where he is symptomatic.     10.13.21: Feeling better but still has swelling in legs. Wearing compression stockings. Has been amlodipine for some time for his HYPERTENSION, but this could be causing the swelling and is also not best line of med for DM. Would like handicap placard for his chronic neck pain and inability for walking greater than 250 yards without having to stop. 9.15.21 -- new patient. Hx treated prostate cancer. Saw Dr. Pacheco Don, has been a long time. Has two rods in his neck, does need handicap placard. Had colonoscopy, thinks it might have been a while ago possibly done in [place]. Negative per patient. Will hold off on ordering cologuard until we get results. Thinks cologuard was done 2 years ago. Does have right lower leg swelling, + edema trace x 1 month. Not painful. Does improve a little bit with compression stockings at home. I have reviewed the chart notes available from myself and other providers. I have reviewed and addressed all active problems and created or updated the problems list in detail, as needed    I have extensively reviewed and reconciled the medication list, discontinued medications not taking or no longer appropriate, and updated the active meds list    OBJECTIVE:  Review of Systems   Constitutional: Negative for chills and fever. Respiratory: Negative for cough and shortness of breath. Cardiovascular: Negative for leg swelling.    Gastrointestinal: Negative for constipation, diarrhea, nausea and vomiting. Endocrine: Negative for polyuria. Genitourinary: Negative for frequency. Skin: Negative for rash. Vitals:    05/16/22 0849   BP: 136/79   Pulse: 74   SpO2: 99%   Weight: 245 lb (111.1 kg)      Body mass index is 37.25 kg/m². Physical Exam  Constitutional:       Appearance: Normal appearance. Cardiovascular:      Rate and Rhythm: Normal rate and regular rhythm. Pulses: Normal pulses. Heart sounds: Normal heart sounds. Pulmonary:      Effort: Pulmonary effort is normal.      Breath sounds: Normal breath sounds. Neurological:      General: No focal deficit present. Mental Status: He is alert. Mental status is at baseline. Except as noted below, all chronic problems have been reviewed and are stable to continue medications or other therapy as previously documented in the patient's chart, with changes per orders or documentation below:  Patient received counseling and, if relevant, printed instructions for all symptoms listed in CC and HPI, as well as for all diagnoses brought onto today's visit note below. Typical counseling includes, but is not limited to, non-pharmacologic measures to manage listed symptoms and conditions; appropriate use, risks and benefits for all prescribed medications; potential interactions between medications both prescribed and OTC; diet; exercise; healthy behaviors; and goalsetting to improve health. Patient or responsible party was involved in shared decision making and had opportunity to have all questions answered. I have reconciled the medications in chart with what patient reports to be taking, and reviewed action/ side effects and how to take any new medications. Patient/caregiver understands purpose and side effects. A complete list of medications was provided in their after-visit summary. 1. Diabetes mellitus with coincident hypertension (HCC)  -     losartan (COZAAR) 25 MG tablet;  Take 2 tablets by mouth daily, Disp-180 tablet, R-1Normal  -     metFORMIN (GLUCOPHAGE-XR) 500 MG extended release tablet; Take 2 tablets by mouth in the morning and at bedtime TAKE 2 TABLETS BY MOUTH twice daily, Disp-180 tablet, R-1Normal  2. Obesity, diabetes, and hypertension syndrome (HCC)  -     losartan (COZAAR) 25 MG tablet; Take 2 tablets by mouth daily, Disp-180 tablet, R-1Normal  -     metFORMIN (GLUCOPHAGE-XR) 500 MG extended release tablet; Take 2 tablets by mouth in the morning and at bedtime TAKE 2 TABLETS BY MOUTH twice daily, Disp-180 tablet, R-1Normal  3. Essential hypertension  -     losartan (COZAAR) 25 MG tablet; Take 2 tablets by mouth daily, Disp-180 tablet, R-1Normal  -     famotidine (PEPCID) 20 MG tablet; Take 1 tablet by mouth 2 times daily, Disp-180 tablet, R-1Normal  4. Type 2 diabetes mellitus with hyperlipidemia (HCC)  -     metFORMIN (GLUCOPHAGE-XR) 500 MG extended release tablet; Take 2 tablets by mouth in the morning and at bedtime TAKE 2 TABLETS BY MOUTH twice daily, Disp-180 tablet, R-1Normal  5. GERD without esophagitis  -     famotidine (PEPCID) 20 MG tablet; Take 1 tablet by mouth 2 times daily, Disp-180 tablet, R-1Normal  -     omeprazole (PRILOSEC) 20 MG delayed release capsule; TAKE 2 CAPSULES BY MOUTH EVERY DAY, Disp-180 capsule, R-1Normal  6. Localized swelling of both lower extremities  -     hydroCHLOROthiazide (HYDRODIURIL) 25 MG tablet; Take 0.5 tablets by mouth every morning, Disp-45 tablet, R-1Normal  7. Seasonal allergies  -     cetirizine (ZYRTEC) 10 MG tablet;  Take 1 tablet by mouth daily, Disp-90 tablet, R-1Normal      Problem List        Unprioritized    Essential hypertension    Relevant Medications    losartan (COZAAR) 25 MG tablet    famotidine (PEPCID) 20 MG tablet    GERD without esophagitis    Relevant Medications    famotidine (PEPCID) 20 MG tablet    omeprazole (PRILOSEC) 20 MG delayed release capsule    Obesity, diabetes, and hypertension syndrome ([address]    Relevant office after 8 am on office days if you need to be seen from an issue the night before. During hours when the office is not normally open, your call will go to the messaging service which cannot provide any service other than paging the doctor. No prescriptions or other nonurgent matters will be handled and no voicemail is available, so please call back during office hours for these matters.           Electronically signed by Cherelle Castro DO on 5/16/2022 at 2:26 PM.

## 2022-06-14 DIAGNOSIS — M48.061 SPINAL STENOSIS, LUMBAR REGION, WITHOUT NEUROGENIC CLAUDICATION: ICD-10-CM

## 2022-06-14 RX ORDER — CELECOXIB 200 MG/1
CAPSULE ORAL
Qty: 30 CAPSULE | Refills: 1 | Status: SHIPPED | OUTPATIENT
Start: 2022-06-14 | End: 2022-08-09

## 2022-06-14 NOTE — TELEPHONE ENCOUNTER
Requested Prescriptions     Pending Prescriptions Disp Refills    celecoxib (CELEBREX) 200 MG capsule [Pharmacy Med Name: CELECOXIB 200 MG CAPSULE] 30 capsule 1     Sig: TAKE 1 CAPSULE BY MOUTH EVERY DAY       Patient requesting a medication refill.   Last filled on:   [facility]  Next office visit: 9/16/2022  Last regular office visit: 5/16/2022

## 2022-08-09 DIAGNOSIS — M48.061 SPINAL STENOSIS, LUMBAR REGION, WITHOUT NEUROGENIC CLAUDICATION: ICD-10-CM

## 2022-08-09 RX ORDER — CELECOXIB 200 MG/1
CAPSULE ORAL
Qty: 30 CAPSULE | Refills: 1 | Status: SHIPPED | OUTPATIENT
Start: 2022-08-09 | End: 2022-11-02 | Stop reason: SDUPTHER

## 2022-08-09 NOTE — TELEPHONE ENCOUNTER
Requested Prescriptions     Pending Prescriptions Disp Refills    celecoxib (CELEBREX) 200 MG capsule [Pharmacy Med Name: CELECOXIB 200 MG CAPSULE] 30 capsule 1     Sig: TAKE 1 CAPSULE BY MOUTH EVERY DAY       Patient requesting a medication refill.   [facility]  Next office visit: Visit date not found   Last regular office visit: 5/16/2022

## 2022-08-23 DIAGNOSIS — I15.2 OBESITY, DIABETES, AND HYPERTENSION SYNDROME (HCC): ICD-10-CM

## 2022-08-23 DIAGNOSIS — E11.69 TYPE 2 DIABETES MELLITUS WITH HYPERLIPIDEMIA (HCC): ICD-10-CM

## 2022-08-23 DIAGNOSIS — E78.5 TYPE 2 DIABETES MELLITUS WITH HYPERLIPIDEMIA (HCC): ICD-10-CM

## 2022-08-23 DIAGNOSIS — I10 DIABETES MELLITUS WITH COINCIDENT HYPERTENSION (HCC): ICD-10-CM

## 2022-08-23 DIAGNOSIS — E11.9 DIABETES MELLITUS WITH COINCIDENT HYPERTENSION (HCC): ICD-10-CM

## 2022-08-23 DIAGNOSIS — E66.9 OBESITY, DIABETES, AND HYPERTENSION SYNDROME (HCC): ICD-10-CM

## 2022-08-23 DIAGNOSIS — E11.59 OBESITY, DIABETES, AND HYPERTENSION SYNDROME (HCC): ICD-10-CM

## 2022-08-23 DIAGNOSIS — E11.69 OBESITY, DIABETES, AND HYPERTENSION SYNDROME (HCC): ICD-10-CM

## 2022-08-23 RX ORDER — METFORMIN HYDROCHLORIDE 500 MG/1
1000 TABLET, EXTENDED RELEASE ORAL 2 TIMES DAILY
Qty: 120 TABLET | Refills: 3 | Status: SHIPPED | OUTPATIENT
Start: 2022-08-23 | End: 2022-11-04 | Stop reason: SDUPTHER

## 2022-09-28 ENCOUNTER — OFFICE VISIT (OUTPATIENT)
Dept: INTERNAL MEDICINE CLINIC | Age: 62
End: 2022-09-28
Payer: MEDICAID

## 2022-09-28 VITALS
DIASTOLIC BLOOD PRESSURE: 84 MMHG | OXYGEN SATURATION: 100 % | HEART RATE: 80 BPM | SYSTOLIC BLOOD PRESSURE: 131 MMHG | BODY MASS INDEX: 35.46 KG/M2 | HEIGHT: 68 IN | WEIGHT: 234 LBS

## 2022-09-28 DIAGNOSIS — I15.2 OBESITY, DIABETES, AND HYPERTENSION SYNDROME (HCC): Primary | ICD-10-CM

## 2022-09-28 DIAGNOSIS — E11.69 MIXED HYPERLIPIDEMIA DUE TO TYPE 2 DIABETES MELLITUS (HCC): ICD-10-CM

## 2022-09-28 DIAGNOSIS — I10 DIABETES MELLITUS WITH COINCIDENT HYPERTENSION (HCC): ICD-10-CM

## 2022-09-28 DIAGNOSIS — E11.69 TYPE 2 DIABETES MELLITUS WITH HYPERLIPIDEMIA (HCC): ICD-10-CM

## 2022-09-28 DIAGNOSIS — I10 ESSENTIAL HYPERTENSION: ICD-10-CM

## 2022-09-28 DIAGNOSIS — E11.9 DIABETES MELLITUS WITH COINCIDENT HYPERTENSION (HCC): ICD-10-CM

## 2022-09-28 DIAGNOSIS — E11.69 OBESITY, DIABETES, AND HYPERTENSION SYNDROME (HCC): Primary | ICD-10-CM

## 2022-09-28 DIAGNOSIS — E78.2 MIXED HYPERLIPIDEMIA DUE TO TYPE 2 DIABETES MELLITUS (HCC): ICD-10-CM

## 2022-09-28 DIAGNOSIS — E78.5 TYPE 2 DIABETES MELLITUS WITH HYPERLIPIDEMIA (HCC): ICD-10-CM

## 2022-09-28 DIAGNOSIS — E66.9 OBESITY, DIABETES, AND HYPERTENSION SYNDROME (HCC): Primary | ICD-10-CM

## 2022-09-28 DIAGNOSIS — E11.59 OBESITY, DIABETES, AND HYPERTENSION SYNDROME (HCC): Primary | ICD-10-CM

## 2022-09-28 LAB
CHOLESTEROL, TOTAL: 75 MG/DL (ref 0–199)
CREATININE URINE: 165.9 MG/DL (ref 39–259)
HDLC SERPL-MCNC: 31 MG/DL (ref 40–60)
LDL CHOLESTEROL CALCULATED: 32 MG/DL
MICROALBUMIN UR-MCNC: <1.2 MG/DL
MICROALBUMIN/CREAT UR-RTO: NORMAL MG/G (ref 0–30)
TRIGL SERPL-MCNC: 61 MG/DL (ref 0–150)
VLDLC SERPL CALC-MCNC: 12 MG/DL

## 2022-09-28 PROCEDURE — 2022F DILAT RTA XM EVC RTNOPTHY: CPT | Performed by: FAMILY MEDICINE

## 2022-09-28 PROCEDURE — 90674 CCIIV4 VAC NO PRSV 0.5 ML IM: CPT | Performed by: FAMILY MEDICINE

## 2022-09-28 PROCEDURE — 3017F COLORECTAL CA SCREEN DOC REV: CPT | Performed by: FAMILY MEDICINE

## 2022-09-28 PROCEDURE — G8417 CALC BMI ABV UP PARAM F/U: HCPCS | Performed by: FAMILY MEDICINE

## 2022-09-28 PROCEDURE — 3046F HEMOGLOBIN A1C LEVEL >9.0%: CPT | Performed by: FAMILY MEDICINE

## 2022-09-28 PROCEDURE — 99214 OFFICE O/P EST MOD 30 MIN: CPT | Performed by: FAMILY MEDICINE

## 2022-09-28 PROCEDURE — 90471 IMMUNIZATION ADMIN: CPT | Performed by: FAMILY MEDICINE

## 2022-09-28 PROCEDURE — G8427 DOCREV CUR MEDS BY ELIG CLIN: HCPCS | Performed by: FAMILY MEDICINE

## 2022-09-28 PROCEDURE — 1036F TOBACCO NON-USER: CPT | Performed by: FAMILY MEDICINE

## 2022-09-28 RX ORDER — ATORVASTATIN CALCIUM 40 MG/1
40 TABLET, FILM COATED ORAL NIGHTLY
Qty: 90 TABLET | Refills: 1 | Status: SHIPPED | OUTPATIENT
Start: 2022-09-28 | End: 2022-12-27

## 2022-09-28 ASSESSMENT — PATIENT HEALTH QUESTIONNAIRE - PHQ9
SUM OF ALL RESPONSES TO PHQ9 QUESTIONS 1 & 2: 0
1. LITTLE INTEREST OR PLEASURE IN DOING THINGS: 0
SUM OF ALL RESPONSES TO PHQ QUESTIONS 1-9: 0
2. FEELING DOWN, DEPRESSED OR HOPELESS: 0

## 2022-09-28 ASSESSMENT — ENCOUNTER SYMPTOMS
NAUSEA: 0
CONSTIPATION: 0
SHORTNESS OF BREATH: 0
DIARRHEA: 0
COUGH: 0
VOMITING: 0

## 2022-09-28 NOTE — PROGRESS NOTES
Tcio Giron (:  1960) is a 64 y.o. male,Established patient, here for evaluation of the following chief complaint(s): Other (Well adult )        SUBJECTIVE:  22 -- stopped taking his HCTZ due to cramping, stopped approx in . He started drinking more water and urinating more, and no longer having issues with swelling of legs       22 - follow up after increased metformin dose to 1000 mg BID  Tolerating the increased dose well. No issues with blood pressure       3.31.22 - follow up after lab work. A1c elevated now to 7.7. BNP was normal.   For DM -- will need to increase metformin to 1000 mg BID instead of 500 mg BID. Started on HCTZ after last visit -- improved significantly. No pain, no further swelling  Swelling improved but patient still sleeping in recliner. Discussed that he needs to see his spine surgeon to see if there is a better way for him to lay down without as much pain -- sleeping in a bed will elevate his feet up instead of them being dangling downwards otherwise. Had colonoscopy with Dr. Anderson Rosen on 3.15.22 -- found small grade 1 internal hemorrhoids and mild diverticulosis. Does have family hx of brother and father who both had colon cancer. Next colonoscopy is panned 5 years from now in 2027      3.9.22 - follow up   Did not yet go for GI colonoscopy -- has it scheduled for next Tuesday  Eye doctor appt is in April. No orthopnea, has some swelling in his legs and sleeps in his recliner not a bed. States he thinks this could be part of the problem  Shortness of breath occurs in the daytime, similar to asthma symptoms that he had as a kid -- only last 2-3 hours but then resolve. Occur when he is sitting up.     21 -- has appt with neck doctor to discuss his numbness and tingling in his fingers since his neck surgery. Needs eye doctor for dilation/exam, and needs to get his teeth pulled/dentures.    Blood pressure much better controlled today after increaseing the losartan to 50 mg losartan. No longer having any headaches in the mornings when he is waking up since increasing the dose on that medicine. 11.17.21:  HYPERTENSION still present, blood pressure 162/83 in office. Having some numbness and tingling in his fingers, which occurred since he had the neck surgery. Surgery was approx 1 year ago. Dr. Odalis Ma was the one who did the cervical spinal fucsion of C3-C6. Has a follow up appt with that surgeon in early January. Waking up with headaches. Did not check his blood pressure when his headache is present. Discussed his need to be more vigilant with taking his BP at times where he is symptomatic.     10.13.21: Feeling better but still has swelling in legs. Wearing compression stockings. Has been amlodipine for some time for his HYPERTENSION, but this could be causing the swelling and is also not best line of med for DM. Would like handicap placard for his chronic neck pain and inability for walking greater than 250 yards without having to stop. 9.15.21 -- new patient. Hx treated prostate cancer. Saw Dr. Lola Alarcon, has been a long time. Has two rods in his neck, does need handicap placard. Had colonoscopy, thinks it might have been a while ago possibly done in [place]. Negative per patient. Will hold off on ordering cologuard until we get results. Thinks cologuard was done 2 years ago. Does have right lower leg swelling, + edema trace x 1 month. Not painful. Does improve a little bit with compression stockings at home. I have reviewed the chart notes available from myself and other providers.  I have reviewed and addressed all active problems and created or updated the problems list in detail, as needed    I have extensively reviewed and reconciled the medication list, discontinued medications not taking or no longer appropriate, and updated the active meds list    OBJECTIVE:  Review of Systems   Constitutional:  Negative for chills and fever. Respiratory:  Negative for cough and shortness of breath. Cardiovascular:  Negative for leg swelling. Gastrointestinal:  Negative for constipation, diarrhea, nausea and vomiting. Endocrine: Negative for polyuria. Genitourinary:  Negative for frequency. Skin:  Negative for rash. Vitals:    09/28/22 1458   BP: 131/84   Pulse: 80   SpO2: 100%   Weight: 234 lb (106.1 kg)   Height: 5' 8\" (1.727 m)      Body mass index is 35.58 kg/m². Physical Exam  Constitutional:       Appearance: Normal appearance. Cardiovascular:      Rate and Rhythm: Normal rate and regular rhythm. Pulses: Normal pulses. Heart sounds: Normal heart sounds. Pulmonary:      Effort: Pulmonary effort is normal.      Breath sounds: Normal breath sounds. Neurological:      General: No focal deficit present. Mental Status: He is alert. Mental status is at baseline. Except as noted below, all chronic problems have been reviewed and are stable to continue medications or other therapy as previously documented in the patient's chart, with changes per orders or documentation below:  Patient received counseling and, if relevant, printed instructions for all symptoms listed in CC and HPI, as well as for all diagnoses brought onto today's visit note below. Typical counseling includes, but is not limited to, non-pharmacologic measures to manage listed symptoms and conditions; appropriate use, risks and benefits for all prescribed medications; potential interactions between medications both prescribed and OTC; diet; exercise; healthy behaviors; and goalsetting to improve health. Patient or responsible party was involved in shared decision making and had opportunity to have all questions answered. I have reconciled the medications in chart with what patient reports to be taking, and reviewed action/ side effects and how to take any new medications.    Patient/caregiver understands purpose and side Thursday 7:30 am to 5:00 pm                                               Friday 7:30 am to 4:00 pm           Saturdays, Sundays, and after hours: E-Visits are available    We observe most federal holidays and Good Friday. We ask that you only contact the office one time per issue or question, and please allow one full business day for a call back. Calling us back multiple times keeps us from being able to complete the work efficiently for you and our other patients. For medication renewals, please call your pharmacist to contact us, and be sure to allow at least 3 business days for processing before you need to  your medication. If you are sick or need an appointment that hasn't been planned, same day appointments are available every day the office is open: Monday, Tuesday, Wednesday, Thursday, and Friday. Call during office hours to schedule, even if it may not be with your regular physician. You may also call the office after 8 am on office days if you need to be seen from an issue the night before. During hours when the office is not normally open, your call will go to the messaging service which cannot provide any service other than paging the doctor. No prescriptions or other nonurgent matters will be handled and no voicemail is available, so please call back during office hours for these matters.           Electronically signed by Ewa Lazaro DO on 9/28/2022 at 3:09 PM.

## 2022-09-29 LAB
ESTIMATED AVERAGE GLUCOSE: 277.6 MG/DL
HBA1C MFR BLD: 11.3 %

## 2022-10-19 ENCOUNTER — APPOINTMENT (OUTPATIENT)
Dept: CT IMAGING | Age: 62
End: 2022-10-19
Payer: MEDICAID

## 2022-10-19 ENCOUNTER — APPOINTMENT (OUTPATIENT)
Dept: GENERAL RADIOLOGY | Age: 62
End: 2022-10-19
Payer: MEDICAID

## 2022-10-19 ENCOUNTER — APPOINTMENT (OUTPATIENT)
Dept: MRI IMAGING | Age: 62
End: 2022-10-19
Payer: MEDICAID

## 2022-10-19 ENCOUNTER — HOSPITAL ENCOUNTER (OUTPATIENT)
Age: 62
Setting detail: OBSERVATION
Discharge: HOME | End: 2022-10-21
Attending: EMERGENCY MEDICINE | Admitting: HOSPITALIST
Payer: MEDICAID

## 2022-10-19 DIAGNOSIS — Z90.49 S/P LAPAROSCOPIC CHOLECYSTECTOMY: ICD-10-CM

## 2022-10-19 DIAGNOSIS — K81.0 ACUTE CHOLECYSTITIS: Primary | ICD-10-CM

## 2022-10-19 LAB
A/G RATIO: 1.1 (ref 1.1–2.2)
ALBUMIN SERPL-MCNC: 3.6 G/DL (ref 3.4–5)
ALP BLD-CCNC: 243 U/L (ref 40–129)
ALT SERPL-CCNC: 182 U/L (ref 10–40)
ANION GAP SERPL CALCULATED.3IONS-SCNC: 16 MMOL/L (ref 3–16)
AST SERPL-CCNC: 551 U/L (ref 15–37)
BASOPHILS ABSOLUTE: 0 K/UL (ref 0–0.2)
BASOPHILS RELATIVE PERCENT: 0.2 %
BILIRUB SERPL-MCNC: 2 MG/DL (ref 0–1)
BUN BLDV-MCNC: 10 MG/DL (ref 7–20)
CALCIUM SERPL-MCNC: 9.3 MG/DL (ref 8.3–10.6)
CHLORIDE BLD-SCNC: 93 MMOL/L (ref 99–110)
CO2: 24 MMOL/L (ref 21–32)
CREAT SERPL-MCNC: 0.8 MG/DL (ref 0.8–1.3)
EKG ATRIAL RATE: 85 BPM
EKG DIAGNOSIS: NORMAL
EKG P AXIS: 48 DEGREES
EKG P-R INTERVAL: 180 MS
EKG Q-T INTERVAL: 352 MS
EKG QRS DURATION: 94 MS
EKG QTC CALCULATION (BAZETT): 418 MS
EKG R AXIS: -9 DEGREES
EKG T AXIS: 36 DEGREES
EKG VENTRICULAR RATE: 85 BPM
EOSINOPHILS ABSOLUTE: 0 K/UL (ref 0–0.6)
EOSINOPHILS RELATIVE PERCENT: 0.1 %
GFR SERPL CREATININE-BSD FRML MDRD: >60 ML/MIN/{1.73_M2}
GLUCOSE BLD-MCNC: 259 MG/DL (ref 70–99)
GLUCOSE BLD-MCNC: 278 MG/DL (ref 70–99)
GLUCOSE BLD-MCNC: 315 MG/DL (ref 70–99)
HCT VFR BLD CALC: 42.8 % (ref 40.5–52.5)
HEMOGLOBIN: 14.2 G/DL (ref 13.5–17.5)
LIPASE: 9 U/L (ref 13–60)
LYMPHOCYTES ABSOLUTE: 0.5 K/UL (ref 1–5.1)
LYMPHOCYTES RELATIVE PERCENT: 3.7 %
MAGNESIUM: 1.7 MG/DL (ref 1.8–2.4)
MCH RBC QN AUTO: 30.7 PG (ref 26–34)
MCHC RBC AUTO-ENTMCNC: 33.2 G/DL (ref 31–36)
MCV RBC AUTO: 92.3 FL (ref 80–100)
MONOCYTES ABSOLUTE: 1 K/UL (ref 0–1.3)
MONOCYTES RELATIVE PERCENT: 7.1 %
NEUTROPHILS ABSOLUTE: 12.3 K/UL (ref 1.7–7.7)
NEUTROPHILS RELATIVE PERCENT: 88.9 %
PDW BLD-RTO: 14.8 % (ref 12.4–15.4)
PERFORMED ON: ABNORMAL
PERFORMED ON: ABNORMAL
PLATELET # BLD: 154 K/UL (ref 135–450)
PMV BLD AUTO: 8.6 FL (ref 5–10.5)
POTASSIUM REFLEX MAGNESIUM: 3.2 MMOL/L (ref 3.5–5.1)
RBC # BLD: 4.63 M/UL (ref 4.2–5.9)
SODIUM BLD-SCNC: 133 MMOL/L (ref 136–145)
TOTAL PROTEIN: 6.9 G/DL (ref 6.4–8.2)
TROPONIN: <0.01 NG/ML
WBC # BLD: 13.9 K/UL (ref 4–11)

## 2022-10-19 PROCEDURE — 96365 THER/PROPH/DIAG IV INF INIT: CPT

## 2022-10-19 PROCEDURE — 93005 ELECTROCARDIOGRAM TRACING: CPT | Performed by: EMERGENCY MEDICINE

## 2022-10-19 PROCEDURE — APPNB15 APP NON BILLABLE TIME 0-15 MINS: Performed by: PHYSICIAN ASSISTANT

## 2022-10-19 PROCEDURE — 99214 OFFICE O/P EST MOD 30 MIN: CPT | Performed by: SURGERY

## 2022-10-19 PROCEDURE — 6370000000 HC RX 637 (ALT 250 FOR IP): Performed by: HOSPITALIST

## 2022-10-19 PROCEDURE — 96372 THER/PROPH/DIAG INJ SC/IM: CPT

## 2022-10-19 PROCEDURE — 85025 COMPLETE CBC W/AUTO DIFF WBC: CPT

## 2022-10-19 PROCEDURE — 93010 ELECTROCARDIOGRAM REPORT: CPT | Performed by: INTERNAL MEDICINE

## 2022-10-19 PROCEDURE — G0378 HOSPITAL OBSERVATION PER HR: HCPCS

## 2022-10-19 PROCEDURE — 6360000004 HC RX CONTRAST MEDICATION: Performed by: PHYSICIAN ASSISTANT

## 2022-10-19 PROCEDURE — 83690 ASSAY OF LIPASE: CPT

## 2022-10-19 PROCEDURE — 96361 HYDRATE IV INFUSION ADD-ON: CPT

## 2022-10-19 PROCEDURE — 6360000002 HC RX W HCPCS: Performed by: HOSPITALIST

## 2022-10-19 PROCEDURE — 74177 CT ABD & PELVIS W/CONTRAST: CPT

## 2022-10-19 PROCEDURE — 96375 TX/PRO/DX INJ NEW DRUG ADDON: CPT

## 2022-10-19 PROCEDURE — 99285 EMERGENCY DEPT VISIT HI MDM: CPT

## 2022-10-19 PROCEDURE — C9113 INJ PANTOPRAZOLE SODIUM, VIA: HCPCS | Performed by: HOSPITALIST

## 2022-10-19 PROCEDURE — 6360000002 HC RX W HCPCS: Performed by: EMERGENCY MEDICINE

## 2022-10-19 PROCEDURE — 83735 ASSAY OF MAGNESIUM: CPT

## 2022-10-19 PROCEDURE — 2580000003 HC RX 258: Performed by: EMERGENCY MEDICINE

## 2022-10-19 PROCEDURE — 97165 OT EVAL LOW COMPLEX 30 MIN: CPT

## 2022-10-19 PROCEDURE — APPSS15 APP SPLIT SHARED TIME 0-15 MINUTES: Performed by: PHYSICIAN ASSISTANT

## 2022-10-19 PROCEDURE — 74181 MRI ABDOMEN W/O CONTRAST: CPT

## 2022-10-19 PROCEDURE — 80053 COMPREHEN METABOLIC PANEL: CPT

## 2022-10-19 PROCEDURE — 84484 ASSAY OF TROPONIN QUANT: CPT

## 2022-10-19 PROCEDURE — 71046 X-RAY EXAM CHEST 2 VIEWS: CPT

## 2022-10-19 PROCEDURE — 2580000003 HC RX 258: Performed by: HOSPITALIST

## 2022-10-19 PROCEDURE — 97161 PT EVAL LOW COMPLEX 20 MIN: CPT

## 2022-10-19 RX ORDER — LOSARTAN POTASSIUM 50 MG/1
50 TABLET ORAL DAILY
Status: DISCONTINUED | OUTPATIENT
Start: 2022-10-19 | End: 2022-10-21 | Stop reason: HOSPADM

## 2022-10-19 RX ORDER — ATORVASTATIN CALCIUM 40 MG/1
40 TABLET, FILM COATED ORAL NIGHTLY
Status: DISCONTINUED | OUTPATIENT
Start: 2022-10-19 | End: 2022-10-21 | Stop reason: HOSPADM

## 2022-10-19 RX ORDER — SODIUM CHLORIDE 9 MG/ML
INJECTION, SOLUTION INTRAVENOUS CONTINUOUS
Status: DISCONTINUED | OUTPATIENT
Start: 2022-10-19 | End: 2022-10-21 | Stop reason: HOSPADM

## 2022-10-19 RX ORDER — SODIUM CHLORIDE 9 MG/ML
INJECTION, SOLUTION INTRAVENOUS PRN
Status: DISCONTINUED | OUTPATIENT
Start: 2022-10-19 | End: 2022-10-21 | Stop reason: HOSPADM

## 2022-10-19 RX ORDER — ENOXAPARIN SODIUM 100 MG/ML
30 INJECTION SUBCUTANEOUS 2 TIMES DAILY
Status: DISCONTINUED | OUTPATIENT
Start: 2022-10-19 | End: 2022-10-21 | Stop reason: HOSPADM

## 2022-10-19 RX ORDER — ASPIRIN 81 MG/1
81 TABLET, CHEWABLE ORAL DAILY
Status: DISCONTINUED | OUTPATIENT
Start: 2022-10-19 | End: 2022-10-21 | Stop reason: HOSPADM

## 2022-10-19 RX ORDER — SODIUM CHLORIDE, SODIUM LACTATE, POTASSIUM CHLORIDE, AND CALCIUM CHLORIDE .6; .31; .03; .02 G/100ML; G/100ML; G/100ML; G/100ML
1000 INJECTION, SOLUTION INTRAVENOUS ONCE
Status: COMPLETED | OUTPATIENT
Start: 2022-10-19 | End: 2022-10-19

## 2022-10-19 RX ORDER — ACETAMINOPHEN 325 MG/1
650 TABLET ORAL EVERY 6 HOURS PRN
Status: DISCONTINUED | OUTPATIENT
Start: 2022-10-19 | End: 2022-10-21 | Stop reason: HOSPADM

## 2022-10-19 RX ORDER — ONDANSETRON 4 MG/1
4 TABLET, ORALLY DISINTEGRATING ORAL EVERY 8 HOURS PRN
Status: DISCONTINUED | OUTPATIENT
Start: 2022-10-19 | End: 2022-10-21 | Stop reason: HOSPADM

## 2022-10-19 RX ORDER — SODIUM CHLORIDE 0.9 % (FLUSH) 0.9 %
10 SYRINGE (ML) INJECTION PRN
Status: DISCONTINUED | OUTPATIENT
Start: 2022-10-19 | End: 2022-10-21 | Stop reason: HOSPADM

## 2022-10-19 RX ORDER — CETIRIZINE HYDROCHLORIDE 10 MG/1
10 TABLET ORAL DAILY
Status: DISCONTINUED | OUTPATIENT
Start: 2022-10-19 | End: 2022-10-21 | Stop reason: HOSPADM

## 2022-10-19 RX ORDER — SODIUM CHLORIDE 0.9 % (FLUSH) 0.9 %
5-40 SYRINGE (ML) INJECTION EVERY 12 HOURS SCHEDULED
Status: DISCONTINUED | OUTPATIENT
Start: 2022-10-19 | End: 2022-10-21 | Stop reason: HOSPADM

## 2022-10-19 RX ORDER — ACETAMINOPHEN 650 MG/1
650 SUPPOSITORY RECTAL EVERY 6 HOURS PRN
Status: DISCONTINUED | OUTPATIENT
Start: 2022-10-19 | End: 2022-10-21 | Stop reason: HOSPADM

## 2022-10-19 RX ORDER — ONDANSETRON 2 MG/ML
4 INJECTION INTRAMUSCULAR; INTRAVENOUS EVERY 6 HOURS PRN
Status: DISCONTINUED | OUTPATIENT
Start: 2022-10-19 | End: 2022-10-21 | Stop reason: HOSPADM

## 2022-10-19 RX ORDER — POTASSIUM CHLORIDE 7.45 MG/ML
10 INJECTION INTRAVENOUS PRN
Status: DISCONTINUED | OUTPATIENT
Start: 2022-10-19 | End: 2022-10-21 | Stop reason: HOSPADM

## 2022-10-19 RX ORDER — POTASSIUM CHLORIDE 20 MEQ/1
40 TABLET, EXTENDED RELEASE ORAL PRN
Status: DISCONTINUED | OUTPATIENT
Start: 2022-10-19 | End: 2022-10-21 | Stop reason: HOSPADM

## 2022-10-19 RX ORDER — FAMOTIDINE 20 MG/1
20 TABLET, FILM COATED ORAL 2 TIMES DAILY
Status: DISCONTINUED | OUTPATIENT
Start: 2022-10-19 | End: 2022-10-21 | Stop reason: HOSPADM

## 2022-10-19 RX ORDER — ASPIRIN 81 MG/1
81 TABLET, CHEWABLE ORAL DAILY
COMMUNITY

## 2022-10-19 RX ORDER — KETOROLAC TROMETHAMINE 30 MG/ML
15 INJECTION, SOLUTION INTRAMUSCULAR; INTRAVENOUS ONCE
Status: COMPLETED | OUTPATIENT
Start: 2022-10-19 | End: 2022-10-19

## 2022-10-19 RX ORDER — POLYETHYLENE GLYCOL 3350 17 G/17G
17 POWDER, FOR SOLUTION ORAL DAILY PRN
Status: DISCONTINUED | OUTPATIENT
Start: 2022-10-19 | End: 2022-10-21 | Stop reason: HOSPADM

## 2022-10-19 RX ADMIN — CETIRIZINE HYDROCHLORIDE 10 MG: 10 TABLET, FILM COATED ORAL at 17:09

## 2022-10-19 RX ADMIN — FAMOTIDINE 20 MG: 20 TABLET, FILM COATED ORAL at 20:02

## 2022-10-19 RX ADMIN — IOPAMIDOL 75 ML: 755 INJECTION, SOLUTION INTRAVENOUS at 09:30

## 2022-10-19 RX ADMIN — PIPERACILLIN AND TAZOBACTAM 3375 MG: 3; .375 INJECTION, POWDER, FOR SOLUTION INTRAVENOUS at 11:03

## 2022-10-19 RX ADMIN — Medication 40 MG: at 17:13

## 2022-10-19 RX ADMIN — ASPIRIN 81 MG 81 MG: 81 TABLET ORAL at 17:10

## 2022-10-19 RX ADMIN — LOSARTAN POTASSIUM 50 MG: 50 TABLET, FILM COATED ORAL at 17:10

## 2022-10-19 RX ADMIN — SODIUM CHLORIDE: 9 INJECTION, SOLUTION INTRAVENOUS at 17:08

## 2022-10-19 RX ADMIN — KETOROLAC TROMETHAMINE 15 MG: 30 INJECTION, SOLUTION INTRAMUSCULAR at 10:58

## 2022-10-19 RX ADMIN — ENOXAPARIN SODIUM 30 MG: 100 INJECTION SUBCUTANEOUS at 20:01

## 2022-10-19 RX ADMIN — ATORVASTATIN CALCIUM 40 MG: 40 TABLET, FILM COATED ORAL at 20:01

## 2022-10-19 RX ADMIN — POTASSIUM CHLORIDE 40 MEQ: 1500 TABLET, EXTENDED RELEASE ORAL at 17:10

## 2022-10-19 RX ADMIN — SODIUM CHLORIDE, POTASSIUM CHLORIDE, SODIUM LACTATE AND CALCIUM CHLORIDE 1000 ML: 600; 310; 30; 20 INJECTION, SOLUTION INTRAVENOUS at 11:35

## 2022-10-19 ASSESSMENT — ENCOUNTER SYMPTOMS
COLOR CHANGE: 0
COUGH: 0
ABDOMINAL DISTENTION: 0
ABDOMINAL PAIN: 1
NAUSEA: 0
SHORTNESS OF BREATH: 0
VOMITING: 0

## 2022-10-19 ASSESSMENT — PAIN SCALES - GENERAL
PAINLEVEL_OUTOF10: 0
PAINLEVEL_OUTOF10: 8
PAINLEVEL_OUTOF10: 9
PAINLEVEL_OUTOF10: 0
PAINLEVEL_OUTOF10: 0

## 2022-10-19 ASSESSMENT — PAIN DESCRIPTION - FREQUENCY: FREQUENCY: CONTINUOUS

## 2022-10-19 ASSESSMENT — PAIN DESCRIPTION - ORIENTATION: ORIENTATION: UPPER

## 2022-10-19 ASSESSMENT — PAIN - FUNCTIONAL ASSESSMENT: PAIN_FUNCTIONAL_ASSESSMENT: 0-10

## 2022-10-19 ASSESSMENT — PAIN DESCRIPTION - DESCRIPTORS
DESCRIPTORS: PRESSURE
DESCRIPTORS: ACHING

## 2022-10-19 ASSESSMENT — PAIN DESCRIPTION - PAIN TYPE: TYPE: ACUTE PAIN

## 2022-10-19 ASSESSMENT — PAIN DESCRIPTION - LOCATION
LOCATION: ABDOMEN
LOCATION: CHEST

## 2022-10-19 NOTE — PROGRESS NOTES
Occupational Therapy  Facility/Department: [facility] SURG  Occupational Therapy Initial Assessment    Name: Greg Ganser  : 1960  MRN: [id number]  Date of Service: 10/19/2022    Discharge Recommendations:  Home with assist PRN     Greg Ganser scored a 24/24 on the AM-PAC ADL Inpatient form. At this time, no further OT is recommended upon discharge due to pt near baseline. Recommend patient returns to prior setting with prior services. Patient Diagnosis(es): The encounter diagnosis was Acute cholecystitis. Past Medical History:  has a past medical history of Diabetes mellitus type 1 ([facility] [address] Hyperlipidemia, Hypertension, Overweight, and Sprain of left ankle. Past Surgical History:  has a past surgical history that includes Gastric bypass surgery; cervical laminectomy (N/A, 2021); and Neck surgery (2021). Assessment   Performance deficits / Impairments: Decreased functional mobility   Assessment: Pt is a 65 yo M admitted with chest pain and abdominal pain; being treated for cholelithiasis. PTA, pt lives alone in an apt where he is typically independent in self-care, fxl mobility, and homemaking. He is functioning close to baseline today, completing fxl transfers and mobility independently, and anticipate independence in ADls. Will sign off. Anticipate safe return home w/ assist PRN when medically stable.   Decision Making: Low Complexity  REQUIRES OT FOLLOW-UP: No  Activity Tolerance  Activity Tolerance: Patient Tolerated treatment well        Plan   Occupational Therapy Plan  Times Per Week: d/c OT     Restrictions  Restrictions/Precautions  Restrictions/Precautions: Up [address]  Position Activity Restriction  Other position/activity restrictions: NPO    Subjective   General  Chart Reviewed: Yes  Patient assessed for rehabilitation services?: Yes  Additional Pertinent Hx: per Dr. Prosper Adkins: Tiara Flores is a 64 y.o. male with a PMH of DM type I, HTN, HLD, Camila-en-y gastric bypass surgery, and prior alcohol abuse who presented on 10/19/2022 with chest pain and abdominal pain. He reports it started yesterday afternoon while he was watching TV. He states the pain is located right mid abdomen as well as epigastric area and in his back\"  Family / Caregiver Present: No  Referring Practitioner: Sky Herrmann MD  Diagnosis: Cholelithiasis with acute cholecystitis  Subjective  Subjective: Pt met b/s for OT eval/tx w/ PT. Pt in bed, agreeable to therapy. Denied pain     Social/Functional History  Social/Functional History  Lives With: Alone  Type of Home: Apartment  Home Layout: One level  Home Access: Stairs to enter with rails  Entrance Stairs - Number of Steps: 4 down  Bathroom Shower/Tub: Tub/Shower unit  Bathroom Toilet: Standard  Bathroom Accessibility: Accessible  Home Equipment: [address]  ADL Assistance: Independent  Homemaking Assistance: Independent  Homemaking Responsibilities: Yes  Ambulation Assistance: Independent  Transfer Assistance: Independent  Active : Yes  Occupation: Retired  Type of Occupation:        Objective   Safety Devices  Type of Devices: Call light within reach; Left in bed;Nurse notified        AROM: Within functional limits  PROM: Within functional limits  Strength: Within functional limits  Coordination: Within functional limits  ADL  Additional Comments: Pt declined ADLs but anticipate he would be independent in ADLs based on ROM, strength, endurance this session     Activity Tolerance  Activity Tolerance: Patient tolerated evaluation without incident  Bed mobility  Supine to Sit: Independent  Sit to Supine: Independent  Transfers  Sit to stand: Independent  Stand to sit:  Independent  Transfer Comments: Pt completed fxl mobility length of hallway independently, no LOB noted  Vision  Vision: Within Functional Limits  Hearing  Hearing: Within functional limits  Cognition  Overall Cognitive Status: [facility]  Orientation  Overall Orientation Status: Within Functional Limits                  Education Given To: Patient  Education Provided: Role of Therapy  Education Method: Verbal  Barriers to Learning: None  Education Outcome: Verbalized understanding       AM-PAC Score  AM-PAC Inpatient Daily Activity Raw Score: 24 (10/19/22 1441)  AM-PAC Inpatient ADL T-Scale Score : 57.54 (10/19/22 1441)  ADL Inpatient CMS 0-100% Score: 0 (10/19/22 1441)  ADL Inpatient CMS G-Code Modifier : [address] (10/19/22 1441)      Goals  Short Term Goals  Time Frame for Short Term Goals: No acute care OT goals identified, pt near baseline function  Patient Goals   Patient goals : to go home       Therapy Time   Individual Concurrent Group Co-treatment   Time In 1415         Time Out 1430         Minutes 15         Timed Code Treatment Minutes: [facility] [address]

## 2022-10-19 NOTE — CONSULTS
GASTROENTEROLOGY INPATIENT CONSULTATION        IDENTIFYING DATA/REASON FOR CONSULTATION   PATIENT:  Rickey Rolle  MRN:  [id number]  ADMIT DATE: 10/19/2022  TIME OF EVALUATION: 10/19/2022 12:16 PM  HOSPITAL STAY:   LOS: 0 days     REASON FOR CONSULTATION: Cholelithiasis, acute cholecystitis, elevated LFTs    HISTORY OF PRESENT ILLNESS   Rickey Rolle is a 64 y.o. male with a PMH of DM type I, HTN, HLD, Camila-en-y gastric bypass surgery, and prior alcohol abuse who presented on 10/19/2022 with chest pain and abdominal pain. He reports it started yesterday afternoon while he was watching TV. He states the pain is located right mid abdomen as well as epigastric area and in his back. He denies any  aggravating factors. No fevers. CT abdomen/pel showed cholelithiasis with evidence of acute cholecystitis. No intra or extrahepatic biliary ductal dilation. Liver was normal-appearing. Blood work noted bilirubin of 2.0, AST of 551, ALT of 182 and alk phos of 243. White count 13.9. Patient reports he used to be heavy daily alcohol drinker but has been sober over the past year. He denies any new medications.           PAST MEDICAL, SURGICAL, FAMILY, and SOCIAL HISTORY     Past Medical History:   Diagnosis Date    Diabetes mellitus type 1 ([address]     Hyperlipidemia     Hypertension     Overweight 2/22/2021    Sprain of left ankle 2/27/2021     Past Surgical History:   Procedure Laterality Date    CERVICAL LAMINECTOMY N/A 1/28/2021    C3-4, C4-5, C5-6 POSTERIOR CERVICAL DECOMPRESSION, FUSION AND FIXATION performed by Vidhya Petty MD at [facility]  01/2021     Family History   Problem Relation Age of Onset    Diabetes Mother     Cancer Father      Social History     Socioeconomic History    Marital status:      Spouse name: None    Number of children: None    Years of education: None    Highest education level: None   Tobacco Use    Smoking status: Never    Smokeless tobacco: Never   Substance and Sexual Activity    Alcohol use: Yes     Alcohol/week: 4.0 standard drinks     Types: 2 Cans of beer, 2 Shots of liquor per week    Drug use: No     Social Determinants of Health     Financial Resource Strain: Low Risk     Difficulty of Paying Living Expenses: Not hard at all   Food Insecurity: No Food Insecurity    Worried About Running Out of Food in the Last Year: Never true    Ran Out of Food in the Last Year: Never true       MEDICATIONS   SCHEDULED:  lactated ringers bolus, 1,000 mL, Once      FLUIDS/DRIPS:    PRNs:    ALLERGIES:  He No Known Allergies    REVIEW OF SYSTEMS   Pertinent ROS noted in HPI    PHYSICAL EXAM     Vitals:    10/19/22 0826 10/19/22 0827   BP:  (!) 147/76   Pulse: 85    Resp: 16    Temp: 97.8 °F (36.6 °C) 98.3 °F (36.8 °C)   TempSrc: Oral Oral   SpO2:  98%   Weight: 236 lb 12.4 oz (107.4 kg)    Height: 5' 8\" (1.727 m)        No intake/output data recorded. Physical Exam:  General appearance: alert, cooperative, no distress, appears stated age  Eyes: Anicteric  Head: Normocephalic, without obvious abnormality  Lungs: clear to auscultation bilaterally, Normal Effort  Heart: regular rate and rhythm, normal S1 and S2, no murmurs or rubs  Abdomen: soft, non-distended, non-tender. Bowel sounds normal. No masses,  no organomegaly. Extremities: atraumatic, no cyanosis or edema  Skin: warm and dry, no jaundice  Neuro: Grossly intact, A&OX3      LABS AND IMAGING   Laboratory   Recent Labs     10/19/22  0900   WBC 13.9*   HGB 14.2   HCT 42.8   MCV 92.3        Recent Labs     10/19/22  0900   *   K 3.2*   CL 93*   CO2 24   BUN 10   CREATININE 0.8     Recent Labs     10/19/22  0900   *   *   BILITOT 2.0*   ALKPHOS 243*     Recent Labs     10/19/22  0900   LIPASE 9.0*     No results for input(s): PROTIME, INR in the last 72 hours.     Imaging  CT ABDOMEN PELVIS W IV CONTRAST Additional Contrast? None   Final Result   Cholelithiasis with acute cholecystitis. XR CHEST (2 VW)   Final Result   No acute process. ASSESSMENT AND RECOMMENDATIONS   64 y.o. male with a PMH of DM type I, HTN, HLD, remote gastric bypass surgery, and prior alcohol abuse who presented on 10/19/2022 with chest pain and abdominal pain    IMPRESSION:  Cholelithiasis with acute cholecystitis  Elevated LFTs  History of nevin-en-y gastric bypass surgery  History of alcohol abuse. RECOMMENDATIONS:    Discussed case with Dr. Nataly Gonsales. Will order MRCP to evaluate for biliary obstruction/stones. If you have any questions or need any further information, please feel free to contact our consult team.  Thank you for allowing us to participate in the care of Janie Hernandez. The note was completed using Dragon voice recognition transcription. Every effort was made to ensure accuracy; however, inadvertent transcription errors may be present despite my best efforts to edit errors.       Miriam Bowles PA-C

## 2022-10-19 NOTE — PROGRESS NOTES
Pt to room 4252 from ED. Pt is A&O x4. VSS, RA. Call light in reach and fall precautions in place. Pt denies pain currently. Pt instructed on NPO status. Pt denies bed alarm.  Electronically signed by Marv Atwood RN on 10/19/2022 at 3:21 PM

## 2022-10-19 NOTE — CONSULTS
Surgery Consult Note     Jamaal Pandya PA-C  Pt Name: Ashlee Flynn  MRN: [id number]  [place]: 1960  Date of evaluation: 10/19/2022  Primary Care Physician: Vadim Dixon DO  Referring Physician. Yossi Riser  Reason for Consultation: Acute cholecystitis  Chief Complaint:Chest and Abdominal pain  IMPRESSIONS:   Cholelithiasis with acute cholecystitis. No intra or extrahepatic biliary ductal dilatation noted on CT scan   Elevated LFT's Bilirubin: 2.0; Alk phos 243;    ALT: 182;    AST: 551  Leukocytosis: WBC count 13.9  Hypokalemia: K 3.2  Hx of gastric bypass surgery  BMI: 36.0  PLANS:   Monitor and control pain  Monitor LFT's. MRCP today per GI  If bilirubin increased or bile duct blockage on MRCP may need further GI workup. Cholecystectomy per surgeons recommendations  Replace potassium per protocol  SUBJECTIVE:   History of Chief Complaint:    Ashlee Flynn is a 64 y.o. male who presents with chest and abdominal pain. He stated that the pain began yesterday at noon. The pain is located across his entire upper abdomen but it is most intense in his epigastric area and he admits that the pain radiates to his back. The pain continued in intensity and was constant and he came into the ER. A CT scan was performed and this showed him to have cholelithiasis with acute cholecystitis. No intra or extrahepatic biliary ductal dilatation noted on CT scan. He denies having any associated nausea or emesis. Denies any CP, SOB, cough, lightheadedness, or dizziness. Past Medical History  Reviewed  has a past medical history of Diabetes mellitus type 1 ([address] Hyperlipidemia, Hypertension, Overweight, and Sprain of left ankle. Past Surgical History  Reviewed has a past surgical history that includes Gastric bypass surgery; cervical laminectomy (N/A, 1/28/2021); and Neck surgery (01/2021). Medications  Prior to Admission medications    Medication Sig Start Date End Date Taking?  Authorizing Provider aspirin 81 MG chewable tablet Take 81 mg by mouth daily   Yes Historical Provider, MD   atorvastatin (LIPITOR) 40 MG tablet Take 1 tablet by mouth nightly 9/28/22 12/27/22  [facility], DO   metFORMIN (GLUCOPHAGE-XR) 500 MG extended release tablet TAKE 2 TABLETS BY MOUTH IN THE MORNING AND AT BEDTIME TAKE 2 TABLETS BY MOUTH TWICE DAILY 8/23/22 10/19/22  [facility], DO   celecoxib (CELEBREX) 200 MG capsule TAKE 1 CAPSULE BY MOUTH EVERY DAY 8/9/22   WAGNER Paz CNP   losartan (COZAAR) 25 MG tablet Take 2 tablets by mouth daily 5/16/22   [facility], DO   famotidine (PEPCID) 20 MG tablet Take 1 tablet by mouth 2 times daily 5/16/22   [facility], DO   omeprazole (PRILOSEC) 20 MG delayed release capsule TAKE 2 CAPSULES BY MOUTH EVERY DAY 5/16/22   [facility], DO   cetirizine (ZYRTEC) 10 MG tablet Take 1 tablet by mouth daily 5/16/22   [facility] Both, DO   methocarbamol (ROBAXIN) 750 MG tablet TAKE 1 TABLET BY MOUTH FOUR TIMES A DAY 3/25/22   [facility], DO   Handicap Placard MISC by Does not apply route 10/13/21   [facility], DO    Scheduled Meds:   lactated ringers bolus  1,000 mL IntraVENous Once     Continuous Infusions:  PRN Meds:. Allergies  has No Known Allergies. Family History  Reviewedfamily history includes Cancer in his father; Diabetes in his mother. Social History   reports that he has never smoked. He has never used smokeless tobacco. He reports current alcohol use of about 4.0 standard drinks per week. He reports that he does not use drugs. EDUCATION  Patient educated about their illness/diagnosis, stated above, and all questions answered. We discussed the importance of nutrition, medications they are taking, and healthy lifestyle.   Review of Systems:  General Denies any fever or chills  HEENT Denies any diplopia, tinnitus or vertigo  Resp Denies any shortness of breath, cough or wheezing  Cardiac Denies any chest pain, palpitations, claudication or edema  GI Denies any melena, hematochezia, hematemesis or pyrosis   Denies any frequency, urgency, hesitancy or incontinence  Heme Denies bruising or bleeding easily  Neuro Denies any focal motor or sensory deficits  OBJECTIVE:   VITALS:  height is 5' 8\" (1.727 m) and weight is 236 lb 12.4 oz (107.4 kg). His oral temperature is 98.3 °F (36.8 °C). His blood pressure is 147/76 (abnormal) and his pulse is 85. His respiration is 16 and oxygen saturation is 98%. CONSTITUTIONAL: Alert and oriented times 3, no acute distress and cooperative to examination with proper mood and affect. SKIN: Skin color, texture, turgor normal. No rashes or lesions. LYMPH: no cervical nodes, no inguinal nodes  HEENT: Head is normocephalic, atraumatic. EOMI, PERRLA. NECK: Supple, symmetrical, trachea midline, no adenopathy, thyroid symmetric, not enlarged and no tenderness, skin normal.  CHEST/LUNGS: chest symmetric with normal A/P diameter, normal respiratory rate and rhythm  CARDIOVASCULAR: Heart sounds are normal.  Regular rate and rhythm   ABDOMEN: Normal shape. Tenderness: epigastric. Vertical midline scar supraumbilical.  RECTAL: deferred, not clinically indicated  NEUROLOGIC: There are no focalizing motor or sensory deficits. CN II-XII are grossly intact. Les Pimple EXTREMITIES: no cyanosis, no clubbing, and no edema.   LABS:     Recent Labs     10/19/22  0900   WBC 13.9*   HGB 14.2   HCT 42.8      *   K 3.2*   CL 93*   CO2 24   BUN 10   CREATININE 0.8   MG 1.70*   CALCIUM 9.3   *   *   BILITOT 2.0*     Recent Labs     10/19/22  0900   ALKPHOS 243*   *   *   BILITOT 2.0*   LABALBU 3.6   LIPASE 9.0*     CBC:   Lab Results   Component Value Date/Time    WBC 13.9 10/19/2022 09:00 AM    RBC 4.63 10/19/2022 09:00 AM    HGB 14.2 10/19/2022 09:00 AM    HCT 42.8 10/19/2022 09:00 AM    MCV 92.3 10/19/2022 09:00 AM    MCH 30.7 10/19/2022 09:00 AM    MCHC 33.2 10/19/2022 09:00 AM    RDW 14.8 10/19/2022 09:00 AM     10/19/2022 09:00 AM    MPV 8.6 10/19/2022 09:00 AM     CMP:    Lab Results   Component Value Date/Time     10/19/2022 09:00 AM    K 3.2 10/19/2022 09:00 AM    CL 93 10/19/2022 09:00 AM    CO2 24 10/19/2022 09:00 AM    BUN 10 10/19/2022 09:00 AM    CREATININE 0.8 10/19/2022 09:00 AM    GFRAA >60 09/15/2021 12:07 PM    AGRATIO 1.1 10/19/2022 09:00 AM    LABGLOM >60 10/19/2022 09:00 AM    GLUCOSE 315 10/19/2022 09:00 AM    PROT 6.9 10/19/2022 09:00 AM    LABALBU 3.6 10/19/2022 09:00 AM    CALCIUM 9.3 10/19/2022 09:00 AM    BILITOT 2.0 10/19/2022 09:00 AM    ALKPHOS 243 10/19/2022 09:00 AM     10/19/2022 09:00 AM     10/19/2022 09:00 AM     Urine Culture:  No components found for: CURINE  Blood Culture:  No components found for: CBLOOD, CFUNGUSBL  RADIOLOGY:     CT scan abd/pelvis (10/19/22): Cholelithiasis with acute cholecystitis. Thank you for the interesting evaluation. Further recommendations to follow. Glynn Escamilla   General and Vascular Surgery [phone]  Electronically signed by Jessica Thornton PA-C on 10/19/2022 at 11:51 AM      Attending      As per note above by Lauryn Lehman  Patient was personally seen and examined by me today  Chart, labs and imaging reviewed    A/P  Calculous cholecystitis   Mild elevation in LFT's but MRCP negative for choledocholithiasis   Plan for Laparoscopic Cholecystectomy with Cholangiogram on 10/20 at [facility]    Electronically signed by Karen Munoz MD on 10/19/2022 at 4:59 PM

## 2022-10-19 NOTE — H&P
Hospitalist  History and Physical    Patient:  Chasity Godinez  MRN: [id number]  PCP: Madonna Miller DO    CHIEF COMPLAINT:     Chest Pain         HISTORY OF PRESENT ILLNESS:   The patient Chasity Godinez is a 64 y.o.male with medical history significant for diabetes mellitus hypertension-Hyperlipidemia   Continue statin therapy. .  Patient presented to the emergency room with epigastric/chest pain. Patient's pain started at rest yesterday. Upper abdominal pain that radiates to the back. Patient denied recent history of nausea vomiting or diarrhea. Patient denied fever chills. No previous history of peptic ulcer disease no history of cardiac disease. Patient does have a history of gastric bypass surgery      Past Medical History:        Diagnosis Date    Diabetes mellitus type 1 ([facility] [address]     Hyperlipidemia     Hypertension     Overweight 2/22/2021    Sprain of left ankle 2/27/2021       Past Surgical History:        Procedure Laterality Date    CERVICAL LAMINECTOMY N/A 1/28/2021    C3-4, C4-5, C5-6 POSTERIOR CERVICAL DECOMPRESSION, FUSION AND FIXATION performed by Karlee Belle MD at [facility]  01/2021       Medications Prior to Admission:    Prior to Admission medications    Medication Sig Start Date End Date Taking?  Authorizing Provider   atorvastatin (LIPITOR) 40 MG tablet Take 1 tablet by mouth nightly 9/28/22 12/27/22  Madonna Miller, DO   metFORMIN (GLUCOPHAGE-XR) 500 MG extended release tablet TAKE 2 TABLETS BY MOUTH IN THE MORNING AND AT BEDTIME TAKE 2 TABLETS BY MOUTH TWICE DAILY 8/23/22 9/22/22  Madonna Miller, DO   celecoxib (CELEBREX) 200 MG capsule TAKE 1 CAPSULE BY MOUTH EVERY DAY 8/9/22   WAGNER Roberts - CNP   losartan (COZAAR) 25 MG tablet Take 2 tablets by mouth daily 5/16/22   Madonna Miller,    famotidine (PEPCID) 20 MG tablet Take 1 tablet by mouth 2 times daily 5/16/22   Madonna Miller, DO   omeprazole (PRILOSEC) 20 MG delayed release capsule TAKE 2 CAPSULES BY MOUTH EVERY DAY 5/16/22   [place], DO   cetirizine (ZYRTEC) 10 MG tablet Take 1 tablet by mouth daily 5/16/22   [facility], DO   methocarbamol (ROBAXIN) 750 MG tablet TAKE 1 TABLET BY MOUTH FOUR TIMES A DAY 3/25/22   [place], DO   Handicap Placard MISC by Does not apply route 10/13/21   [place], DO       Allergies:  Patient has no known allergies. Social History:   TOBACCO:   reports that he has never smoked. He has never used smokeless tobacco.  ETOH:   reports current alcohol use of about 4.0 standard drinks per week. Family History:       Problem Relation Age of Onset    Diabetes Mother     Cancer Father            REVIEW OF SYSTEMS:     patients reported symptoms are in BOLD all other symptoms are negative. CONSTITUTIONAL:      fatigue, fever, chills or night sweats, recent weight gain, recent wt loss, insomnia,  General weakness, poor appetite, muscle aches and pains    HEAD: headache, dizziness    EYES:      blurriness,  double vision, dryness,  discharge, irritation,diplopia    EARS:      hearing loss, vertigo, ear discharge,  Earache. Ringing in the ears. NOSE:      Rhinorrhea, sneezing, epistaxis. Discharge, sinusitis,     MOUTH/THROAT:         sore throat, mouth ulcers, Hoarseness    RESPIRATORY:        Shortness of breath, wheezing,  cough, sputum, hemoptysis, obstructive sleep apnea,    CARDIOVASCULAR :      chest pain, palpitations, dyspnea on exercise, Lower extrimity edema (swelling),     GASTROINTESTINAL:       Dysphagia, Poor appetite,  Nausea, Vomiting, diarrhea, heartburn, abdominal pain. Blood in the stools, hematemesis. Pain with swallowing, constipation    GENITOURINARY:       Urinary frequency, hesitancy,  urgency, Dysuria, hematuria,  Urinary Incontinence. Urinary Retention.   GYNECOLOGICAL: vaginal bleeding , vaginal discharge, menopause    MUSCULOSKELETAL:       joint swelling or stiffness, joint pain, muscle pain, balance problems, low back pain. NEUROLOGICAL:      Gait problems. Tremor. Dizziness. Pain and paresthesias, weakness in extremities. Seizures, memory loss    PSYCHLOGICAL:        Anxiety, depression    SKIN :      Rashes ulcers, skin color changes, easy bruisability, lymphadenopathy      Physical Exam:      Vitals: BP (!) 147/76   Pulse 85   Temp 98.3 °F (36.8 °C) (Oral)   Resp 16   Ht 5' 8\" (1.727 m)   Wt 236 lb 12.4 oz (107.4 kg)   SpO2 98%   BMI 36.00 kg/m²     Gen:          Alert and oriented x 2  Eyes: PERRL. No sclera icterus. No conjunctival injection. ENT: No discharge. Pharynx clear. External appearance of ears and nose normal.  Neck: Trachea midline. No obvious mass. Resp: No accessory muscle use. No crackles. No wheezes. No rhonchi. CV: Regular rate. Regular rhythm. No murmur or rub. No edema. GI: Non-tender. Non-distended. No hernia. Skin: Warm, dry, normal texture and turgor. Lymph: No cervical LAD. No supraclavicular LAD. M/S: / Ext. No cyanosis. No clubbing. No joint deformity. Neuro: Moves all four extremities. CN 2-12 tested, no deficits noted. Peripheral pulses and capillary refill is intact. CBC:   Recent Labs     10/19/22  0900   WBC 13.9*   HGB 14.2        BMP:    Recent Labs     10/19/22  0900   *   K 3.2*   CL 93*   CO2 24   BUN 10   CREATININE 0.8   GLUCOSE 315*     Hepatic:   Recent Labs     10/19/22  0900   *   *   BILITOT 2.0*   ALKPHOS 243*     Troponin:   Recent Labs     10/19/22  0900   TROPONINI <0.01     BNP: No results for input(s): BNP in the last 72 hours. INR: No results for input(s): INR in the last 72 hours. Lab Results   Component Value Date/Time    LABA1C 11.3 09/28/2022 04:14 PM           No results for input(s): CKTOTAL in the last 72 hours. -----------------------------------------------------------------  XR CHEST  No acute process.     CT ABDOMEN PELVIS W IV CONTRAST  Cholelithiasis with acute cholecystitis. EKG  Normal sinus rhythm    MRI  Gallbladder is distended and contains stones  HIDA scan is recommended to rule out acute cholecystitis    Assessment / Plan     Acute cholecystitis  In a patient with cholelithiasis  MRI shows gallbladder is distended  Leukocytosis  Continue patient on Zosyn  IV fluids  General surgery is consulted      Transaminitis  Continue to monitor    Hypokalemia  E87.6  Supplements and monitoring          DVT and GI prophylaxis      DNR Antoni Oliva MD M.D    This note was transcribed using [id number] [employer]. Please disregard any translational errors.

## 2022-10-19 NOTE — ED PROVIDER NOTES
I independently performed a history and physical on [facility]. All diagnostic, treatment, and disposition decisions were made by myself in conjunction with the advanced practice provider. For further details of [facility] emergency department encounter, please see TATI Watkins's documentation. Patient complains of some upper abdominal/lower chest pain that began yesterday evening. He ate some lunch meat and then the pain came on. After that he had some soup which did not seem to bother him too much. He states the pain has been constant since onset and seems to radiate around to his back. He denies any diaphoresis, lightheadedness, palpitations or shortness of breath. He does have some slight nausea. No prior history of this pain. On exam heart regular rate and rhythm and lungs clear auscultation bilaterally and abdomen with right upper quadrant tenderness and a positive Mensah sign. EKG  The Ekg interpreted by me shows  normal sinus rhythm with a rate of 85  Axis is   Normal  QTc is  normal  Intervals and Durations are unremarkable.       ST Segments: no acute change  No significant change from prior EKG dated 25 jan 2021    Labs Reviewed   CBC WITH AUTO DIFFERENTIAL - Abnormal; Notable for the following components:       Result Value    WBC 13.9 (*)     Neutrophils Absolute 12.3 (*)     Lymphocytes Absolute 0.5 (*)     All other components within normal limits   COMPREHENSIVE METABOLIC PANEL W/ REFLEX TO MG FOR LOW K - Abnormal; Notable for the following components:    Sodium 133 (*)     Potassium reflex Magnesium 3.2 (*)     Chloride 93 (*)     Glucose 315 (*)     Total Bilirubin 2.0 (*)     Alkaline Phosphatase 243 (*)      (*)      (*)     All other components within normal limits   LIPASE - Abnormal; Notable for the following components:    Lipase 9.0 (*)     All other components within normal limits   MAGNESIUM - Abnormal; Notable for the following components: Magnesium 1.70 (*)     All other components within normal limits   TROPONIN     CT ABDOMEN PELVIS W IV CONTRAST Additional Contrast? None   Final Result   Cholelithiasis with acute cholecystitis. XR CHEST (2 VW)   Final Result   No acute process. I personally saw this patient and performed a substantive portion of the visit including all aspects of the medical decision making. MDM  Patient's evaluation is significant for findings of cholecystitis both on exam and imaging as well as elevated white count and liver enzymes and bilirubin. I believe he would benefit from hospitalization for consideration of cholecystectomy or ERCP with intervention. I personally saw this patient and independently provided 30 minutes of non-concurrent critical care out of the total shared critical care time provided.         Gold Orozco MD  10/19/22 1756

## 2022-10-19 NOTE — ED PROVIDER NOTES
[facility]        Pt Name: Ashlee Flynn  MRN: [id number]  [place] 1960  Date of evaluation: 10/19/2022  Provider: TATI Franco  PCP: Vadim Dixon DO  Note Started: 10:28 AM EDT        I have seen and evaluated this patient with my supervising physician Yossi Smith MD.    [address] [facility]       Chief Complaint   Patient presents with    Chest Pain     Chest pain starting yesterday        HISTORY OF PRESENT ILLNESS   (Location, Timing/Onset, Context/Setting, Quality, Duration, Modifying Factors, Severity, Associated Signs and Symptoms)  Note limiting factors. Chief Complaint: Chest pain     Ashlee Flynn is a 64 y.o. male who presents to the emergency department today with complaints of chest pain. States his symptoms started yesterday while he was sitting down watching TV. He states that the pain is an aching pain that is in his chest, abdomen, and into his back. He has no associated nausea, vomiting, diarrhea, constipation. He denies any urinary symptoms. He states nothing seems to make the pain better or worse, but it is constant. He has not tried taking anything to treat his symptoms. He has no further complaints. Nursing Notes were all reviewed and agreed with or any disagreements were addressed in the HPI. REVIEW OF SYSTEMS    (2-9 systems for level 4, 10 or more for level 5)     Review of Systems   Constitutional:  Negative for chills and fever. Respiratory:  Negative for cough and shortness of breath. Cardiovascular:  Positive for chest pain. Negative for palpitations and leg swelling. Gastrointestinal:  Positive for abdominal pain. Negative for abdominal distention, nausea and vomiting. Genitourinary:  Negative for dysuria, frequency and urgency. Musculoskeletal:  Negative for arthralgias and myalgias. Skin:  Negative for color change and rash. Neurological:  Negative for dizziness, syncope and weakness. Psychiatric/Behavioral:  Negative for agitation and confusion. Positives and Pertinent negatives as per HPI. Except as noted above in the ROS, all other systems were reviewed and negative. PAST MEDICAL HISTORY     Past Medical History:   Diagnosis Date    Diabetes mellitus type 1 ([address]     Hyperlipidemia     Hypertension     Overweight 2/22/2021    Sprain of left ankle 2/27/2021         SURGICAL HISTORY     Past Surgical History:   Procedure Laterality Date    CERVICAL LAMINECTOMY N/A 1/28/2021    C3-4, C4-5, C5-6 POSTERIOR CERVICAL DECOMPRESSION, FUSION AND FIXATION performed by Marry Teague MD at [facility]  01/2021         [address]       Current Discharge Medication List        CONTINUE these medications which have NOT CHANGED    Details   aspirin 81 MG chewable tablet Take 81 mg by mouth daily      atorvastatin (LIPITOR) 40 MG tablet Take 1 tablet by mouth nightly  Qty: 90 tablet, Refills: 1    Associated Diagnoses: Type 2 diabetes mellitus with hyperlipidemia ([address] Mixed hyperlipidemia due to type 2 diabetes mellitus (HCC)      metFORMIN (GLUCOPHAGE-XR) 500 MG extended release tablet TAKE 2 TABLETS BY MOUTH IN THE MORNING AND AT BEDTIME TAKE 2 TABLETS BY MOUTH TWICE DAILY  Qty: 120 tablet, Refills: 3    Associated Diagnoses: Diabetes mellitus with coincident hypertension ([address] Obesity, diabetes, and hypertension syndrome ([address] Type 2 diabetes mellitus with hyperlipidemia (HCC)      celecoxib (CELEBREX) 200 MG capsule TAKE 1 CAPSULE BY MOUTH EVERY DAY  Qty: 30 capsule, Refills: 1    Associated Diagnoses: Spinal stenosis, lumbar region, without neurogenic claudication      losartan (COZAAR) 25 MG tablet Take 2 tablets by mouth daily  Qty: 180 tablet, Refills: 1    Associated Diagnoses: Diabetes mellitus with coincident hypertension ([address] Obesity, diabetes, and hypertension syndrome ([address]  Essential hypertension      famotidine (PEPCID) 20 MG tablet Take 1 tablet by mouth 2 times daily  Qty: 180 tablet, Refills: 1    Associated Diagnoses: Essential hypertension; GERD without esophagitis      omeprazole (PRILOSEC) 20 MG delayed release capsule TAKE 2 CAPSULES BY MOUTH EVERY DAY  Qty: 180 capsule, Refills: 1    Associated Diagnoses: GERD without esophagitis      cetirizine (ZYRTEC) 10 MG tablet Take 1 tablet by mouth daily  Qty: 90 tablet, Refills: 1    Associated Diagnoses: Seasonal allergies      methocarbamol (ROBAXIN) 750 MG tablet TAKE 1 TABLET BY MOUTH FOUR TIMES A DAY  Qty: 90 tablet, Refills: 2      Handicap Placard MISC by Does not apply route  Qty: 1 each, Refills: 0    Associated Diagnoses: Spinal stenosis, lumbar region, without neurogenic claudication               ALLERGIES     Patient has no known allergies. FAMILYHISTORY       Family History   Problem Relation Age of Onset    Diabetes Mother     Cancer Father           SOCIAL HISTORY       Social History     Tobacco Use    Smoking status: Never    Smokeless tobacco: Never   Substance Use Topics    Alcohol use: Yes     Alcohol/week: 4.0 standard drinks     Types: 2 Cans of beer, 2 Shots of liquor per week    Drug use: No       SCREENINGS    [place] Coma Scale  Eye Opening: Spontaneous  Best Verbal Response: Oriented  Best Motor Response: Obeys commands  Darshana Coma Scale Score: 15        PHYSICAL EXAM    (up to 7 for level 4, 8 or more for level 5)     ED Triage Vitals   BP Temp Temp Source Heart Rate Resp SpO2 Height Weight   10/19/22 0827 10/19/22 0826 10/19/22 0826 10/19/22 0826 10/19/22 0826 10/19/22 0827 10/19/22 0826 10/19/22 0826   (!) 147/76 97.8 °F (36.6 °C) Oral 85 16 98 % 5' 8\" (1.727 m) 236 lb 12.4 oz (107.4 kg)       Physical Exam  Vitals and nursing note reviewed. Constitutional:       General: He is not in acute distress. Appearance: Normal appearance. He is well-developed. He is not ill-appearing, toxic-appearing or diaphoretic.    HENT:      Head: Normocephalic and atraumatic. Right Ear: Tympanic membrane and ear canal normal.      Left Ear: Tympanic membrane and ear canal normal.   Eyes:      Conjunctiva/sclera: Conjunctivae normal.      Pupils: Pupils are equal, round, and reactive to light. Cardiovascular:      Rate and Rhythm: Normal rate and regular rhythm. Pulses: Normal pulses. Pulmonary:      Effort: Pulmonary effort is normal. No respiratory distress. Breath sounds: Normal breath sounds. Abdominal:      General: Bowel sounds are normal. There is no distension. Palpations: Abdomen is soft. There is no mass. Tenderness: There is abdominal tenderness (epigastric, RUQ). There is no guarding or rebound. Musculoskeletal:      Cervical back: Normal range of motion and neck supple. Right lower leg: No edema. Left lower leg: No edema. Skin:     General: Skin is warm and dry. Neurological:      General: No focal deficit present. Mental Status: He is alert and oriented to person, place, and time. Mental status is at baseline. Psychiatric:         Mood and Affect: Mood normal.         Behavior: Behavior normal. Behavior is cooperative.        DIAGNOSTIC RESULTS   LABS:    Labs Reviewed   CBC WITH AUTO DIFFERENTIAL - Abnormal; Notable for the following components:       Result Value    WBC 13.9 (*)     Neutrophils Absolute 12.3 (*)     Lymphocytes Absolute 0.5 (*)     All other components within normal limits   COMPREHENSIVE METABOLIC PANEL W/ REFLEX TO MG FOR LOW K - Abnormal; Notable for the following components:    Sodium 133 (*)     Potassium reflex Magnesium 3.2 (*)     Chloride 93 (*)     Glucose 315 (*)     Total Bilirubin 2.0 (*)     Alkaline Phosphatase 243 (*)      (*)      (*)     All other components within normal limits   LIPASE - Abnormal; Notable for the following components:    Lipase 9.0 (*)     All other components within normal limits   MAGNESIUM - Abnormal; Notable for the following components: Magnesium 1.70 (*)     All other components within normal limits   TROPONIN       When ordered only abnormal lab results are displayed. All other labs were within normal range or not returned as of this dictation. EKG: When ordered, EKG's are interpreted by the Emergency Department Physician in the absence of a cardiologist.  Please see their note for interpretation of EKG. RADIOLOGY:   Non-plain film images such as CT, Ultrasound and MRI are read by the radiologist. Plain radiographic images are visualized and preliminarily interpreted by the ED Provider with the below findings:        Interpretation per the Radiologist below, if available at the time of this note:    CT ABDOMEN PELVIS W IV CONTRAST Additional Contrast? None   Final Result   Cholelithiasis with acute cholecystitis. XR CHEST (2 VW)   Final Result   No acute process. MRI ABDOMEN WO CONTRAST MRCP    (Results Pending)     XR CHEST (2 VW)    Result Date: 10/19/2022  EXAMINATION: TWO XRAY VIEWS OF THE CHEST 10/19/2022 8:39 am COMPARISON: 01/25/2021 HISTORY: ORDERING SYSTEM PROVIDED HISTORY: Chest pain TECHNOLOGIST PROVIDED HISTORY: Reason for exam:->Chest pain Reason for Exam: Chest Pain FINDINGS: The lungs are without acute focal process. There is no effusion or pneumothorax. The cardiomediastinal silhouette is stable. The osseous structures are stable. No acute process. CT ABDOMEN PELVIS W IV CONTRAST Additional Contrast? None    Result Date: 10/19/2022  EXAMINATION: CT OF THE ABDOMEN AND PELVIS WITH CONTRAST 10/19/2022 9:31 am TECHNIQUE: CT of the abdomen and pelvis was performed with the administration of intravenous contrast. Multiplanar reformatted images are provided for review. Automated exposure control, iterative reconstruction, and/or weight based adjustment of the mA/kV was utilized to reduce the radiation dose to as low as reasonably achievable. COMPARISON: None.  HISTORY: ORDERING SYSTEM PROVIDED HISTORY: abd pain TECHNOLOGIST PROVIDED HISTORY: Reason for exam:->abd pain Additional Contrast?->None Decision Support Exception - unselect if not a suspected or confirmed emergency medical condition->Emergency Medical Condition (MA) Reason for Exam: right sided abdominal pain Relevant Medical/Surgical History: hx of bypass surgery FINDINGS: Lower Chest: There is no consolidation or effusion. Organs: The liver, pancreas, spleen, kidneys and adrenals are unremarkable. The gallbladder is hydropic, measuring 5.4 cm in short axis diameter. Multiple calcified gallstones are noted with equivocal circumferential wall thickening and gallbladder wall edema. There is no intra or extrahepatic biliary ductal dilatation. GI/Bowel: Postop changes of gastric bypass are present. There is no bowel dilatation, wall thickening or obstruction. The appendix is normal. Pelvis: The bladder and prostate are unremarkable. Peritoneum/Retroperitoneum: There is no free air, free fluid or intraperitoneal inflammatory change. There is no adenopathy. Bones/Soft Tissues: There is no acute fracture or aggressive osseous lesion. Cholelithiasis with acute cholecystitis.            PROCEDURES   Unless otherwise noted below, none     Procedures    CONSULTS:  IP CONSULT TO GENERAL SURGERY  IP CONSULT TO GI      EMERGENCY DEPARTMENT COURSE and DIFFERENTIAL DIAGNOSIS/MDM:   Vitals:    Vitals:    10/19/22 1215 10/19/22 1230 10/19/22 1353 10/19/22 1355   BP: (!) 146/72 (!) 141/76 (!) 147/75 (!) 147/75   Pulse: 90 90 86 86   Resp: 24 24 22 22   Temp:   100 °F (37.8 °C) 100 °F (37.8 °C)   TempSrc:   Oral Oral   SpO2: 99% 99% 98% 98%   Weight:       Height:           Patient was given the following medications:  Medications   0.9 % sodium chloride infusion (has no administration in time range)   sodium chloride flush 0.9 % injection 5-40 mL (has no administration in time range)   sodium chloride flush 0.9 % injection 10 mL (has no administration in time range) 0.9 % sodium chloride infusion (has no administration in time range)   potassium chloride (KLOR-CON M) extended release tablet 40 mEq (has no administration in time range)     Or   potassium bicarb-citric acid (EFFER-K) effervescent tablet 40 mEq (has no administration in time range)     Or   potassium chloride 10 mEq/100 mL IVPB (Peripheral Line) (has no administration in time range)   ondansetron (ZOFRAN-ODT) disintegrating tablet 4 mg (has no administration in time range)     Or   ondansetron (ZOFRAN) injection 4 mg (has no administration in time range)   polyethylene glycol (GLYCOLAX) packet 17 g (has no administration in time range)   acetaminophen (TYLENOL) tablet 650 mg (has no administration in time range)     Or   acetaminophen (TYLENOL) suppository 650 mg (has no administration in time range)   enoxaparin Sodium (LOVENOX) injection 30 mg (has no administration in time range)   aspirin chewable tablet 81 mg (has no administration in time range)   atorvastatin (LIPITOR) tablet 40 mg (has no administration in time range)   cetirizine (ZYRTEC) tablet 10 mg (has no administration in time range)   famotidine (PEPCID) tablet 20 mg (has no administration in time range)   losartan (COZAAR) tablet 50 mg (has no administration in time range)   pantoprazole (PROTONIX) 40 mg in sodium chloride (PF) 10 mL injection (has no administration in time range)   iopamidol (ISOVUE-370) 76 % injection 75 mL (75 mLs IntraVENous Given 10/19/22 0930)   ketorolac (TORADOL) injection 15 mg (15 mg IntraVENous Given 10/19/22 1058)   piperacillin-tazobactam (ZOSYN) 3,375 mg in dextrose 5 % 50 mL IVPB (mini-bag) (0 mg IntraVENous Stopped 10/19/22 1134)   lactated ringers bolus (0 mLs IntraVENous Stopped 10/19/22 1241)         Is this patient to be included in the SEP-1 Core Measure due to severe sepsis or septic shock?    No   Exclusion criteria - the patient is NOT to be included for SEP-1 Core Measure due to:  2+ SIRS criteria are not met    ED COURSE & MEDICAL DECISION MAKING    - The patient presented to the ER with complaints of chest pain. Vital signs were reviewed. Exam as above. Peripheral IV placed. Labs, Imaging ordered. - Pertinent Labs & Imaging studies reviewed. (See chart for details)   -  Patient seen and evaluated in the emergency department. -  Triage and nursing notes reviewed and incorporated. -  Old chart records reviewed and incorporated. -  Code status: FULL  -   I have seen and evaluated this patient with my supervising physician Amaris Lopez MD.  -  Differential diagnosis includes: kidney stone, pyelonephritis, UTI, appendicitis, bowel obstruction, diverticulitis, hernia, gastritis/gastroenteritis, pancreatitis, cholecystitis, hepatitis, constipation, IBS, IBD  -  Work-up included:  See above  -  ED treatment included:  toradol, zosyn, IV fluids  - Consults: General Surgery - I spoke with Suri Amaya NP who advised that we get the patient started on antibiotics, IV fluids, and reach out to GI given his elevated bilirubin and liver enzymes to discuss further imaging studies. I spoke with TATI Vazquez with GI, who will review the patient's chart, discussed with her attending, and placed any additional imaging orders as well as discuss with general surgery to plan recommendations. Given his medical history, general surgery did request that we admit to medicine, so the hospitalist was consulted for admission orders.  -  Results discussed with patient and/or family. Labs show white blood cell count of 13.9, no concerning anemia. Metabolic panel sodium 320, potassium 3.2, glucose 315. His bilirubin is elevated at 2.0, alk phos 243, , . Troponin is less than 0.1, lipase is 9, magnesium is 1.7. Imaging studies show cholelithiasis with acute cholecystitis. .  At this time, we recommend admission, as the patient has acute cholecystitis.  The patient and/or family is agreeable with plan of care and disposition.  -  Disposition:  Admission  - Critical Care: The total critical care time I independently spent while evaluating and treating this patient was 22 minutes. This excludes time spent doing separately billable procedures. This includes time at the bedside, data interpretation, medication management, obtaining critical history from collateral sources if the patient is unable to provide it directly, and physician consultation. Specifics of interventions taken and potentially life-threatening diagnostic considerations are listed above in the medical decision making. If this was a shared visit with an physician, the time in this attestation is non-concurrent critical care time out of the total shared critical care time provided by the physician and myself. FINAL IMPRESSION      1. Acute cholecystitis          DISPOSITION/PLAN   DISPOSITION Admitted 10/19/2022 11:41:35 AM      PATIENT REFERRED TO:  No follow-up provider specified.     DISCHARGE MEDICATIONS:  Current Discharge Medication List          DISCONTINUED MEDICATIONS:  Current Discharge Medication List        STOP taking these medications       Aspirin Buf,CaCarb-MgCarb-MgO, 81 MG TABS Comments:   Reason for Stopping:                      (Please note that portions of this note were completed with a voice recognition program.  Efforts were made to edit the dictations but occasionally words are mis-transcribed.)    TATI Nuñez (electronically signed)           Blair Nuñez  10/19/22 152

## 2022-10-19 NOTE — ED NOTES
Patient reports chest pain starting yesterday continuous and feels like pressure. Patient denies cough or any other symptoms.       Theo Felix RN  10/19/22 [id number]

## 2022-10-19 NOTE — PROGRESS NOTES
Pharmacy Medication Reconciliation Note     List of medications patient is currently taking is complete. Patient's status of their home medications prior to arrival to the emergency room is unknown. Source of information:   1. Patient interview/list of home medications from patient. 2. List of medications in Epic  3. Medication dispensing report in Epic      Notes regarding home medications:   1. Patient has taken NO medications PTA on 10-19-22  2. Patient no longer taking HCTZ, gabapentin  3. Patient denies other OTC medications, liquid nutritional supplements (ex: Boost, Ensure)  4. Patient denies herbal medications  5. Takes many OTC vitamins which he states he can hold if admitted.        Allergies: NKA (verified with patient)      Bear Weeks [facility], PRS 10/19/2022  11:10 AM

## 2022-10-20 ENCOUNTER — ANESTHESIA (OUTPATIENT)
Dept: OPERATING ROOM | Age: 62
End: 2022-10-20
Payer: MEDICAID

## 2022-10-20 ENCOUNTER — ANESTHESIA EVENT (OUTPATIENT)
Dept: OPERATING ROOM | Age: 62
End: 2022-10-20
Payer: MEDICAID

## 2022-10-20 ENCOUNTER — APPOINTMENT (OUTPATIENT)
Dept: GENERAL RADIOLOGY | Age: 62
End: 2022-10-20
Payer: MEDICAID

## 2022-10-20 LAB
ACANTHOCYTES: ABNORMAL
ALBUMIN SERPL-MCNC: 3.5 G/DL (ref 3.4–5)
ALP BLD-CCNC: 231 U/L (ref 40–129)
ALT SERPL-CCNC: 172 U/L (ref 10–40)
ANION GAP SERPL CALCULATED.3IONS-SCNC: 15 MMOL/L (ref 3–16)
AST SERPL-CCNC: 110 U/L (ref 15–37)
BASOPHILS ABSOLUTE: 0 K/UL (ref 0–0.2)
BASOPHILS RELATIVE PERCENT: 0 %
BILIRUB SERPL-MCNC: 1 MG/DL (ref 0–1)
BILIRUBIN DIRECT: 0.4 MG/DL (ref 0–0.3)
BILIRUBIN, INDIRECT: 0.6 MG/DL (ref 0–1)
BUN BLDV-MCNC: 5 MG/DL (ref 7–20)
CALCIUM SERPL-MCNC: 8.7 MG/DL (ref 8.3–10.6)
CHLORIDE BLD-SCNC: 95 MMOL/L (ref 99–110)
CO2: 22 MMOL/L (ref 21–32)
CREAT SERPL-MCNC: 0.8 MG/DL (ref 0.8–1.3)
CRENATED RBC'S: ABNORMAL
EOSINOPHILS ABSOLUTE: 0 K/UL (ref 0–0.6)
EOSINOPHILS RELATIVE PERCENT: 0 %
GFR SERPL CREATININE-BSD FRML MDRD: >60 ML/MIN/{1.73_M2}
GLUCOSE BLD-MCNC: 222 MG/DL (ref 70–99)
GLUCOSE BLD-MCNC: 242 MG/DL (ref 70–99)
GLUCOSE BLD-MCNC: 254 MG/DL (ref 70–99)
GLUCOSE BLD-MCNC: 257 MG/DL (ref 70–99)
GLUCOSE BLD-MCNC: 262 MG/DL (ref 70–99)
GLUCOSE BLD-MCNC: 353 MG/DL (ref 70–99)
HCT VFR BLD CALC: 37.3 % (ref 40.5–52.5)
HEMOGLOBIN: 12.4 G/DL (ref 13.5–17.5)
LYMPHOCYTES ABSOLUTE: 0.9 K/UL (ref 1–5.1)
LYMPHOCYTES RELATIVE PERCENT: 7 %
MAGNESIUM: 1.8 MG/DL (ref 1.8–2.4)
MCH RBC QN AUTO: 30.2 PG (ref 26–34)
MCHC RBC AUTO-ENTMCNC: 33.1 G/DL (ref 31–36)
MCV RBC AUTO: 91.2 FL (ref 80–100)
MONOCYTES ABSOLUTE: 0.5 K/UL (ref 0–1.3)
MONOCYTES RELATIVE PERCENT: 4 %
NEUTROPHILS ABSOLUTE: 11 K/UL (ref 1.7–7.7)
NEUTROPHILS RELATIVE PERCENT: 89 %
OVALOCYTES: ABNORMAL
PDW BLD-RTO: 14.6 % (ref 12.4–15.4)
PERFORMED ON: ABNORMAL
PLATELET # BLD: 148 K/UL (ref 135–450)
PMV BLD AUTO: 8.7 FL (ref 5–10.5)
POIKILOCYTES: ABNORMAL
POTASSIUM REFLEX MAGNESIUM: 2.9 MMOL/L (ref 3.5–5.1)
RBC # BLD: 4.09 M/UL (ref 4.2–5.9)
SODIUM BLD-SCNC: 132 MMOL/L (ref 136–145)
TOTAL PROTEIN: 6.4 G/DL (ref 6.4–8.2)
VACUOLATED NEUTROPHILS: PRESENT
WBC # BLD: 12.4 K/UL (ref 4–11)

## 2022-10-20 PROCEDURE — 2580000003 HC RX 258: Performed by: SURGERY

## 2022-10-20 PROCEDURE — 2580000003 HC RX 258: Performed by: HOSPITALIST

## 2022-10-20 PROCEDURE — 88304 TISSUE EXAM BY PATHOLOGIST: CPT

## 2022-10-20 PROCEDURE — 3600000014 HC SURGERY LEVEL 4 ADDTL 15MIN: Performed by: SURGERY

## 2022-10-20 PROCEDURE — 47563 LAPARO CHOLECYSTECTOMY/GRAPH: CPT | Performed by: SURGERY

## 2022-10-20 PROCEDURE — C9113 INJ PANTOPRAZOLE SODIUM, VIA: HCPCS | Performed by: HOSPITALIST

## 2022-10-20 PROCEDURE — 85025 COMPLETE CBC W/AUTO DIFF WBC: CPT

## 2022-10-20 PROCEDURE — 6360000002 HC RX W HCPCS: Performed by: HOSPITALIST

## 2022-10-20 PROCEDURE — 2500000003 HC RX 250 WO HCPCS: Performed by: NURSE ANESTHETIST, CERTIFIED REGISTERED

## 2022-10-20 PROCEDURE — 94760 N-INVAS EAR/PLS OXIMETRY 1: CPT

## 2022-10-20 PROCEDURE — 6370000000 HC RX 637 (ALT 250 FOR IP): Performed by: SURGERY

## 2022-10-20 PROCEDURE — 80076 HEPATIC FUNCTION PANEL: CPT

## 2022-10-20 PROCEDURE — 3700000000 HC ANESTHESIA ATTENDED CARE: Performed by: SURGERY

## 2022-10-20 PROCEDURE — 2709999900 HC NON-CHARGEABLE SUPPLY: Performed by: SURGERY

## 2022-10-20 PROCEDURE — 2720000010 HC SURG SUPPLY STERILE: Performed by: SURGERY

## 2022-10-20 PROCEDURE — 6360000002 HC RX W HCPCS: Performed by: SURGERY

## 2022-10-20 PROCEDURE — 96376 TX/PRO/DX INJ SAME DRUG ADON: CPT

## 2022-10-20 PROCEDURE — 74300 X-RAY BILE DUCTS/PANCREAS: CPT

## 2022-10-20 PROCEDURE — 83735 ASSAY OF MAGNESIUM: CPT

## 2022-10-20 PROCEDURE — 6370000000 HC RX 637 (ALT 250 FOR IP): Performed by: HOSPITALIST

## 2022-10-20 PROCEDURE — 7100000001 HC PACU RECOVERY - ADDTL 15 MIN: Performed by: SURGERY

## 2022-10-20 PROCEDURE — 96375 TX/PRO/DX INJ NEW DRUG ADDON: CPT

## 2022-10-20 PROCEDURE — 96366 THER/PROPH/DIAG IV INF ADDON: CPT

## 2022-10-20 PROCEDURE — G0378 HOSPITAL OBSERVATION PER HR: HCPCS

## 2022-10-20 PROCEDURE — 36415 COLL VENOUS BLD VENIPUNCTURE: CPT

## 2022-10-20 PROCEDURE — 2500000003 HC RX 250 WO HCPCS: Performed by: SURGERY

## 2022-10-20 PROCEDURE — 6360000004 HC RX CONTRAST MEDICATION: Performed by: SURGERY

## 2022-10-20 PROCEDURE — 3700000001 HC ADD 15 MINUTES (ANESTHESIA): Performed by: SURGERY

## 2022-10-20 PROCEDURE — 7100000000 HC PACU RECOVERY - FIRST 15 MIN: Performed by: SURGERY

## 2022-10-20 PROCEDURE — 6360000002 HC RX W HCPCS: Performed by: NURSE ANESTHETIST, CERTIFIED REGISTERED

## 2022-10-20 PROCEDURE — 96368 THER/DIAG CONCURRENT INF: CPT

## 2022-10-20 PROCEDURE — A4217 STERILE WATER/SALINE, 500 ML: HCPCS | Performed by: SURGERY

## 2022-10-20 PROCEDURE — 96367 TX/PROPH/DG ADDL SEQ IV INF: CPT

## 2022-10-20 PROCEDURE — 96361 HYDRATE IV INFUSION ADD-ON: CPT

## 2022-10-20 PROCEDURE — 80048 BASIC METABOLIC PNL TOTAL CA: CPT

## 2022-10-20 PROCEDURE — 3600000004 HC SURGERY LEVEL 4 BASE: Performed by: SURGERY

## 2022-10-20 PROCEDURE — C9113 INJ PANTOPRAZOLE SODIUM, VIA: HCPCS | Performed by: SURGERY

## 2022-10-20 RX ORDER — SODIUM CHLORIDE 0.9 % (FLUSH) 0.9 %
5-40 SYRINGE (ML) INJECTION EVERY 12 HOURS SCHEDULED
Status: DISCONTINUED | OUTPATIENT
Start: 2022-10-20 | End: 2022-10-20 | Stop reason: SDUPTHER

## 2022-10-20 RX ORDER — SODIUM CHLORIDE 0.9 % (FLUSH) 0.9 %
5-40 SYRINGE (ML) INJECTION PRN
Status: DISCONTINUED | OUTPATIENT
Start: 2022-10-20 | End: 2022-10-20 | Stop reason: SDUPTHER

## 2022-10-20 RX ORDER — FENTANYL CITRATE 50 UG/ML
INJECTION, SOLUTION INTRAMUSCULAR; INTRAVENOUS PRN
Status: DISCONTINUED | OUTPATIENT
Start: 2022-10-20 | End: 2022-10-20 | Stop reason: SDUPTHER

## 2022-10-20 RX ORDER — OXYCODONE HYDROCHLORIDE 5 MG/1
5 TABLET ORAL EVERY 4 HOURS PRN
Status: DISCONTINUED | OUTPATIENT
Start: 2022-10-20 | End: 2022-10-21 | Stop reason: HOSPADM

## 2022-10-20 RX ORDER — OXYCODONE HYDROCHLORIDE 5 MG/1
5 TABLET ORAL
Status: DISCONTINUED | OUTPATIENT
Start: 2022-10-20 | End: 2022-10-20 | Stop reason: SDUPTHER

## 2022-10-20 RX ORDER — SODIUM CHLORIDE 9 MG/ML
INJECTION, SOLUTION INTRAVENOUS PRN
Status: DISCONTINUED | OUTPATIENT
Start: 2022-10-20 | End: 2022-10-20 | Stop reason: SDUPTHER

## 2022-10-20 RX ORDER — LIDOCAINE HYDROCHLORIDE 20 MG/ML
INJECTION, SOLUTION EPIDURAL; INFILTRATION; INTRACAUDAL; PERINEURAL PRN
Status: DISCONTINUED | OUTPATIENT
Start: 2022-10-20 | End: 2022-10-20 | Stop reason: SDUPTHER

## 2022-10-20 RX ORDER — BUPIVACAINE HYDROCHLORIDE 5 MG/ML
INJECTION, SOLUTION EPIDURAL; INTRACAUDAL
Status: COMPLETED | OUTPATIENT
Start: 2022-10-20 | End: 2022-10-20

## 2022-10-20 RX ORDER — DEXAMETHASONE SODIUM PHOSPHATE 4 MG/ML
INJECTION, SOLUTION INTRA-ARTICULAR; INTRALESIONAL; INTRAMUSCULAR; INTRAVENOUS; SOFT TISSUE PRN
Status: DISCONTINUED | OUTPATIENT
Start: 2022-10-20 | End: 2022-10-20 | Stop reason: SDUPTHER

## 2022-10-20 RX ORDER — ROCURONIUM BROMIDE 10 MG/ML
INJECTION, SOLUTION INTRAVENOUS PRN
Status: DISCONTINUED | OUTPATIENT
Start: 2022-10-20 | End: 2022-10-20 | Stop reason: SDUPTHER

## 2022-10-20 RX ORDER — ONDANSETRON 2 MG/ML
4 INJECTION INTRAMUSCULAR; INTRAVENOUS
Status: DISCONTINUED | OUTPATIENT
Start: 2022-10-20 | End: 2022-10-21 | Stop reason: HOSPADM

## 2022-10-20 RX ORDER — FENTANYL CITRATE 50 UG/ML
25 INJECTION, SOLUTION INTRAMUSCULAR; INTRAVENOUS EVERY 5 MIN PRN
Status: DISCONTINUED | OUTPATIENT
Start: 2022-10-20 | End: 2022-10-21 | Stop reason: HOSPADM

## 2022-10-20 RX ORDER — OXYCODONE HYDROCHLORIDE 10 MG/1
10 TABLET ORAL EVERY 4 HOURS PRN
Status: DISCONTINUED | OUTPATIENT
Start: 2022-10-20 | End: 2022-10-21 | Stop reason: HOSPADM

## 2022-10-20 RX ORDER — ONDANSETRON 2 MG/ML
INJECTION INTRAMUSCULAR; INTRAVENOUS PRN
Status: DISCONTINUED | OUTPATIENT
Start: 2022-10-20 | End: 2022-10-20 | Stop reason: SDUPTHER

## 2022-10-20 RX ORDER — SUCCINYLCHOLINE/SOD CL,ISO/PF 200MG/10ML
SYRINGE (ML) INTRAVENOUS PRN
Status: DISCONTINUED | OUTPATIENT
Start: 2022-10-20 | End: 2022-10-20 | Stop reason: SDUPTHER

## 2022-10-20 RX ORDER — PROPOFOL 10 MG/ML
INJECTION, EMULSION INTRAVENOUS PRN
Status: DISCONTINUED | OUTPATIENT
Start: 2022-10-20 | End: 2022-10-20 | Stop reason: SDUPTHER

## 2022-10-20 RX ORDER — MAGNESIUM HYDROXIDE 1200 MG/15ML
LIQUID ORAL CONTINUOUS PRN
Status: COMPLETED | OUTPATIENT
Start: 2022-10-20 | End: 2022-10-20

## 2022-10-20 RX ADMIN — FENTANYL CITRATE 25 MCG: 50 INJECTION INTRAMUSCULAR; INTRAVENOUS at 15:12

## 2022-10-20 RX ADMIN — PIPERACILLIN AND TAZOBACTAM 3375 MG: 3; .375 INJECTION, POWDER, FOR SOLUTION INTRAVENOUS at 23:40

## 2022-10-20 RX ADMIN — SODIUM CHLORIDE: 9 INJECTION, SOLUTION INTRAVENOUS at 20:26

## 2022-10-20 RX ADMIN — SODIUM CHLORIDE: 9 INJECTION, SOLUTION INTRAVENOUS at 17:53

## 2022-10-20 RX ADMIN — SUGAMMADEX 200 MG: 100 INJECTION, SOLUTION INTRAVENOUS at 16:09

## 2022-10-20 RX ADMIN — Medication 140 MG: at 15:01

## 2022-10-20 RX ADMIN — HYDROMORPHONE HYDROCHLORIDE 0.5 MG: 1 INJECTION, SOLUTION INTRAMUSCULAR; INTRAVENOUS; SUBCUTANEOUS at 09:04

## 2022-10-20 RX ADMIN — ROCURONIUM BROMIDE 15 MG: 10 INJECTION INTRAVENOUS at 15:46

## 2022-10-20 RX ADMIN — Medication 40 MG: at 17:55

## 2022-10-20 RX ADMIN — FENTANYL CITRATE 25 MCG: 50 INJECTION INTRAMUSCULAR; INTRAVENOUS at 15:03

## 2022-10-20 RX ADMIN — POTASSIUM CHLORIDE 10 MEQ: 7.46 INJECTION, SOLUTION INTRAVENOUS at 11:44

## 2022-10-20 RX ADMIN — LIDOCAINE HYDROCHLORIDE 50 MG: 20 INJECTION, SOLUTION EPIDURAL; INFILTRATION; INTRACAUDAL; PERINEURAL at 16:02

## 2022-10-20 RX ADMIN — FAMOTIDINE 20 MG: 20 TABLET, FILM COATED ORAL at 20:28

## 2022-10-20 RX ADMIN — Medication 40 MG: at 02:38

## 2022-10-20 RX ADMIN — ROCURONIUM BROMIDE 50 MG: 10 INJECTION INTRAVENOUS at 15:07

## 2022-10-20 RX ADMIN — PROPOFOL 150 MG: 10 INJECTION, EMULSION INTRAVENOUS at 14:59

## 2022-10-20 RX ADMIN — PIPERACILLIN AND TAZOBACTAM 4500 MG: 4; .5 INJECTION, POWDER, FOR SOLUTION INTRAVENOUS at 17:54

## 2022-10-20 RX ADMIN — SODIUM CHLORIDE: 9 INJECTION, SOLUTION INTRAVENOUS at 14:53

## 2022-10-20 RX ADMIN — LIDOCAINE HYDROCHLORIDE 100 MG: 20 INJECTION, SOLUTION EPIDURAL; INFILTRATION; INTRACAUDAL; PERINEURAL at 14:59

## 2022-10-20 RX ADMIN — FENTANYL CITRATE 25 MCG: 50 INJECTION INTRAMUSCULAR; INTRAVENOUS at 15:08

## 2022-10-20 RX ADMIN — ATORVASTATIN CALCIUM 40 MG: 40 TABLET, FILM COATED ORAL at 20:27

## 2022-10-20 RX ADMIN — POTASSIUM CHLORIDE 10 MEQ: 7.46 INJECTION, SOLUTION INTRAVENOUS at 17:55

## 2022-10-20 RX ADMIN — POTASSIUM CHLORIDE 10 MEQ: 7.46 INJECTION, SOLUTION INTRAVENOUS at 20:27

## 2022-10-20 RX ADMIN — DEXAMETHASONE SODIUM PHOSPHATE 8 MG: 4 INJECTION, SOLUTION INTRAMUSCULAR; INTRAVENOUS at 15:12

## 2022-10-20 RX ADMIN — POTASSIUM CHLORIDE 10 MEQ: 7.46 INJECTION, SOLUTION INTRAVENOUS at 09:12

## 2022-10-20 RX ADMIN — SODIUM CHLORIDE: 9 INJECTION, SOLUTION INTRAVENOUS at 02:38

## 2022-10-20 RX ADMIN — ACETAMINOPHEN 650 MG: 325 TABLET ORAL at 04:38

## 2022-10-20 RX ADMIN — POTASSIUM CHLORIDE 10 MEQ: 7.46 INJECTION, SOLUTION INTRAVENOUS at 10:38

## 2022-10-20 RX ADMIN — ONDANSETRON 4 MG: 2 INJECTION INTRAMUSCULAR; INTRAVENOUS at 15:12

## 2022-10-20 RX ADMIN — POTASSIUM CHLORIDE 10 MEQ: 7.46 INJECTION, SOLUTION INTRAVENOUS at 22:10

## 2022-10-20 ASSESSMENT — PAIN - FUNCTIONAL ASSESSMENT
PAIN_FUNCTIONAL_ASSESSMENT: 0-10
PAIN_FUNCTIONAL_ASSESSMENT: ACTIVITIES ARE NOT PREVENTED

## 2022-10-20 ASSESSMENT — PAIN SCALES - GENERAL
PAINLEVEL_OUTOF10: 0
PAINLEVEL_OUTOF10: 0
PAINLEVEL_OUTOF10: 6
PAINLEVEL_OUTOF10: 2
PAINLEVEL_OUTOF10: 0
PAINLEVEL_OUTOF10: 4

## 2022-10-20 ASSESSMENT — PAIN DESCRIPTION - LOCATION
LOCATION: ABDOMEN
LOCATION: HEAD
LOCATION: HEAD

## 2022-10-20 ASSESSMENT — PAIN DESCRIPTION - ONSET: ONSET: ON-GOING

## 2022-10-20 ASSESSMENT — PAIN DESCRIPTION - FREQUENCY: FREQUENCY: INTERMITTENT

## 2022-10-20 ASSESSMENT — PAIN DESCRIPTION - PAIN TYPE: TYPE: ACUTE PAIN

## 2022-10-20 ASSESSMENT — PAIN DESCRIPTION - ORIENTATION
ORIENTATION: MID
ORIENTATION: MID

## 2022-10-20 ASSESSMENT — PAIN DESCRIPTION - DESCRIPTORS
DESCRIPTORS: SORE
DESCRIPTORS: ACHING;DISCOMFORT
DESCRIPTORS: ACHING;DISCOMFORT

## 2022-10-20 NOTE — ANESTHESIA POSTPROCEDURE EVALUATION
[facility] Department of Anesthesiology  Post-Anesthesia Note       Name:  Sonal Trevino                                  Age:  64 y.o. MRN:  [id number]     Last Vitals & Oxygen Saturation: BP (!) 120/53   Pulse 86   Temp 98.4 °F (36.9 °C)   Resp 25   Ht 5' 8\" (1.727 m)   Wt 231 lb 14.8 oz (105.2 kg)   SpO2 99%   BMI 35.26 kg/m²   Patient Vitals for the past 4 hrs:   BP Temp Pulse Resp SpO2   10/20/22 1650 (!) 120/53 98.4 °F (36.9 °C) 86 25 99 %   10/20/22 1635 126/65 -- 98 30 98 %   10/20/22 1630 117/77 98.1 °F (36.7 °C) (!) 104 24 97 %       Level of consciousness:  Awake, alert    Respiratory: Respirations easy, no distress. Stable. Cardiovascular: Hemodynamically stable. Hydration: Adequate. PONV: Adequately managed. Post-op pain: Adequately controlled. Post-op assessment: Tolerated anesthetic well without complication. Complications:  None.     Henrique Kim MD  October 20, 2022   5:05 PM

## 2022-10-20 NOTE — PROGRESS NOTES
Pt is alert and oriented, states that pain is tolerable at this time.  Report called to [facility], will transfer to [phone]

## 2022-10-20 NOTE — ACP (ADVANCE CARE PLANNING)
Advance Care Planning     Advance Care Planning Activator (Inpatient)  Conversation Note      Date of ACP Conversation: 10/20/2022     Conversation Conducted with: Patient with Decision Making Capacity    ACP Activator: Fabrizio Ross RN    Health Care Decision Maker:     Current Designated Health Care Decision Maker:     Primary Decision Maker: Urban Jolley - Brother/Sister - [phone]    Primary Decision Maker: Erenest Foot - Brother/Sister - [phone]    Today we documented desired Decision Maker(s), who is (are) NOT Legal Next of Allwyatt Cyndee. ACP documents are required for decision maker authority. Care Preferences    Ventilation: \"If you were in your present state of health and suddenly became very ill and were unable to breathe on your own, what would your preference be about the use of a ventilator (breathing machine) if it were available to you? \"      Would the patient desire the use of ventilator (breathing machine)?: yes    \"If your health worsens and it becomes clear that your chance of recovery is unlikely, what would your preference be about the use of a ventilator (breathing machine) if it were available to you? \"     Would the patient desire the use of ventilator (breathing machine)?: No      Resuscitation  \"CPR works best to restart the heart when there is a sudden event, like a heart attack, in someone who is otherwise healthy. Unfortunately, CPR does not typically restart the heart for people who have serious health conditions or who are very sick. \"    \"In the event your heart stopped as a result of an underlying serious health condition, would you want attempts to be made to restart your heart (answer \"yes\" for attempt to resuscitate) or would you prefer a natural death (answer \"no\" for do not attempt to resuscitate)? \" yes       [] Yes   [] No   Educated Patient / Raymond Lamar regarding differences between Advance Directives and portable DNR orders.     Length of ACP Conversation in minutes:  4 min    Conversation Outcomes:  [x] ACP discussion completed  [] Existing advance directive reviewed with patient; no changes to patient's previously recorded wishes  [] New Advance Directive completed  [] Portable Do Not Rescitate prepared for Provider review and signature  [] POLST/POST/MOLST/MOST prepared for Provider review and signature      Follow-up plan:    [] Schedule follow-up conversation to continue planning  [] Referred individual to Provider for additional questions/concerns   [] Advised patient/agent/surrogate to review completed ACP document and update if needed with changes in condition, patient preferences or care setting    [x] This note routed to one or more involved healthcare providers        Ag Herman RN, BSN,   [phone]  Electronically signed by Ag Herman RN on 10/20/2022 at 9:32 AM

## 2022-10-20 NOTE — PROGRESS NOTES
Hospitalist Progress Note  10/20/2022 9:44 AM    PCP: Javier Cardoza DO    [id number]     Date of Admission: 10/19/2022                                                                                                                     HOSPITAL COURSE    Patient demographics:  The patient  Allie Hicks is a 64 y.o. male     Significant past medical history:   Patient Active Problem List   Diagnosis    Contusion of left ankle    Right sided sciatica    Acute low back pain with possible spinal stenosis of less than six weeks' duration    S/P cervical spinal fusion    Other symptoms referable to back    Chronic venous insufficiency    Degeneration of lumbar or lumbosacral intervertebral disc    Displacement of lumbar intervertebral disc without myelopathy    Essential hypertension    GERD without esophagitis    Morbid obesity (HCC)    Other and unspecified hyperlipidemia    Spinal stenosis, lumbar region, without neurogenic claudication    Thoracic or lumbosacral neuritis or radiculitis, unspecified    Unspecified inflammatory spondylopathy, lumbar region [facility])    Central cord syndrome at unspecified level of cervical spinal cord, initial encounter ([address]    Obesity, diabetes, and hypertension syndrome ([address]    Cervical spinal cord compression ([address]    Diabetes mellitus with coincident hypertension ([address]    Type 2 diabetes mellitus with hyperlipidemia ([address]    Acute cholecystitis         Presenting symptoms:  Chest pain    Diagnostic workup:      CONSULTS DURING ADMISSION :   IP CONSULT TO GENERAL SURGERY  IP CONSULT TO GI  IP CONSULT TO SPIRITUAL SERVICES      Patient was diagnosed with:  Acute cholecystitis  Transaminitis  Hypokalemia    Treatment while inpatient:  64years old male with medical history significant for hypertension diabetes mellitus gastric bypass surgery cervical laminectomy. Patient presented with upper abdominal pain of 1 day duration.   Patient was suspected for acute cholecystitis General surgery GI and GI was consulted. Patient was also noted to have transaminitis. CT scan of the abdomen and pelvis showed cholelithiasis and possible acute cholecystitis MRCP showed distended gallbladder with stones and sludge.                                                                                       ----------------------------------------------------------      SUBJECTIVE COMPLAINTS- Chest pain    Diet: Diet NPO      OBJECTIVE:   Patient Active Problem List   Diagnosis    Contusion of left ankle    Right sided sciatica    Acute low back pain with possible spinal stenosis of less than six weeks' duration    S/P cervical spinal fusion    Other symptoms referable to back    Chronic venous insufficiency    Degeneration of lumbar or lumbosacral intervertebral disc    Displacement of lumbar intervertebral disc without myelopathy    Essential hypertension    GERD without esophagitis    Morbid obesity (HCC)    Other and unspecified hyperlipidemia    Spinal stenosis, lumbar region, without neurogenic claudication    Thoracic or lumbosacral neuritis or radiculitis, unspecified    Unspecified inflammatory spondylopathy, lumbar region [facility])    Central cord syndrome at unspecified level of cervical spinal cord, initial encounter [address]    Obesity, diabetes, and hypertension syndrome ([address]    Cervical spinal cord compression ([address]    Diabetes mellitus with coincident hypertension ([address]    Type 2 diabetes mellitus with hyperlipidemia ([address]    Acute cholecystitis       Allergies  Patient has no known allergies.     Medications    Scheduled Meds:   sodium chloride flush  5-40 mL IntraVENous 2 times per day    enoxaparin  30 mg SubCUTAneous BID    aspirin  81 mg Oral Daily    atorvastatin  40 mg Oral Nightly    cetirizine  10 mg Oral Daily    famotidine  20 mg Oral BID    losartan  50 mg Oral Daily    pantoprazole (PROTONIX) 40 mg injection  40 mg IntraVENous Q12H     Continuous Infusions:   sodium chloride 100 mL/hr at 10/20/22 0238    sodium chloride       PRN Meds:  sodium chloride flush, sodium chloride, potassium chloride **OR** potassium alternative oral replacement **OR** potassium chloride, ondansetron **OR** ondansetron, polyethylene glycol, acetaminophen **OR** acetaminophen, HYDROmorphone **OR** HYDROmorphone    Vitals   Vitals /wt Patient Vitals for the past 8 hrs:   BP Temp Temp src Pulse Resp SpO2 Weight   10/20/22 0759 (!) 145/74 98.7 °F (37.1 °C) Oral 74 18 96 % --   10/20/22 0458 -- -- -- -- -- -- 231 lb 14.8 oz (105.2 kg)   10/20/22 0434 114/64 100.2 °F (37.9 °C) Oral 83 18 95 % --        72HR INTAKE/OUTPUT:    Intake/Output Summary (Last 24 hours) at 10/20/2022 0944  Last data filed at 10/20/2022 0238  Gross per 24 hour   Intake 1223.64 ml   Output --   Net 1223.64 ml       Exam:    Gen:   Alert and oriented ×3    Eyes: PERRL. No sclera icterus. No conjunctival injection. ENT: No discharge. Pharynx clear. External appearance of ears and nose normal.  Neck: Trachea midline. No obvious mass. Resp: No accessory muscle use. No crackles. No wheezes. No rhonchi. CV: Regular rate. Regular rhythm. No murmur or rub. No edema. GI: Non-tender. Non-distended. No hernia. Skin: Warm, dry, normal texture and turgor. Lymph: No cervical LAD. No supraclavicular LAD. M/S: / Ext. No cyanosis. No clubbing. No joint deformity. Neuro: CN 2-12 are intact,  no neurologic deficits noted. PT/INR: No results for input(s): PROTIME, INR in the last 72 hours. APTT: No results for input(s): APTT in the last 72 hours.     CBC:   Recent Labs     10/19/22  0900 10/20/22  0647   WBC 13.9* 12.4*   HGB 14.2 12.4*   HCT 42.8 37.3*   MCV 92.3 91.2    148       BMP:   Recent Labs     10/19/22  0900 10/20/22  0647   * 132*   K 3.2* 2.9*   CL 93* 95*   CO2 24 22   BUN 10 5*   CREATININE 0.8 0.8       LIVER PROFILE:   Recent Labs     10/19/22  0900 10/20/22  0647   ALKPHOS 243* 231*   * 110*   * 172* BILIDIR  --  0.4*   BILITOT 2.0* 1.0     No results for input(s): AMYLASE in the last 72 hours. Recent Labs     10/19/22  0900   LIPASE 9.0*       UA:No results for input(s): NITRITE, LABCAST, WBCUA, RBCUA, MUCUS in the last 72 hours. TROPONIN:   Recent Labs     10/19/22  0900   TROPONINI <0.01       Lab Results   Component Value Date/Time    URRFLXCULT Yes 01/25/2021 12:42 PM       No results for input(s): TSHREFLEX in the last 72 hours. No components found for: GYR5603  POC GLUCOSE:    Recent Labs     10/19/22  1637 10/19/22  2118 10/20/22  0758   POCGLU 278* 259* 254*     No results for input(s): LABA1C in the last 72 hours. Lab Results   Component Value Date/Time    LABA1C 11.3 09/28/2022 04:14 PM         ASSESSMENT AND PLAN  Acute cholecystitis  In a patient with cholelithiasis noted on CT abdomen and pelvis  MRCP also suggestive of cholelithiasis  Continue patient on Zon  General surgery consult is appreciated  Plan is for laparoscopic cholecystectomy and intraoperative cholangiogram.     Transaminitis  Liver enzymes are improving    Hypokalemia  E87.6  Supplements and monitoring           Code Status: Full Code        Dispo - cc        The patient and / or the family were informed of the results of any tests, a time was given to answer questions, a plan was proposed and they agreed with plan. Latoya Sanchez MD    This note was transcribed using [facility]. Please disregard any translational errors.

## 2022-10-20 NOTE — PLAN OF CARE
Problem: Discharge Planning  Goal: Discharge to home or other facility with appropriate resources  Outcome: Progressing   Continuing to work with patient and health care team on discharge plan. Discharge instructions and medication management will be reviewed prior to discharge. Problem: Pain  Goal: Verbalizes/displays adequate comfort level or baseline comfort level  Outcome: Progressing   Pt able to express presence/absence of pain and rate pain appropriately using numerical scale. Pain/discomfort being managed with PRN analgesics per MD orders (see MAR). Pain assessed every shift and after interventions. Problem: Skin/Tissue Integrity  Goal: Absence of new skin breakdown  Description: 1. Monitor for areas of redness and/or skin breakdown  2. Assess vascular access sites hourly  3. Every 4-6 hours minimum:  Change oxygen saturation probe site  4. Every 4-6 hours:  If on nasal continuous positive airway pressure, respiratory therapy assess nares and determine need for appliance change or resting period. Outcome: Progressing   Skin assessment performed each shift per protocol. Patient turned and repositioned every two hours and prn with pillow support. Patient checked for incontence every two hours.

## 2022-10-20 NOTE — PROGRESS NOTES
Gastroenterology Progress Note    Josie Garber is a 64 y.o. male patient. Principal Problem:    Acute cholecystitis  Resolved Problems:    * No resolved hospital problems. *      SUBJECTIVE:  Low grade fevers. No vomiting. Has ongoing abdominal pain. NPO for surgery today.     Current Facility-Administered Medications: 0.9 % sodium chloride infusion, , IntraVENous, Continuous  sodium chloride flush 0.9 % injection 5-40 mL, 5-40 mL, IntraVENous, 2 times per day  sodium chloride flush 0.9 % injection 10 mL, 10 mL, IntraVENous, PRN  0.9 % sodium chloride infusion, , IntraVENous, PRN  potassium chloride (KLOR-CON M) extended release tablet 40 mEq, 40 mEq, Oral, PRN **OR** potassium bicarb-citric acid (EFFER-K) effervescent tablet 40 mEq, 40 mEq, Oral, PRN **OR** potassium chloride 10 mEq/100 mL IVPB (Peripheral Line), 10 mEq, IntraVENous, PRN  ondansetron (ZOFRAN-ODT) disintegrating tablet 4 mg, 4 mg, Oral, Q8H PRN **OR** ondansetron (ZOFRAN) injection 4 mg, 4 mg, IntraVENous, Q6H PRN  polyethylene glycol (GLYCOLAX) packet 17 g, 17 g, Oral, Daily PRN  acetaminophen (TYLENOL) tablet 650 mg, 650 mg, Oral, Q6H PRN **OR** acetaminophen (TYLENOL) suppository 650 mg, 650 mg, Rectal, Q6H PRN  enoxaparin Sodium (LOVENOX) injection 30 mg, 30 mg, SubCUTAneous, BID  aspirin chewable tablet 81 mg, 81 mg, Oral, Daily  atorvastatin (LIPITOR) tablet 40 mg, 40 mg, Oral, Nightly  cetirizine (ZYRTEC) tablet 10 mg, 10 mg, Oral, Daily  famotidine (PEPCID) tablet 20 mg, 20 mg, Oral, BID  losartan (COZAAR) tablet 50 mg, 50 mg, Oral, Daily  pantoprazole (PROTONIX) 40 mg in sodium chloride (PF) 10 mL injection, 40 mg, IntraVENous, Q12H  HYDROmorphone (DILAUDID) injection 0.5 mg, 0.5 mg, IntraVENous, Q3H PRN **OR** HYDROmorphone (DILAUDID) injection 1 mg, 1 mg, IntraVENous, Q3H PRN    Physical    VITALS:  /64   Pulse 83   Temp 100.2 °F (37.9 °C) (Oral)   Resp 18   Ht 5' 8\" (1.727 m)   Wt 231 lb 14.8 oz (105.2 kg)   SpO2 95%   BMI 35.26 kg/m²   TEMPERATURE:  Current - Temp: 100.2 °F (37.9 °C); Max - Temp  Av.3 °F (37.4 °C)  Min: 97.8 °F (36.6 °C)  Max: 100.7 °F (38.2 °C)    NAD  Eyes: No icterus  RRR  Lungs CTA Bilaterally, normal effort  Abdomen soft, ND, NT, Bowel sounds normal.  Ext: no edema  Neuro: No tremor  Psych: A&Ox3    Data    Data Review:    Recent Labs     10/19/22  0900 10/20/22  0647   WBC 13.9* 12.4*   HGB 14.2 12.4*   HCT 42.8 37.3*   MCV 92.3 91.2    148     Recent Labs     10/19/22  09   *   K 3.2*   CL 93*   CO2 24   BUN 10   CREATININE 0.8     Recent Labs     10/19/22  09   *   *   BILITOT 2.0*   ALKPHOS 243*     Recent Labs     10/19/22  09   LIPASE 9.0*     No results for input(s): PROTIME, INR in the last 72 hours. No results for input(s): PTT in the last 72 hours. ASSESSMENT:  64year old with a history of HTN, HLP, DM1, neck surgery, Camila en Y gastric bypass surgery, cervical laminectomy. Presented with abdominal pain that started yesterday in the right mid abdomen and epigastric area radiating to the back. Moderate in severity. Aggravated by movement. No instigating factors. No associated fevers. No history of liver disease. Labs 10/19/22 showed Na 133, K 3.2, Cr 0.8. Mag 1.7. Alk phos 243, , , bili 2.0. CBC 13.9, 14.2/42.8, 154. CT A&P with contrast showed hydropic gallbladder with multiple calcified gallstones and equivocal wall thickening and gallbladder wall edema. No ductal dilation. MRCP showed distended gallbladder with stones and sludge. No biliary dilation or choledocolithiasis. There was hepatomegaly with mild steatosis. Acute abdominal pain in the RUQ with rise in LFT's (normal in ) and CT suggestive of choelcystitis. No stones on MRCP. I do think he has symptomatic gallstones and agree with sam johnston.   If stones identified on IOC, would need surgical removal or would need surgical assisted ERCP with placement of scope in excluded stomach. Plan: Awaiting repeat LFT's. Agree with sam johnston. Thank you for allowing me to participate in the care of your patient. Please feel free to contact me with any concerns.   [address] [facility], MD

## 2022-10-20 NOTE — CARE COORDINATION
INITIAL CASE MANAGEMENT ASSESSMENT    Reviewed chart, met with patient to assess possible discharge needs. Explained Case Management role/services. Living Situation: lives alone in a basement apartment, 4 ROSALVA going down     DME: cane & glucometer    PT/OT Recs:     OT  'Date of Service: 10/19/2022     Discharge Recommendations:  Home with assist PRN  Aury Lyle scored a 24/24 on the AM-PAC ADL Inpatient form. At this time, no further OT is recommended upon discharge due to pt near baseline. Recommend patient returns to prior setting with prior services'    PT-pending    Case Management Assessment  Initial Evaluation    Date/Time of Evaluation: 10/20/2022 9:37 AM  Assessment Completed by: Hannah Boothe RN    If patient is discharged prior to next notation, then this note serves as note for discharge by case management. Patient Name: Aury Lyle                   YOB: 1960  Diagnosis: Acute cholecystitis [K81.0]                   Date / Time: 10/19/2022  8:21 AM    Patient Admission Status: Observation   Readmission Risk (Low < 19, Mod (19-27), High > 27): Readmission Risk Score: 7.4    Current PCP: Sha Harley, DO  PCP verified by CM? Yes    Chart Reviewed: Yes      History Provided by: Patient  Patient Orientation: Alert and Oriented, Person, Place, Situation, Self    Patient Cognition: Alert    Hospitalization in the last 30 days (Readmission):  No    If yes, Readmission Assessment in CM Navigator will be completed.     Advance Directives:      Code Status: Full Code   Patient's Primary Decision Maker is: Legal Next of Kin (pt wants his brothers to be decision makers-spiritual services consult order placed)    Primary Decision Maker: Worthy Easter - Brother/Sister - [phone]    Primary Decision Maker: Haylee Carson - Brother/Sister - [phone]    Discharge Planning:    Patient lives with: Alone Type of Home: Apartment  Primary Care Giver: Self  Patient Support Systems include: Family Members   Current Financial resources:    Current community resources:    Current services prior to admission: None            Current DME:              Type of Home Care services:  None    ADLS  Prior functional level:    Current functional level:      PT AM-PAC: 24 /24  OT AM-PAC: 24 /24     Other Identified Issues/Barriers to RETURNING to current housing: na  Potential Assistance needed at discharge: N/A            Potential DME:    Patient expects to discharge to: [address] [place] [address] for transportation at discharge:  has a ride    Financial    Payor: [employer] [address] / Plan: [address] / Product Type: *No Product type* /     Does insurance require precert for SNF: Yes    Potential assistance Purchasing Medications: No  Meds-to-Beds request:        [facility]/pharmacy [facility] [address], [address]  [address] [address]  Phone: [phone] Fax: [phone]      Notes:    Factors facilitating achievement of predicted outcomes: Cooperative    Barriers to discharge: na    Additional Case Management Notes: denies any needs, plans to dc home, will have a ride    Chico Childress RN, BSN,   [phone]  Electronically signed by Chico Childress RN on 10/20/2022 at 9:40 AM

## 2022-10-20 NOTE — PROGRESS NOTES
Pt back to room 4252 from pacu. Pt is A&O x4. Pt states slight abd pain. Call light in reach.  Electronically signed by Kerline Quiroz RN on 10/20/2022 at 7:27 PM

## 2022-10-20 NOTE — H&P
Preoperative History and Physical Update    H&P from 10/19/2022 was reviewed    MRCP on 10/19 negative for CBD stone    PE  Alert and oriented  Tender RUQ    A/P  Cholecystitis  For Laparoscopic Cholecystectomy with Cholangiogram    The risks, benefits and alternatives to the planned procedure were discussed. Patient expressed an understanding and is willing to proceed.     Electronically signed by Lynda Wallace MD on 10/20/2022 at 2:19 PM

## 2022-10-20 NOTE — FLOWSHEET NOTE
Pt has 100.2 temp at this time. Tylenol given per orders. No c/o abdominal pain. NPO. Call light in reach.

## 2022-10-20 NOTE — ANESTHESIA PRE PROCEDURE
[facility] Department of Anesthesiology  Pre-Anesthesia Evaluation/Consultation       Name:  Arnold Cowan  : 1960  Age:  64 y.o.                                            MRN:  [id number]  Date: 10/20/2022           Surgeon: Surgeon(s):  Morgan Siddiqi MD    Procedure: Procedure(s):  LAPAROSCOPIC CHOLECYSTECTOMY WITH CHOLANGIOGRAM, POSSIBLE OPEN     No Known Allergies  Patient Active Problem List   Diagnosis    Contusion of left ankle    Right sided sciatica    Acute low back pain with possible spinal stenosis of less than six weeks' duration    S/P cervical spinal fusion    Other symptoms referable to back    Chronic venous insufficiency    Degeneration of lumbar or lumbosacral intervertebral disc    Displacement of lumbar intervertebral disc without myelopathy    Essential hypertension    GERD without esophagitis    Morbid obesity ([address]    Other and unspecified hyperlipidemia    Spinal stenosis, lumbar region, without neurogenic claudication    Thoracic or lumbosacral neuritis or radiculitis, unspecified    Unspecified inflammatory spondylopathy, lumbar region ([address]    Central cord syndrome at unspecified level of cervical spinal cord, initial encounter ([address]    Obesity, diabetes, and hypertension syndrome ([address]    Cervical spinal cord compression ([address]    Diabetes mellitus with coincident hypertension ([address]    Type 2 diabetes mellitus with hyperlipidemia ([address]    Acute cholecystitis     Past Medical History:   Diagnosis Date    Diabetes mellitus type 1 ([address]     Hyperlipidemia     Hypertension     Overweight 2021    Sprain of left ankle 2021     Past Surgical History:   Procedure Laterality Date    CERVICAL LAMINECTOMY N/A 2021    C3-4, C4-5, C5-6 POSTERIOR CERVICAL DECOMPRESSION, FUSION AND FIXATION [facility]  [facility]     Social History     Tobacco Use    Smoking status: Never    Smokeless tobacco: Never   Substance Use Topics    Alcohol use: Yes     Alcohol/week: 4.0 standard drinks     Types: 2 Cans of beer, 2 Shots of liquor per week    Drug use: No     Medications  No current facility-administered medications on file prior to encounter.      Current Outpatient Medications on File Prior to Encounter   Medication Sig Dispense Refill    aspirin 81 MG chewable tablet Take 81 mg by mouth daily      atorvastatin (LIPITOR) 40 MG tablet Take 1 tablet by mouth nightly 90 tablet 1    metFORMIN (GLUCOPHAGE-XR) 500 MG extended release tablet TAKE 2 TABLETS BY MOUTH IN THE MORNING AND AT BEDTIME TAKE 2 TABLETS BY MOUTH TWICE DAILY 120 tablet 3    celecoxib (CELEBREX) 200 MG capsule TAKE 1 CAPSULE BY MOUTH EVERY DAY 30 capsule 1    losartan (COZAAR) 25 MG tablet Take 2 tablets by mouth daily 180 tablet 1    famotidine (PEPCID) 20 MG tablet Take 1 tablet by mouth 2 times daily 180 tablet 1    omeprazole (PRILOSEC) 20 MG delayed release capsule TAKE 2 CAPSULES BY MOUTH EVERY  capsule 1    cetirizine (ZYRTEC) 10 MG tablet Take 1 tablet by mouth daily 90 tablet 1    methocarbamol (ROBAXIN) 750 MG tablet TAKE 1 TABLET BY MOUTH FOUR TIMES A DAY 90 tablet 2    Handicap Placard MISC by Does not apply route 1 each 0     Current Facility-Administered Medications   Medication Dose Route Frequency Provider Last Rate Last Admin    0.9 % sodium chloride infusion   IntraVENous Continuous Fabian Arvizu  mL/hr at 10/20/22 1301 NoRateChange at 10/20/22 1301    sodium chloride flush 0.9 % injection 5-40 mL  5-40 mL IntraVENous 2 times per day Fabian Arvizu MD        sodium chloride flush 0.9 % injection 10 mL  10 mL IntraVENous PRN Fabian Arvizu MD        0.9 % sodium chloride infusion   IntraVENous PRN Fabian Arvizu MD        potassium chloride (KLOR-CON M) extended release tablet 40 mEq  40 mEq Oral PRN Fabian Arvizu MD   40 mEq at 10/19/22 6582 Or    potassium bicarb-citric acid (EFFER-K) effervescent tablet 40 mEq  40 mEq Oral PRN Panfilo Castro MD        Or    potassium chloride 10 mEq/100 mL IVPB (Peripheral Line)  10 mEq IntraVENous PRN Panfilo Castro  mL/hr at 10/20/22 1144 10 mEq at 10/20/22 1144    ondansetron (ZOFRAN-ODT) disintegrating tablet 4 mg  4 mg Oral Q8H PRN Panfilo Castro MD        Or    ondansetron [facility]) injection 4 mg  4 mg IntraVENous Q6H PRN Panfilo Castro MD        polyethylene glycol [facility]) packet 17 g  17 g Oral Daily PRN Panfilo Castro MD        acetaminophen (TYLENOL) tablet 650 mg  650 mg Oral Q6H PRN Panfilo Castro MD   650 mg at 10/20/22 4254    Or    acetaminophen (TYLENOL) suppository 650 mg  650 mg Rectal Q6H PRN Panfilo Castro MD        enoxaparin Sodium (LOVENOX) injection 30 mg  30 mg SubCUTAneous BID Panfilo Castro MD   30 mg at 10/19/22 2001    aspirin chewable tablet 81 mg  81 mg Oral Daily Panfilo Castro MD   81 mg at 10/19/22 1710    atorvastatin (LIPITOR) tablet 40 mg  40 mg Oral Nightly Panfilo Castro MD   40 mg at 10/19/22 2001    cetirizine (ZYRTEC) tablet 10 mg  10 mg Oral Daily Panfilo Castro MD   10 mg at 10/19/22 1709    famotidine (PEPCID) tablet 20 mg  20 mg Oral BID Panfilo Castro MD   20 mg at 10/19/22 2002    losartan (COZAAR) tablet 50 mg  50 mg Oral Daily Panfilo Castro MD   50 mg at 10/19/22 1710    pantoprazole (PROTONIX) 40 mg in sodium chloride (PF) 10 mL injection  40 mg IntraVENous Q12H Panfilo Castro MD   40 mg at 10/20/22 0238    HYDROmorphone (DILAUDID) injection 0.5 mg  0.5 mg IntraVENous Q3H PRN Panfilo Castro MD   0.5 mg at 10/20/22 1341    Or    HYDROmorphone (DILAUDID) injection 1 mg  1 mg IntraVENous Q3H PRN Panfilo Castro MD         Vital Signs (Current)   Vitals:    10/20/22 1259   BP: (!) 151/73   Pulse: 83   Resp: 18   Temp: 99.4 °F (37.4 °C)   SpO2: 96% Vital Signs Statistics (for past 48 hrs)     Temp  Av.3 °F (37.4 °C)  Min: 97.8 °F (36.6 °C)   Min taken time: 10/19/22 0826  Max: 100.7 °F (38.2 °C)   Max taken time: 10/19/22 2116  Pulse  Av.7  Min: 74   Min taken time: 10/20/22 0759  Max: 98   Max taken time: 10/19/22 2116  Resp  Av.9  Min: 12   Min taken time: 10/19/22 0826  Max: 26   Max taken time: 10/19/22 1200  BP  Min: 114/64   Min taken time: 10/20/22 0434  Max: 160/73   Max taken time: 10/19/22 2116  MAP (mmHg)  Av  Min: 81   Min taken time: 10/20/22 0434  Max: 103   Max taken time: 10/19/22 1641  SpO2  Av.4 %  Min: 95 %   Min taken time: 10/20/22 0434  Max: 99 %   Max taken time: 10/19/22 1230    BP Readings from Last 3 Encounters:   10/20/22 (!) 151/73   22 131/84   22 136/79     BMI  Body mass index is 35.26 kg/m². Estimated body mass index is 35.26 kg/m² as calculated from the following:    Height as of an earlier encounter on 10/19/22: 5' 8\" (1.727 m). Weight as of this encounter: 231 lb 14.8 oz (105.2 kg).     CBC   Lab Results   Component Value Date/Time    WBC 12.4 10/20/2022 06:47 AM    RBC 4.09 10/20/2022 06:47 AM    HGB 12.4 10/20/2022 06:47 AM    HCT 37.3 10/20/2022 06:47 AM    MCV 91.2 10/20/2022 06:47 AM    RDW 14.6 10/20/2022 06:47 AM     10/20/2022 06:47 AM     CMP    Lab Results   Component Value Date/Time     10/20/2022 06:47 AM    K 2.9 10/20/2022 06:47 AM    CL 95 10/20/2022 06:47 AM    CO2 22 10/20/2022 06:47 AM    BUN 5 10/20/2022 06:47 AM    CREATININE 0.8 10/20/2022 06:47 AM    GFRAA >60 09/15/2021 12:07 PM    AGRATIO 1.1 10/19/2022 09:00 AM    LABGLOM >60 10/20/2022 06:47 AM    GLUCOSE 262 10/20/2022 06:47 AM    PROT 6.4 10/20/2022 06:47 AM    CALCIUM 8.7 10/20/2022 06:47 AM    BILITOT 1.0 10/20/2022 06:47 AM    ALKPHOS 231 10/20/2022 06:47 AM     10/20/2022 06:47 AM     10/20/2022 06:47 AM     BMP    Lab Results   Component Value Date/Time     10/20/2022 06:47 AM    K 2.9 10/20/2022 06:47 AM    CL 95 10/20/2022 06:47 AM    CO2 22 10/20/2022 06:47 AM    BUN 5 10/20/2022 06:47 AM    CREATININE 0.8 10/20/2022 06:47 AM    CALCIUM 8.7 10/20/2022 06:47 AM    GFRAA >60 09/15/2021 12:07 PM    LABGLOM >60 10/20/2022 06:47 AM    GLUCOSE 262 10/20/2022 06:47 AM     POCGlucose  Recent Labs     10/19/22  0900 10/20/22  0647   GLUCOSE 315* 262*      Coags    Lab Results   Component Value Date/Time    PROTIME 11.2 01/27/2021 03:29 PM    INR 0.97 01/27/2021 03:29 PM     HCG (If Applicable) No results found for: PREGTESTUR, PREGSERUM, HCG, HCGQUANT   ABGs No results found for: PHART, PO2ART, ZOU2CLC, XHR2XBU, BEART, C9HMGEDU   Type & Screen (If Applicable)  No results found for: LABABO, LABRH                         BMI: Wt Readings from Last 3 Encounters:       NPO Status:   Date of last liquid consumption: 10/19/22   Time of last liquid consumption: 2100   Date of last solid food consumption: 10/19/22      Time of last solid consumption: 2100       Anesthesia Evaluation  Patient summary reviewed no history of anesthetic complications:   Airway: Mallampati: III  TM distance: >3 FB   Neck ROM: full  Mouth opening: > = 3 FB   Dental:    (+) edentulous      Pulmonary:Negative Pulmonary ROS and normal exam                               Cardiovascular:  Exercise tolerance: good (>4 METS),   (+) hypertension:, hyperlipidemia      ECG reviewed  Rhythm: regular  Rate: normal           Beta Blocker:  Not on Beta Blocker         Neuro/Psych:   (+) neuromuscular disease:,             GI/Hepatic/Renal:   (+) GERD: well controlled,           Endo/Other:    (+) DiabetesType I DM, , : arthritis:., .                 Abdominal:   (+) obese,           Vascular: negative vascular ROS. Other Findings:           Anesthesia Plan      general     ASA 3       Induction: intravenous. MIPS: Postoperative opioids intended and Prophylactic antiemetics administered.   Anesthetic plan and risks discussed with patient. Plan discussed with CRNA. This pre-anesthesia assessment may be used as a history and physical.    DOS STAFF ADDENDUM:    Pt seen and examined, chart reviewed (including anesthesia, drug and allergy history). No interval changes to history and physical examination. Anesthetic plan, risks, benefits, alternatives, and personnel involved discussed with patient. Questions and concerns addressed. Patient(family) verbalized an understanding and agrees to proceed.       Roque Prince MD  October 20, 2022  1:05 PM

## 2022-10-21 VITALS
WEIGHT: 231.04 LBS | SYSTOLIC BLOOD PRESSURE: 130 MMHG | HEART RATE: 65 BPM | RESPIRATION RATE: 18 BRPM | TEMPERATURE: 97.6 F | BODY MASS INDEX: 35.02 KG/M2 | HEIGHT: 68 IN | OXYGEN SATURATION: 96 % | DIASTOLIC BLOOD PRESSURE: 70 MMHG

## 2022-10-21 LAB
ALBUMIN SERPL-MCNC: 3.7 G/DL (ref 3.4–5)
ALP BLD-CCNC: 190 U/L (ref 40–129)
ALT SERPL-CCNC: 157 U/L (ref 10–40)
ANION GAP SERPL CALCULATED.3IONS-SCNC: 16 MMOL/L (ref 3–16)
AST SERPL-CCNC: 152 U/L (ref 15–37)
BILIRUB SERPL-MCNC: 0.8 MG/DL (ref 0–1)
BILIRUBIN DIRECT: 0.3 MG/DL (ref 0–0.3)
BILIRUBIN, INDIRECT: 0.5 MG/DL (ref 0–1)
BUN BLDV-MCNC: 10 MG/DL (ref 7–20)
CALCIUM SERPL-MCNC: 8.6 MG/DL (ref 8.3–10.6)
CHLORIDE BLD-SCNC: 101 MMOL/L (ref 99–110)
CO2: 22 MMOL/L (ref 21–32)
CREAT SERPL-MCNC: 0.9 MG/DL (ref 0.8–1.3)
GFR SERPL CREATININE-BSD FRML MDRD: >60 ML/MIN/{1.73_M2}
GLUCOSE BLD-MCNC: 320 MG/DL (ref 70–99)
GLUCOSE BLD-MCNC: 332 MG/DL (ref 70–99)
GLUCOSE BLD-MCNC: 351 MG/DL (ref 70–99)
HCT VFR BLD CALC: 36.9 % (ref 40.5–52.5)
HEMOGLOBIN: 12.4 G/DL (ref 13.5–17.5)
MCH RBC QN AUTO: 30.7 PG (ref 26–34)
MCHC RBC AUTO-ENTMCNC: 33.6 G/DL (ref 31–36)
MCV RBC AUTO: 91.1 FL (ref 80–100)
PDW BLD-RTO: 14.5 % (ref 12.4–15.4)
PERFORMED ON: ABNORMAL
PERFORMED ON: ABNORMAL
PLATELET # BLD: 166 K/UL (ref 135–450)
PMV BLD AUTO: 9.1 FL (ref 5–10.5)
POTASSIUM SERPL-SCNC: 4.2 MMOL/L (ref 3.5–5.1)
RBC # BLD: 4.05 M/UL (ref 4.2–5.9)
SODIUM BLD-SCNC: 139 MMOL/L (ref 136–145)
TOTAL PROTEIN: 6 G/DL (ref 6.4–8.2)
WBC # BLD: 9.9 K/UL (ref 4–11)

## 2022-10-21 PROCEDURE — 99024 POSTOP FOLLOW-UP VISIT: CPT | Performed by: SURGERY

## 2022-10-21 PROCEDURE — G0378 HOSPITAL OBSERVATION PER HR: HCPCS

## 2022-10-21 PROCEDURE — 99024 POSTOP FOLLOW-UP VISIT: CPT | Performed by: NURSE PRACTITIONER

## 2022-10-21 PROCEDURE — 36415 COLL VENOUS BLD VENIPUNCTURE: CPT

## 2022-10-21 PROCEDURE — 2580000003 HC RX 258: Performed by: SURGERY

## 2022-10-21 PROCEDURE — 6360000002 HC RX W HCPCS: Performed by: SURGERY

## 2022-10-21 PROCEDURE — 80048 BASIC METABOLIC PNL TOTAL CA: CPT

## 2022-10-21 PROCEDURE — 96368 THER/DIAG CONCURRENT INF: CPT

## 2022-10-21 PROCEDURE — 85027 COMPLETE CBC AUTOMATED: CPT

## 2022-10-21 PROCEDURE — 96372 THER/PROPH/DIAG INJ SC/IM: CPT

## 2022-10-21 PROCEDURE — 96366 THER/PROPH/DIAG IV INF ADDON: CPT

## 2022-10-21 PROCEDURE — APPNB15 APP NON BILLABLE TIME 0-15 MINS: Performed by: NURSE PRACTITIONER

## 2022-10-21 PROCEDURE — 80076 HEPATIC FUNCTION PANEL: CPT

## 2022-10-21 PROCEDURE — C9113 INJ PANTOPRAZOLE SODIUM, VIA: HCPCS | Performed by: SURGERY

## 2022-10-21 PROCEDURE — 6370000000 HC RX 637 (ALT 250 FOR IP): Performed by: SURGERY

## 2022-10-21 PROCEDURE — 96376 TX/PRO/DX INJ SAME DRUG ADON: CPT

## 2022-10-21 PROCEDURE — APPSS15 APP SPLIT SHARED TIME 0-15 MINUTES: Performed by: NURSE PRACTITIONER

## 2022-10-21 PROCEDURE — 94760 N-INVAS EAR/PLS OXIMETRY 1: CPT

## 2022-10-21 RX ORDER — AMOXICILLIN AND CLAVULANATE POTASSIUM 875; 125 MG/1; MG/1
1 TABLET, FILM COATED ORAL 2 TIMES DAILY
Qty: 10 TABLET | Refills: 0 | Status: SHIPPED | OUTPATIENT
Start: 2022-10-21 | End: 2022-10-26

## 2022-10-21 RX ORDER — OXYCODONE HYDROCHLORIDE 5 MG/1
5 TABLET ORAL EVERY 6 HOURS PRN
Qty: 20 TABLET | Refills: 0 | Status: SHIPPED | OUTPATIENT
Start: 2022-10-21 | End: 2022-10-26

## 2022-10-21 RX ADMIN — PIPERACILLIN AND TAZOBACTAM 3375 MG: 3; .375 INJECTION, POWDER, FOR SOLUTION INTRAVENOUS at 09:05

## 2022-10-21 RX ADMIN — CETIRIZINE HYDROCHLORIDE 10 MG: 10 TABLET, FILM COATED ORAL at 09:05

## 2022-10-21 RX ADMIN — FAMOTIDINE 20 MG: 20 TABLET, FILM COATED ORAL at 09:05

## 2022-10-21 RX ADMIN — Medication 40 MG: at 04:35

## 2022-10-21 RX ADMIN — ENOXAPARIN SODIUM 30 MG: 100 INJECTION SUBCUTANEOUS at 09:05

## 2022-10-21 RX ADMIN — ASPIRIN 81 MG 81 MG: 81 TABLET ORAL at 09:05

## 2022-10-21 RX ADMIN — LOSARTAN POTASSIUM 50 MG: 50 TABLET, FILM COATED ORAL at 09:05

## 2022-10-21 NOTE — PROGRESS NOTES
Gastroenterology Progress Note    Ashlee Flynn is a 64 y.o. male patient. Principal Problem:    Acute cholecystitis  Resolved Problems:    * No resolved hospital problems. *      SUBJECTIVE:  He feels dramatically better. Has not even required pain medication. No nausea or vomiting. No fevers.      Current Facility-Administered Medications: fentaNYL (SUBLIMAZE) injection 25 mcg, 25 mcg, IntraVENous, Q5 Min PRN  HYDROmorphone (DILAUDID) injection 0.5 mg, 0.5 mg, IntraVENous, Q5 Min PRN  ondansetron (ZOFRAN) injection 4 mg, 4 mg, IntraVENous, Once PRN  piperacillin-tazobactam (ZOSYN) 3,375 mg in dextrose 5 % 100 mL IVPB extended infusion (mini-bag), 3,375 mg, IntraVENous, Q8H  oxyCODONE (ROXICODONE) immediate release tablet 5 mg, 5 mg, Oral, Q4H PRN **OR** oxyCODONE HCl (OXY-IR) immediate release tablet 10 mg, 10 mg, Oral, Q4H PRN  0.9 % sodium chloride infusion, , IntraVENous, Continuous  sodium chloride flush 0.9 % injection 5-40 mL, 5-40 mL, IntraVENous, 2 times per day  sodium chloride flush 0.9 % injection 10 mL, 10 mL, IntraVENous, PRN  0.9 % sodium chloride infusion, , IntraVENous, PRN  potassium chloride (KLOR-CON M) extended release tablet 40 mEq, 40 mEq, Oral, PRN **OR** potassium bicarb-citric acid (EFFER-K) effervescent tablet 40 mEq, 40 mEq, Oral, PRN **OR** potassium chloride 10 mEq/100 mL IVPB (Peripheral Line), 10 mEq, IntraVENous, PRN  ondansetron (ZOFRAN-ODT) disintegrating tablet 4 mg, 4 mg, Oral, Q8H PRN **OR** ondansetron (ZOFRAN) injection 4 mg, 4 mg, IntraVENous, Q6H PRN  polyethylene glycol (GLYCOLAX) packet 17 g, 17 g, Oral, Daily PRN  acetaminophen (TYLENOL) tablet 650 mg, 650 mg, Oral, Q6H PRN **OR** acetaminophen (TYLENOL) suppository 650 mg, 650 mg, Rectal, Q6H PRN  enoxaparin Sodium (LOVENOX) injection 30 mg, 30 mg, SubCUTAneous, BID  aspirin chewable tablet 81 mg, 81 mg, Oral, Daily  atorvastatin (LIPITOR) tablet 40 mg, 40 mg, Oral, Nightly  cetirizine (ZYRTEC) tablet 10 mg, 10 mg, Oral, Daily  famotidine (PEPCID) tablet 20 mg, 20 mg, Oral, BID  losartan (COZAAR) tablet 50 mg, 50 mg, Oral, Daily  pantoprazole (PROTONIX) 40 mg in sodium chloride (PF) 10 mL injection, 40 mg, IntraVENous, Q12H  HYDROmorphone (DILAUDID) injection 0.5 mg, 0.5 mg, IntraVENous, Q3H PRN **OR** HYDROmorphone (DILAUDID) injection 1 mg, 1 mg, IntraVENous, Q3H PRN    Physical    VITALS:  BP (!) 150/78   Pulse 72   Temp 98.6 °F (37 °C) (Oral)   Resp 18   Ht 5' 8\" (1.727 m)   Wt 231 lb 0.7 oz (104.8 kg)   SpO2 97%   BMI 35.13 kg/m²   TEMPERATURE:  Current - Temp: 98.6 °F (37 °C); Max - Temp  Av.5 °F (36.9 °C)  Min: 97.5 °F (36.4 °C)  Max: 99.4 °F (37.4 °C)    NAD  Eyes: No icterus  RRR  Lungs CTA Bilaterally, normal effort  Abdomen soft, ND, mild incisional tenderness, Bowel sounds normal.  Ext: no edema  Neuro: No tremor  Psych: A&Ox3    Data    Data Review:    Recent Labs     10/19/22  0900 10/20/22  0647   WBC 13.9* 12.4*   HGB 14.2 12.4*   HCT 42.8 37.3*   MCV 92.3 91.2    148     Recent Labs     10/19/22  0900 10/20/22  0647   * 132*   K 3.2* 2.9*   CL 93* 95*   CO2 24 22   BUN 10 5*   CREATININE 0.8 0.8     Recent Labs     10/19/22  0900 10/20/22  0647   * 110*   * 172*   BILIDIR  --  0.4*   BILITOT 2.0* 1.0   ALKPHOS 243* 231*     Recent Labs     10/19/22  0900   LIPASE 9.0*     No results for input(s): PROTIME, INR in the last 72 hours. No results for input(s): PTT in the last 72 hours. ASSESSMENT:  64year old with a history of HTN, HLP, DM1, neck surgery, Camila en Y gastric bypass surgery, cervical laminectomy. Presented with abdominal pain that started yesterday in the right mid abdomen and epigastric area radiating to the back. Moderate in severity. Aggravated by movement. No instigating factors. No associated fevers. No history of liver disease. Labs 10/19/22 showed Na 133, K 3.2, Cr 0.8. Mag 1.7. Alk phos 243, , , bili 2.0.  CBC 13.9, 14. 2/42.8, 154. CT A&P with contrast showed hydropic gallbladder with multiple calcified gallstones and equivocal wall thickening and gallbladder wall edema. No ductal dilation. MRCP showed distended gallbladder with stones and sludge. No biliary dilation or choledocolithiasis. There was hepatomegaly with mild steatosis. Had cholecystectomy 10/20/22 that showed acute cholecystitis and negative IOC. Acute cholecystitis s/p cholecystectomy  Abnormal LFT's likely from a passed gallstone. IOC and MRCP negative for biliary stone. Plan: No further recs. Diet per surgery and dispo per surgery. Will sign off. Please call with questions. Thank you for allowing me to participate in the care of your patient. Please feel free to contact me with any concerns.   [address], MD

## 2022-10-21 NOTE — DISCHARGE INSTRUCTIONS
Nicole Denise MD     General and Vascular Surgery   Mathieu Carvalho MD     [address] [facility] [address]     [address] [place] [address], [place] [address]  Gildardoaleisha Roy APRN - CNP   Phone: [phone]           Fax: [phone]                                                         POST-OPERATIVE INSTRUCTIONS FOR LAPAROSCOPIC CHOLECYSTECTOMY    Call the office to schedule your post-operative appointment with your surgeon for two (2) weeks. You will have water occlusive glue closing your incisions. You may shower. Wash incisions gently, and pat them dry. Do not rub your incisions. Do not soak incisions in tub baths, hot tubs or pools    General guidelines for activity:  Avoid strenuous activity or lifting anything heavier than 15 pounds for 4-6 weeks. It is OK to be up walking around; walking up and down stairs is also OK. Do what is comfortable: stop and rest when you feel tired. Drink plenty of fluids and stay on a bland diet for 2-3 days after surgery. Do NOT drive for one week and while taking your narcotic pain medicine. Watch for signs of infection:   Fever over 100.5°   Excessive warmth or bright redness around your incisions  Leakage of bloody or cloudy fluid from your incisions    During the laparoscopic procedure that you had, gas is pumped into the abdominal cavity. You may feel abdominal, shoulder, or rib pain for a few days due to this. You will have pain medicine ordered. Take as directed. If you experience constipation  Increase your water intake, and activity; walking is best.  A stool softener or mild laxative may be necessary if you still have not had a bowel  movement ; call the office for further instructions.

## 2022-10-21 NOTE — CARE COORDINATION
CASE MANAGEMENT DISCHARGE SUMMARY:    DISCHARGE DATE: 10/21/22    DISCHARGED TO HOME     TRANSPORTATION: has a ride     Denies needs.             Electronically signed by Ag Herman RN on 10/21/2022 at 2:17 PM

## 2022-10-21 NOTE — PLAN OF CARE
Problem: Discharge Planning  Goal: Discharge to home or other facility with appropriate resources  Outcome: Progressing  Flowsheets (Taken 10/20/2022 0845 by Makenzie Devlin, RN)  Discharge to home or other facility with appropriate resources: Identify barriers to discharge with patient and caregiver     Problem: Pain  Goal: Verbalizes/displays adequate comfort level or baseline comfort level  Outcome: Progressing     Problem: Skin/Tissue Integrity  Goal: Absence of new skin breakdown  Description: 1. Monitor for areas of redness and/or skin breakdown  2. Assess vascular access sites hourly  3. Every 4-6 hours minimum:  Change oxygen saturation probe site  4. Every 4-6 hours:  If on nasal continuous positive airway pressure, respiratory therapy assess nares and determine need for appliance change or resting period.   Outcome: Progressing     Problem: ABCDS Injury Assessment  Goal: Absence of physical injury  Outcome: Progressing     Problem: Chronic Conditions and Co-morbidities  Goal: Patient's chronic conditions and co-morbidity symptoms are monitored and maintained or improved  Outcome: Progressing  Flowsheets (Taken 10/20/2022 0845 by Makenzie Devlin, RN)  Care Plan - Patient's Chronic Conditions and Co-Morbidity Symptoms are Monitored and Maintained or Improved: Monitor and assess patient's chronic conditions and comorbid symptoms for stability, deterioration, or improvement

## 2022-10-21 NOTE — PROGRESS NOTES
Discharge instruction went over with pt, all questions answered. IV flushed and discontinued, no complications. New medications and side effects went over with pt, stated understanding. Scripts escribed to pt pharmacy. Pt refused transportation.  Electronically signed by Marielena Claudio RN on 10/21/2022 at 3:10 PM

## 2022-10-21 NOTE — ACP (ADVANCE CARE PLANNING)
Advance Care Planning     Advance Care Planning Inpatient Note  [facility] Department    Today's Date: 10/21/2022  Unit: [facility] MED SURG    Received request from IDT Member. Upon review of chart and communication with care team, patient's decision making abilities are not in question. . Patient was/were present in the room during visit. Goals of ACP Conversation:  Discuss advance care planning documents    Health Care Decision Makers:       Primary Decision Maker: Federico Montes - Brother/Sister - [phone]    Primary Decision Maker: Kurt Guerrapavan - Brother/Sister - [phone]  Summary:  No Decision Maker named by patient at this time    Advance Care Planning Documents (Patient Wishes):  None     Assessment:  Patient is awake and alert, sitting in chair waiting to be discharged. Patient is not  and has one  child, son Adelso Gloria, with whom he has a relationship. However, patient does not want his son to be his healthcare decision maker. Patient has several siblings but wants his two brothers, who are also [employer]hov[employer]es, to be his healthcare decision makers. Patient does not want to complete documents now but will take them home to review and consult with JW about their unique forms.     Interventions:  Provided education on documents for clarity and greater understanding  Discussed and provided education on state decision maker hierarchy  Reviewed but did not complete ACP document    Care Preferences Communicated:   No    Outcomes/Plan:  ACP Discussion: Completed    Electronically signed by Jessi Walsh [facility] on 10/21/2022 at 12:43 PM

## 2022-10-21 NOTE — PROGRESS NOTES
[facility] General Surgery                                   Daily Progress Note                                                         Pt Name: Rickey Rolle  Medical Record Number: [id number]  Date of Birth 1960   Today's Date: 10/21/2022  Chief Complaint   Patient presents with    Chest Pain     Chest pain starting yesterday         ASSESSMENT/PLAN  Acute severe cholecystitis  -POD #1 lap lashonda with normal gram.   -Pain controlled  -Tolerating diet  -stable post op. Hg stable at 12.4.   -OK to DC from surgery standpoint. 5 more days of antibiotics for severe cholecystitis. Rx sent. F/u Dr. Emiliana Faustin 2 weeks. Marie Boston is resting in bed. Pain controlled. Tolerated breakfast, regular food. []Pain []nausea  []vomiting  [x]passing flatus   []BM      OBJECTIVE  VITALS:  height is 5' 8\" (1.727 m) and weight is 231 lb 0.7 oz (104.8 kg). His oral temperature is 97.6 °F (36.4 °C). His blood pressure is 130/70 and his pulse is 65. His respiration is 18 and oxygen saturation is 95%. INTAKE/OUTPUT:    Intake/Output Summary (Last 24 hours) at 10/21/2022 0830  Last data filed at 10/20/2022 2000  Gross per 24 hour   Intake 530 ml   Output 100 ml   Net 430 ml     GENERAL: alert, cooperative, no distress  LUNGS: clear to ausculation, without wheezes, rales or rhonci  HEART: normal rate and regular rhythm  ABDOMEN: Soft, appropriately tender, non distended. Incisions c/d/i  EXTREMITY: no cyanosis, clubbing or edema    I/O last 3 completed shifts: In: 1753.6 [P.O.:720;  I.V.:986.2; IV Piggyback:47.4]  Out: 100 [Blood:100]      LABS  Recent Labs     10/20/22  0647 10/21/22  0653 10/21/22  0654   WBC 12.4*  --  9.9   HGB 12.4*  --  12.4*   HCT 37.3*  --  36.9*     --  166   * 139  --    K 2.9* 4.2  --    CL 95* 101  --    CO2 22 22  --    BUN 5* 10  --    CREATININE 0.8 0.9  --    MG 1.80  --   --    CALCIUM 8.7 8.6  --    * 152*  --    * 157*  --    BILITOT 1.0 0.8  --    BILIDIR 0.4* 0.3  --        EDUCATION  Patient educated about Disease Process, Medications, Smoking Cessation, Oxygenation, Incentive Spirometry and Deep Breath and Cough, Diabetes, Hyperlipidemia, Smoking Cessation, Nutrition, Exercise and Hypertension    Electronically signed by WAGNER Peralta CNP on 10/21/2022 at 8:30 AM      [facility] and Vascular Surgery   [phone] Office  [phone] Fax     As above  Stable POD 2 from Laparoscopic Cholecystectomy with Cholangiogram  Continue antibiotics  OK for discharge from my standpoint    Electronically signed by Nava Carlson MD on 10/21/2022 at 11:24 AM

## 2022-10-21 NOTE — OP NOTE
[address] Deshawn Coffman                                 OPERATIVE REPORT    PATIENT NAME: Nasra Villarreal                    :        1960  MED REC NO:   [id number]                          ROOM:       4252  ACCOUNT NO:   [de-identified]                           ADMIT DATE: 10/19/2022  PROVIDER:     Sarah Romero MD    DATE OF PROCEDURE:  10/20/2022    PREOPERATIVE DIAGNOSIS:  Acute cholecystitis. POSTOPERATIVE DIAGNOSIS:  Acute cholecystitis. OPERATION PERFORMED:  Laparoscopic cholecystectomy with cholangiogram.    SURGEON:  Sarah Romero MD    SPECIMEN:  Gallbladder. ESTIMATED BLOOD LOSS:  Less than 100 mL. COMPLICATIONS:  None. DISPOSITION:  To Recovery in stable condition. INDICATION:  The patient is a 58-year-old male who presented with  abdominal pain on 10/19/2022. His workup was consistent with acute  cholecystitis. He had a mild elevation in his liver function testing  and an MRCP was performed showing a clear bile duct. The risks,  benefits, and alternatives of cholecystectomy were reviewed and he  agreed to proceed. OPERATIVE PROCEDURE:  The patient was brought to the operating room,  placed supine, general anesthesia intubation performed, and the abdomen  prepped and draped in a sterile fashion. An infraumbilical incision was  made and a trocar placed with the Irene technique. Insufflation was  established and three additional trocars placed across the right upper  quadrant. He did have notable adhesions to the upper midline from a  previous hand-assisted gastric bypass, however these did not interfere  with our visualization or procedure and were left intact. The patient  was now repositioned into reverse Trendelenburg and rolled to the left. The gallbladder was identified and was markedly erythematous and  edematous consistent with severe acute cholecystitis.   In order to aid  with retraction, using cautery, an opening was made in the dome of the  gallbladder and the gallbladder contents evacuated. This was thick,  nearly purulent bile. With the gallbladder decompressed, we could now  retract cephalad and followed down to the neck of the gallbladder. Dissection was carried from the lateral to the medial direction and the  cystic duct identified and cleared circumferentially. The cystic artery  was also identified and cleared at this time. A clip was placed on the  gallbladder side of the cystic duct and the duct opened with the  scissors. A cholangiogram catheter was inserted and a cholangiogram  performed. This showed normal biliary anatomy with contrast flowing to  the duodenum. No filling defects were identified. The downside of the  cystic duct was now clipped x3 and the duct divided. The cystic artery  was clipped and divided. We began freeing the gallbladder from the  liver bed, but due to the severity of the inflammatory change, the  gallbladder basically avulsed with even gentle traction. We attempted  to create a plane in order to minimize bleeding, however there was  steady oozing from the liver bed as the gallbladder continued to tear  away from the surface. We did maintain hemostasis for the most part  during this time, but there was more blood loss than we would typically  encounter. Eventually, the gallbladder was freed and placed into an  Endopouch. This was moved to the side and we continued to irrigate,  suction, and used cautery to provide hemostasis along the liver bed. This was somewhat difficult due to clot formation which was eventually  able to be evacuated with suction and vigorous irrigation. Surgicel and  then FloSeal were also used and eventually we produced hemostatic effect  on the liver bed. At this time, the gallbladder was removed. The  abdomen was copiously irrigated and suctioned clear.   We did check the  liver bed multiple times during this process to ensure there was no  pooling or active bleeding noted. Eventually, after period of  approximately 10 minutes of pressure, hemostasis was confirmed. At this  point, the trocars were withdrawn and the umbilicus closed with an 0  Vicryl in the fascia. The gallbladder had been retracted prior to  trocar removal.  The skin incisions were closed with 4-0 Vicryl. Marcaine was injected, dressings applied, and the patient transferred to  Recovery in good condition.         Shanel Reddy MD    D: 10/20/2022 16:37:38       T: 10/20/2022 16:41:10     [id number]  Job#: [id number]     Doc#: [id number]    CC:

## 2022-10-22 DIAGNOSIS — I15.2 OBESITY, DIABETES, AND HYPERTENSION SYNDROME (HCC): ICD-10-CM

## 2022-10-22 DIAGNOSIS — E11.69 OBESITY, DIABETES, AND HYPERTENSION SYNDROME (HCC): ICD-10-CM

## 2022-10-22 DIAGNOSIS — I10 DIABETES MELLITUS WITH COINCIDENT HYPERTENSION (HCC): ICD-10-CM

## 2022-10-22 DIAGNOSIS — E11.59 OBESITY, DIABETES, AND HYPERTENSION SYNDROME (HCC): ICD-10-CM

## 2022-10-22 DIAGNOSIS — I10 ESSENTIAL HYPERTENSION: ICD-10-CM

## 2022-10-22 DIAGNOSIS — E66.9 OBESITY, DIABETES, AND HYPERTENSION SYNDROME (HCC): ICD-10-CM

## 2022-10-22 DIAGNOSIS — E11.9 DIABETES MELLITUS WITH COINCIDENT HYPERTENSION (HCC): ICD-10-CM

## 2022-10-22 DIAGNOSIS — K21.9 GERD WITHOUT ESOPHAGITIS: ICD-10-CM

## 2022-10-23 NOTE — DISCHARGE SUMMARY
Hospital Medicine Discharge Summary      Patient ID: Tuyet Painting , [id number]     Patient's PCP: Jeremias Vitale DO    Admit Date: 10/19/2022     Discharge Date: 10/21/2022      Admitting Physician: Fareed Joe MD    Discharge Physician: Faye Ramirez MD     Discharge Diagnoses: Active Hospital Problems    Diagnosis Date Noted    Acute cholecystitis [K81.0] 10/19/2022     Priority: Medium         The patient was seen and examined on the day of discharge and this discharge summary is in conjunction with any daily progress note from day of discharge.     HOSPITAL COURSE    Patient demographics:  The patient  Tuyet Painting is a 64 y.o. male      Significant past medical history:       Patient Active Problem List   Diagnosis    Contusion of left ankle    Right sided sciatica    Acute low back pain with possible spinal stenosis of less than six weeks' duration    S/P cervical spinal fusion    Other symptoms referable to back    Chronic venous insufficiency    Degeneration of lumbar or lumbosacral intervertebral disc    Displacement of lumbar intervertebral disc without myelopathy    Essential hypertension    GERD without esophagitis    Morbid obesity ([address]    Other and unspecified hyperlipidemia    Spinal stenosis, lumbar region, without neurogenic claudication    Thoracic or lumbosacral neuritis or radiculitis, unspecified    Unspecified inflammatory spondylopathy, lumbar region [facility])    Central cord syndrome at unspecified level of cervical spinal cord, initial encounter ([address]    Obesity, diabetes, and hypertension syndrome ([address]    Cervical spinal cord compression ([address]    Diabetes mellitus with coincident hypertension ([address]    Type 2 diabetes mellitus with hyperlipidemia (HCC)    Acute cholecystitis            Presenting symptoms:  Chest pain     Diagnostic workup:   CT ABDOMEN PELVIS W IV CONTRAST  MRI ABDOMEN WO CONTRAST MRCP       CONSULTS DURING ADMISSION :   IP CONSULT TO GENERAL SURGERY  IP CONSULT TO GI  IP CONSULT TO [facility]        Patient was diagnosed with:  Acute cholecystitis  Transaminitis  Hypokalemia     Treatment while inpatient:  64years old male with medical history significant for hypertension diabetes mellitus gastric bypass surgery cervical laminectomy. Patient presented with upper abdominal pain of 1 day duration. Patient was suspected for acute cholecystitis General surgery GI and GI was consulted. Patient was also noted to have transaminitis. CT scan of the abdomen and pelvis showed cholelithiasis and possible acute cholecystitis MRCP showed distended gallbladder with stones and sludge. Patient underwent cholecystectomy and patient tolerated the procedure well. Patient is being discharged home. Patient will complete 5 days of for more antibiotics and patient will follow-up with surgery in 2 weeks. Discharge Condition:  stable     Discharged to:  Home     Activity:   as tolerated: Follow Up: Follow-up with surgery in 2 weeks           Labs: For convenience and continuity at follow-up the following most recent labs are provided:      CBC:   Lab Results   Component Value Date/Time    WBC 9.9 10/21/2022 06:54 AM    HGB 12.4 10/21/2022 06:54 AM    HCT 36.9 10/21/2022 06:54 AM     10/21/2022 06:54 AM       RENAL:   Lab Results   Component Value Date/Time     10/21/2022 06:53 AM    K 4.2 10/21/2022 06:53 AM    K 2.9 10/20/2022 06:47 AM     10/21/2022 06:53 AM    CO2 22 10/21/2022 06:53 AM    BUN 10 10/21/2022 06:53 AM    CREATININE 0.9 10/21/2022 06:53 AM           Discharge Medications:      Medication List        START taking these medications      amoxicillin-clavulanate 875-125 MG per tablet  Commonly known as:  Augmentin  Take 1 tablet by mouth 2 times daily for 5 days     oxyCODONE 5 MG immediate release tablet  Commonly known as: Roxicodone  Take 1 tablet by mouth every 6 hours as needed for Pain for up to 5 days. Intended supply: 5 days. Take lowest dose possible to manage pain            CONTINUE taking these medications      aspirin 81 MG chewable tablet     atorvastatin 40 MG tablet  Commonly known as: LIPITOR  Take 1 tablet by mouth nightly     celecoxib 200 MG capsule  Commonly known as: CELEBREX  TAKE 1 CAPSULE BY MOUTH EVERY DAY     cetirizine 10 MG tablet  Commonly known as: ZYRTEC  Take 1 tablet by mouth daily     Handicap Placard Misc  by Does not apply route     losartan 25 MG tablet  Commonly known as: COZAAR  Take 2 tablets by mouth daily     metFORMIN 500 MG extended release tablet  Commonly known as: GLUCOPHAGE-XR  TAKE 2 TABLETS BY MOUTH IN THE MORNING AND AT BEDTIME TAKE 2 TABLETS BY MOUTH TWICE DAILY     methocarbamol 750 MG tablet  Commonly known as: ROBAXIN  TAKE 1 TABLET BY MOUTH FOUR TIMES A DAY     omeprazole 20 MG delayed release capsule  Commonly known as: PRILOSEC  TAKE 2 CAPSULES BY MOUTH EVERY DAY            STOP taking these medications      famotidine 20 MG tablet  Commonly known as: PEPCID               Where to Get Your Medications        These medications were sent to [facility] [phone]  [address] [place] [address]      Phone: [phone]   amoxicillin-clavulanate 875-125 MG per tablet  oxyCODONE 5 MG immediate release tablet            Time Spent on discharge is more than 30 min in the examination, evaluation, counseling and review of medications and discharge plan. Signed:  Roger Garg MD   10/22/2022      Thank you Rhoda Cintron DO for the opportunity to be involved in this patient's care. If you have any questions or concerns please feel free to contact me at [phone]. This note was transcribed using [id number]. Please disregard any translational errors.

## 2022-10-24 RX ORDER — FAMOTIDINE 20 MG/1
TABLET, FILM COATED ORAL
Qty: 180 TABLET | Refills: 0 | Status: SHIPPED | OUTPATIENT
Start: 2022-10-24

## 2022-10-24 RX ORDER — LOSARTAN POTASSIUM 25 MG/1
50 TABLET ORAL DAILY
Qty: 180 TABLET | Refills: 0 | Status: SHIPPED | OUTPATIENT
Start: 2022-10-24

## 2022-10-24 NOTE — TELEPHONE ENCOUNTER
Requested Prescriptions     Pending Prescriptions Disp Refills    losartan (COZAAR) 25 MG tablet [Pharmacy Med Name: LOSARTAN POTASSIUM 25 MG TAB] 60 tablet 3     Sig: TAKE 2 TABLETS BY MOUTH DAILY    famotidine (PEPCID) 20 MG tablet [Pharmacy Med Name: FAMOTIDINE 20 MG TABLET] 60 tablet 3     Sig: TAKE 1 TABLET BY MOUTH TWICE A DAY     Patient requesting a medication refill.     Next office visit: 12/28/2022    Last regular office visit: 9/28/2022 patient

## 2022-10-27 ENCOUNTER — OFFICE VISIT (OUTPATIENT)
Dept: INTERNAL MEDICINE CLINIC | Age: 62
End: 2022-10-27
Payer: MEDICAID

## 2022-10-27 VITALS
HEART RATE: 71 BPM | HEIGHT: 68 IN | BODY MASS INDEX: 34.25 KG/M2 | SYSTOLIC BLOOD PRESSURE: 127 MMHG | OXYGEN SATURATION: 98 % | DIASTOLIC BLOOD PRESSURE: 81 MMHG | WEIGHT: 226 LBS

## 2022-10-27 DIAGNOSIS — E11.65 UNCONTROLLED TYPE 2 DIABETES MELLITUS WITH HYPERGLYCEMIA, WITHOUT LONG-TERM CURRENT USE OF INSULIN (HCC): Chronic | ICD-10-CM

## 2022-10-27 DIAGNOSIS — Z09 HOSPITAL DISCHARGE FOLLOW-UP: Primary | ICD-10-CM

## 2022-10-27 PROCEDURE — 3078F DIAST BP <80 MM HG: CPT | Performed by: FAMILY MEDICINE

## 2022-10-27 PROCEDURE — 1111F DSCHRG MED/CURRENT MED MERGE: CPT | Performed by: FAMILY MEDICINE

## 2022-10-27 PROCEDURE — 1036F TOBACCO NON-USER: CPT | Performed by: FAMILY MEDICINE

## 2022-10-27 PROCEDURE — G8482 FLU IMMUNIZE ORDER/ADMIN: HCPCS | Performed by: FAMILY MEDICINE

## 2022-10-27 PROCEDURE — G8417 CALC BMI ABV UP PARAM F/U: HCPCS | Performed by: FAMILY MEDICINE

## 2022-10-27 PROCEDURE — G8427 DOCREV CUR MEDS BY ELIG CLIN: HCPCS | Performed by: FAMILY MEDICINE

## 2022-10-27 PROCEDURE — 2022F DILAT RTA XM EVC RTNOPTHY: CPT | Performed by: FAMILY MEDICINE

## 2022-10-27 PROCEDURE — 3074F SYST BP LT 130 MM HG: CPT | Performed by: FAMILY MEDICINE

## 2022-10-27 PROCEDURE — 3017F COLORECTAL CA SCREEN DOC REV: CPT | Performed by: FAMILY MEDICINE

## 2022-10-27 PROCEDURE — 99214 OFFICE O/P EST MOD 30 MIN: CPT | Performed by: FAMILY MEDICINE

## 2022-10-27 PROCEDURE — 3046F HEMOGLOBIN A1C LEVEL >9.0%: CPT | Performed by: FAMILY MEDICINE

## 2022-10-27 ASSESSMENT — ENCOUNTER SYMPTOMS
SHORTNESS OF BREATH: 0
DIARRHEA: 0
CONSTIPATION: 0
VOMITING: 0
COUGH: 0
NAUSEA: 0

## 2022-10-27 NOTE — PROGRESS NOTES
Post-Discharge Transitional Care Follow Up      Judge Greenwood   YOB: 1960    Date of Office Visit:  10/27/2022  Date of Hospital Admission: 10/19/22  Date of Hospital Discharge: 10/21/22  Readmission Risk Score (high >=14%. Medium >=10%):Readmission Risk Score: 7.4      Care management risk score Rising risk (score 2-5) and Complex Care (Scores >=6): No Risk Score On File     Non face to face  following discharge, date last encounter closed (first attempt may have been earlier): *No documented post hospital discharge outreach found in the last 14 days     Call initiated 2 business days of discharge: *No response recorded in the last 14 days     Hospital discharge follow-up  -     [address] MED LIST  Uncontrolled type 2 diabetes mellitus with hyperglycemia, without long-term current use of insulin ([facility]  -     Mohamud Lawler MD, Endocrinology, [address] [place] [address] Decision Making: moderate complexity  Return in about 1 week (around 11/3/2022) for DM check 30 min extended. Subjective:   Had cholecystectomy with Dr. Nicolle Curiel due to stones and sludge. Discharge summary:   64years old male with medical history significant for hypertension diabetes mellitus gastric bypass surgery cervical laminectomy. Patient presented with upper abdominal pain of 1 day duration. Patient was suspected for acute cholecystitis General surgery GI and GI was consulted. Patient was also noted to have transaminitis. CT scan of the abdomen and pelvis showed cholelithiasis and possible acute cholecystitis MRCP showed distended gallbladder with stones and sludge. Patient underwent cholecystectomy and patient tolerated the procedure well. Patient is being discharged home.    Patient will complete 5 days of for more antibiotics and patient will follow-up with surgery in 2 weeks    Inpatient course: Discharge summary reviewed- see chart.    Interval history/Current status: stable    Patient Active Problem List   Diagnosis    Contusion of left ankle    Right sided sciatica    Acute low back pain with possible spinal stenosis of less than six weeks' duration    S/P cervical spinal fusion    Other symptoms referable to back    Chronic venous insufficiency    Degeneration of lumbar or lumbosacral intervertebral disc    Displacement of lumbar intervertebral disc without myelopathy    Essential hypertension    GERD without esophagitis    Morbid obesity ([address]    Other and unspecified hyperlipidemia    Spinal stenosis, lumbar region, without neurogenic claudication    Thoracic or lumbosacral neuritis or radiculitis, unspecified    Unspecified inflammatory spondylopathy, lumbar region [facility])    Central cord syndrome at unspecified level of cervical spinal cord, initial encounter ([facility]    Obesity, diabetes, and hypertension syndrome ([address]    Cervical spinal cord compression ([address]    Diabetes mellitus with coincident hypertension ([address]    Type 2 diabetes mellitus with hyperlipidemia ([address]    Acute cholecystitis       Medications listed as ordered at the time of discharge from hospital     Medication List            Accurate as of October 27, 2022  1:47 PM. If you have any questions, ask your nurse or doctor.                 CONTINUE taking these medications      aspirin 81 MG chewable tablet     atorvastatin 40 MG tablet  Commonly known as: LIPITOR  Take 1 tablet by mouth nightly     celecoxib 200 MG capsule  Commonly known as: CELEBREX  TAKE 1 CAPSULE BY MOUTH EVERY DAY     cetirizine 10 MG tablet  Commonly known as: ZYRTEC  Take 1 tablet by mouth daily     famotidine 20 MG tablet  Commonly known as: PEPCID  TAKE 1 TABLET BY MOUTH TWICE A DAY     Handicap Placard Misc  by Does not apply route     losartan 25 MG tablet  Commonly known as: COZAAR  TAKE 2 TABLETS BY MOUTH DAILY     metFORMIN 500 MG extended release tablet  Commonly known as: GLUCOPHAGE-XR  TAKE 2 TABLETS BY MOUTH IN THE MORNING AND AT BEDTIME TAKE 2 TABLETS BY MOUTH TWICE DAILY     methocarbamol 750 MG tablet  Commonly known as: ROBAXIN  TAKE 1 TABLET BY MOUTH FOUR TIMES A DAY     omeprazole 20 MG delayed release capsule  Commonly known as: PRILOSEC  TAKE 2 CAPSULES BY MOUTH EVERY DAY               Medications marked \"taking\" at this time  Outpatient Medications Marked as Taking for the 10/27/22 encounter (Office Visit) with Nati Mcgovern DO   Medication Sig Dispense Refill    losartan (COZAAR) 25 MG tablet TAKE 2 TABLETS BY MOUTH DAILY 180 tablet 0    famotidine (PEPCID) 20 MG tablet TAKE 1 TABLET BY MOUTH TWICE A  tablet 0    aspirin 81 MG chewable tablet Take 81 mg by mouth daily      atorvastatin (LIPITOR) 40 MG tablet Take 1 tablet by mouth nightly 90 tablet 1    celecoxib (CELEBREX) 200 MG capsule TAKE 1 CAPSULE BY MOUTH EVERY DAY 30 capsule 1    omeprazole (PRILOSEC) 20 MG delayed release capsule TAKE 2 CAPSULES BY MOUTH EVERY  capsule 1    cetirizine (ZYRTEC) 10 MG tablet Take 1 tablet by mouth daily 90 tablet 1    methocarbamol (ROBAXIN) 750 MG tablet TAKE 1 TABLET BY MOUTH FOUR TIMES A DAY 90 tablet 2    Handicap Placard MISC by Does not apply route 1 each 0        Medications patient taking as of now reconciled against medications ordered at time of hospital discharge: Yes    Review of Systems   Constitutional:  Negative for chills and fever. Respiratory:  Negative for cough and shortness of breath. Cardiovascular:  Negative for leg swelling. Gastrointestinal:  Negative for constipation, diarrhea, nausea and vomiting. Endocrine: Negative for polyuria. Genitourinary:  Negative for frequency. Skin:  Negative for rash. Objective:    /81   Pulse 71   Ht 5' 8\" (1.727 m)   Wt 226 lb (102.5 kg)   SpO2 98%   BMI 34.36 kg/m²   Physical Exam    An electronic signature was used to authenticate this note.   --Nati Mcgovern DO

## 2022-11-02 DIAGNOSIS — M48.061 SPINAL STENOSIS, LUMBAR REGION, WITHOUT NEUROGENIC CLAUDICATION: ICD-10-CM

## 2022-11-02 RX ORDER — CELECOXIB 200 MG/1
CAPSULE ORAL
Qty: 90 CAPSULE | Refills: 0 | Status: SHIPPED | OUTPATIENT
Start: 2022-11-02

## 2022-11-02 NOTE — TELEPHONE ENCOUNTER
Requested Prescriptions     Pending Prescriptions Disp Refills    celecoxib (CELEBREX) 200 MG capsule 30 capsule 6     Sig: TAKE 1 CAPSULE BY MOUTH EVERY DAY   Patient requesting a medication refill.   Pharmacy: [facility]  Next office visit: 11/4/2022  Last regular office visit: 10/27/2022

## 2022-11-03 ENCOUNTER — OFFICE VISIT (OUTPATIENT)
Dept: SURGERY | Age: 62
End: 2022-11-03

## 2022-11-03 DIAGNOSIS — K81.0 ACUTE CHOLECYSTITIS: Primary | ICD-10-CM

## 2022-11-03 PROCEDURE — 99024 POSTOP FOLLOW-UP VISIT: CPT | Performed by: SURGERY

## 2022-11-03 NOTE — PROGRESS NOTES
Kev Chacon (:  1960) is a 64 y.o. male,Established patient, here for evaluation of the following chief complaint(s):  Post-Op Check (Pt is here today for a postop visit. )         ASSESSMENT/PLAN:  1. Acute cholecystitis    Follow up with me as needed           Subjective   SUBJECTIVE/OBJECTIVE:  HPI  Patient presents s/p Laparoscopic Cholecystectomy with Cholangiogram. Patient is two weeks post op. Pain level is minor. Incision appearance: well healed x 4. Post op complications: none. Pathology report reviewed with patient and showed cholecystitis. Follow up prn. Review of Systems       Objective   Physical Exam       Electronically signed by Angi Garcia MD on 11/3/2022 at 9:24 AM        An electronic signature was used to authenticate this note.     --Angi Garcia MD

## 2022-11-04 ENCOUNTER — OFFICE VISIT (OUTPATIENT)
Dept: INTERNAL MEDICINE CLINIC | Age: 62
End: 2022-11-04
Payer: MEDICAID

## 2022-11-04 VITALS
HEART RATE: 77 BPM | WEIGHT: 222 LBS | OXYGEN SATURATION: 98 % | DIASTOLIC BLOOD PRESSURE: 79 MMHG | SYSTOLIC BLOOD PRESSURE: 119 MMHG | BODY MASS INDEX: 33.65 KG/M2 | HEIGHT: 68 IN

## 2022-11-04 DIAGNOSIS — E11.59 OBESITY, DIABETES, AND HYPERTENSION SYNDROME (HCC): Chronic | ICD-10-CM

## 2022-11-04 DIAGNOSIS — I15.2 OBESITY, DIABETES, AND HYPERTENSION SYNDROME (HCC): Chronic | ICD-10-CM

## 2022-11-04 DIAGNOSIS — E11.69 TYPE 2 DIABETES MELLITUS WITH HYPERLIPIDEMIA (HCC): Chronic | ICD-10-CM

## 2022-11-04 DIAGNOSIS — E11.65 UNCONTROLLED TYPE 2 DIABETES MELLITUS WITH HYPERGLYCEMIA, WITHOUT LONG-TERM CURRENT USE OF INSULIN (HCC): Primary | Chronic | ICD-10-CM

## 2022-11-04 DIAGNOSIS — E78.5 TYPE 2 DIABETES MELLITUS WITH HYPERLIPIDEMIA (HCC): Chronic | ICD-10-CM

## 2022-11-04 DIAGNOSIS — E11.69 OBESITY, DIABETES, AND HYPERTENSION SYNDROME (HCC): Chronic | ICD-10-CM

## 2022-11-04 DIAGNOSIS — E66.9 OBESITY, DIABETES, AND HYPERTENSION SYNDROME (HCC): Chronic | ICD-10-CM

## 2022-11-04 DIAGNOSIS — I10 DIABETES MELLITUS WITH COINCIDENT HYPERTENSION (HCC): Chronic | ICD-10-CM

## 2022-11-04 DIAGNOSIS — E11.9 DIABETES MELLITUS WITH COINCIDENT HYPERTENSION (HCC): Chronic | ICD-10-CM

## 2022-11-04 PROBLEM — K81.0 ACUTE CHOLECYSTITIS: Status: RESOLVED | Noted: 2022-10-19 | Resolved: 2022-11-04

## 2022-11-04 PROCEDURE — G8417 CALC BMI ABV UP PARAM F/U: HCPCS | Performed by: FAMILY MEDICINE

## 2022-11-04 PROCEDURE — G8427 DOCREV CUR MEDS BY ELIG CLIN: HCPCS | Performed by: FAMILY MEDICINE

## 2022-11-04 PROCEDURE — 3078F DIAST BP <80 MM HG: CPT | Performed by: FAMILY MEDICINE

## 2022-11-04 PROCEDURE — 3074F SYST BP LT 130 MM HG: CPT | Performed by: FAMILY MEDICINE

## 2022-11-04 PROCEDURE — G8482 FLU IMMUNIZE ORDER/ADMIN: HCPCS | Performed by: FAMILY MEDICINE

## 2022-11-04 PROCEDURE — 99214 OFFICE O/P EST MOD 30 MIN: CPT | Performed by: FAMILY MEDICINE

## 2022-11-04 PROCEDURE — 3017F COLORECTAL CA SCREEN DOC REV: CPT | Performed by: FAMILY MEDICINE

## 2022-11-04 PROCEDURE — 3046F HEMOGLOBIN A1C LEVEL >9.0%: CPT | Performed by: FAMILY MEDICINE

## 2022-11-04 PROCEDURE — 2022F DILAT RTA XM EVC RTNOPTHY: CPT | Performed by: FAMILY MEDICINE

## 2022-11-04 PROCEDURE — 1036F TOBACCO NON-USER: CPT | Performed by: FAMILY MEDICINE

## 2022-11-04 RX ORDER — DULAGLUTIDE 0.75 MG/.5ML
0.75 INJECTION, SOLUTION SUBCUTANEOUS WEEKLY
Qty: 4 ADJUSTABLE DOSE PRE-FILLED PEN SYRINGE | Refills: 4 | Status: SHIPPED | OUTPATIENT
Start: 2022-11-04

## 2022-11-04 RX ORDER — METFORMIN HYDROCHLORIDE 500 MG/1
1000 TABLET, EXTENDED RELEASE ORAL 2 TIMES DAILY
Qty: 120 TABLET | Refills: 3 | Status: SHIPPED | OUTPATIENT
Start: 2022-11-04 | End: 2022-12-04

## 2022-11-04 ASSESSMENT — ENCOUNTER SYMPTOMS
DIARRHEA: 0
SHORTNESS OF BREATH: 0
COUGH: 0
CONSTIPATION: 0
VOMITING: 0
NAUSEA: 0

## 2022-11-04 NOTE — PROGRESS NOTES
Ayana Pugh (:  1960) is a 64 y.o. male,Established patient, here for evaluation of the following chief complaint(s):  Follow-up (1 week/)        SUBJECTIVE:   --  Adding trulicity to his metformin, will see if this helps bring his glucose levels down  Was eating a lot of bread and hamburger and hot dog buns. Was eating white buns. Was also eating a lot of potato chips and hashbrowns and french fries. Is planning to cut down on a lot of these things. Will keep his appt in Dec for fasting glucose level -- will adjust time to be earlier in the daytime to accomodate fasting          22 -- stopped taking his HCTZ due to cramping, stopped approx in . He started drinking more water and urinating more, and no longer having issues with swelling of legs       22 - follow up after increased metformin dose to 1000 mg BID  Tolerating the increased dose well. No issues with blood pressure       3.31.22 - follow up after lab work. A1c elevated now to 7.7. BNP was normal.   For DM -- will need to increase metformin to 1000 mg BID instead of 500 mg BID. Started on HCTZ after last visit -- improved significantly. No pain, no further swelling  Swelling improved but patient still sleeping in recliner. Discussed that he needs to see his spine surgeon to see if there is a better way for him to lay down without as much pain -- sleeping in a bed will elevate his feet up instead of them being dangling downwards otherwise. Had colonoscopy with Dr. Jodi Mchugh on 3.15.22 -- found small grade 1 internal hemorrhoids and mild diverticulosis. Does have family hx of brother and father who both had colon cancer. Next colonoscopy is panned 5 years from now in 2027      3.9.22 - follow up   Did not yet go for GI colonoscopy -- has it scheduled for next Tuesday  Eye doctor appt is in April. No orthopnea, has some swelling in his legs and sleeps in his recliner not a bed.   States he thinks this could be part of the problem  Shortness of breath occurs in the daytime, similar to asthma symptoms that he had as a kid -- only last 2-3 hours but then resolve. Occur when he is sitting up.     12.8.21 -- has appt with neck doctor to discuss his numbness and tingling in his fingers since his neck surgery. Needs eye doctor for dilation/exam, and needs to get his teeth pulled/dentures. Blood pressure much better controlled today after increaseing the losartan to 50 mg losartan. No longer having any headaches in the mornings when he is waking up since increasing the dose on that medicine. 11.17.21:  HYPERTENSION still present, blood pressure 162/83 in office. Having some numbness and tingling in his fingers, which occurred since he had the neck surgery. Surgery was approx 1 year ago. Dr. Zoraida Orozco was the one who did the cervical spinal fucsion of C3-C6. Has a follow up appt with that surgeon in early January. Waking up with headaches. Did not check his blood pressure when his headache is present. Discussed his need to be more vigilant with taking his BP at times where he is symptomatic.     10.13.21: Feeling better but still has swelling in legs. Wearing compression stockings. Has been amlodipine for some time for his HYPERTENSION, but this could be causing the swelling and is also not best line of med for DM. Would like handicap placard for his chronic neck pain and inability for walking greater than 250 yards without having to stop. 9.15.21 -- new patient. Hx treated prostate cancer. Saw Dr. Zain Yates, has been a long time. Has two rods in his neck, does need handicap placard. Had colonoscopy, thinks it might have been a while ago possibly done in [place]. Negative per patient. Will hold off on ordering cologuard until we get results. Thinks cologuard was done 2 years ago. Does have right lower leg swelling, + edema trace x 1 month. Not painful.  Does improve a little bit with compression stockings at home. I have reviewed the chart notes available from myself and other providers. I have reviewed and addressed all active problems and created or updated the problems list in detail, as needed    I have extensively reviewed and reconciled the medication list, discontinued medications not taking or no longer appropriate, and updated the active meds list    OBJECTIVE:  Review of Systems   Constitutional:  Negative for chills and fever. Respiratory:  Negative for cough and shortness of breath. Cardiovascular:  Negative for leg swelling. Gastrointestinal:  Negative for constipation, diarrhea, nausea and vomiting. Endocrine: Negative for polyuria. Genitourinary:  Negative for frequency. Skin:  Negative for rash. Vitals:    11/04/22 1116   BP: 119/79   Pulse: 77   SpO2: 98%   Weight: 222 lb (100.7 kg)   Height: 5' 8\" (1.727 m)      Body mass index is 33.75 kg/m². Physical Exam  Constitutional:       Appearance: Normal appearance. Cardiovascular:      Rate and Rhythm: Normal rate and regular rhythm. Pulses: Normal pulses. Heart sounds: Normal heart sounds. Pulmonary:      Effort: Pulmonary effort is normal.      Breath sounds: Normal breath sounds. Neurological:      General: No focal deficit present. Mental Status: He is alert. Mental status is at baseline. Except as noted below, all chronic problems have been reviewed and are stable to continue medications or other therapy as previously documented in the patient's chart, with changes per orders or documentation below:  Patient received counseling and, if relevant, printed instructions for all symptoms listed in CC and HPI, as well as for all diagnoses brought onto today's visit note below.  Typical counseling includes, but is not limited to, non-pharmacologic measures to manage listed symptoms and conditions; appropriate use, risks and benefits for all prescribed medications; potential interactions between medications both prescribed and OTC; diet; exercise; healthy behaviors; and goalsetting to improve health. Patient or responsible party was involved in shared decision making and had opportunity to have all questions answered. I have reconciled the medications in chart with what patient reports to be taking, and reviewed action/ side effects and how to take any new medications. Patient/caregiver understands purpose and side effects. A complete list of medications was provided in their after-visit summary. 1. Uncontrolled type 2 diabetes mellitus with hyperglycemia, without long-term current use of insulin ([facility]  Comments:  adding trulicity. continue metformin  Orders:  -     Dulaglutide (TRULICITY) 7.92 DD/3.3OD SOPN; Inject 0.75 mg into the skin once a week, Disp-4 Adjustable Dose Pre-filled Pen Syringe, R-4Normal  2. Diabetes mellitus with coincident hypertension ([facility]  Comments:  BP stable, but DM not stable  Orders:  -     Dulaglutide (TRULICITY) 9.67 EB/1.7HH SOPN; Inject 0.75 mg into the skin once a week, Disp-4 Adjustable Dose Pre-filled Pen Syringe, R-4Normal  -     metFORMIN (GLUCOPHAGE-XR) 500 MG extended release tablet; Take 2 tablets by mouth in the morning and at bedtime TAKE 2 TABLETS BY MOUTH twice daily, Disp-120 tablet, R-3Normal  3. Obesity, diabetes, and hypertension syndrome ([facility]  Comments:  BMI still 33. patient working on diet changes. Orders:  -     Dulaglutide (TRULICITY) 8.19 HJ/0.3RD SOPN; Inject 0.75 mg into the skin once a week, Disp-4 Adjustable Dose Pre-filled Pen Syringe, R-4Normal  -     metFORMIN (GLUCOPHAGE-XR) 500 MG extended release tablet; Take 2 tablets by mouth in the morning and at bedtime TAKE 2 TABLETS BY MOUTH twice daily, Disp-120 tablet, R-3Normal  4. Type 2 diabetes mellitus with hyperlipidemia ([facility]  Comments:  lipids stable as of lab work on 9.28.  Continue statin  Orders:  -     Dulaglutide (TRULICITY) 0.53 IJ/8.9NE SOPN; Inject 0.75 mg into the skin once a week, Disp-4 Adjustable Dose Pre-filled Pen Syringe, R-4Normal  -     metFORMIN (GLUCOPHAGE-XR) 500 MG extended release tablet; Take 2 tablets by mouth in the morning and at bedtime TAKE 2 TABLETS BY MOUTH twice daily, Disp-120 tablet, R-3Normal      Problem List          Unprioritized    Obesity, diabetes, and hypertension syndrome (HCC)    Relevant Medications    losartan (COZAAR) 25 MG tablet    Dulaglutide (TRULICITY) 8.86 QF/6.9JN SOPN    metFORMIN (GLUCOPHAGE-XR) 500 MG extended release tablet    Diabetes mellitus with coincident hypertension (HCC)    Relevant Medications    losartan (COZAAR) 25 MG tablet    Dulaglutide (TRULICITY) 2.64 ZV/6.7CO SOPN    metFORMIN (GLUCOPHAGE-XR) 500 MG extended release tablet    Type 2 diabetes mellitus with hyperlipidemia (HCC)    Relevant Medications    atorvastatin (LIPITOR) 40 MG tablet    aspirin 81 MG chewable tablet    losartan (COZAAR) 25 MG tablet    Dulaglutide (TRULICITY) 1.39 CH/0.8FO SOPN    metFORMIN (GLUCOPHAGE-XR) 500 MG extended release tablet     Return in about 8 weeks (around 12/28/2022) for DM check. [facility] - Internal Medicine and Pediatrics  Dr. Rhoda Cintron D.O.  - Family Medicine and OMT    Usual doctor's hours are:     Monday 7:30 am to 6:00 pm           Tuesday  7:30 am to 5:00 pm                                               Wednesday 7:30 am to 5:00 pm                                               Thursday 7:30 am to 5:00 pm                                               Friday 7:30 am to 4:00 pm           Saturdays, Sundays, and after hours: E-Visits are available    We observe most federal holidays and Good Friday. We ask that you only contact the office one time per issue or question, and please allow one full business day for a call back. Calling us back multiple times keeps us from being able to complete the work efficiently for you and our other patients.     For medication renewals, please call your pharmacist to contact us, and be sure to allow at least 3 business days for processing before you need to  your medication. If you are sick or need an appointment that hasn't been planned, same day appointments are available every day the office is open: Monday, Tuesday, Wednesday, Thursday, and Friday. Call during office hours to schedule, even if it may not be with your regular physician. You may also call the office after 8 am on office days if you need to be seen from an issue the night before. During hours when the office is not normally open, your call will go to the messaging service which cannot provide any service other than paging the doctor. No prescriptions or other nonurgent matters will be handled and no voicemail is available, so please call back during office hours for these matters.           Electronically signed by Sunitha Laguna DO on 11/4/2022 at 8:18 AM.

## 2022-11-07 DIAGNOSIS — K21.9 GERD WITHOUT ESOPHAGITIS: ICD-10-CM

## 2022-11-07 RX ORDER — OMEPRAZOLE 20 MG/1
CAPSULE, DELAYED RELEASE ORAL
Qty: 60 CAPSULE | Refills: 1 | Status: SHIPPED | OUTPATIENT
Start: 2022-11-07

## 2022-11-07 NOTE — TELEPHONE ENCOUNTER
Requested Prescriptions     Pending Prescriptions Disp Refills    omeprazole (PRILOSEC) 20 MG delayed release capsule [Pharmacy Med Name: OMEPRAZOLE DR 20 MG CAPSULE] 60 capsule 5     Sig: TAKE 2 CAPSULES BY MOUTH EVERY DAY     Patient requesting a medication refill.     Next office visit: 12/28/2022    Last regular office visit: 11/4/2022

## 2022-12-06 DIAGNOSIS — K21.9 GERD WITHOUT ESOPHAGITIS: ICD-10-CM

## 2022-12-06 RX ORDER — FAMOTIDINE 20 MG/1
TABLET, FILM COATED ORAL
Qty: 180 TABLET | Refills: 0 | Status: SHIPPED | OUTPATIENT
Start: 2022-12-06

## 2022-12-12 DIAGNOSIS — K21.9 GERD WITHOUT ESOPHAGITIS: ICD-10-CM

## 2022-12-12 RX ORDER — OMEPRAZOLE 20 MG/1
CAPSULE, DELAYED RELEASE ORAL
Qty: 60 CAPSULE | Refills: 1 | Status: SHIPPED | OUTPATIENT
Start: 2022-12-12

## 2022-12-28 ENCOUNTER — OFFICE VISIT (OUTPATIENT)
Dept: INTERNAL MEDICINE CLINIC | Age: 62
End: 2022-12-28
Payer: MEDICAID

## 2022-12-28 VITALS
HEART RATE: 72 BPM | OXYGEN SATURATION: 98 % | WEIGHT: 228.8 LBS | SYSTOLIC BLOOD PRESSURE: 116 MMHG | DIASTOLIC BLOOD PRESSURE: 72 MMHG | BODY MASS INDEX: 34.79 KG/M2

## 2022-12-28 DIAGNOSIS — E11.69 OBESITY, DIABETES, AND HYPERTENSION SYNDROME (HCC): ICD-10-CM

## 2022-12-28 DIAGNOSIS — R74.01 ELEVATED TRANSAMINASE MEASUREMENT: Primary | ICD-10-CM

## 2022-12-28 DIAGNOSIS — E11.59 OBESITY, DIABETES, AND HYPERTENSION SYNDROME (HCC): ICD-10-CM

## 2022-12-28 DIAGNOSIS — E11.9 TYPE 2 DIABETES MELLITUS TREATED WITH INSULIN (HCC): ICD-10-CM

## 2022-12-28 DIAGNOSIS — E11.69 TYPE 2 DIABETES MELLITUS WITH HYPERLIPIDEMIA (HCC): ICD-10-CM

## 2022-12-28 DIAGNOSIS — E11.59 OBESITY, DIABETES, AND HYPERTENSION SYNDROME (HCC): Chronic | ICD-10-CM

## 2022-12-28 DIAGNOSIS — E66.9 OBESITY, DIABETES, AND HYPERTENSION SYNDROME (HCC): Chronic | ICD-10-CM

## 2022-12-28 DIAGNOSIS — E78.5 TYPE 2 DIABETES MELLITUS WITH HYPERLIPIDEMIA (HCC): Chronic | ICD-10-CM

## 2022-12-28 DIAGNOSIS — Z79.4 TYPE 2 DIABETES MELLITUS TREATED WITH INSULIN (HCC): Chronic | ICD-10-CM

## 2022-12-28 DIAGNOSIS — E66.9 OBESITY, DIABETES, AND HYPERTENSION SYNDROME (HCC): ICD-10-CM

## 2022-12-28 DIAGNOSIS — I15.2 OBESITY, DIABETES, AND HYPERTENSION SYNDROME (HCC): Chronic | ICD-10-CM

## 2022-12-28 DIAGNOSIS — I15.2 OBESITY, DIABETES, AND HYPERTENSION SYNDROME (HCC): ICD-10-CM

## 2022-12-28 DIAGNOSIS — Z79.4 TYPE 2 DIABETES MELLITUS TREATED WITH INSULIN (HCC): ICD-10-CM

## 2022-12-28 DIAGNOSIS — E78.5 TYPE 2 DIABETES MELLITUS WITH HYPERLIPIDEMIA (HCC): ICD-10-CM

## 2022-12-28 DIAGNOSIS — E11.69 OBESITY, DIABETES, AND HYPERTENSION SYNDROME (HCC): Chronic | ICD-10-CM

## 2022-12-28 DIAGNOSIS — E11.9 TYPE 2 DIABETES MELLITUS TREATED WITH INSULIN (HCC): Chronic | ICD-10-CM

## 2022-12-28 DIAGNOSIS — R74.01 ELEVATED TRANSAMINASE MEASUREMENT: ICD-10-CM

## 2022-12-28 DIAGNOSIS — E11.69 TYPE 2 DIABETES MELLITUS WITH HYPERLIPIDEMIA (HCC): Chronic | ICD-10-CM

## 2022-12-28 LAB
ALBUMIN SERPL-MCNC: 4.4 G/DL (ref 3.4–5)
ALP BLD-CCNC: 138 U/L (ref 40–129)
ALT SERPL-CCNC: 35 U/L (ref 10–40)
AST SERPL-CCNC: 25 U/L (ref 15–37)
BILIRUB SERPL-MCNC: 0.6 MG/DL (ref 0–1)
BILIRUBIN DIRECT: <0.2 MG/DL (ref 0–0.3)
BILIRUBIN, INDIRECT: ABNORMAL MG/DL (ref 0–1)
ESTIMATED AVERAGE GLUCOSE: 174.3 MG/DL
HBA1C MFR BLD: 7.7 %
TOTAL PROTEIN: 6.6 G/DL (ref 6.4–8.2)

## 2022-12-28 PROCEDURE — 1036F TOBACCO NON-USER: CPT | Performed by: FAMILY MEDICINE

## 2022-12-28 PROCEDURE — 3074F SYST BP LT 130 MM HG: CPT | Performed by: FAMILY MEDICINE

## 2022-12-28 PROCEDURE — 3078F DIAST BP <80 MM HG: CPT | Performed by: FAMILY MEDICINE

## 2022-12-28 PROCEDURE — 2022F DILAT RTA XM EVC RTNOPTHY: CPT | Performed by: FAMILY MEDICINE

## 2022-12-28 PROCEDURE — G8427 DOCREV CUR MEDS BY ELIG CLIN: HCPCS | Performed by: FAMILY MEDICINE

## 2022-12-28 PROCEDURE — 99214 OFFICE O/P EST MOD 30 MIN: CPT | Performed by: FAMILY MEDICINE

## 2022-12-28 PROCEDURE — G8417 CALC BMI ABV UP PARAM F/U: HCPCS | Performed by: FAMILY MEDICINE

## 2022-12-28 PROCEDURE — 3046F HEMOGLOBIN A1C LEVEL >9.0%: CPT | Performed by: FAMILY MEDICINE

## 2022-12-28 PROCEDURE — 3017F COLORECTAL CA SCREEN DOC REV: CPT | Performed by: FAMILY MEDICINE

## 2022-12-28 PROCEDURE — G8482 FLU IMMUNIZE ORDER/ADMIN: HCPCS | Performed by: FAMILY MEDICINE

## 2022-12-28 ASSESSMENT — ENCOUNTER SYMPTOMS
CONSTIPATION: 0
COUGH: 0
NAUSEA: 0
VOMITING: 0
DIARRHEA: 0
SHORTNESS OF BREATH: 0

## 2022-12-28 NOTE — PROGRESS NOTES
Parvin Fox (:  1960) is a 58 y.o. male,Established patient, here for evaluation of the following chief complaint(s):  Diabetes        SUBJECTIVE:  22 --  Fasting glucose in office is 139 -- this is an improvement. Is tolerating the trulicity injections that he was started on at his last visit. Has continued his dietary changes of not eating super processed foods and breads. Will recheck A1c today, as well as transaminases which were elevated in October due to cholecystitis s/p cholecystectomy  -- follow in 3 mo    .2.41 --  Adding trulicity to his metformin, will see if this helps bring his glucose levels down  Was eating a lot of bread and hamburger and hot dog buns. Was eating white buns. Was also eating a lot of potato chips and hashbrowns and french fries. Is planning to cut down on a lot of these things. Will keep his appt in Dec for fasting glucose level -- will adjust time to be earlier in the daytime to accomodate fasting    22 -- stopped taking his HCTZ due to cramping, stopped approx in . He started drinking more water and urinating more, and no longer having issues with swelling of legs     22 - follow up after increased metformin dose to 1000 mg BID  Tolerating the increased dose well. No issues with blood pressure       3.31.22 - follow up after lab work. A1c elevated now to 7.7. BNP was normal.   For DM -- will need to increase metformin to 1000 mg BID instead of 500 mg BID. Started on HCTZ after last visit -- improved significantly. No pain, no further swelling  Swelling improved but patient still sleeping in recliner. Discussed that he needs to see his spine surgeon to see if there is a better way for him to lay down without as much pain -- sleeping in a bed will elevate his feet up instead of them being dangling downwards otherwise.      Had colonoscopy with Dr. Moriah Lin on 3.15.22 -- found small grade 1 internal hemorrhoids and mild diverticulosis. Does have family hx of brother and father who both had colon cancer. Next colonoscopy is panned 5 years from now in 2027      3.9.22 - follow up   Did not yet go for GI colonoscopy -- has it scheduled for next Tuesday  Eye doctor appt is in April. No orthopnea, has some swelling in his legs and sleeps in his recliner not a bed. States he thinks this could be part of the problem  Shortness of breath occurs in the daytime, similar to asthma symptoms that he had as a kid -- only last 2-3 hours but then resolve. Occur when he is sitting up.     12.8.21 -- has appt with neck doctor to discuss his numbness and tingling in his fingers since his neck surgery. Needs eye doctor for dilation/exam, and needs to get his teeth pulled/dentures. Blood pressure much better controlled today after increaseing the losartan to 50 mg losartan. No longer having any headaches in the mornings when he is waking up since increasing the dose on that medicine. 11.17.21:  HYPERTENSION still present, blood pressure 162/83 in office. Having some numbness and tingling in his fingers, which occurred since he had the neck surgery. Surgery was approx 1 year ago. Dr. Chet Madison was the one who did the cervical spinal fucsion of C3-C6. Has a follow up appt with that surgeon in early January. Waking up with headaches. Did not check his blood pressure when his headache is present. Discussed his need to be more vigilant with taking his BP at times where he is symptomatic.     10.13.21: Feeling better but still has swelling in legs. Wearing compression stockings. Has been amlodipine for some time for his HYPERTENSION, but this could be causing the swelling and is also not best line of med for DM. Would like handicap placard for his chronic neck pain and inability for walking greater than 250 yards without having to stop. 9.15.21 -- new patient. Hx treated prostate cancer. Saw Dr. Celeste Grider, has been a long time.  Has two rods in his neck, does need handicap placard. Had colonoscopy, thinks it might have been a while ago possibly done in [facility]. Negative per patient. Will hold off on ordering cologuard until we get results. Thinks cologuard was done 2 years ago. Does have right lower leg swelling, + edema trace x 1 month. Not painful. Does improve a little bit with compression stockings at home. I have reviewed the chart notes available from myself and other providers. I have reviewed and addressed all active problems and created or updated the problems list in detail, as needed    I have extensively reviewed and reconciled the medication list, discontinued medications not taking or no longer appropriate, and updated the active meds list    OBJECTIVE:  Review of Systems   Constitutional:  Negative for chills and fever. Respiratory:  Negative for cough and shortness of breath. Cardiovascular:  Negative for leg swelling. Gastrointestinal:  Negative for constipation, diarrhea, nausea and vomiting. Genitourinary:  Negative for frequency. Skin:  Negative for rash. Vitals:    12/28/22 0936   BP: 116/72   Site: Right Upper Arm   Position: Sitting   Cuff Size: Large Adult   Pulse: 72   SpO2: 98%   Weight: 228 lb 12.8 oz (103.8 kg)      Body mass index is 34.79 kg/m². Physical Exam  Constitutional:       Appearance: Normal appearance. Cardiovascular:      Rate and Rhythm: Normal rate and regular rhythm. Pulses: Normal pulses. Heart sounds: Normal heart sounds. Pulmonary:      Effort: Pulmonary effort is normal.      Breath sounds: Normal breath sounds. Neurological:      General: No focal deficit present. Mental Status: He is alert. Mental status is at baseline.      Except as noted below, all chronic problems have been reviewed and are stable to continue medications or other therapy as previously documented in the patient's chart, with changes per orders or documentation below:  Patient received counseling and, if relevant, printed instructions for all symptoms listed in CC and HPI, as well as for all diagnoses brought onto today's visit note below. Typical counseling includes, but is not limited to, non-pharmacologic measures to manage listed symptoms and conditions; appropriate use, risks and benefits for all prescribed medications; potential interactions between medications both prescribed and OTC; diet; exercise; healthy behaviors; and goalsetting to improve health. Patient or responsible party was involved in shared decision making and had opportunity to have all questions answered. I have reconciled the medications in chart with what patient reports to be taking, and reviewed action/ side effects and how to take any new medications. Patient/caregiver understands purpose and side effects. A complete list of medications was provided in their after-visit summary. 1. Elevated transaminase measurement  Comments:  s/p cholecystitis in October. Will recheck levels today to check for resolution of elevations  Orders:  -     Hepatic Function Panel; Future  2. Obesity, diabetes, and hypertension syndrome ([facility]  Comments:  continue diabetic meds including trulicity  Orders:  -     Hemoglobin A1C; Future  3. Type 2 diabetes mellitus with hyperlipidemia (HCC)  Comments:  rechecking A1c today  Orders:  -     Hemoglobin A1C; Future  4.  Type 2 diabetes mellitus treated with insulin (HCC)  -     Hemoglobin A1C; Future      Problem List          Medium    Type 2 diabetes mellitus treated with insulin (HCC)    Relevant Medications    Dulaglutide (TRULICITY) 9.92 CQ/1.2AJ SOPN    Other Relevant Orders    Hemoglobin A1C       Unprioritized    Obesity, diabetes, and hypertension syndrome (HCC)    Relevant Medications    losartan (COZAAR) 25 MG tablet    Dulaglutide (TRULICITY) 8.84 PE/7.9HH SOPN    Other Relevant Orders    Hemoglobin A1C    Type 2 diabetes mellitus with hyperlipidemia ([facility]    Relevant Medications    aspirin 81 MG chewable tablet    losartan (COZAAR) 25 MG tablet    Dulaglutide (TRULICITY) 5.86 MH/5.6QM SOPN    Other Relevant Orders    Hemoglobin A1C   Return in about 3 months (around 3/28/2023) for diabetic check/visit. [facility] - Internal Medicine and Pediatrics  Dr. Mohsen Herr D.O.  - Family Medicine and T    Usual doctor's hours are:     Monday 7:30 am to 6:00 pm           Tuesday  7:30 am to 5:00 pm                                               Wednesday 7:30 am to 5:00 pm                                               Thursday 7:30 am to 5:00 pm                                               Friday 7:30 am to 4:00 pm           Saturdays, Sundays, and after hours: E-Visits are available    We observe most federal holidays and Good Friday. We ask that you only contact the office one time per issue or question, and please allow one full business day for a call back. Calling us back multiple times keeps us from being able to complete the work efficiently for you and our other patients. For medication renewals, please call your pharmacist to contact us, and be sure to allow at least 3 business days for processing before you need to  your medication. If you are sick or need an appointment that hasn't been planned, same day appointments are available every day the office is open: Monday, Tuesday, Wednesday, Thursday, and Friday. Call during office hours to schedule, even if it may not be with your regular physician. You may also call the office after 8 am on office days if you need to be seen from an issue the night before. During hours when the office is not normally open, your call will go to the messaging service which cannot provide any service other than paging the doctor. No prescriptions or other nonurgent matters will be handled and no voicemail is available, so please call back during office hours for these matters.           Electronically signed by Whitney Burroughs DO on 12/28/2022 at 12:49 PM.

## 2023-01-16 DIAGNOSIS — M48.061 SPINAL STENOSIS, LUMBAR REGION, WITHOUT NEUROGENIC CLAUDICATION: ICD-10-CM

## 2023-01-16 NOTE — TELEPHONE ENCOUNTER
Patient requesting a medication refill.     Pharmacy: [facility]  Next office visit: 3/29/2023    Last regular office visit: 12/28/2022

## 2023-01-17 RX ORDER — CELECOXIB 200 MG/1
CAPSULE ORAL
Qty: 30 CAPSULE | Refills: 2 | Status: SHIPPED | OUTPATIENT
Start: 2023-01-17

## 2023-01-31 DIAGNOSIS — K21.9 GERD WITHOUT ESOPHAGITIS: ICD-10-CM

## 2023-01-31 RX ORDER — FAMOTIDINE 20 MG/1
TABLET, FILM COATED ORAL
Qty: 180 TABLET | Refills: 0 | Status: SHIPPED | OUTPATIENT
Start: 2023-01-31

## 2023-01-31 NOTE — TELEPHONE ENCOUNTER
Patient requesting a medication refill.     Pharmacy: [facility]  Next office visit: 3/29/23  Last regular office visit: 12/28/2022

## 2023-03-02 DIAGNOSIS — J30.2 SEASONAL ALLERGIES: ICD-10-CM

## 2023-03-03 RX ORDER — CETIRIZINE HYDROCHLORIDE 10 MG/1
TABLET ORAL
Qty: 30 TABLET | Refills: 5 | Status: SHIPPED | OUTPATIENT
Start: 2023-03-03

## 2023-03-19 DIAGNOSIS — E11.69 TYPE 2 DIABETES MELLITUS WITH HYPERLIPIDEMIA (HCC): ICD-10-CM

## 2023-03-19 DIAGNOSIS — E11.69 MIXED HYPERLIPIDEMIA DUE TO TYPE 2 DIABETES MELLITUS (HCC): ICD-10-CM

## 2023-03-19 DIAGNOSIS — E78.2 MIXED HYPERLIPIDEMIA DUE TO TYPE 2 DIABETES MELLITUS (HCC): ICD-10-CM

## 2023-03-19 DIAGNOSIS — E78.5 TYPE 2 DIABETES MELLITUS WITH HYPERLIPIDEMIA (HCC): ICD-10-CM

## 2023-03-20 RX ORDER — ATORVASTATIN CALCIUM 40 MG/1
40 TABLET, FILM COATED ORAL NIGHTLY
Qty: 30 TABLET | Refills: 5 | Status: SHIPPED | OUTPATIENT
Start: 2023-03-20 | End: 2023-06-18

## 2023-03-29 ENCOUNTER — OFFICE VISIT (OUTPATIENT)
Dept: PRIMARY CARE CLINIC | Age: 63
End: 2023-03-29
Payer: MEDICAID

## 2023-03-29 VITALS
SYSTOLIC BLOOD PRESSURE: 112 MMHG | WEIGHT: 235.2 LBS | TEMPERATURE: 97.3 F | HEART RATE: 72 BPM | BODY MASS INDEX: 35.76 KG/M2 | DIASTOLIC BLOOD PRESSURE: 72 MMHG | OXYGEN SATURATION: 98 %

## 2023-03-29 DIAGNOSIS — E11.69 TYPE 2 DIABETES MELLITUS WITH HYPERLIPIDEMIA (HCC): Chronic | ICD-10-CM

## 2023-03-29 DIAGNOSIS — K21.9 GERD WITHOUT ESOPHAGITIS: ICD-10-CM

## 2023-03-29 DIAGNOSIS — E78.5 TYPE 2 DIABETES MELLITUS WITH HYPERLIPIDEMIA (HCC): Chronic | ICD-10-CM

## 2023-03-29 DIAGNOSIS — E11.9 TYPE 2 DIABETES MELLITUS TREATED WITH INSULIN (HCC): Primary | Chronic | ICD-10-CM

## 2023-03-29 DIAGNOSIS — I10 DIABETES MELLITUS WITH COINCIDENT HYPERTENSION (HCC): Chronic | ICD-10-CM

## 2023-03-29 DIAGNOSIS — E11.59 OBESITY, DIABETES, AND HYPERTENSION SYNDROME (HCC): Chronic | ICD-10-CM

## 2023-03-29 DIAGNOSIS — I10 ESSENTIAL HYPERTENSION: ICD-10-CM

## 2023-03-29 DIAGNOSIS — I15.2 OBESITY, DIABETES, AND HYPERTENSION SYNDROME (HCC): Chronic | ICD-10-CM

## 2023-03-29 DIAGNOSIS — E11.9 DIABETES MELLITUS WITH COINCIDENT HYPERTENSION (HCC): Chronic | ICD-10-CM

## 2023-03-29 DIAGNOSIS — Z79.4 TYPE 2 DIABETES MELLITUS TREATED WITH INSULIN (HCC): Primary | Chronic | ICD-10-CM

## 2023-03-29 DIAGNOSIS — G95.20 CERVICAL SPINAL CORD COMPRESSION (HCC): ICD-10-CM

## 2023-03-29 DIAGNOSIS — E11.69 OBESITY, DIABETES, AND HYPERTENSION SYNDROME (HCC): Chronic | ICD-10-CM

## 2023-03-29 DIAGNOSIS — E66.9 OBESITY, DIABETES, AND HYPERTENSION SYNDROME (HCC): Chronic | ICD-10-CM

## 2023-03-29 PROBLEM — M54.50: Status: RESOLVED | Noted: 2021-01-25 | Resolved: 2023-03-29

## 2023-03-29 PROBLEM — S90.02XA CONTUSION OF LEFT ANKLE: Status: RESOLVED | Noted: 2021-01-24 | Resolved: 2023-03-29

## 2023-03-29 PROCEDURE — 2022F DILAT RTA XM EVC RTNOPTHY: CPT | Performed by: FAMILY MEDICINE

## 2023-03-29 PROCEDURE — G8482 FLU IMMUNIZE ORDER/ADMIN: HCPCS | Performed by: FAMILY MEDICINE

## 2023-03-29 PROCEDURE — 3046F HEMOGLOBIN A1C LEVEL >9.0%: CPT | Performed by: FAMILY MEDICINE

## 2023-03-29 PROCEDURE — 1036F TOBACCO NON-USER: CPT | Performed by: FAMILY MEDICINE

## 2023-03-29 PROCEDURE — 3017F COLORECTAL CA SCREEN DOC REV: CPT | Performed by: FAMILY MEDICINE

## 2023-03-29 PROCEDURE — 99214 OFFICE O/P EST MOD 30 MIN: CPT | Performed by: FAMILY MEDICINE

## 2023-03-29 PROCEDURE — 83036 HEMOGLOBIN GLYCOSYLATED A1C: CPT | Performed by: FAMILY MEDICINE

## 2023-03-29 PROCEDURE — 3078F DIAST BP <80 MM HG: CPT | Performed by: FAMILY MEDICINE

## 2023-03-29 PROCEDURE — G8427 DOCREV CUR MEDS BY ELIG CLIN: HCPCS | Performed by: FAMILY MEDICINE

## 2023-03-29 PROCEDURE — 3074F SYST BP LT 130 MM HG: CPT | Performed by: FAMILY MEDICINE

## 2023-03-29 PROCEDURE — G8417 CALC BMI ABV UP PARAM F/U: HCPCS | Performed by: FAMILY MEDICINE

## 2023-03-29 RX ORDER — LOSARTAN POTASSIUM 25 MG/1
50 TABLET ORAL DAILY
Qty: 180 TABLET | Refills: 1 | Status: SHIPPED | OUTPATIENT
Start: 2023-03-29

## 2023-03-29 RX ORDER — METFORMIN HYDROCHLORIDE 500 MG/1
1000 TABLET, EXTENDED RELEASE ORAL 2 TIMES DAILY
Qty: 120 TABLET | Refills: 5 | Status: SHIPPED | OUTPATIENT
Start: 2023-03-29 | End: 2023-04-28

## 2023-03-29 RX ORDER — FAMOTIDINE 20 MG/1
20 TABLET, FILM COATED ORAL 2 TIMES DAILY
Qty: 180 TABLET | Refills: 0 | Status: SHIPPED | OUTPATIENT
Start: 2023-03-29

## 2023-03-29 RX ORDER — DULAGLUTIDE 0.75 MG/.5ML
0.75 INJECTION, SOLUTION SUBCUTANEOUS WEEKLY
Qty: 4 ADJUSTABLE DOSE PRE-FILLED PEN SYRINGE | Refills: 4 | Status: SHIPPED | OUTPATIENT
Start: 2023-03-29

## 2023-03-29 RX ORDER — ATORVASTATIN CALCIUM 40 MG/1
40 TABLET, FILM COATED ORAL NIGHTLY
Qty: 90 TABLET | Refills: 1 | Status: SHIPPED | OUTPATIENT
Start: 2023-03-29 | End: 2023-06-27

## 2023-03-29 RX ORDER — OMEPRAZOLE 20 MG/1
CAPSULE, DELAYED RELEASE ORAL
Qty: 180 CAPSULE | Refills: 1 | Status: SHIPPED | OUTPATIENT
Start: 2023-03-29

## 2023-03-29 SDOH — ECONOMIC STABILITY: INCOME INSECURITY: HOW HARD IS IT FOR YOU TO PAY FOR THE VERY BASICS LIKE FOOD, HOUSING, MEDICAL CARE, AND HEATING?: PATIENT DECLINED

## 2023-03-29 SDOH — ECONOMIC STABILITY: FOOD INSECURITY: WITHIN THE PAST 12 MONTHS, THE FOOD YOU BOUGHT JUST DIDN'T LAST AND YOU DIDN'T HAVE MONEY TO GET MORE.: PATIENT DECLINED

## 2023-03-29 SDOH — ECONOMIC STABILITY: HOUSING INSECURITY
IN THE LAST 12 MONTHS, WAS THERE A TIME WHEN YOU DID NOT HAVE A STEADY PLACE TO SLEEP OR SLEPT IN A SHELTER (INCLUDING NOW)?: PATIENT REFUSED

## 2023-03-29 SDOH — ECONOMIC STABILITY: FOOD INSECURITY: WITHIN THE PAST 12 MONTHS, YOU WORRIED THAT YOUR FOOD WOULD RUN OUT BEFORE YOU GOT MONEY TO BUY MORE.: PATIENT DECLINED

## 2023-03-29 ASSESSMENT — ENCOUNTER SYMPTOMS
CONSTIPATION: 0
NAUSEA: 0
DIARRHEA: 0
COUGH: 0
SHORTNESS OF BREATH: 0
VOMITING: 0

## 2023-03-29 ASSESSMENT — PATIENT HEALTH QUESTIONNAIRE - PHQ9
SUM OF ALL RESPONSES TO PHQ QUESTIONS 1-9: 0
SUM OF ALL RESPONSES TO PHQ QUESTIONS 1-9: 0
2. FEELING DOWN, DEPRESSED OR HOPELESS: 0
SUM OF ALL RESPONSES TO PHQ QUESTIONS 1-9: 0
1. LITTLE INTEREST OR PLEASURE IN DOING THINGS: 0
SUM OF ALL RESPONSES TO PHQ9 QUESTIONS 1 & 2: 0
DEPRESSION UNABLE TO ASSESS: PT REFUSES
SUM OF ALL RESPONSES TO PHQ QUESTIONS 1-9: 0

## 2023-03-29 NOTE — PROGRESS NOTES
Relevant Medications    aspirin 81 MG chewable tablet    Dulaglutide (TRULICITY) 1.51 VG/8.9BD SOPN    metFORMIN (GLUCOPHAGE-XR) 500 MG extended release tablet    atorvastatin (LIPITOR) 40 MG tablet    losartan (COZAAR) 25 MG tablet   Return in about 3 months (around 6/29/2023). Dr. Maya Cullen and [facility]      Usual doctor's hours are:       Monday 7:00 am to 5:30 pm  Wednesday 7:00 am to 4:30 pm  Thursday 7:00 am to 4:30 pm  Friday 7:00 am to 3:30 pm  Saturdays, Sundays, and after hours: E-Visits are available    We observe most federal holidays and Good Friday. We ask that you only contact the office one time per issue or question, and please allow one full business day for a call back. Calling us back multiple times keeps us from being able to complete the work efficiently for you and our other patients. For medication renewals, please call your pharmacist to contact us, and be sure to allow at least 3 business days for processing before you need to  your medication. If you are sick or need an appointment that hasn't been planned, same day appointments are available every day the office is open: Monday, Tuesday, Wednesday, Thursday, and Friday. Call during office hours to schedule, even if it may not be with your regular physician. You may also call the office after 8 am on office days if you need to be seen from an issue the night before. During hours when the office is not normally open, your call will go to the messaging service which cannot provide any service other than paging the doctor. No prescriptions or other nonurgent matters will be handled and no voicemail is available, so please call back during office hours for these matters.         Electronically signed by Jim Gutierrez DO on 3/29/2023 at 2:16 PM.

## 2023-04-10 DIAGNOSIS — M48.061 SPINAL STENOSIS, LUMBAR REGION, WITHOUT NEUROGENIC CLAUDICATION: ICD-10-CM

## 2023-04-10 RX ORDER — CELECOXIB 200 MG/1
CAPSULE ORAL
Qty: 30 CAPSULE | Refills: 2 | OUTPATIENT
Start: 2023-04-10

## 2023-05-07 DIAGNOSIS — M48.061 SPINAL STENOSIS, LUMBAR REGION, WITHOUT NEUROGENIC CLAUDICATION: ICD-10-CM

## 2023-05-10 RX ORDER — CELECOXIB 200 MG/1
CAPSULE ORAL
Qty: 30 CAPSULE | Refills: 2 | Status: SHIPPED | OUTPATIENT
Start: 2023-05-10

## 2023-05-11 ENCOUNTER — OFFICE VISIT (OUTPATIENT)
Dept: ENDOCRINOLOGY | Age: 63
End: 2023-05-11

## 2023-05-11 VITALS
OXYGEN SATURATION: 96 % | WEIGHT: 231.8 LBS | HEIGHT: 68 IN | BODY MASS INDEX: 35.13 KG/M2 | HEART RATE: 62 BPM | TEMPERATURE: 97 F | DIASTOLIC BLOOD PRESSURE: 72 MMHG | SYSTOLIC BLOOD PRESSURE: 118 MMHG | RESPIRATION RATE: 15 BRPM

## 2023-05-11 DIAGNOSIS — E78.49 OTHER HYPERLIPIDEMIA: ICD-10-CM

## 2023-05-11 DIAGNOSIS — E11.65 UNCONTROLLED TYPE 2 DIABETES MELLITUS WITH HYPERGLYCEMIA (HCC): Primary | ICD-10-CM

## 2023-05-11 DIAGNOSIS — E11.59 OBESITY, DIABETES, AND HYPERTENSION SYNDROME (HCC): Chronic | ICD-10-CM

## 2023-05-11 DIAGNOSIS — I15.2 OBESITY, DIABETES, AND HYPERTENSION SYNDROME (HCC): Chronic | ICD-10-CM

## 2023-05-11 DIAGNOSIS — I10 DIABETES MELLITUS WITH COINCIDENT HYPERTENSION (HCC): Chronic | ICD-10-CM

## 2023-05-11 DIAGNOSIS — E11.69 OBESITY, DIABETES, AND HYPERTENSION SYNDROME (HCC): Chronic | ICD-10-CM

## 2023-05-11 DIAGNOSIS — E66.9 OBESITY, DIABETES, AND HYPERTENSION SYNDROME (HCC): Chronic | ICD-10-CM

## 2023-05-11 DIAGNOSIS — E11.9 DIABETES MELLITUS WITH COINCIDENT HYPERTENSION (HCC): Chronic | ICD-10-CM

## 2023-05-11 RX ORDER — METFORMIN HYDROCHLORIDE 500 MG/1
1000 TABLET, EXTENDED RELEASE ORAL 2 TIMES DAILY
Qty: 120 TABLET | Refills: 5 | Status: SHIPPED | OUTPATIENT
Start: 2023-05-11 | End: 2023-06-10

## 2023-05-11 NOTE — PROGRESS NOTES
Seen as new patient for diabetes    Referred by Pia Wills    Diagnosed with Type 2 diabetes mellitus > 15 years  Known diabetic complications: none   Uncontrolled, moderate    Current diabetic medications     Metformin ER 1gm BID  Trulicity 3.30MR  added recently    H/o glipizide use    Last A1c  6.5%<-----7.7%<--- 11.3%<----7.7%<--- 6.8%    Prior visit with dietician: Yes   Current diet: on average, 3 meals per day   Current exercise: walking   Current monitoring regimen: home blood tests - 0 times daily   Diet has improved    Has brought blood glucose log/meter: No   Home blood sugar records: -----  Any episodes of hypoglycemia?    Worsened by high CHO    No Hx of CAD , PVD, CVA    Hyperlipidemia:   For   Years  Takes Lipitor 40mg  Controlled  LDL 32 on 9/22    Hypertension for yrs  Stable  Takes losartan 25mg    Last eye exam: 5/23  Last foot exam: 5/23  Last microalbumin to creatinine ratio: 9/22    Ho gastric bypass surgery    FH: Brothers DM, Mom DM    SH: No smoking  Disability  Worked as     Past Medical History:   Diagnosis Date    Acute cholecystitis 10/19/2022    Acute low back pain with possible spinal stenosis of less than six weeks' duration 1/25/2021    Contusion of left ankle 1/24/2021    Diabetes mellitus type 1 ([address]     Hyperlipidemia     Hypertension     Overweight 2/22/2021    Sprain of left ankle 2/27/2021     Past Surgical History:   Procedure Laterality Date    CERVICAL LAMINECTOMY N/A 1/28/2021    C3-4, C4-5, C5-6 POSTERIOR CERVICAL DECOMPRESSION, FUSION AND FIXATION performed by Richard Duncan MD at [address] [place] [address] Pkwy, LAPAROSCOPIC N/A 10/20/2022    LAPAROSCOPIC CHOLECYSTECTOMY WITH CHOLANGIOGRAM, performed by Nav Vides MD at [place] [address]  01/2021     Current Outpatient Medications   Medication Sig Dispense Refill    celecoxib (CELEBREX) 200 MG capsule TAKE 1 CAPSULE BY MOUTH EVERY DAY 30 capsule 2    omeprazole

## 2023-06-29 ENCOUNTER — OFFICE VISIT (OUTPATIENT)
Dept: PRIMARY CARE CLINIC | Age: 63
End: 2023-06-29

## 2023-06-29 VITALS
BODY MASS INDEX: 34.7 KG/M2 | WEIGHT: 228.2 LBS | OXYGEN SATURATION: 99 % | HEART RATE: 75 BPM | SYSTOLIC BLOOD PRESSURE: 116 MMHG | DIASTOLIC BLOOD PRESSURE: 79 MMHG | TEMPERATURE: 98.6 F

## 2023-06-29 DIAGNOSIS — D64.9 ANEMIA, UNSPECIFIED TYPE: ICD-10-CM

## 2023-06-29 DIAGNOSIS — E78.5 TYPE 2 DIABETES MELLITUS WITH HYPERLIPIDEMIA (HCC): Chronic | ICD-10-CM

## 2023-06-29 DIAGNOSIS — I15.2 OBESITY, DIABETES, AND HYPERTENSION SYNDROME (HCC): Chronic | ICD-10-CM

## 2023-06-29 DIAGNOSIS — K21.9 GERD WITHOUT ESOPHAGITIS: ICD-10-CM

## 2023-06-29 DIAGNOSIS — E66.9 OBESITY, DIABETES, AND HYPERTENSION SYNDROME (HCC): Chronic | ICD-10-CM

## 2023-06-29 DIAGNOSIS — E11.9 DIABETES MELLITUS WITH COINCIDENT HYPERTENSION (HCC): Chronic | ICD-10-CM

## 2023-06-29 DIAGNOSIS — E11.69 OBESITY, DIABETES, AND HYPERTENSION SYNDROME (HCC): Chronic | ICD-10-CM

## 2023-06-29 DIAGNOSIS — Z79.4 TYPE 2 DIABETES MELLITUS TREATED WITH INSULIN (HCC): Primary | Chronic | ICD-10-CM

## 2023-06-29 DIAGNOSIS — E11.69 TYPE 2 DIABETES MELLITUS WITH HYPERLIPIDEMIA (HCC): Chronic | ICD-10-CM

## 2023-06-29 DIAGNOSIS — R74.8 ELEVATED ALKALINE PHOSPHATASE LEVEL: ICD-10-CM

## 2023-06-29 DIAGNOSIS — R74.8 ELEVATED ALKALINE PHOSPHATASE LEVEL: Chronic | ICD-10-CM

## 2023-06-29 DIAGNOSIS — E11.9 TYPE 2 DIABETES MELLITUS TREATED WITH INSULIN (HCC): Primary | Chronic | ICD-10-CM

## 2023-06-29 DIAGNOSIS — I10 DIABETES MELLITUS WITH COINCIDENT HYPERTENSION (HCC): Chronic | ICD-10-CM

## 2023-06-29 DIAGNOSIS — E11.59 OBESITY, DIABETES, AND HYPERTENSION SYNDROME (HCC): Chronic | ICD-10-CM

## 2023-06-29 LAB
ALBUMIN SERPL-MCNC: 4.5 G/DL (ref 3.4–5)
ALBUMIN/GLOB SERPL: 2.1 {RATIO} (ref 1.1–2.2)
ALP SERPL-CCNC: 119 U/L (ref 40–129)
ALT SERPL-CCNC: 35 U/L (ref 10–40)
ANION GAP SERPL CALCULATED.3IONS-SCNC: 15 MMOL/L (ref 3–16)
AST SERPL-CCNC: 27 U/L (ref 15–37)
BASOPHILS # BLD: 0 K/UL (ref 0–0.2)
BASOPHILS NFR BLD: 0.5 %
BILIRUB DIRECT SERPL-MCNC: <0.2 MG/DL (ref 0–0.3)
BILIRUB INDIRECT SERPL-MCNC: NORMAL MG/DL (ref 0–1)
BILIRUB SERPL-MCNC: 0.4 MG/DL (ref 0–1)
BUN SERPL-MCNC: 16 MG/DL (ref 7–20)
CALCIUM SERPL-MCNC: 9.6 MG/DL (ref 8.3–10.6)
CHLORIDE SERPL-SCNC: 104 MMOL/L (ref 99–110)
CHOLEST SERPL-MCNC: 64 MG/DL (ref 0–199)
CO2 SERPL-SCNC: 20 MMOL/L (ref 21–32)
CREAT SERPL-MCNC: 0.9 MG/DL (ref 0.8–1.3)
DEPRECATED RDW RBC AUTO: 15 % (ref 12.4–15.4)
EOSINOPHIL # BLD: 0.1 K/UL (ref 0–0.6)
EOSINOPHIL NFR BLD: 3.1 %
GFR SERPLBLD CREATININE-BSD FMLA CKD-EPI: >60 ML/MIN/{1.73_M2}
GGT SERPL-CCNC: 13 U/L (ref 8–61)
GLUCOSE SERPL-MCNC: 109 MG/DL (ref 70–99)
HBA1C MFR BLD: 6.7 %
HCT VFR BLD AUTO: 42 % (ref 40.5–52.5)
HDLC SERPL-MCNC: 30 MG/DL (ref 40–60)
HGB BLD-MCNC: 13.8 G/DL (ref 13.5–17.5)
LDLC SERPL CALC-MCNC: 23 MG/DL
LYMPHOCYTES # BLD: 1.4 K/UL (ref 1–5.1)
LYMPHOCYTES NFR BLD: 32.1 %
MCH RBC QN AUTO: 30.6 PG (ref 26–34)
MCHC RBC AUTO-ENTMCNC: 32.9 G/DL (ref 31–36)
MCV RBC AUTO: 93.2 FL (ref 80–100)
MONOCYTES # BLD: 0.4 K/UL (ref 0–1.3)
MONOCYTES NFR BLD: 9.3 %
NEUTROPHILS # BLD: 2.4 K/UL (ref 1.7–7.7)
NEUTROPHILS NFR BLD: 55 %
PLATELET # BLD AUTO: 208 K/UL (ref 135–450)
PMV BLD AUTO: 9.2 FL (ref 5–10.5)
POTASSIUM SERPL-SCNC: 4.3 MMOL/L (ref 3.5–5.1)
PROT SERPL-MCNC: 6.6 G/DL (ref 6.4–8.2)
RBC # BLD AUTO: 4.5 M/UL (ref 4.2–5.9)
SODIUM SERPL-SCNC: 139 MMOL/L (ref 136–145)
TRIGL SERPL-MCNC: 54 MG/DL (ref 0–150)
VLDLC SERPL CALC-MCNC: 11 MG/DL
WBC # BLD AUTO: 4.4 K/UL (ref 4–11)

## 2023-06-29 RX ORDER — HYDROCHLOROTHIAZIDE 25 MG/1
TABLET ORAL
COMMUNITY
Start: 2023-05-11 | End: 2023-06-29

## 2023-06-29 RX ORDER — LOSARTAN POTASSIUM 25 MG/1
50 TABLET ORAL DAILY
Qty: 180 TABLET | Refills: 1 | Status: SHIPPED | OUTPATIENT
Start: 2023-06-29

## 2023-06-29 RX ORDER — ATORVASTATIN CALCIUM 40 MG/1
40 TABLET, FILM COATED ORAL NIGHTLY
Qty: 90 TABLET | Refills: 1 | Status: SHIPPED | OUTPATIENT
Start: 2023-06-29 | End: 2023-09-27

## 2023-06-29 RX ORDER — OMEPRAZOLE 20 MG/1
CAPSULE, DELAYED RELEASE ORAL
Qty: 180 CAPSULE | Refills: 1 | Status: SHIPPED | OUTPATIENT
Start: 2023-06-29

## 2023-06-29 RX ORDER — DULAGLUTIDE 0.75 MG/.5ML
0.75 INJECTION, SOLUTION SUBCUTANEOUS WEEKLY
Qty: 4 ADJUSTABLE DOSE PRE-FILLED PEN SYRINGE | Refills: 4 | Status: SHIPPED | OUTPATIENT
Start: 2023-06-29

## 2023-06-29 RX ORDER — FAMOTIDINE 20 MG/1
20 TABLET, FILM COATED ORAL 2 TIMES DAILY
Qty: 180 TABLET | Refills: 0 | Status: SHIPPED | OUTPATIENT
Start: 2023-06-29

## 2023-06-29 ASSESSMENT — ENCOUNTER SYMPTOMS
VOMITING: 0
CONSTIPATION: 0
NAUSEA: 0
SHORTNESS OF BREATH: 0
COUGH: 0
DIARRHEA: 0

## 2023-07-08 DIAGNOSIS — J30.2 SEASONAL ALLERGIES: ICD-10-CM

## 2023-07-09 DIAGNOSIS — M48.061 SPINAL STENOSIS, LUMBAR REGION, WITHOUT NEUROGENIC CLAUDICATION: ICD-10-CM

## 2023-07-10 RX ORDER — CELECOXIB 200 MG/1
CAPSULE ORAL
Qty: 30 CAPSULE | Refills: 2 | Status: SHIPPED | OUTPATIENT
Start: 2023-07-10

## 2023-07-10 RX ORDER — CETIRIZINE HYDROCHLORIDE 10 MG/1
TABLET ORAL
Qty: 30 TABLET | Refills: 5 | Status: SHIPPED | OUTPATIENT
Start: 2023-07-10

## 2023-07-15 NOTE — PROGRESS NOTES
Patient has continued loud, labile, and agitated. She is impulsive, shrieking in her room and despite redirection she continues this behavior. She is highly distractable and redirection is effective only for a short time. She was offered and accepted Haldol 5mgs and Atarax 100mgs po prn. RN from 4 came to collect covid test. Pt did not allow RN to collect sample. Pt stated the \"test hurts\" and a test was recently conducted before coming to rehab unit.

## 2023-07-24 DIAGNOSIS — M79.89 LOCALIZED SWELLING OF BOTH LOWER EXTREMITIES: ICD-10-CM

## 2023-07-24 RX ORDER — HYDROCHLOROTHIAZIDE 25 MG/1
TABLET ORAL
Qty: 45 TABLET | Refills: 1 | OUTPATIENT
Start: 2023-07-24

## 2023-09-15 ENCOUNTER — HOSPITAL ENCOUNTER (EMERGENCY)
Age: 63
Discharge: HOME | End: 2023-09-15
Payer: MEDICARE

## 2023-09-15 VITALS
HEART RATE: 71 BPM | SYSTOLIC BLOOD PRESSURE: 124 MMHG | BODY MASS INDEX: 34.78 KG/M2 | HEIGHT: 68 IN | TEMPERATURE: 97.8 F | WEIGHT: 229.5 LBS | OXYGEN SATURATION: 97 % | DIASTOLIC BLOOD PRESSURE: 81 MMHG | RESPIRATION RATE: 16 BRPM

## 2023-09-15 DIAGNOSIS — K64.4 EXTERNAL HEMORRHOID, BLEEDING: Primary | ICD-10-CM

## 2023-09-15 PROCEDURE — 99283 EMERGENCY DEPT VISIT LOW MDM: CPT

## 2023-09-15 RX ORDER — HYDROCORTISONE 25 MG/G
CREAM TOPICAL
Qty: 28 G | Refills: 1 | Status: SHIPPED | OUTPATIENT
Start: 2023-09-15

## 2023-10-20 ENCOUNTER — OFFICE VISIT (OUTPATIENT)
Dept: PRIMARY CARE CLINIC | Age: 63
End: 2023-10-20
Payer: MEDICARE

## 2023-10-20 VITALS
HEART RATE: 67 BPM | OXYGEN SATURATION: 99 % | BODY MASS INDEX: 34.97 KG/M2 | TEMPERATURE: 97.8 F | WEIGHT: 230 LBS | SYSTOLIC BLOOD PRESSURE: 130 MMHG | DIASTOLIC BLOOD PRESSURE: 79 MMHG

## 2023-10-20 DIAGNOSIS — Z79.4 TYPE 2 DIABETES MELLITUS TREATED WITH INSULIN (HCC): Chronic | ICD-10-CM

## 2023-10-20 DIAGNOSIS — M62.838 NECK MUSCLE SPASM: Primary | ICD-10-CM

## 2023-10-20 DIAGNOSIS — M46.96 UNSPECIFIED INFLAMMATORY SPONDYLOPATHY, LUMBAR REGION (HCC): ICD-10-CM

## 2023-10-20 DIAGNOSIS — G95.20 CERVICAL SPINAL CORD COMPRESSION (HCC): Chronic | ICD-10-CM

## 2023-10-20 DIAGNOSIS — E11.9 TYPE 2 DIABETES MELLITUS TREATED WITH INSULIN (HCC): Chronic | ICD-10-CM

## 2023-10-20 PROCEDURE — G8417 CALC BMI ABV UP PARAM F/U: HCPCS | Performed by: FAMILY MEDICINE

## 2023-10-20 PROCEDURE — 1036F TOBACCO NON-USER: CPT | Performed by: FAMILY MEDICINE

## 2023-10-20 PROCEDURE — 2022F DILAT RTA XM EVC RTNOPTHY: CPT | Performed by: FAMILY MEDICINE

## 2023-10-20 PROCEDURE — 3017F COLORECTAL CA SCREEN DOC REV: CPT | Performed by: FAMILY MEDICINE

## 2023-10-20 PROCEDURE — 3074F SYST BP LT 130 MM HG: CPT | Performed by: FAMILY MEDICINE

## 2023-10-20 PROCEDURE — G8484 FLU IMMUNIZE NO ADMIN: HCPCS | Performed by: FAMILY MEDICINE

## 2023-10-20 PROCEDURE — G8427 DOCREV CUR MEDS BY ELIG CLIN: HCPCS | Performed by: FAMILY MEDICINE

## 2023-10-20 PROCEDURE — 99214 OFFICE O/P EST MOD 30 MIN: CPT | Performed by: FAMILY MEDICINE

## 2023-10-20 PROCEDURE — 3044F HG A1C LEVEL LT 7.0%: CPT | Performed by: FAMILY MEDICINE

## 2023-10-20 PROCEDURE — 3078F DIAST BP <80 MM HG: CPT | Performed by: FAMILY MEDICINE

## 2023-10-20 RX ORDER — METHOCARBAMOL 750 MG/1
750 TABLET, FILM COATED ORAL 4 TIMES DAILY
Qty: 360 TABLET | Refills: 0 | Status: SHIPPED | OUTPATIENT
Start: 2023-10-20 | End: 2024-01-18

## 2023-10-20 ASSESSMENT — ENCOUNTER SYMPTOMS
DIARRHEA: 0
SHORTNESS OF BREATH: 0
VOMITING: 0
CONSTIPATION: 0
COUGH: 0
NAUSEA: 0

## 2023-10-20 NOTE — PATIENT INSTRUCTIONS
GENERAL OFFICE POLICIES        Telephone Calls: Messages will be answered within 1-2 business days, unless the provider is out of the office. If it is urgent a covering provider will answer. (this does not include Medication refills). MyChart: We recommend all patients sign up for [facility]. Through this portal you can see your lab results, request refills, schedule appointments, pay your bill and send messages to the office. [facility] messages will be answered within 1-2 business days unless the provider is out of the office. For urgent matters, please call the office. Appointments:  All appointments must be scheduled. We ask all patients to schedule their next follow up appointment before they leave the office to make sure you will be able to be seen before you run out of medications. 24 hours' notice is required to cancel or reschedule an appointment to avoid being marked as a no show. You may be dismissed from the practice after 3 no shows. LATE for Appointment: If you are 15 or more minutes late for your appointment, you may be asked to reschedule. MA/LAB APPTS: Must be scheduled, cannot accept walk in lab visits. We only draw labs for patients established in our office. We only do injections for medications ordered by our office. Acute Sick Visits:  Nothing other than acute complaint will be addressed at this visit. TRADITIONAL MEDICARE DOES NOT COVER PHYSICALS  MEDICARE WELLNESS VISITS: These are NOT physicals, but the free annual visit offered by Medicare to discuss wellness issues. Medication refills, checkups, etc. will not be addressed during this visit. Medication Refills: Refills are handled electronically; please contact your pharmacy for refills even if current refills have been exhausted. If you are on a controlled medication, you will be referred to a specialist (pain specialist, psychiatry, etc). Forms:  There is a $35 fee to fill out FMLA/Disability paperwork,

## 2023-10-20 NOTE — PROGRESS NOTES
Madonna Pedroza (:  1960) is a 58 y.o. male,Established patient, here for evaluation of the following chief complaint(s):  Neck Pain (Pain that radiates stiffness in neck)       SUBJECTIVE:  10.20.23 -- acute visit, neck pain  Woke up with neck pain 3 days ago, more stiffness rather than pain related. - Using celebrex, out of his muscle relaxant  -- hot pack, stretches, restart robaxin as this worked in the past when he had some of the medicine left over  - will let us know if worsens, can refer to PT as needed  - alrady had hx of neck pain, central cord syndrome      23:  Seeing endocrinology, he gets metformin from dr. Eleuterio Edmondson -- 1000 mg BID  A1c 6.7. Sees foot doctor next month  Is on trulicity as well which is helping. 3.29.23:  Recheck A1c in office today -- continues on trulicity, O9W 6.5 today  Much improved on his dietary changes. 22 --  Fasting glucose in office is 139 -- this is an improvement. Is tolerating the trulicity injections that he was started on at his last visit. Has continued his dietary changes of not eating super processed foods and breads. Will recheck A1c today, as well as transaminases which were elevated in October due to cholecystitis s/p cholecystectomy  -- follow in 3 mo    69.0.31 --  Adding trulicity to his metformin, will see if this helps bring his glucose levels down  Was eating a lot of bread and hamburger and hot dog buns. Was eating white buns. Was also eating a lot of potato chips and hashbrowns and french fries. Is planning to cut down on a lot of these things. Will keep his appt in Dec for fasting glucose level -- will adjust time to be earlier in the daytime to accomodate fasting    22 -- stopped taking his HCTZ due to cramping, stopped approx in .   He started drinking more water and urinating more, and no longer having issues with swelling of legs     5 - follow up after increased metformin dose to 1000 mg

## 2023-10-31 DIAGNOSIS — M48.061 SPINAL STENOSIS, LUMBAR REGION, WITHOUT NEUROGENIC CLAUDICATION: ICD-10-CM

## 2023-11-02 RX ORDER — CELECOXIB 200 MG/1
CAPSULE ORAL
Qty: 30 CAPSULE | Refills: 2 | Status: SHIPPED | OUTPATIENT
Start: 2023-11-02

## 2023-11-02 NOTE — TELEPHONE ENCOUNTER
Patient requesting a medication refill.   Pharm[facility]: [facility]  Next office visit: 12/29/2023  Last regular office visit: 10/20/2023

## 2023-11-28 DIAGNOSIS — E11.59 OBESITY, DIABETES, AND HYPERTENSION SYNDROME (HCC): Chronic | ICD-10-CM

## 2023-11-28 DIAGNOSIS — I15.2 OBESITY, DIABETES, AND HYPERTENSION SYNDROME (HCC): Chronic | ICD-10-CM

## 2023-11-28 DIAGNOSIS — I10 DIABETES MELLITUS WITH COINCIDENT HYPERTENSION (HCC): Chronic | ICD-10-CM

## 2023-11-28 DIAGNOSIS — E66.9 OBESITY, DIABETES, AND HYPERTENSION SYNDROME (HCC): Chronic | ICD-10-CM

## 2023-11-28 DIAGNOSIS — E11.69 OBESITY, DIABETES, AND HYPERTENSION SYNDROME (HCC): Chronic | ICD-10-CM

## 2023-11-28 DIAGNOSIS — E11.9 DIABETES MELLITUS WITH COINCIDENT HYPERTENSION (HCC): Chronic | ICD-10-CM

## 2023-11-28 NOTE — TELEPHONE ENCOUNTER
Medication:   Requested Prescriptions     Pending Prescriptions Disp Refills    Dulaglutide (TRULICITY) 3.18 LJ/9.1MJ SOPN 4 Adjustable Dose Pre-filled Pen Syringe 4     Sig: Inject 0.75 mg into the skin once a week    metFORMIN (GLUCOPHAGE-XR) 500 MG extended release tablet 120 tablet 5     Sig: Take 2 tablets by mouth in the morning and at bedtime TAKE 2 TABLETS BY MOUTH twice daily       Last Filled:      Patient Phone Number: [phone] (home)     Last appt: 10/20/2023   Next appt: 12/29/2023    Last Labs DM:   Lab Results   Component Value Date/Time    LABA1C 6.7 06/29/2023 11:22 AM

## 2023-11-28 NOTE — TELEPHONE ENCOUNTER
Patient walked in for refill on   Pt will be out of medication 11/29/23   Appt 12/29/23  metFORMIN (GLUCOPHAGE-XR) 500 MG extended release tablet    Dulaglutide (TRULICITY) 4.46 BI/9.6KC SOPN    [id number] [place], [place] - [facility] [address].  Yvrose Arango [phone] - F [phone]

## 2023-11-30 RX ORDER — METFORMIN HYDROCHLORIDE 500 MG/1
1000 TABLET, EXTENDED RELEASE ORAL 2 TIMES DAILY
Qty: 120 TABLET | Refills: 5 | Status: SHIPPED | OUTPATIENT
Start: 2023-11-30 | End: 2024-05-28

## 2023-11-30 RX ORDER — DULAGLUTIDE 0.75 MG/.5ML
0.75 INJECTION, SOLUTION SUBCUTANEOUS WEEKLY
Qty: 4 ADJUSTABLE DOSE PRE-FILLED PEN SYRINGE | Refills: 4 | Status: SHIPPED | OUTPATIENT
Start: 2023-11-30

## 2023-12-29 ENCOUNTER — OFFICE VISIT (OUTPATIENT)
Dept: PRIMARY CARE CLINIC | Age: 63
End: 2023-12-29
Payer: MEDICARE

## 2023-12-29 VITALS
TEMPERATURE: 98.6 F | SYSTOLIC BLOOD PRESSURE: 126 MMHG | DIASTOLIC BLOOD PRESSURE: 80 MMHG | HEIGHT: 68 IN | WEIGHT: 232 LBS | HEART RATE: 89 BPM | BODY MASS INDEX: 35.16 KG/M2 | OXYGEN SATURATION: 96 %

## 2023-12-29 DIAGNOSIS — E11.9 DIABETES MELLITUS WITH COINCIDENT HYPERTENSION (HCC): Chronic | ICD-10-CM

## 2023-12-29 DIAGNOSIS — E78.5 TYPE 2 DIABETES MELLITUS WITH HYPERLIPIDEMIA (HCC): Chronic | ICD-10-CM

## 2023-12-29 DIAGNOSIS — I10 DIABETES MELLITUS WITH COINCIDENT HYPERTENSION (HCC): Chronic | ICD-10-CM

## 2023-12-29 DIAGNOSIS — J30.2 SEASONAL ALLERGIES: ICD-10-CM

## 2023-12-29 DIAGNOSIS — I15.2 OBESITY, DIABETES, AND HYPERTENSION SYNDROME (HCC): Chronic | ICD-10-CM

## 2023-12-29 DIAGNOSIS — E11.59 OBESITY, DIABETES, AND HYPERTENSION SYNDROME (HCC): Chronic | ICD-10-CM

## 2023-12-29 DIAGNOSIS — Z23 NEEDS FLU SHOT: ICD-10-CM

## 2023-12-29 DIAGNOSIS — E11.69 OBESITY, DIABETES, AND HYPERTENSION SYNDROME (HCC): Chronic | ICD-10-CM

## 2023-12-29 DIAGNOSIS — Z00.00 INITIAL MEDICARE ANNUAL WELLNESS VISIT: ICD-10-CM

## 2023-12-29 DIAGNOSIS — E11.9 TYPE 2 DIABETES MELLITUS TREATED WITH INSULIN (HCC): Primary | ICD-10-CM

## 2023-12-29 DIAGNOSIS — E66.9 OBESITY, DIABETES, AND HYPERTENSION SYNDROME (HCC): Chronic | ICD-10-CM

## 2023-12-29 DIAGNOSIS — Z79.4 TYPE 2 DIABETES MELLITUS TREATED WITH INSULIN (HCC): Primary | ICD-10-CM

## 2023-12-29 DIAGNOSIS — E11.69 TYPE 2 DIABETES MELLITUS WITH HYPERLIPIDEMIA (HCC): Chronic | ICD-10-CM

## 2023-12-29 DIAGNOSIS — M48.061 SPINAL STENOSIS, LUMBAR REGION, WITHOUT NEUROGENIC CLAUDICATION: ICD-10-CM

## 2023-12-29 DIAGNOSIS — K21.9 GERD WITHOUT ESOPHAGITIS: ICD-10-CM

## 2023-12-29 LAB — HBA1C MFR BLD: 8.5 %

## 2023-12-29 PROCEDURE — 1036F TOBACCO NON-USER: CPT | Performed by: FAMILY MEDICINE

## 2023-12-29 PROCEDURE — G8427 DOCREV CUR MEDS BY ELIG CLIN: HCPCS | Performed by: FAMILY MEDICINE

## 2023-12-29 PROCEDURE — 83036 HEMOGLOBIN GLYCOSYLATED A1C: CPT | Performed by: FAMILY MEDICINE

## 2023-12-29 PROCEDURE — 3079F DIAST BP 80-89 MM HG: CPT | Performed by: FAMILY MEDICINE

## 2023-12-29 PROCEDURE — 3074F SYST BP LT 130 MM HG: CPT | Performed by: FAMILY MEDICINE

## 2023-12-29 PROCEDURE — G0438 PPPS, INITIAL VISIT: HCPCS | Performed by: FAMILY MEDICINE

## 2023-12-29 PROCEDURE — 2022F DILAT RTA XM EVC RTNOPTHY: CPT | Performed by: FAMILY MEDICINE

## 2023-12-29 PROCEDURE — 3017F COLORECTAL CA SCREEN DOC REV: CPT | Performed by: FAMILY MEDICINE

## 2023-12-29 PROCEDURE — 99214 OFFICE O/P EST MOD 30 MIN: CPT | Performed by: FAMILY MEDICINE

## 2023-12-29 PROCEDURE — G8417 CALC BMI ABV UP PARAM F/U: HCPCS | Performed by: FAMILY MEDICINE

## 2023-12-29 PROCEDURE — G8484 FLU IMMUNIZE NO ADMIN: HCPCS | Performed by: FAMILY MEDICINE

## 2023-12-29 RX ORDER — LOSARTAN POTASSIUM 25 MG/1
50 TABLET ORAL DAILY
Qty: 180 TABLET | Refills: 1 | Status: SHIPPED | OUTPATIENT
Start: 2023-12-29

## 2023-12-29 RX ORDER — ASPIRIN 81 MG/1
81 TABLET, CHEWABLE ORAL DAILY
Qty: 90 TABLET | Refills: 1 | Status: SHIPPED | OUTPATIENT
Start: 2023-12-29

## 2023-12-29 RX ORDER — OMEPRAZOLE 20 MG/1
CAPSULE, DELAYED RELEASE ORAL
Qty: 180 CAPSULE | Refills: 1 | Status: SHIPPED | OUTPATIENT
Start: 2023-12-29

## 2023-12-29 RX ORDER — ATORVASTATIN CALCIUM 40 MG/1
40 TABLET, FILM COATED ORAL NIGHTLY
Qty: 90 TABLET | Refills: 1 | Status: SHIPPED | OUTPATIENT
Start: 2023-12-29 | End: 2024-06-26

## 2023-12-29 RX ORDER — FAMOTIDINE 20 MG/1
20 TABLET, FILM COATED ORAL 2 TIMES DAILY
Qty: 180 TABLET | Refills: 0 | Status: SHIPPED | OUTPATIENT
Start: 2023-12-29

## 2023-12-29 RX ORDER — CELECOXIB 200 MG/1
200 CAPSULE ORAL DAILY
Qty: 90 CAPSULE | Refills: 1 | Status: SHIPPED | OUTPATIENT
Start: 2023-12-29

## 2023-12-29 RX ORDER — METFORMIN HYDROCHLORIDE 500 MG/1
1000 TABLET, EXTENDED RELEASE ORAL 2 TIMES DAILY
Qty: 120 TABLET | Refills: 5 | Status: SHIPPED | OUTPATIENT
Start: 2023-12-29 | End: 2024-06-26

## 2023-12-29 RX ORDER — HYDROCORTISONE 25 MG/G
CREAM TOPICAL
Qty: 28 G | Refills: 1 | Status: SHIPPED | OUTPATIENT
Start: 2023-12-29

## 2023-12-29 RX ORDER — CETIRIZINE HYDROCHLORIDE 10 MG/1
10 TABLET ORAL DAILY
Qty: 90 TABLET | Refills: 1 | Status: SHIPPED | OUTPATIENT
Start: 2023-12-29

## 2023-12-29 RX ORDER — METHOCARBAMOL 750 MG/1
750 TABLET, FILM COATED ORAL 4 TIMES DAILY
Qty: 360 TABLET | Refills: 0 | Status: SHIPPED | OUTPATIENT
Start: 2023-12-29 | End: 2024-03-28

## 2023-12-29 ASSESSMENT — PATIENT HEALTH QUESTIONNAIRE - PHQ9
SUM OF ALL RESPONSES TO PHQ QUESTIONS 1-9: 0
SUM OF ALL RESPONSES TO PHQ9 QUESTIONS 1 & 2: 0
1. LITTLE INTEREST OR PLEASURE IN DOING THINGS: 0
SUM OF ALL RESPONSES TO PHQ QUESTIONS 1-9: 0
2. FEELING DOWN, DEPRESSED OR HOPELESS: 0
SUM OF ALL RESPONSES TO PHQ QUESTIONS 1-9: 0
SUM OF ALL RESPONSES TO PHQ QUESTIONS 1-9: 0

## 2023-12-29 ASSESSMENT — LIFESTYLE VARIABLES
HOW MANY STANDARD DRINKS CONTAINING ALCOHOL DO YOU HAVE ON A TYPICAL DAY: PATIENT DOES NOT DRINK
HOW OFTEN DO YOU HAVE A DRINK CONTAINING ALCOHOL: NEVER

## 2023-12-29 NOTE — PROGRESS NOTES
Medicare Annual Wellness Visit    Wang Starr is here for 6 Month Follow-Up and Medicare AWV    Assessment & Plan   Type 2 diabetes mellitus treated with insulin (HCC)  -     POCT glycosylated hemoglobin (Hb A1C)  GERD without esophagitis  -     omeprazole (PRILOSEC) 20 MG delayed release capsule; Take 2 capsules by mouth daily, Disp-180 capsule, R-1Normal  -     famotidine (PEPCID) 20 MG tablet; Take 1 tablet by mouth 2 times daily, Disp-180 tablet, R-0Normal  Diabetes mellitus with coincident hypertension (HCC)  Comments:  BP stable  Orders:  -     metFORMIN (GLUCOPHAGE-XR) 500 MG extended release tablet; Take 2 tablets by mouth in the morning and at bedtime TAKE 2 TABLETS BY MOUTH twice daily, Disp-120 tablet, R-5Normal  -     losartan (COZAAR) 25 MG tablet; Take 2 tablets by mouth daily, Disp-180 tablet, R-1Normal  Obesity, diabetes, and hypertension syndrome (HCC)  Comments:  working on dietary changes  Orders:  -     metFORMIN (GLUCOPHAGE-XR) 500 MG extended release tablet; Take 2 tablets by mouth in the morning and at bedtime TAKE 2 TABLETS BY MOUTH twice daily, Disp-120 tablet, R-5Normal  -     losartan (COZAAR) 25 MG tablet; Take 2 tablets by mouth daily, Disp-180 tablet, R-1Normal  Diabetes mellitus with coincident hypertension (HCC)  Comments:  BP stable, continue losartan daily  Orders:  -     metFORMIN (GLUCOPHAGE-XR) 500 MG extended release tablet; Take 2 tablets by mouth in the morning and at bedtime TAKE 2 TABLETS BY MOUTH twice daily, Disp-120 tablet, R-5Normal  -     losartan (COZAAR) 25 MG tablet; Take 2 tablets by mouth daily, Disp-180 tablet, R-1Normal  Obesity, diabetes, and hypertension syndrome (HCC)  Comments:  A1c better, blood pressure controlled  Orders:  -     metFORMIN (GLUCOPHAGE-XR) 500 MG extended release tablet; Take 2 tablets by mouth in the morning and at bedtime TAKE 2 TABLETS BY MOUTH twice daily, Disp-120 tablet, R-5Normal  -     losartan (COZAAR) 25 MG tablet; Take 2

## 2023-12-29 NOTE — PROGRESS NOTES
Wang Starr (:  1960) is a 63 y.o. male,Established patient, here for evaluation of the following chief complaint(s):  6 Month Follow-Up and Medicare AW       SUBJECTIVE:  23:    Neck pain improved with muscle relaxant, mobic  Blood pressure -- stable, needs refill on medication  DM - following with endocrine, next appt in the next few months, every 6 mo      10.20.23 -- acute visit, neck pain  Woke up with neck pain 3 days ago, more stiffness rather than pain related.    - Using celebrex, out of his muscle relaxant  -- hot pack, stretches, restart robaxin as this worked in the past when he had some of the medicine left over  - will let us know if worsens, can refer to PT as needed  - alrady had hx of neck pain, central cord syndrome      23:  Seeing endocrinology, he gets metformin from dr. [place]iedo -- 1000 mg BID  A1c 6.7.  Sees foot doctor next month  Is on trulicity as well which is helping.       3.29.23:  Recheck A1c in office today -- continues on trulicity, A1c 6.5 today  Much improved on his dietary changes.       22 --  Fasting glucose in office is 139 -- this is an improvement.  Is tolerating the trulicity injections that he was started on at his last visit.  Has continued his dietary changes of not eating super processed foods and breads.   Will recheck A1c today, as well as transaminases which were elevated in October due to cholecystitis s/p cholecystectomy  -- follow in 3 mo    22 --  Adding trulicity to his metformin, will see if this helps bring his glucose levels down  Was eating a lot of bread and hamburger and hot dog buns.  Was eating white buns. Was also eating a lot of potato chips and hashbrowns and french fries.    Is planning to cut down on a lot of these things.   Will keep his appt in Dec for fasting glucose level -- will adjust time to be earlier in the daytime to accomodate fasting    22 -- stopped taking his HCTZ due to cramping, stopped approx

## 2023-12-29 NOTE — PATIENT INSTRUCTIONS
medical history including lifestyle, illnesses that may run in your family, and various assessments and screenings as appropriate.    After reviewing your medical record and screening and assessments performed today your provider may have ordered immunizations, labs, imaging, and/or referrals for you.  A list of these orders (if applicable) as well as your Preventive Care list are included within your After Visit Summary for your review.    Other Preventive Recommendations:    A preventive eye exam performed by an eye specialist is recommended every 1-2 years to screen for glaucoma; cataracts, macular degeneration, and other eye disorders.  A preventive dental visit is recommended every 6 months.  Try to get at least 150 minutes of exercise per week or 10,000 steps per day on a pedometer .  Order or download the FREE \"Exercise & Physical Activity: Your Everyday Guide\" from [employer] [place] [employer]. Call 1-403.195.7674 or search [employer] [place] [employer] online.  You need 0575-8558 mg of calcium and 1288-6977 IU of vitamin D per day. It is possible to meet your calcium requirement with diet alone, but a vitamin D supplement is usually necessary to meet this goal.  When exposed to the sun, use a sunscreen that protects against both UVA and UVB radiation with an SPF of 30 or greater. Reapply every 2 to 3 hours or after sweating, drying off with a towel, or swimming.  Always wear a seat belt when traveling in a car. Always wear a helmet when riding a bicycle or motorcycle.

## 2024-02-27 DIAGNOSIS — K21.9 GERD WITHOUT ESOPHAGITIS: ICD-10-CM

## 2024-02-27 NOTE — TELEPHONE ENCOUNTER
Medication:   Requested Prescriptions     Pending Prescriptions Disp Refills    famotidine (PEPCID) 20 MG tablet [Pharmacy Med Name: FAMOTIDINE 20 MG TABLET] 180 tablet 0     Sig: TAKE 1 TABLET BY MOUTH TWICE A DAY        Last Filled:      Patient Phone Number: [phone] (home)     Last appt: 12/29/2023   Next appt: 7/1/2024    Last OARRS:        No data to display

## 2024-02-28 RX ORDER — FAMOTIDINE 20 MG/1
20 TABLET, FILM COATED ORAL 2 TIMES DAILY
Qty: 180 TABLET | Refills: 0 | Status: SHIPPED | OUTPATIENT
Start: 2024-02-28

## 2024-04-18 ENCOUNTER — TELEPHONE (OUTPATIENT)
Dept: PRIMARY CARE CLINIC | Age: 64
End: 2024-04-18

## 2024-04-18 NOTE — TELEPHONE ENCOUNTER
Insurance will not cover methocarbamol. Patient needs a comparable medication sent to [facility] on [address]. Please advise ASAP.

## 2024-04-19 SDOH — ECONOMIC STABILITY: FOOD INSECURITY: WITHIN THE PAST 12 MONTHS, YOU WORRIED THAT YOUR FOOD WOULD RUN OUT BEFORE YOU GOT MONEY TO BUY MORE.: PATIENT DECLINED

## 2024-04-19 SDOH — ECONOMIC STABILITY: FOOD INSECURITY: WITHIN THE PAST 12 MONTHS, THE FOOD YOU BOUGHT JUST DIDN'T LAST AND YOU DIDN'T HAVE MONEY TO GET MORE.: PATIENT DECLINED

## 2024-04-19 SDOH — ECONOMIC STABILITY: INCOME INSECURITY: HOW HARD IS IT FOR YOU TO PAY FOR THE VERY BASICS LIKE FOOD, HOUSING, MEDICAL CARE, AND HEATING?: SOMEWHAT HARD

## 2024-04-19 SDOH — ECONOMIC STABILITY: HOUSING INSECURITY
IN THE LAST 12 MONTHS, WAS THERE A TIME WHEN YOU DID NOT HAVE A STEADY PLACE TO SLEEP OR SLEPT IN A SHELTER (INCLUDING NOW)?: PATIENT DECLINED

## 2024-04-19 SDOH — ECONOMIC STABILITY: TRANSPORTATION INSECURITY
IN THE PAST 12 MONTHS, HAS LACK OF TRANSPORTATION KEPT YOU FROM MEETINGS, WORK, OR FROM GETTING THINGS NEEDED FOR DAILY LIVING?: PATIENT DECLINED

## 2024-04-19 ASSESSMENT — PATIENT HEALTH QUESTIONNAIRE - PHQ9
2. FEELING DOWN, DEPRESSED OR HOPELESS: NOT AT ALL
SUM OF ALL RESPONSES TO PHQ9 QUESTIONS 1 & 2: 0
1. LITTLE INTEREST OR PLEASURE IN DOING THINGS: NOT AT ALL
2. FEELING DOWN, DEPRESSED OR HOPELESS: NOT AT ALL
1. LITTLE INTEREST OR PLEASURE IN DOING THINGS: NOT AT ALL
SUM OF ALL RESPONSES TO PHQ9 QUESTIONS 1 & 2: 0
SUM OF ALL RESPONSES TO PHQ QUESTIONS 1-9: 0

## 2024-04-22 ENCOUNTER — OFFICE VISIT (OUTPATIENT)
Dept: PRIMARY CARE CLINIC | Age: 64
End: 2024-04-22
Payer: MEDICARE

## 2024-04-22 VITALS
WEIGHT: 229.8 LBS | SYSTOLIC BLOOD PRESSURE: 120 MMHG | BODY MASS INDEX: 34.83 KG/M2 | TEMPERATURE: 97.4 F | RESPIRATION RATE: 16 BRPM | OXYGEN SATURATION: 98 % | HEIGHT: 68 IN | HEART RATE: 87 BPM | DIASTOLIC BLOOD PRESSURE: 70 MMHG

## 2024-04-22 DIAGNOSIS — E11.59 OBESITY, DIABETES, AND HYPERTENSION SYNDROME (HCC): Chronic | ICD-10-CM

## 2024-04-22 DIAGNOSIS — Z86.010 HISTORY OF COLON POLYPS: ICD-10-CM

## 2024-04-22 DIAGNOSIS — E55.9 VITAMIN D DEFICIENCY: ICD-10-CM

## 2024-04-22 DIAGNOSIS — E11.69 TYPE 2 DIABETES MELLITUS WITH HYPERLIPIDEMIA (HCC): Chronic | ICD-10-CM

## 2024-04-22 DIAGNOSIS — E11.8 TYPE 2 DIABETES MELLITUS WITH COMPLICATION, WITHOUT LONG-TERM CURRENT USE OF INSULIN (HCC): ICD-10-CM

## 2024-04-22 DIAGNOSIS — Z12.5 PROSTATE CANCER SCREENING: ICD-10-CM

## 2024-04-22 DIAGNOSIS — E11.9 DIABETES MELLITUS WITH COINCIDENT HYPERTENSION (HCC): Primary | Chronic | ICD-10-CM

## 2024-04-22 DIAGNOSIS — E66.9 OBESITY, DIABETES, AND HYPERTENSION SYNDROME (HCC): Chronic | ICD-10-CM

## 2024-04-22 DIAGNOSIS — E11.69 OBESITY, DIABETES, AND HYPERTENSION SYNDROME (HCC): Chronic | ICD-10-CM

## 2024-04-22 DIAGNOSIS — K21.9 GERD WITHOUT ESOPHAGITIS: ICD-10-CM

## 2024-04-22 DIAGNOSIS — M79.10 MUSCLE PAIN: ICD-10-CM

## 2024-04-22 DIAGNOSIS — I10 DIABETES MELLITUS WITH COINCIDENT HYPERTENSION (HCC): Primary | Chronic | ICD-10-CM

## 2024-04-22 DIAGNOSIS — G95.20 CERVICAL SPINAL CORD COMPRESSION (HCC): ICD-10-CM

## 2024-04-22 DIAGNOSIS — I10 ESSENTIAL HYPERTENSION: ICD-10-CM

## 2024-04-22 DIAGNOSIS — R53.83 OTHER FATIGUE: ICD-10-CM

## 2024-04-22 DIAGNOSIS — I15.2 OBESITY, DIABETES, AND HYPERTENSION SYNDROME (HCC): Chronic | ICD-10-CM

## 2024-04-22 DIAGNOSIS — E78.5 TYPE 2 DIABETES MELLITUS WITH HYPERLIPIDEMIA (HCC): Chronic | ICD-10-CM

## 2024-04-22 PROBLEM — S14.129A CENTRAL CORD SYNDROME AT UNSPECIFIED LEVEL OF CERVICAL SPINAL CORD, INITIAL ENCOUNTER (HCC): Status: RESOLVED | Noted: 2021-01-28 | Resolved: 2024-04-22

## 2024-04-22 LAB
25(OH)D3 SERPL-MCNC: 26.9 NG/ML
ALBUMIN SERPL-MCNC: 4.6 G/DL (ref 3.4–5)
ALBUMIN/GLOB SERPL: 2.2 {RATIO} (ref 1.1–2.2)
ALP SERPL-CCNC: 131 U/L (ref 40–129)
ALT SERPL-CCNC: 38 U/L (ref 10–40)
ANION GAP SERPL CALCULATED.3IONS-SCNC: 14 MMOL/L (ref 3–16)
AST SERPL-CCNC: 34 U/L (ref 15–37)
BACTERIA URNS QL MICRO: ABNORMAL /HPF
BILIRUB SERPL-MCNC: 0.6 MG/DL (ref 0–1)
BILIRUB UR QL STRIP.AUTO: NEGATIVE
BUN SERPL-MCNC: 19 MG/DL (ref 7–20)
CALCIUM SERPL-MCNC: 9.5 MG/DL (ref 8.3–10.6)
CHLORIDE SERPL-SCNC: 105 MMOL/L (ref 99–110)
CLARITY UR: CLEAR
CO2 SERPL-SCNC: 21 MMOL/L (ref 21–32)
COLOR UR: ABNORMAL
CREAT SERPL-MCNC: 1 MG/DL (ref 0.8–1.3)
CREAT UR-MCNC: 281.1 MG/DL (ref 39–259)
EPI CELLS #/AREA URNS AUTO: 1 /HPF (ref 0–5)
GFR SERPLBLD CREATININE-BSD FMLA CKD-EPI: 84 ML/MIN/{1.73_M2}
GLUCOSE SERPL-MCNC: 217 MG/DL (ref 70–99)
GLUCOSE UR STRIP.AUTO-MCNC: 250 MG/DL
HBA1C MFR BLD: 8.1 %
HGB UR QL STRIP.AUTO: NEGATIVE
HYALINE CASTS #/AREA URNS AUTO: 0 /LPF (ref 0–8)
KETONES UR STRIP.AUTO-MCNC: ABNORMAL MG/DL
LEUKOCYTE ESTERASE UR QL STRIP.AUTO: NEGATIVE
MICROALBUMIN UR DL<=1MG/L-MCNC: 1.5 MG/DL
MICROALBUMIN/CREAT UR: 5.3 MG/G (ref 0–30)
NITRITE UR QL STRIP.AUTO: NEGATIVE
PH UR STRIP.AUTO: 5.5 [PH] (ref 5–8)
POTASSIUM SERPL-SCNC: 4.5 MMOL/L (ref 3.5–5.1)
PROT SERPL-MCNC: 6.7 G/DL (ref 6.4–8.2)
PROT UR STRIP.AUTO-MCNC: ABNORMAL MG/DL
PSA SERPL DL<=0.01 NG/ML-MCNC: 0.32 NG/ML (ref 0–4)
RBC CLUMPS #/AREA URNS AUTO: 2 /HPF (ref 0–4)
SODIUM SERPL-SCNC: 140 MMOL/L (ref 136–145)
SP GR UR STRIP.AUTO: 1.04 (ref 1–1.03)
UA DIPSTICK W REFLEX MICRO PNL UR: YES
URN SPEC COLLECT METH UR: ABNORMAL
UROBILINOGEN UR STRIP-ACNC: 1 E.U./DL
VIT B12 SERPL-MCNC: 1316 PG/ML (ref 211–911)
WBC #/AREA URNS AUTO: 0 /HPF (ref 0–5)

## 2024-04-22 PROCEDURE — 2022F DILAT RTA XM EVC RTNOPTHY: CPT | Performed by: INTERNAL MEDICINE

## 2024-04-22 PROCEDURE — 1036F TOBACCO NON-USER: CPT | Performed by: INTERNAL MEDICINE

## 2024-04-22 PROCEDURE — G8417 CALC BMI ABV UP PARAM F/U: HCPCS | Performed by: INTERNAL MEDICINE

## 2024-04-22 PROCEDURE — 83036 HEMOGLOBIN GLYCOSYLATED A1C: CPT | Performed by: INTERNAL MEDICINE

## 2024-04-22 PROCEDURE — 3052F HG A1C>EQUAL 8.0%<EQUAL 9.0%: CPT | Performed by: INTERNAL MEDICINE

## 2024-04-22 PROCEDURE — 99214 OFFICE O/P EST MOD 30 MIN: CPT | Performed by: INTERNAL MEDICINE

## 2024-04-22 PROCEDURE — G8427 DOCREV CUR MEDS BY ELIG CLIN: HCPCS | Performed by: INTERNAL MEDICINE

## 2024-04-22 PROCEDURE — 3074F SYST BP LT 130 MM HG: CPT | Performed by: INTERNAL MEDICINE

## 2024-04-22 PROCEDURE — 3017F COLORECTAL CA SCREEN DOC REV: CPT | Performed by: INTERNAL MEDICINE

## 2024-04-22 PROCEDURE — 3078F DIAST BP <80 MM HG: CPT | Performed by: INTERNAL MEDICINE

## 2024-04-22 RX ORDER — METFORMIN HYDROCHLORIDE 500 MG/1
1000 TABLET, EXTENDED RELEASE ORAL 2 TIMES DAILY
Qty: 360 TABLET | Refills: 3 | Status: SHIPPED | OUTPATIENT
Start: 2024-04-22 | End: 2025-04-17

## 2024-04-22 RX ORDER — BLOOD-GLUCOSE SENSOR
1 EACH MISCELLANEOUS
Qty: 2 EACH | Refills: 12 | Status: SHIPPED | OUTPATIENT
Start: 2024-04-22

## 2024-04-22 RX ORDER — TIZANIDINE 2 MG/1
2 TABLET ORAL EVERY 8 HOURS PRN
Qty: 90 TABLET | Refills: 3 | Status: SHIPPED | OUTPATIENT
Start: 2024-04-22

## 2024-04-22 RX ORDER — DULAGLUTIDE 1.5 MG/.5ML
1.5 INJECTION, SOLUTION SUBCUTANEOUS WEEKLY
Qty: 4 ADJUSTABLE DOSE PRE-FILLED PEN SYRINGE | Refills: 5 | Status: SHIPPED | OUTPATIENT
Start: 2024-04-22

## 2024-04-22 RX ORDER — ATORVASTATIN CALCIUM 40 MG/1
40 TABLET, FILM COATED ORAL NIGHTLY
Qty: 90 TABLET | Refills: 1 | Status: SHIPPED | OUTPATIENT
Start: 2024-04-22 | End: 2024-10-19

## 2024-04-22 RX ORDER — OMEPRAZOLE 20 MG/1
CAPSULE, DELAYED RELEASE ORAL
Qty: 180 CAPSULE | Refills: 1 | Status: SHIPPED | OUTPATIENT
Start: 2024-04-22

## 2024-04-22 RX ORDER — LOSARTAN POTASSIUM 50 MG/1
50 TABLET ORAL DAILY
Qty: 90 TABLET | Refills: 1 | Status: SHIPPED | OUTPATIENT
Start: 2024-04-22

## 2024-04-22 RX ORDER — ASPIRIN 81 MG/1
81 TABLET ORAL DAILY
Qty: 90 TABLET | Refills: 3 | Status: SHIPPED | OUTPATIENT
Start: 2024-04-22

## 2024-04-22 RX ORDER — ERGOCALCIFEROL 1.25 MG/1
50000 CAPSULE ORAL WEEKLY
Qty: 12 CAPSULE | Refills: 3 | Status: SHIPPED | OUTPATIENT
Start: 2024-04-22

## 2024-04-22 RX ORDER — FAMOTIDINE 20 MG/1
20 TABLET, FILM COATED ORAL 2 TIMES DAILY
Qty: 180 TABLET | Refills: 3 | Status: SHIPPED | OUTPATIENT
Start: 2024-04-22

## 2024-04-22 SDOH — ECONOMIC STABILITY: HOUSING INSECURITY
IN THE LAST 12 MONTHS, WAS THERE A TIME WHEN YOU DID NOT HAVE A STEADY PLACE TO SLEEP OR SLEPT IN A SHELTER (INCLUDING NOW)?: NO

## 2024-04-22 SDOH — ECONOMIC STABILITY: FOOD INSECURITY: WITHIN THE PAST 12 MONTHS, THE FOOD YOU BOUGHT JUST DIDN'T LAST AND YOU DIDN'T HAVE MONEY TO GET MORE.: NEVER TRUE

## 2024-04-22 SDOH — ECONOMIC STABILITY: FOOD INSECURITY: WITHIN THE PAST 12 MONTHS, YOU WORRIED THAT YOUR FOOD WOULD RUN OUT BEFORE YOU GOT MONEY TO BUY MORE.: NEVER TRUE

## 2024-04-22 SDOH — ECONOMIC STABILITY: INCOME INSECURITY: HOW HARD IS IT FOR YOU TO PAY FOR THE VERY BASICS LIKE FOOD, HOUSING, MEDICAL CARE, AND HEATING?: NOT HARD AT ALL

## 2024-04-22 ASSESSMENT — ENCOUNTER SYMPTOMS
VISUAL CHANGE: 0
EYES NEGATIVE: 1
ALLERGIC/IMMUNOLOGIC NEGATIVE: 1
BLURRED VISION: 0

## 2024-04-22 NOTE — RESULT ENCOUNTER NOTE
Discussed with patient at office visit and Trulicity increased from 0.75 to 1.5 mg weekly since no nausea or intolerance.

## 2024-04-23 NOTE — RESULT ENCOUNTER NOTE
The lab test shows no protein in the urine.  The B12 level is good slightly high.  We can reduce your supplement to every other day instead of daily. Your vitamin D level is low at 26.9.  Normally the vitamin D level should be above 30.  A level of 50 is ideal.  Vitamin D is important for healthy bones and a healthy immune system.  Low vitamin D levels making you more prone to getting viral infections.  Also a chronically low vitamin D level is a risk factor for breast and colon cancer.  I sent in a prescription for vitamin D to the take 50,000 units once a week.  Stable kidney and liver test.  The prostate blood test is normal..

## 2024-04-23 NOTE — PROGRESS NOTES
Wang Starr (:  1960) is a 63 y.o. male,Established patient, here for evaluation of the following chief complaint(s):  Medication Check      Assessment & Plan   ASSESSMENT/PLAN:  1. Diabetes mellitus with coincident hypertension (HCC)  BP Readings from Last 3 Encounters:   24 120/70   23 126/80   10/20/23 130/79     Is controlled on losartan 50 mg daily, check CMP and urine microalbumin and continue current medication.  2. Obesity, diabetes, and hypertension syndrome (HCC)  Comments:  working on dietary changes    3. GERD without esophagitis controlled with famotidine and omeprazole.  Try to reduce omeprazole to every other day since famotidine has been better long-term safety profile.  Also caution patient to use Celebrex sparingly.  -     omeprazole (PRILOSEC) 20 MG delayed release capsule; Take 2 capsules by mouth daily, Disp-180 capsule, R-1Normal  -     famotidine (PEPCID) 20 MG tablet; Take 1 tablet by mouth 2 times daily, Disp-180 tablet, R-3Normal  -    4. Type 2 diabetes mellitus with hyperlipidemia (HCC) controlled on statin therapy lipids  Comment due in .  s:  Lab Results   Component Value Date    CHOL 64 2023    CHOL 75 2022    CHOL 146 09/15/2021     Lab Results   Component Value Date    TRIG 54 2023    TRIG 61 2022    TRIG 60 09/15/2021     Lab Results   Component Value Date    HDL 30 (L) 2023    HDL 31 (L) 2022    HDL 43 09/15/2021     Lab Results   Component Value Date    LDLCALC 23 2023    LDLCALC 32 2022    LDLCALC 91 09/15/2021     No results found for: \"VLDL\"  No results found for: \"CHOLHDLRATIO\"   lipids stable; continue statin  Orders:  -     atorvastatin (LIPITOR) 40 MG tablet; Take 1 tablet by mouth nightly, Disp-90 tablet, R-1Normal  -     Comprehensive Metabolic Panel; Future  -     Microalbumin / Creatinine Urine Ratio; Future  -     Vitamin B12; Future  -     Urinalysis with Microscopic; Future  -

## 2024-05-14 DIAGNOSIS — E11.59 OBESITY, DIABETES, AND HYPERTENSION SYNDROME (HCC): Chronic | ICD-10-CM

## 2024-05-14 DIAGNOSIS — E11.9 DIABETES MELLITUS WITH COINCIDENT HYPERTENSION (HCC): Chronic | ICD-10-CM

## 2024-05-14 DIAGNOSIS — I10 DIABETES MELLITUS WITH COINCIDENT HYPERTENSION (HCC): Chronic | ICD-10-CM

## 2024-05-14 DIAGNOSIS — E11.69 OBESITY, DIABETES, AND HYPERTENSION SYNDROME (HCC): Chronic | ICD-10-CM

## 2024-05-14 DIAGNOSIS — I15.2 OBESITY, DIABETES, AND HYPERTENSION SYNDROME (HCC): Chronic | ICD-10-CM

## 2024-05-14 DIAGNOSIS — E66.9 OBESITY, DIABETES, AND HYPERTENSION SYNDROME (HCC): Chronic | ICD-10-CM

## 2024-05-14 NOTE — TELEPHONE ENCOUNTER
Medication:   Requested Prescriptions     Pending Prescriptions Disp Refills    TRULICITY 0.75 MG/0.5ML SOPN SC injection [Pharmacy Med Name: TRULICITY 0.75 MG/0.5 ML PEN]  4     Sig: INJECT 0.75 MG SUBCUTANEOUSLY ONE TIME PER WEEK        Last Filled:      Patient Phone Number: [phone] (home)     Last appt: 4/22/2024   Next appt: 7/1/2024    Last OARRS:        No data to display

## 2024-05-15 RX ORDER — DULAGLUTIDE 0.75 MG/.5ML
INJECTION, SOLUTION SUBCUTANEOUS
Refills: 4 | OUTPATIENT
Start: 2024-05-15

## 2024-05-23 ENCOUNTER — TELEPHONE (OUTPATIENT)
Dept: PRIMARY CARE CLINIC | Age: 64
End: 2024-05-23

## 2024-05-23 DIAGNOSIS — K21.9 GERD WITHOUT ESOPHAGITIS: ICD-10-CM

## 2024-05-23 DIAGNOSIS — E11.9 DIABETES MELLITUS WITH COINCIDENT HYPERTENSION (HCC): Chronic | ICD-10-CM

## 2024-05-23 DIAGNOSIS — E11.8 TYPE 2 DIABETES MELLITUS WITH COMPLICATION, WITHOUT LONG-TERM CURRENT USE OF INSULIN (HCC): Primary | ICD-10-CM

## 2024-05-23 DIAGNOSIS — E11.8 TYPE 2 DIABETES MELLITUS WITH COMPLICATION, WITHOUT LONG-TERM CURRENT USE OF INSULIN (HCC): ICD-10-CM

## 2024-05-23 DIAGNOSIS — I10 DIABETES MELLITUS WITH COINCIDENT HYPERTENSION (HCC): Chronic | ICD-10-CM

## 2024-05-23 RX ORDER — ACYCLOVIR 400 MG/1
1 TABLET ORAL CONTINUOUS
Qty: 1 EACH | Refills: 1 | Status: SHIPPED | OUTPATIENT
Start: 2024-05-23

## 2024-05-23 RX ORDER — BLOOD-GLUCOSE SENSOR
1 EACH MISCELLANEOUS
Qty: 2 EACH | Refills: 12 | Status: SHIPPED | OUTPATIENT
Start: 2024-05-23

## 2024-05-23 RX ORDER — ASPIRIN 81 MG/1
81 TABLET ORAL DAILY
Qty: 90 TABLET | Refills: 3 | Status: SHIPPED | OUTPATIENT
Start: 2024-05-23

## 2024-05-23 RX ORDER — DULAGLUTIDE 1.5 MG/.5ML
1.5 INJECTION, SOLUTION SUBCUTANEOUS WEEKLY
Qty: 4 ADJUSTABLE DOSE PRE-FILLED PEN SYRINGE | Refills: 5 | Status: SHIPPED | OUTPATIENT
Start: 2024-05-23

## 2024-05-23 RX ORDER — TIRZEPATIDE 5 MG/.5ML
5 INJECTION, SOLUTION SUBCUTANEOUS WEEKLY
Qty: 4 ADJUSTABLE DOSE PRE-FILLED PEN SYRINGE | Refills: 5 | Status: SHIPPED | OUTPATIENT
Start: 2024-05-23

## 2024-05-23 RX ORDER — ASPIRIN 81 MG/1
81 TABLET ORAL DAILY
Qty: 90 TABLET | Refills: 3 | Status: SHIPPED | OUTPATIENT
Start: 2024-05-23 | End: 2024-05-23 | Stop reason: SDUPTHER

## 2024-05-23 RX ORDER — ACYCLOVIR 400 MG/1
1 TABLET ORAL
Qty: 3 EACH | Refills: 12 | Status: SHIPPED | OUTPATIENT
Start: 2024-05-23

## 2024-05-23 NOTE — TELEPHONE ENCOUNTER
Patient came in he states pharmacy didn't receive these medication from his visit 4/22/24    dulaglutide (TRULICITY) 1.5 MG/0.5ML SC injection      aspirin 81 MG EC tablet     Continuous Blood Gluc Sensor (FREESTYLE SIMA 3 SENSOR)   Please resend     [id number] - [place], [place] - [address] - P [phone] - F [phone] [[id number]]

## 2024-05-23 NOTE — TELEPHONE ENCOUNTER
Called and spoke with pharmacy they states the medication for Asprin and freestyle joanne are not covered by insurance and states never received rx for Trulicity 1.5mg  but stated on backorder.    Called and spoke with patient informed him of what's going on also stated we will call him to see what the next step is he expressed understanding

## 2024-05-23 NOTE — TELEPHONE ENCOUNTER
Called and spoke with pharmacy staff stated dexcom is not covered as well, out of pocket cost will be $152 per sensor and stated they will fill monjoro since they are unable to fill Trulicity. Called and informed patient he states  fingerstick blood glucose testing kit and mounjaro will do informed him of purchasing aspril otc patient expressed understanding and will call office or send my chrt message if any questions

## 2024-05-30 ENCOUNTER — OFFICE VISIT (OUTPATIENT)
Dept: SLEEP MEDICINE | Age: 64
End: 2024-05-30
Payer: MEDICARE

## 2024-05-30 VITALS
HEIGHT: 68 IN | OXYGEN SATURATION: 96 % | SYSTOLIC BLOOD PRESSURE: 119 MMHG | DIASTOLIC BLOOD PRESSURE: 64 MMHG | RESPIRATION RATE: 18 BRPM | TEMPERATURE: 98 F | WEIGHT: 234.2 LBS | BODY MASS INDEX: 35.49 KG/M2 | HEART RATE: 68 BPM

## 2024-05-30 DIAGNOSIS — E66.01 SEVERE OBESITY (BMI 35.0-39.9) WITH COMORBIDITY (HCC): ICD-10-CM

## 2024-05-30 DIAGNOSIS — I10 ESSENTIAL HYPERTENSION: ICD-10-CM

## 2024-05-30 DIAGNOSIS — R06.89 GASPING FOR BREATH: ICD-10-CM

## 2024-05-30 DIAGNOSIS — G47.19 EXCESSIVE DAYTIME SLEEPINESS: ICD-10-CM

## 2024-05-30 DIAGNOSIS — G47.33 OSA (OBSTRUCTIVE SLEEP APNEA): Primary | ICD-10-CM

## 2024-05-30 DIAGNOSIS — R53.83 FATIGUE, UNSPECIFIED TYPE: ICD-10-CM

## 2024-05-30 PROCEDURE — 3078F DIAST BP <80 MM HG: CPT | Performed by: PSYCHIATRY & NEUROLOGY

## 2024-05-30 PROCEDURE — 1036F TOBACCO NON-USER: CPT | Performed by: PSYCHIATRY & NEUROLOGY

## 2024-05-30 PROCEDURE — 99204 OFFICE O/P NEW MOD 45 MIN: CPT | Performed by: PSYCHIATRY & NEUROLOGY

## 2024-05-30 PROCEDURE — G8427 DOCREV CUR MEDS BY ELIG CLIN: HCPCS | Performed by: PSYCHIATRY & NEUROLOGY

## 2024-05-30 PROCEDURE — G8417 CALC BMI ABV UP PARAM F/U: HCPCS | Performed by: PSYCHIATRY & NEUROLOGY

## 2024-05-30 PROCEDURE — 3074F SYST BP LT 130 MM HG: CPT | Performed by: PSYCHIATRY & NEUROLOGY

## 2024-05-30 PROCEDURE — 3017F COLORECTAL CA SCREEN DOC REV: CPT | Performed by: PSYCHIATRY & NEUROLOGY

## 2024-05-30 ASSESSMENT — SLEEP AND FATIGUE QUESTIONNAIRES
HOW LIKELY ARE YOU TO NOD OFF OR FALL ASLEEP WHILE SITTING AND TALKING TO SOMEONE: WOULD NEVER DOZE
HOW LIKELY ARE YOU TO NOD OFF OR FALL ASLEEP WHILE SITTING AND READING: WOULD NEVER DOZE
HOW LIKELY ARE YOU TO NOD OFF OR FALL ASLEEP WHILE LYING DOWN TO REST IN THE AFTERNOON WHEN CIRCUMSTANCES PERMIT: MODERATE CHANCE OF DOZING
HOW LIKELY ARE YOU TO NOD OFF OR FALL ASLEEP WHILE SITTING QUIETLY AFTER LUNCH WITHOUT ALCOHOL: WOULD NEVER DOZE
NECK CIRCUMFERENCE (INCHES): 16.5
HOW LIKELY ARE YOU TO NOD OFF OR FALL ASLEEP WHILE SITTING INACTIVE IN A PUBLIC PLACE: WOULD NEVER DOZE
HOW LIKELY ARE YOU TO NOD OFF OR FALL ASLEEP IN A CAR, WHILE STOPPED FOR A FEW MINUTES IN TRAFFIC: WOULD NEVER DOZE
HOW LIKELY ARE YOU TO NOD OFF OR FALL ASLEEP WHEN YOU ARE A PASSENGER IN A CAR FOR AN HOUR WITHOUT A BREAK: WOULD NEVER DOZE
HOW LIKELY ARE YOU TO NOD OFF OR FALL ASLEEP WHILE WATCHING TV: MODERATE CHANCE OF DOZING
ESS TOTAL SCORE: 4

## 2024-05-30 ASSESSMENT — ENCOUNTER SYMPTOMS
CHOKING: 1
GASTROINTESTINAL NEGATIVE: 1
EYES NEGATIVE: 1

## 2024-05-30 NOTE — PATIENT INSTRUCTIONS
Orders Placed This Encounter   Procedures    Baseline Diagnostic Sleep Study     Standing Status:   Future     Standing Expiration Date:   5/30/2025     Order Specific Question:   Adult or Pediatric     Answer:   Adult Study (>7 Years)     Order Specific Question:   Location For Sleep Study     Answer:   [facility]     Order Specific Question:   Select Sleep Lab Location     Answer:   [facility]    Sleep Study with PAP Titration     Standing Status:   Future     Standing Expiration Date:   5/30/2025     Order Specific Question:   Sleep Study Titration Type     Answer:   CPAP     Order Specific Question:   Location For Sleep Study     Answer:   [facility]     Order Specific Question:   Select Sleep Lab Location     Answer:   [facility]

## 2024-05-30 NOTE — PROGRESS NOTES
MD ROCHELLE Avila Board Certified in Sleep Medicine  [facility]  Board Certified in Neurology [facility]  [address]   [address]  [place]  [address]  P- [phone]   [facility]   [address]  [address]   [place], [place] [address]           [facility]  [address] [address] [phone]    Subjective:     Patient ID: Wang Starr is a 63 y.o. male.    Chief Complaint   Patient presents with    New Patient    Fatigue    Daytime Sleepiness       HPI:        Wang Starr is a 63 y.o. male referred by Dr. Barry for a sleep evaluation. He complains of snorting, choking, tossing and turning, excessive daytime sleepiness, feels sleepy during the day, take naps during the day but he denies snoring, periods of not breathing, knees buckling with laughing, completely or partially paralyzed while falling asleep or waking up.  Symptoms began several years ago, gradually worsening since that time.   The patient has no bed-partner.  SLEEP SCHEDULE: Goes to bed around 1-4 AM in the weekdays and 2-4 AM in the weekends. It usually takes the patient 10-15 minutes to fall asleep. The patient gets up 2-3 per night to go to the bathroom. The Patient finally gets up at 7 AM during the weekdays and 7 AM in the weekends. patient wakes up with dry mouth and sometimes morning headache.. the headache usually dull headache.  The patient has restless sleep with frequent arousals in addition to the Patient has significant daytime sleepiness. The Patient scored Lyndon Sleepiness Score: 4 on Lyndon Sleepiness Scale ( more than 10 is indicative of daytime sleepiness)and 24 in fatigue scale ( more than 36 is indicative of daytime fatigue). The patient takes 3-4 naps/week for 60 minutes and usually is not refreshing nap.     Previous evaluation and

## 2024-06-19 ENCOUNTER — HOSPITAL ENCOUNTER (OUTPATIENT)
Dept: SLEEP CENTER | Age: 64
Discharge: HOME | End: 2024-06-19
Attending: PSYCHIATRY & NEUROLOGY
Payer: MEDICARE

## 2024-06-19 DIAGNOSIS — G47.33 OSA (OBSTRUCTIVE SLEEP APNEA): ICD-10-CM

## 2024-06-19 PROCEDURE — 95810 POLYSOM 6/> YRS 4/> PARAM: CPT

## 2024-06-20 PROBLEM — G47.33 OSA (OBSTRUCTIVE SLEEP APNEA): Status: ACTIVE | Noted: 2024-06-20

## 2024-06-21 ENCOUNTER — TELEPHONE (OUTPATIENT)
Dept: PULMONOLOGY | Age: 64
End: 2024-06-21

## 2024-06-21 NOTE — TELEPHONE ENCOUNTER
psg Sleep study showed mild NOEL (on a scale of mild, moderate and severe).  AHI was 5.4  per hr. (Average times per hr breathing was obstructed).  O2 Desaturations to 83% (lowest o2)   Dr Recommends:    Follow up with the patient's sleep physician to discuss results  A trial of CPAP titration is recommended with supplemental oxygen per protocol if needed.  Avoid sedatives, alcohol and caffeinated drinks at bedtime.  Avoid driving while sleepy.       LMOM to call

## 2024-06-23 DIAGNOSIS — M48.061 SPINAL STENOSIS, LUMBAR REGION, WITHOUT NEUROGENIC CLAUDICATION: ICD-10-CM

## 2024-06-24 RX ORDER — CELECOXIB 200 MG/1
CAPSULE ORAL DAILY
Qty: 90 CAPSULE | Refills: 1 | Status: SHIPPED | OUTPATIENT
Start: 2024-06-24

## 2024-06-24 NOTE — TELEPHONE ENCOUNTER
Medication:   Requested Prescriptions     Pending Prescriptions Disp Refills    celecoxib (CELEBREX) 200 MG capsule [Pharmacy Med Name: CELECOXIB 200 MG CAPSULE] 90 capsule 1     Sig: TAKE 1 CAPSULE BY MOUTH EVERY DAY        Last Filled:      Patient Phone Number: [phone] (home)     Last appt: 4/22/2024   Next appt: 7/18/2024    Last OARRS:        No data to display

## 2024-06-26 ENCOUNTER — HOSPITAL ENCOUNTER (OUTPATIENT)
Dept: SLEEP CENTER | Age: 64
Discharge: HOME | End: 2024-06-26
Payer: MEDICARE

## 2024-06-26 DIAGNOSIS — G47.33 OSA (OBSTRUCTIVE SLEEP APNEA): ICD-10-CM

## 2024-06-26 PROCEDURE — 95811 POLYSOM 6/>YRS CPAP 4/> PARM: CPT

## 2024-06-26 NOTE — TELEPHONE ENCOUNTER
Patient called back and sleep results were given and transferred to sleep lab to schedule. Calling back with DME.

## 2024-06-27 NOTE — PROGRESS NOTES
Wang Starr         : 1960  [] [facility]     [] [facility]      [] [facility]     [][facility]    [] [facility]  [] [facility]  [] [facility]   [] [facility] [] Other:  Diagnosis: [x] NOEL (G47.33) [] CSA (G47.31) [] Apnea (G47.30)   Length of Need: [] 12 Months [x] 99 Months [] Other:    Machine (JOHNSON!):  [x] ResMed AirSense     Auto [] Other:     [x]  CPAP () [] Bilevel ()   Mode: [x] Auto [] Spontaneous    Mode: [] Auto [] Spontaneous                         8 cm                 Comfort Settings:     If the order for CPAP  - Ramp Pressure: 5 cmH2O                                        - Ramp time: 15 min         Humidifier: [x] Heated ()        [x] Water chamber replacement ()/ 1 per 6 months        Mask:  Please always start with the mask the patient used during the titraion    [x] Full Face () /1 per 3 months    [x] Patient Choice - Size and fit mask    [] Dispense: small Simplus full face     [x] Headgear () / 1 per 3 months    [x] Interface Replacement ()/1 per month            Tubing: [x] Heated ()/1 per 3 months    [] Standard ()/1 per 3 months [] Other:           Filters: [x] Non-disposable ()/1 per 6 months     [x] Ultra-Fine, Disposable ()/2 per month        Miscellaneous: [x] Chin Strap ()/ 1 per 6 months [] O2 bleed-in:       LPM   [] Oximetry on CPAP/Bilevel []  Other:          Start Order Date: 24    MEDICAL JUSTIFICATION:  I, the undersigned, certify that the above prescribed supplies are medically necessary for this patient’s wellbeing.  In my opinion, the supplies are both reasonable and necessary in reference to accepted standards of medicalpractice in treatment of this patient’s condition.    Lauar Drake MD      NPI: [id number]       Order Signed Date: 24    Electronically signed by Laura Drake MD on 2024 at 12:04 PM

## 2024-07-01 ENCOUNTER — TELEPHONE (OUTPATIENT)
Dept: PULMONOLOGY | Age: 64
End: 2024-07-01

## 2024-07-01 NOTE — TELEPHONE ENCOUNTER
Titration study shows adequate control of the events at a CPAP pressure of 8 cm.      DME: [facility] - faxed     Patient will need appointment 31-90 days after starting new machine

## 2024-07-04 DIAGNOSIS — J30.2 SEASONAL ALLERGIES: ICD-10-CM

## 2024-07-08 RX ORDER — CETIRIZINE HYDROCHLORIDE 10 MG/1
10 TABLET ORAL DAILY
Qty: 90 TABLET | Refills: 1 | Status: SHIPPED | OUTPATIENT
Start: 2024-07-08

## 2024-07-08 NOTE — TELEPHONE ENCOUNTER
Medication:   Requested Prescriptions     Pending Prescriptions Disp Refills    cetirizine (ZYRTEC) 10 MG tablet [Pharmacy Med Name: CETIRIZINE HCL 10 MG TABLET] 90 tablet 1     Sig: TAKE 1 TABLET BY MOUTH EVERY DAY        Last Filled:      Patient Phone Number: [phone] (home)     Last appt: 4/22/2024   Next appt: 7/18/2024    Last OARRS:        No data to display

## 2024-07-15 DIAGNOSIS — E11.69 OBESITY, DIABETES, AND HYPERTENSION SYNDROME (HCC): Chronic | ICD-10-CM

## 2024-07-15 DIAGNOSIS — E11.9 DIABETES MELLITUS WITH COINCIDENT HYPERTENSION (HCC): Chronic | ICD-10-CM

## 2024-07-15 DIAGNOSIS — I15.2 OBESITY, DIABETES, AND HYPERTENSION SYNDROME (HCC): Chronic | ICD-10-CM

## 2024-07-15 DIAGNOSIS — I10 DIABETES MELLITUS WITH COINCIDENT HYPERTENSION (HCC): Chronic | ICD-10-CM

## 2024-07-15 DIAGNOSIS — E11.59 OBESITY, DIABETES, AND HYPERTENSION SYNDROME (HCC): Chronic | ICD-10-CM

## 2024-07-15 DIAGNOSIS — E66.9 OBESITY, DIABETES, AND HYPERTENSION SYNDROME (HCC): Chronic | ICD-10-CM

## 2024-07-17 RX ORDER — LOSARTAN POTASSIUM 25 MG/1
50 TABLET ORAL DAILY
Qty: 180 TABLET | Refills: 1 | OUTPATIENT
Start: 2024-07-17

## 2024-07-18 ENCOUNTER — OFFICE VISIT (OUTPATIENT)
Dept: PRIMARY CARE CLINIC | Age: 64
End: 2024-07-18
Payer: MEDICARE

## 2024-07-18 VITALS
BODY MASS INDEX: 33.89 KG/M2 | OXYGEN SATURATION: 96 % | WEIGHT: 223.6 LBS | TEMPERATURE: 97.3 F | HEIGHT: 68 IN | HEART RATE: 76 BPM | DIASTOLIC BLOOD PRESSURE: 70 MMHG | SYSTOLIC BLOOD PRESSURE: 108 MMHG

## 2024-07-18 DIAGNOSIS — I10 DIABETES MELLITUS WITH COINCIDENT HYPERTENSION (HCC): ICD-10-CM

## 2024-07-18 DIAGNOSIS — M46.96 UNSPECIFIED INFLAMMATORY SPONDYLOPATHY, LUMBAR REGION (HCC): ICD-10-CM

## 2024-07-18 DIAGNOSIS — E78.5 TYPE 2 DIABETES MELLITUS WITH HYPERLIPIDEMIA (HCC): Chronic | ICD-10-CM

## 2024-07-18 DIAGNOSIS — E55.9 VITAMIN D DEFICIENCY: ICD-10-CM

## 2024-07-18 DIAGNOSIS — E11.65 TYPE 2 DIABETES MELLITUS WITH HYPERGLYCEMIA, WITHOUT LONG-TERM CURRENT USE OF INSULIN (HCC): Primary | ICD-10-CM

## 2024-07-18 DIAGNOSIS — J30.2 SEASONAL ALLERGIES: ICD-10-CM

## 2024-07-18 DIAGNOSIS — E11.9 DIABETES MELLITUS WITH COINCIDENT HYPERTENSION (HCC): ICD-10-CM

## 2024-07-18 DIAGNOSIS — G47.33 OSA (OBSTRUCTIVE SLEEP APNEA): Chronic | ICD-10-CM

## 2024-07-18 DIAGNOSIS — E11.69 TYPE 2 DIABETES MELLITUS WITH HYPERLIPIDEMIA (HCC): Chronic | ICD-10-CM

## 2024-07-18 PROBLEM — I15.2 OBESITY, DIABETES, AND HYPERTENSION SYNDROME (HCC): Chronic | Status: ACTIVE | Noted: 2022-03-09

## 2024-07-18 PROBLEM — H04.123 DRY EYES: Status: ACTIVE | Noted: 2022-04-19

## 2024-07-18 PROBLEM — E11.59 OBESITY, DIABETES, AND HYPERTENSION SYNDROME (HCC): Chronic | Status: ACTIVE | Noted: 2022-03-09

## 2024-07-18 PROBLEM — E66.9 OBESITY, DIABETES, AND HYPERTENSION SYNDROME (HCC): Chronic | Status: ACTIVE | Noted: 2022-03-09

## 2024-07-18 PROCEDURE — G2211 COMPLEX E/M VISIT ADD ON: HCPCS | Performed by: FAMILY MEDICINE

## 2024-07-18 PROCEDURE — 3017F COLORECTAL CA SCREEN DOC REV: CPT | Performed by: FAMILY MEDICINE

## 2024-07-18 PROCEDURE — G8417 CALC BMI ABV UP PARAM F/U: HCPCS | Performed by: FAMILY MEDICINE

## 2024-07-18 PROCEDURE — 99214 OFFICE O/P EST MOD 30 MIN: CPT | Performed by: FAMILY MEDICINE

## 2024-07-18 PROCEDURE — 3052F HG A1C>EQUAL 8.0%<EQUAL 9.0%: CPT | Performed by: FAMILY MEDICINE

## 2024-07-18 PROCEDURE — 2022F DILAT RTA XM EVC RTNOPTHY: CPT | Performed by: FAMILY MEDICINE

## 2024-07-18 PROCEDURE — 3078F DIAST BP <80 MM HG: CPT | Performed by: FAMILY MEDICINE

## 2024-07-18 PROCEDURE — 1036F TOBACCO NON-USER: CPT | Performed by: FAMILY MEDICINE

## 2024-07-18 PROCEDURE — 3074F SYST BP LT 130 MM HG: CPT | Performed by: FAMILY MEDICINE

## 2024-07-18 PROCEDURE — G8427 DOCREV CUR MEDS BY ELIG CLIN: HCPCS | Performed by: FAMILY MEDICINE

## 2024-07-18 RX ORDER — ATORVASTATIN CALCIUM 40 MG/1
40 TABLET, FILM COATED ORAL NIGHTLY
Qty: 90 TABLET | Refills: 1 | Status: SHIPPED | OUTPATIENT
Start: 2024-07-18 | End: 2025-01-14

## 2024-07-18 RX ORDER — LOSARTAN POTASSIUM 50 MG/1
50 TABLET ORAL DAILY
Qty: 90 TABLET | Refills: 1 | Status: SHIPPED | OUTPATIENT
Start: 2024-07-18

## 2024-07-18 RX ORDER — CETIRIZINE HYDROCHLORIDE 10 MG/1
10 TABLET ORAL DAILY
Qty: 90 TABLET | Refills: 1 | Status: SHIPPED | OUTPATIENT
Start: 2024-07-18

## 2024-07-18 RX ORDER — DULAGLUTIDE 1.5 MG/.5ML
1.5 INJECTION, SOLUTION SUBCUTANEOUS WEEKLY
Qty: 4 ADJUSTABLE DOSE PRE-FILLED PEN SYRINGE | Refills: 5 | Status: SHIPPED | OUTPATIENT
Start: 2024-07-18

## 2024-07-18 RX ORDER — ERGOCALCIFEROL 1.25 MG/1
50000 CAPSULE ORAL WEEKLY
Qty: 12 CAPSULE | Refills: 3 | Status: SHIPPED | OUTPATIENT
Start: 2024-07-18

## 2024-07-18 RX ORDER — TIRZEPATIDE 5 MG/.5ML
5 INJECTION, SOLUTION SUBCUTANEOUS WEEKLY
Qty: 4 ADJUSTABLE DOSE PRE-FILLED PEN SYRINGE | Refills: 5 | Status: SHIPPED | OUTPATIENT
Start: 2024-07-18

## 2024-07-18 RX ORDER — METFORMIN HYDROCHLORIDE 500 MG/1
1000 TABLET, EXTENDED RELEASE ORAL 2 TIMES DAILY
Qty: 360 TABLET | Refills: 3 | Status: SHIPPED | OUTPATIENT
Start: 2024-07-18 | End: 2025-07-13

## 2024-07-18 ASSESSMENT — PATIENT HEALTH QUESTIONNAIRE - PHQ9
SUM OF ALL RESPONSES TO PHQ QUESTIONS 1-9: 0
2. FEELING DOWN, DEPRESSED OR HOPELESS: NOT AT ALL
1. LITTLE INTEREST OR PLEASURE IN DOING THINGS: NOT AT ALL
SUM OF ALL RESPONSES TO PHQ9 QUESTIONS 1 & 2: 0
SUM OF ALL RESPONSES TO PHQ QUESTIONS 1-9: 0

## 2024-07-18 ASSESSMENT — ENCOUNTER SYMPTOMS
DIARRHEA: 0
CONSTIPATION: 0
SHORTNESS OF BREATH: 0
NAUSEA: 0
VOMITING: 0
COUGH: 0

## 2024-07-18 NOTE — PROGRESS NOTES
Wang Starr (:  1960) is a 63 y.o. male,Established patient, here for evaluation of the following chief complaint(s):  Diabetes (6 month follow up ) and Medication Refill (Lostartan/Zyrtec/Vitamin d)       SUBJECTIVE:  7.18.24:  6 mo follow up    T2DM:  - goal <7  - Recent A1c 8.1 at the middle of April  - No polyuria, polydipsia, weight changes or blurred vision    Thinks he may have first stages of glaucoma, is getting drops  Saw GI and had colonoscopy, next due in 5 years    NOEL:  Following with sleep medicine  Is now using CPAP, next appt with Dr. Drake coming up    Seasonal allergies:  - needed cetirizine    Needs refills on all meds          23:    Neck pain improved with muscle relaxant, mobic  Blood pressure -- stable, needs refill on medication  DM - following with endocrine, next appt in the next few months, every 6 mo      10.20.23 -- acute visit, neck pain  Woke up with neck pain 3 days ago, more stiffness rather than pain related.    - Using celebrex, out of his muscle relaxant  -- hot pack, stretches, restart robaxin as this worked in the past when he had some of the medicine left over  - will let us know if worsens, can refer to PT as needed  - kaitlyny had hx of neck pain, central cord syndrome      23:  Seeing endocrinology, he gets metformin from dr. Oviedo -- 1000 mg BID  A1c 6.7.  Sees foot doctor next month  Is on trulicity as well which is helping.       3.29.23:  Recheck A1c in office today -- continues on trulicity, A1c 6.5 today  Much improved on his dietary changes.       22 --  Fasting glucose in office is 139 -- this is an improvement.  Is tolerating the trulicity injections that he was started on at his last visit.  Has continued his dietary changes of not eating super processed foods and breads.   Will recheck A1c today, as well as transaminases which were elevated in October due to cholecystitis s/p cholecystectomy  -- follow in 3 mo    22

## 2024-08-28 ENCOUNTER — OFFICE VISIT (OUTPATIENT)
Dept: SLEEP MEDICINE | Age: 64
End: 2024-08-28
Payer: MEDICARE

## 2024-08-28 VITALS
BODY MASS INDEX: 33.16 KG/M2 | WEIGHT: 218.8 LBS | OXYGEN SATURATION: 97 % | RESPIRATION RATE: 18 BRPM | TEMPERATURE: 97.4 F | HEIGHT: 68 IN | SYSTOLIC BLOOD PRESSURE: 117 MMHG | DIASTOLIC BLOOD PRESSURE: 67 MMHG | HEART RATE: 84 BPM

## 2024-08-28 DIAGNOSIS — K21.9 GERD WITHOUT ESOPHAGITIS: ICD-10-CM

## 2024-08-28 DIAGNOSIS — Z99.89 DEPENDENCE ON OTHER ENABLING MACHINES AND DEVICES: ICD-10-CM

## 2024-08-28 DIAGNOSIS — G47.33 OSA ON CPAP: Primary | ICD-10-CM

## 2024-08-28 PROCEDURE — 3074F SYST BP LT 130 MM HG: CPT | Performed by: PSYCHIATRY & NEUROLOGY

## 2024-08-28 PROCEDURE — G8417 CALC BMI ABV UP PARAM F/U: HCPCS | Performed by: PSYCHIATRY & NEUROLOGY

## 2024-08-28 PROCEDURE — 99214 OFFICE O/P EST MOD 30 MIN: CPT | Performed by: PSYCHIATRY & NEUROLOGY

## 2024-08-28 PROCEDURE — G8427 DOCREV CUR MEDS BY ELIG CLIN: HCPCS | Performed by: PSYCHIATRY & NEUROLOGY

## 2024-08-28 PROCEDURE — 1036F TOBACCO NON-USER: CPT | Performed by: PSYCHIATRY & NEUROLOGY

## 2024-08-28 PROCEDURE — 3017F COLORECTAL CA SCREEN DOC REV: CPT | Performed by: PSYCHIATRY & NEUROLOGY

## 2024-08-28 PROCEDURE — 3078F DIAST BP <80 MM HG: CPT | Performed by: PSYCHIATRY & NEUROLOGY

## 2024-08-28 ASSESSMENT — SLEEP AND FATIGUE QUESTIONNAIRES
ESS TOTAL SCORE: 8
HOW LIKELY ARE YOU TO NOD OFF OR FALL ASLEEP WHILE SITTING AND TALKING TO SOMEONE: WOULD NEVER DOZE
HOW LIKELY ARE YOU TO NOD OFF OR FALL ASLEEP WHILE SITTING AND READING: SLIGHT CHANCE OF DOZING
HOW LIKELY ARE YOU TO NOD OFF OR FALL ASLEEP WHEN YOU ARE A PASSENGER IN A CAR FOR AN HOUR WITHOUT A BREAK: WOULD NEVER DOZE
HOW LIKELY ARE YOU TO NOD OFF OR FALL ASLEEP IN A CAR, WHILE STOPPED FOR A FEW MINUTES IN TRAFFIC: WOULD NEVER DOZE
HOW LIKELY ARE YOU TO NOD OFF OR FALL ASLEEP WHILE SITTING INACTIVE IN A PUBLIC PLACE: SLIGHT CHANCE OF DOZING
HOW LIKELY ARE YOU TO NOD OFF OR FALL ASLEEP WHILE SITTING QUIETLY AFTER LUNCH WITHOUT ALCOHOL: MODERATE CHANCE OF DOZING
HOW LIKELY ARE YOU TO NOD OFF OR FALL ASLEEP WHILE LYING DOWN TO REST IN THE AFTERNOON WHEN CIRCUMSTANCES PERMIT: MODERATE CHANCE OF DOZING
HOW LIKELY ARE YOU TO NOD OFF OR FALL ASLEEP WHILE WATCHING TV: MODERATE CHANCE OF DOZING

## 2024-08-28 NOTE — PROGRESS NOTES
MD ROCHELLE Drake Board Certified in Sleep Medicine  Certified in Behavioral Sleep Medicine  Board Certified in Neurology [facility]  [address]   [address]  [place]  [address]  [phone]   [facility]  [address]  [address]  [place], [place] [address]                      [facility] PH[facility] [place] [facility]  [address] [address] [phone]    Subjective:     Patient ID: Wang Starr is a 63 y.o. male.    Chief Complaint   Patient presents with    Follow-up    Sleep Apnea       HPI:        Wang Starr is a 63 y.o. male was seen today as a follow for obstructive sleep apnea. The patient underwent comprehensive polysomnogram on 06/19/2024, the overnight registration revealed mild obstructive sleep apnea with apnea hypopnea index of 5.4/hr with lowest O2 saturation of 83%, patient spent about 1.9 minutes below 90% (weight was 234 pounds). Subsequently, the patient underwent successful PAP titration on 06/26/2024, the lowest O2 saturation while on PAP was 92%.  Patient is using the PAP machine about 100% of the time, more than 4 hours a nightabout  100 %, in total average of 7:09 hours a night in last 42 days.  Currently on PAP at 8 cm (), the AHI is only 1.5 events per hour at this pressure.  Patient improved regarding daytime sleepiness and fatigue, wakes up refreshed in the morning.  The Patient scored [place] Sleepiness Score: 8 on [place] Sleepiness Scale ( more than 10 is indicative of daytime sleepiness)   Patient has no problem with PAP pressure or mask.  Wakes up in the morning with dry mouth, the setting of the heated humidifier   GERD is stable. Has lost 16 pounds since last visit. 234  DOT/CDL - N/A      Previous Report(s)Reviewed: historical medical records         Social History     Socioeconomic History    Marital status: Single     Spouse name: Not  on file    Number of children: Not on file    Years of education: Not on file    Highest education level: Not on file   Occupational History    Not on file   Tobacco Use    Smoking status: Never     Passive exposure: Never    Smokeless tobacco: Never   Substance and Sexual Activity    Alcohol use: Yes     Alcohol/week: 4.0 standard drinks of alcohol     Types: 2 Cans of beer, 2 Shots of liquor per week    Drug use: No    Sexual activity: Not on file   Other Topics Concern    Not on file   Social History Narrative    Not on file     Social Determinants of Health     Financial Resource Strain: Low Risk  (4/22/2024)    Overall Financial Resource Strain (CARDIA)     Difficulty of Paying Living Expenses: Not hard at all   Food Insecurity: No Food Insecurity (4/22/2024)    Hunger Vital Sign     Worried About Running Out of Food in the Last Year: Never true     Ran Out of Food in the Last Year: Never true   Transportation Needs: Unknown (4/22/2024)    PRAPARE - Transportation     Lack of Transportation (Medical): Not on file     Lack of Transportation (Non-Medical): No   Physical Activity: Insufficiently Active (12/29/2023)    Exercise Vital Sign     Days of Exercise per Week: 2 days     Minutes of Exercise per Session: 40 min   Stress: Not on file   Social Connections: Not on file   Intimate Partner Violence: Unknown (1/19/2024)    [place] [facility] [place]     Interpersonal Safety     Feel physically or emotionally unsafe where currently live: Not on file     Harm by anyone: Not on file     Emotionally Harmed: Not on file   Housing Stability: Unknown (4/22/2024)    Housing Stability Vital Sign     Unable to Pay for Housing in the Last Year: Not on file     Number of Places Lived in the Last Year: Not on file     Unstable Housing in the Last Year: No       Prior to Admission medications    Medication Sig Start Date End Date Taking? Authorizing Provider   vitamin D (ERGOCALCIFEROL) 1.25 MG (25172 UT) CAPS capsule

## 2024-09-17 DIAGNOSIS — M79.10 MUSCLE PAIN: ICD-10-CM

## 2024-09-18 RX ORDER — TIZANIDINE 2 MG/1
TABLET ORAL
Qty: 270 TABLET | Refills: 1 | Status: SHIPPED | OUTPATIENT
Start: 2024-09-18

## 2024-11-01 NOTE — PLAN OF CARE
Problem: Falls - Risk of:  Goal: Will remain free from falls  Description: Will remain free from falls  1/30/2021 0008 by Renata King RN  Outcome: Ongoing  Patient remains free from falls during this shift. Patient is up x1 person assist with walker. Bed is in the lowest position and the bed and chair alarm is activated. Anti-slip socks are on. Call light is within reach. Will continue to monitor and reassess. Problem: Pain:  Goal: Pain level will decrease  Description: Pain level will decrease  Outcome: Ongoing  RN assesses pain using 0-10 scale. Patient understands how to rate pain using 0-10 scale. Pain is controled with medication per MAR. RN encourages patient to call out for breakthrough pain. Will continue to monitor and reassess. no

## 2024-11-11 DIAGNOSIS — E11.65 TYPE 2 DIABETES MELLITUS WITH HYPERGLYCEMIA, WITHOUT LONG-TERM CURRENT USE OF INSULIN (HCC): Primary | ICD-10-CM

## 2024-11-11 NOTE — TELEPHONE ENCOUNTER
Medication:   Requested Prescriptions     Pending Prescriptions Disp Refills    dulaglutide (TRULICITY) 1.5 MG/0.5ML SC injection       Sig: Inject 0.5 mLs into the skin once a week        Last Filled:      Patient Phone Number: [phone] (home)     Last appt: 7/18/2024   Next appt: 1/22/2025    Last OARRS:        No data to display

## 2024-11-11 NOTE — TELEPHONE ENCOUNTER
PT called in requesting a refill for his Trulicity.     PT says that he called his pharmacy and they said there were no refills remaining.     Please send to the [facility] on [address].     Best call back number: [phone]

## 2024-11-12 RX ORDER — DULAGLUTIDE 1.5 MG/.5ML
1.5 INJECTION, SOLUTION SUBCUTANEOUS WEEKLY
Qty: 6 ML | Refills: 0 | Status: SHIPPED | OUTPATIENT
Start: 2024-11-12 | End: 2025-02-10

## 2024-11-26 DIAGNOSIS — K21.9 GERD WITHOUT ESOPHAGITIS: ICD-10-CM

## 2024-11-26 NOTE — TELEPHONE ENCOUNTER
Medication:   Requested Prescriptions     Pending Prescriptions Disp Refills    omeprazole (PRILOSEC) 20 MG delayed release capsule [Pharmacy Med Name: OMEPRAZOLE DR 20 MG CAPSULE] 180 capsule 1     Sig: TAKE 2 CAPSULES BY MOUTH EVERY DAY        Last Filled:      Patient Phone Number: [phone] (home)     Last appt: 7/18/2024   Next appt: 1/22/2025    Last OARRS:        No data to display

## 2024-12-31 DIAGNOSIS — I10 DIABETES MELLITUS WITH COINCIDENT HYPERTENSION (HCC): ICD-10-CM

## 2024-12-31 DIAGNOSIS — E11.9 DIABETES MELLITUS WITH COINCIDENT HYPERTENSION (HCC): ICD-10-CM

## 2024-12-31 NOTE — TELEPHONE ENCOUNTER
Medication:   Requested Prescriptions     Pending Prescriptions Disp Refills    losartan (COZAAR) 50 MG tablet [Pharmacy Med Name: LOSARTAN POTASSIUM 50 MG TAB] 90 tablet 1     Sig: TAKE 1 TABLET BY MOUTH EVERY DAY        Last Filled:      Patient Phone Number: [phone] (home)     Last appt: 7/18/2024   Next appt: 1/22/2025    Last OARRS:        No data to display

## 2025-01-02 RX ORDER — LOSARTAN POTASSIUM 50 MG/1
50 TABLET ORAL DAILY
Qty: 90 TABLET | Refills: 1 | Status: SHIPPED | OUTPATIENT
Start: 2025-01-02

## 2025-01-18 DIAGNOSIS — M48.061 SPINAL STENOSIS, LUMBAR REGION, WITHOUT NEUROGENIC CLAUDICATION: ICD-10-CM

## 2025-01-19 SDOH — ECONOMIC STABILITY: FOOD INSECURITY: WITHIN THE PAST 12 MONTHS, YOU WORRIED THAT YOUR FOOD WOULD RUN OUT BEFORE YOU GOT MONEY TO BUY MORE.: SOMETIMES TRUE

## 2025-01-19 SDOH — ECONOMIC STABILITY: INCOME INSECURITY: IN THE LAST 12 MONTHS, WAS THERE A TIME WHEN YOU WERE NOT ABLE TO PAY THE MORTGAGE OR RENT ON TIME?: NO

## 2025-01-19 SDOH — HEALTH STABILITY: PHYSICAL HEALTH
ON AVERAGE, HOW MANY DAYS PER WEEK DO YOU ENGAGE IN MODERATE TO STRENUOUS EXERCISE (LIKE A BRISK WALK)?: PATIENT DECLINED

## 2025-01-19 SDOH — ECONOMIC STABILITY: TRANSPORTATION INSECURITY
IN THE PAST 12 MONTHS, HAS THE LACK OF TRANSPORTATION KEPT YOU FROM MEDICAL APPOINTMENTS OR FROM GETTING MEDICATIONS?: NO

## 2025-01-19 SDOH — ECONOMIC STABILITY: FOOD INSECURITY: WITHIN THE PAST 12 MONTHS, THE FOOD YOU BOUGHT JUST DIDN'T LAST AND YOU DIDN'T HAVE MONEY TO GET MORE.: SOMETIMES TRUE

## 2025-01-19 SDOH — ECONOMIC STABILITY: TRANSPORTATION INSECURITY
IN THE PAST 12 MONTHS, HAS LACK OF TRANSPORTATION KEPT YOU FROM MEETINGS, WORK, OR FROM GETTING THINGS NEEDED FOR DAILY LIVING?: NO

## 2025-01-19 ASSESSMENT — PATIENT HEALTH QUESTIONNAIRE - PHQ9
SUM OF ALL RESPONSES TO PHQ QUESTIONS 1-9: 0
SUM OF ALL RESPONSES TO PHQ9 QUESTIONS 1 & 2: 0
2. FEELING DOWN, DEPRESSED OR HOPELESS: NOT AT ALL
SUM OF ALL RESPONSES TO PHQ QUESTIONS 1-9: 0
SUM OF ALL RESPONSES TO PHQ QUESTIONS 1-9: 0
1. LITTLE INTEREST OR PLEASURE IN DOING THINGS: NOT AT ALL
SUM OF ALL RESPONSES TO PHQ QUESTIONS 1-9: 0

## 2025-01-19 ASSESSMENT — LIFESTYLE VARIABLES
HOW MANY STANDARD DRINKS CONTAINING ALCOHOL DO YOU HAVE ON A TYPICAL DAY: PATIENT DOES NOT DRINK
HOW OFTEN DO YOU HAVE A DRINK CONTAINING ALCOHOL: 1
HOW MANY STANDARD DRINKS CONTAINING ALCOHOL DO YOU HAVE ON A TYPICAL DAY: 0
HOW OFTEN DO YOU HAVE A DRINK CONTAINING ALCOHOL: NEVER
HOW OFTEN DO YOU HAVE SIX OR MORE DRINKS ON ONE OCCASION: 1

## 2025-01-20 RX ORDER — CELECOXIB 200 MG/1
CAPSULE ORAL DAILY
Qty: 90 CAPSULE | Refills: 1 | Status: SHIPPED | OUTPATIENT
Start: 2025-01-20

## 2025-01-20 NOTE — TELEPHONE ENCOUNTER
Medication:   Requested Prescriptions     Pending Prescriptions Disp Refills    celecoxib (CELEBREX) 200 MG capsule [Pharmacy Med Name: CELECOXIB 200 MG CAPSULE] 90 capsule 1     Sig: TAKE 1 CAPSULE BY MOUTH EVERY DAY        Last Filled:      Patient Phone Number: [phone] (home)     Last appt: 7/18/2024   Next appt: 1/22/2025    Last OARRS:        No data to display

## 2025-01-22 ENCOUNTER — OFFICE VISIT (OUTPATIENT)
Dept: PRIMARY CARE CLINIC | Age: 65
End: 2025-01-22

## 2025-01-22 VITALS
SYSTOLIC BLOOD PRESSURE: 132 MMHG | TEMPERATURE: 98.1 F | WEIGHT: 223.2 LBS | DIASTOLIC BLOOD PRESSURE: 78 MMHG | BODY MASS INDEX: 33.83 KG/M2 | HEART RATE: 77 BPM | HEIGHT: 68 IN | OXYGEN SATURATION: 95 %

## 2025-01-22 DIAGNOSIS — E55.9 VITAMIN D DEFICIENCY: ICD-10-CM

## 2025-01-22 DIAGNOSIS — E11.69 TYPE 2 DIABETES MELLITUS WITH HYPERLIPIDEMIA (HCC): ICD-10-CM

## 2025-01-22 DIAGNOSIS — E78.5 TYPE 2 DIABETES MELLITUS WITH HYPERLIPIDEMIA (HCC): Chronic | ICD-10-CM

## 2025-01-22 DIAGNOSIS — G95.20 CERVICAL SPINAL CORD COMPRESSION (HCC): ICD-10-CM

## 2025-01-22 DIAGNOSIS — E11.59 OBESITY, DIABETES, AND HYPERTENSION SYNDROME (HCC): Chronic | ICD-10-CM

## 2025-01-22 DIAGNOSIS — K21.9 GERD WITHOUT ESOPHAGITIS: Chronic | ICD-10-CM

## 2025-01-22 DIAGNOSIS — I10 DIABETES MELLITUS WITH COINCIDENT HYPERTENSION (HCC): ICD-10-CM

## 2025-01-22 DIAGNOSIS — E11.8 TYPE 2 DIABETES MELLITUS WITH COMPLICATION, WITHOUT LONG-TERM CURRENT USE OF INSULIN (HCC): ICD-10-CM

## 2025-01-22 DIAGNOSIS — E78.5 TYPE 2 DIABETES MELLITUS WITH HYPERLIPIDEMIA (HCC): ICD-10-CM

## 2025-01-22 DIAGNOSIS — I15.2 OBESITY, DIABETES, AND HYPERTENSION SYNDROME (HCC): Chronic | ICD-10-CM

## 2025-01-22 DIAGNOSIS — E11.69 OBESITY, DIABETES, AND HYPERTENSION SYNDROME (HCC): Chronic | ICD-10-CM

## 2025-01-22 DIAGNOSIS — E66.9 OBESITY, DIABETES, AND HYPERTENSION SYNDROME (HCC): Chronic | ICD-10-CM

## 2025-01-22 DIAGNOSIS — E11.9 DIABETES MELLITUS WITH COINCIDENT HYPERTENSION (HCC): ICD-10-CM

## 2025-01-22 DIAGNOSIS — Z12.5 SCREENING PSA (PROSTATE SPECIFIC ANTIGEN): ICD-10-CM

## 2025-01-22 DIAGNOSIS — E11.69 TYPE 2 DIABETES MELLITUS WITH HYPERLIPIDEMIA (HCC): Chronic | ICD-10-CM

## 2025-01-22 DIAGNOSIS — E11.65 TYPE 2 DIABETES MELLITUS WITH HYPERGLYCEMIA, WITHOUT LONG-TERM CURRENT USE OF INSULIN (HCC): ICD-10-CM

## 2025-01-22 DIAGNOSIS — Z23 NEED FOR INFLUENZA VACCINATION: ICD-10-CM

## 2025-01-22 DIAGNOSIS — Z00.00 INITIAL MEDICARE ANNUAL WELLNESS VISIT: Primary | ICD-10-CM

## 2025-01-22 LAB — HBA1C MFR BLD: 5.6 %

## 2025-01-22 RX ORDER — METFORMIN HYDROCHLORIDE 500 MG/1
1000 TABLET, EXTENDED RELEASE ORAL 2 TIMES DAILY
Qty: 360 TABLET | Refills: 3 | Status: SHIPPED | OUTPATIENT
Start: 2025-01-22 | End: 2026-01-17

## 2025-01-22 RX ORDER — ATORVASTATIN CALCIUM 40 MG/1
40 TABLET, FILM COATED ORAL NIGHTLY
Qty: 90 TABLET | Refills: 1 | Status: SHIPPED | OUTPATIENT
Start: 2025-01-22 | End: 2025-07-21

## 2025-01-22 RX ORDER — LATANOPROST 50 UG/ML
1 SOLUTION/ DROPS OPHTHALMIC NIGHTLY
COMMUNITY
Start: 2025-01-15

## 2025-01-22 ASSESSMENT — ENCOUNTER SYMPTOMS
COUGH: 0
CONSTIPATION: 0
SHORTNESS OF BREATH: 0
DIARRHEA: 0
VOMITING: 0
NAUSEA: 0

## 2025-01-22 NOTE — PROGRESS NOTES
Wang Starr (:  1960) is a 64 y.o. male,Established patient, here for evaluation of the following chief complaint(s):  Medicare AWV, Diabetes (6 month follow up ), and Medication Refill (celecoxib)      SUBJECTIVE:  ..25:   6 mo follow up  AWV    T2DM:  - goal <7  - Recent A1c 5.6  - doing well on mounjro/trulicity (whichever is in stock) and metformin 1000 mg bid  - No polyuria, polydipsia, weight changes or blurred vision    Hypertension:  - goal < 140/90  - Low salt/dash diets discussed  - No muscle aches, muscle weakness, or cramps  - cont losartan    Hyperlipidemia:  LDL Goal < 100  Statin: tolerating well  - no myalgias  - Low cholesterol diet  - Lipid panel annually  - continue current statin        ..24:  6 mo follow up    T2DM:  - goal <7  - Recent A1c 8.1 at the middle of April  - No polyuria, polydipsia, weight changes or blurred vision    Thinks he may have first stages of glaucoma, is getting drops  Saw GI and had colonoscopy, next due in 5 years    NOEL:  Following with sleep medicine  Is now using CPAP, next appt with Dr. Drake coming up    Seasonal allergies:  - needed cetirizine    Needs refills on all meds          23:    Neck pain improved with muscle relaxant, mobic  Blood pressure -- stable, needs refill on medication  DM - following with endocrine, next appt in the next few months, every 6 mo      10.20.23 -- acute visit, neck pain  Woke up with neck pain 3 days ago, more stiffness rather than pain related.    - Using celebrex, out of his muscle relaxant  -- hot pack, stretches, restart robaxin as this worked in the past when he had some of the medicine left over  - will let us know if worsens, can refer to PT as needed  - alrady had hx of neck pain, central cord syndrome      23:  Seeing endocrinology, he gets metformin from dr. Oviedo -- 1000 mg BID  A1c 6.7.  Sees foot doctor next month  Is on trulicity as well which is helping.       3.29.23:  Recheck A1c in

## 2025-01-22 NOTE — ASSESSMENT & PLAN NOTE
Chronic, at goal (stable), cont current meds, cont mounjaro and metformin    Orders:    metFORMIN (GLUCOPHAGE-XR) 500 MG extended release tablet; Take 2 tablets by mouth in the morning and at bedtime TAKE 2 TABLETS BY MOUTH twice daily

## 2025-01-22 NOTE — ASSESSMENT & PLAN NOTE
Chronic, at goal (stable), continue current treatment plan    Orders:    atorvastatin (LIPITOR) 40 MG tablet; Take 1 tablet by mouth nightly    Lipid Panel; Future    Hepatic Function Panel; Future

## 2025-01-22 NOTE — PROGRESS NOTES
Medicare Annual Wellness Visit    Wang Starr is here for Medicare AWV, Diabetes (6 month follow up ), and Medication Refill (celecoxib)    Assessment & Plan   Assessment & Plan  Initial Medicare annual wellness visit            Type 2 diabetes mellitus with hyperlipidemia (HCC)   Chronic, at goal (stable), continue current treatment plan    Orders:    atorvastatin (LIPITOR) 40 MG tablet; Take 1 tablet by mouth nightly    Lipid Panel; Future    Hepatic Function Panel; Future    Type 2 diabetes mellitus with complication, without long-term current use of insulin (HCC)       Orders:    POCT glycosylated hemoglobin (Hb A1C)    Diabetes mellitus with coincident hypertension (HCC)   Chronic, at goal (stable), continue current treatment plan. Cont losartan 50 mg    Orders:    Comprehensive Metabolic Panel; Future    Albumin/Creatinine Ratio, Urine; Future    Type 2 diabetes mellitus with hyperglycemia, without long-term current use of insulin (HCC)   Chronic, at goal (stable), cont current meds, cont mounjaro and metformin    Orders:    metFORMIN (GLUCOPHAGE-XR) 500 MG extended release tablet; Take 2 tablets by mouth in the morning and at bedtime TAKE 2 TABLETS BY MOUTH twice daily    Obesity, diabetes, and hypertension syndrome (HCC)            Cervical spinal cord compression (HCC)            GERD without esophagitis   Chronic, at goal (stable), cont ppi and pepcid         Need for influenza vaccination       Orders:    Influenza, FLUCELVAX Trivalent, (age 6 mo+) IM, Preservative Free, 0.5mL    Type 2 diabetes mellitus with hyperlipidemia (HCC)   Chronic, at goal (stable), continue current treatment plan    Orders:    atorvastatin (LIPITOR) 40 MG tablet; Take 1 tablet by mouth nightly    Lipid Panel; Future    Hepatic Function Panel; Future    Vitamin D deficiency       Orders:    Vitamin D 25 Hydroxy; Future    Screening PSA (prostate specific antigen)       Orders:    PSA Screening; Future       Return in about 6

## 2025-01-22 NOTE — ASSESSMENT & PLAN NOTE
Chronic, at goal (stable), continue current treatment plan. Cont losartan 50 mg    Orders:    Comprehensive Metabolic Panel; Future    Albumin/Creatinine Ratio, Urine; Future

## 2025-02-11 ENCOUNTER — TELEPHONE (OUTPATIENT)
Dept: PRIMARY CARE CLINIC | Age: 65
End: 2025-02-11

## 2025-02-11 NOTE — TELEPHONE ENCOUNTER
Pt came in dropped off [facility] Chronic Condition form   Last visit 1/232/25  Next visit 7/23/25  Please fax when complete and mail original to patient   Form scan in system / in provider mailbox

## 2025-03-14 DIAGNOSIS — M79.10 MUSCLE PAIN: ICD-10-CM

## 2025-03-14 RX ORDER — TIZANIDINE 2 MG/1
TABLET ORAL
Qty: 270 TABLET | Refills: 1 | Status: SHIPPED | OUTPATIENT
Start: 2025-03-14

## 2025-03-14 NOTE — TELEPHONE ENCOUNTER
Medication:   Requested Prescriptions     Pending Prescriptions Disp Refills    tiZANidine (ZANAFLEX) 2 MG tablet [Pharmacy Med Name: TIZANIDINE HCL 2 MG TABLET] 270 tablet 1     Sig: TAKE 1 TABLET BY MOUTH EVERY 8 HOURS AS NEEDED MUSCLE PAIN        Last Filled:      Patient Phone Number: [phone] (home)     Last appt: 1/22/2025   Next appt: 7/23/2025    Last OARRS:        No data to display

## 2025-05-20 DIAGNOSIS — K21.9 GERD WITHOUT ESOPHAGITIS: ICD-10-CM

## 2025-05-20 NOTE — TELEPHONE ENCOUNTER
Medication:   Requested Prescriptions     Pending Prescriptions Disp Refills    omeprazole (PRILOSEC) 20 MG delayed release capsule [Pharmacy Med Name: OMEPRAZOLE DR 20 MG CAPSULE] 180 capsule 1     Sig: TAKE 2 CAPSULES BY MOUTH EVERY DAY        Last Filled:      Patient Phone Number: [phone] (home)     Last appt: 1/22/2025   Next appt: 7/23/2025    Last OARRS:        No data to display

## 2025-05-22 RX ORDER — OMEPRAZOLE 20 MG/1
40 CAPSULE, DELAYED RELEASE ORAL DAILY
Qty: 180 CAPSULE | Refills: 1 | Status: SHIPPED | OUTPATIENT
Start: 2025-05-22

## 2025-05-22 NOTE — TELEPHONE ENCOUNTER
Approved med(s) for 30 days
Requested Prescriptions     Pending Prescriptions Disp Refills    losartan (COZAAR) 25 MG tablet [Pharmacy Med Name: LOSARTAN POTASSIUM 25 MG TAB] 30 tablet 0     Sig: TAKE 1 TABLET BY MOUTH EVERY DAY       Patient requesting a medication refill.   Last filled on: 10.13.2021  [facility]  Next office visit: 11/17/2021  Last regular office visit: 10/13/2021
No

## 2025-06-20 DIAGNOSIS — I10 DIABETES MELLITUS WITH COINCIDENT HYPERTENSION (HCC): ICD-10-CM

## 2025-06-20 DIAGNOSIS — E11.9 DIABETES MELLITUS WITH COINCIDENT HYPERTENSION (HCC): ICD-10-CM

## 2025-06-20 RX ORDER — LOSARTAN POTASSIUM 50 MG/1
50 TABLET ORAL DAILY
Qty: 90 TABLET | Refills: 1 | Status: SHIPPED | OUTPATIENT
Start: 2025-06-20

## 2025-06-24 DIAGNOSIS — K21.9 GERD WITHOUT ESOPHAGITIS: ICD-10-CM

## 2025-06-24 NOTE — TELEPHONE ENCOUNTER
Last OV:  1/22/25    Last Rx:  4/22/24      Already listed as taking Omeprazole 20 mg-  2 capsules daily.

## 2025-06-26 RX ORDER — FAMOTIDINE 20 MG/1
20 TABLET, FILM COATED ORAL 2 TIMES DAILY
Qty: 180 TABLET | Refills: 1 | Status: SHIPPED | OUTPATIENT
Start: 2025-06-26

## 2025-07-03 DIAGNOSIS — E78.5 TYPE 2 DIABETES MELLITUS WITH HYPERLIPIDEMIA (HCC): ICD-10-CM

## 2025-07-03 DIAGNOSIS — Z12.5 SCREENING PSA (PROSTATE SPECIFIC ANTIGEN): ICD-10-CM

## 2025-07-03 DIAGNOSIS — E55.9 VITAMIN D DEFICIENCY: ICD-10-CM

## 2025-07-03 DIAGNOSIS — I10 DIABETES MELLITUS WITH COINCIDENT HYPERTENSION (HCC): ICD-10-CM

## 2025-07-03 DIAGNOSIS — E11.9 DIABETES MELLITUS WITH COINCIDENT HYPERTENSION (HCC): ICD-10-CM

## 2025-07-03 DIAGNOSIS — E11.69 TYPE 2 DIABETES MELLITUS WITH HYPERLIPIDEMIA (HCC): ICD-10-CM

## 2025-07-03 LAB
25(OH)D3 SERPL-MCNC: 41.8 NG/ML
ALBUMIN SERPL-MCNC: 4.3 G/DL (ref 3.4–5)
ALBUMIN/GLOB SERPL: 2.2 {RATIO} (ref 1.1–2.2)
ALP SERPL-CCNC: 99 U/L (ref 40–129)
ALT SERPL-CCNC: 41 U/L (ref 10–40)
ANION GAP SERPL CALCULATED.3IONS-SCNC: 11 MMOL/L (ref 3–16)
AST SERPL-CCNC: 39 U/L (ref 15–37)
BILIRUB DIRECT SERPL-MCNC: 0.4 MG/DL (ref 0–0.3)
BILIRUB INDIRECT SERPL-MCNC: 0.5 MG/DL (ref 0–1)
BILIRUB SERPL-MCNC: 0.9 MG/DL (ref 0–1)
BUN SERPL-MCNC: 17 MG/DL (ref 7–20)
CALCIUM SERPL-MCNC: 10 MG/DL (ref 8.3–10.6)
CHLORIDE SERPL-SCNC: 103 MMOL/L (ref 99–110)
CHOLEST SERPL-MCNC: 92 MG/DL (ref 0–199)
CO2 SERPL-SCNC: 25 MMOL/L (ref 21–32)
CREAT SERPL-MCNC: 1 MG/DL (ref 0.8–1.3)
CREAT UR-MCNC: 193 MG/DL (ref 39–259)
GFR SERPLBLD CREATININE-BSD FMLA CKD-EPI: 84 ML/MIN/{1.73_M2}
GLUCOSE SERPL-MCNC: 266 MG/DL (ref 70–99)
HDLC SERPL-MCNC: 36 MG/DL (ref 40–60)
LDLC SERPL CALC-MCNC: 43 MG/DL
MICROALBUMIN UR DL<=1MG/L-MCNC: 2.67 MG/DL
MICROALBUMIN/CREAT UR: 13.8 MG/G (ref 0–30)
POTASSIUM SERPL-SCNC: 4.1 MMOL/L (ref 3.5–5.1)
PROT SERPL-MCNC: 6.3 G/DL (ref 6.4–8.2)
PSA SERPL DL<=0.01 NG/ML-MCNC: 0.32 NG/ML (ref 0–4)
SODIUM SERPL-SCNC: 139 MMOL/L (ref 136–145)
TRIGL SERPL-MCNC: 64 MG/DL (ref 0–150)
VLDLC SERPL CALC-MCNC: 13 MG/DL

## 2025-07-06 DIAGNOSIS — E11.69 TYPE 2 DIABETES MELLITUS WITH HYPERLIPIDEMIA (HCC): Chronic | ICD-10-CM

## 2025-07-06 DIAGNOSIS — E78.5 TYPE 2 DIABETES MELLITUS WITH HYPERLIPIDEMIA (HCC): Chronic | ICD-10-CM

## 2025-07-07 RX ORDER — ATORVASTATIN CALCIUM 40 MG/1
40 TABLET, FILM COATED ORAL NIGHTLY
Qty: 90 TABLET | Refills: 1 | Status: SHIPPED | OUTPATIENT
Start: 2025-07-07

## 2025-07-07 NOTE — TELEPHONE ENCOUNTER
Medication:   Requested Prescriptions     Pending Prescriptions Disp Refills    atorvastatin (LIPITOR) 40 MG tablet [Pharmacy Med Name: ATORVASTATIN 40 MG TABLET] 90 tablet 1     Sig: TAKE 1 TABLET BY MOUTH EVERY DAY AT NIGHT        Last Filled:      Patient Phone Number: [phone] (home)     Last appt: 1/22/2025   Next appt: 7/23/2025    Last OARRS:        No data to display

## 2025-07-23 ENCOUNTER — OFFICE VISIT (OUTPATIENT)
Dept: PRIMARY CARE CLINIC | Age: 65
End: 2025-07-23
Payer: MEDICARE

## 2025-07-23 VITALS
HEIGHT: 68 IN | OXYGEN SATURATION: 97 % | TEMPERATURE: 97.6 F | HEART RATE: 74 BPM | DIASTOLIC BLOOD PRESSURE: 77 MMHG | BODY MASS INDEX: 33.28 KG/M2 | SYSTOLIC BLOOD PRESSURE: 130 MMHG | WEIGHT: 219.6 LBS

## 2025-07-23 DIAGNOSIS — E11.65 TYPE 2 DIABETES MELLITUS WITH HYPERGLYCEMIA, WITHOUT LONG-TERM CURRENT USE OF INSULIN (HCC): Primary | ICD-10-CM

## 2025-07-23 DIAGNOSIS — E78.5 TYPE 2 DIABETES MELLITUS WITH HYPERLIPIDEMIA (HCC): ICD-10-CM

## 2025-07-23 DIAGNOSIS — E11.69 TYPE 2 DIABETES MELLITUS WITH HYPERLIPIDEMIA (HCC): ICD-10-CM

## 2025-07-23 DIAGNOSIS — R35.89 POLYURIA: ICD-10-CM

## 2025-07-23 LAB — HBA1C MFR BLD: 9.6 %

## 2025-07-23 PROCEDURE — 1036F TOBACCO NON-USER: CPT | Performed by: FAMILY MEDICINE

## 2025-07-23 PROCEDURE — 3078F DIAST BP <80 MM HG: CPT | Performed by: FAMILY MEDICINE

## 2025-07-23 PROCEDURE — 99214 OFFICE O/P EST MOD 30 MIN: CPT | Performed by: FAMILY MEDICINE

## 2025-07-23 PROCEDURE — 83036 HEMOGLOBIN GLYCOSYLATED A1C: CPT | Performed by: FAMILY MEDICINE

## 2025-07-23 PROCEDURE — 2022F DILAT RTA XM EVC RTNOPTHY: CPT | Performed by: FAMILY MEDICINE

## 2025-07-23 PROCEDURE — G8427 DOCREV CUR MEDS BY ELIG CLIN: HCPCS | Performed by: FAMILY MEDICINE

## 2025-07-23 PROCEDURE — 3046F HEMOGLOBIN A1C LEVEL >9.0%: CPT | Performed by: FAMILY MEDICINE

## 2025-07-23 PROCEDURE — G8417 CALC BMI ABV UP PARAM F/U: HCPCS | Performed by: FAMILY MEDICINE

## 2025-07-23 PROCEDURE — G2211 COMPLEX E/M VISIT ADD ON: HCPCS | Performed by: FAMILY MEDICINE

## 2025-07-23 PROCEDURE — 3075F SYST BP GE 130 - 139MM HG: CPT | Performed by: FAMILY MEDICINE

## 2025-07-23 PROCEDURE — 3017F COLORECTAL CA SCREEN DOC REV: CPT | Performed by: FAMILY MEDICINE

## 2025-07-23 RX ORDER — GLIMEPIRIDE 2 MG/1
2 TABLET ORAL EVERY MORNING
Qty: 90 TABLET | Refills: 1 | Status: SHIPPED | OUTPATIENT
Start: 2025-07-23

## 2025-07-23 ASSESSMENT — ENCOUNTER SYMPTOMS
VOMITING: 0
DIARRHEA: 0
NAUSEA: 0
COUGH: 0
SHORTNESS OF BREATH: 0
CONSTIPATION: 0

## 2025-07-23 NOTE — PROGRESS NOTES
status is at baseline.       Except as noted below, all chronic problems have been reviewed and are stable to continue medications or other therapy as previously documented in the patient's chart, with changes per orders or documentation below:  Patient received counseling and, if relevant, printed instructions for all symptoms listed in CC and HPI, as well as for all diagnoses brought onto today's visit note below. Typical counseling includes, but is not limited to, non-pharmacologic measures to manage listed symptoms and conditions; appropriate use, risks and benefits for all prescribed medications; potential interactions between medications both prescribed and OTC; diet; exercise; healthy behaviors; and goalsetting to improve health. Patient or responsible party was involved in shared decision making and had opportunity to have all questions answered.    I have reconciled the medications in chart with what patient reports to be taking, and reviewed action/ side effects and how to take any new medications.   Patient/caregiver understands purpose and side effects.  A complete list of medications was provided in their after-visit summary.    1. Type 2 diabetes mellitus with hyperglycemia, without long-term current use of insulin (HCC)  -     dulaglutide (TRULICITY) 1.5 MG/0.5ML SC injection; Inject 0.5 mLs into the skin once a week, Disp-4 Adjustable Dose Pre-filled Pen Syringe, R-4Print  2. Type 2 diabetes mellitus with hyperlipidemia (HCC)  -     POCT glycosylated hemoglobin (Hb A1C)  -     glimepiride (AMARYL) 2 MG tablet; Take 1 tablet by mouth every morning, Disp-90 tablet, R-1Normal  3. Polyuria        Problem List           Diagnosed       Unprioritized    Type 2 diabetes mellitus with hyperlipidemia (HCC) (Chronic)     Relevant Medications    aspirin 81 MG EC tablet    metFORMIN (GLUCOPHAGE-XR) 500 MG extended release tablet    losartan (COZAAR) 50 MG tablet    atorvastatin (LIPITOR) 40 MG tablet

## 2025-08-18 DIAGNOSIS — M48.061 SPINAL STENOSIS, LUMBAR REGION, WITHOUT NEUROGENIC CLAUDICATION: ICD-10-CM

## 2025-08-21 RX ORDER — CELECOXIB 200 MG/1
CAPSULE ORAL DAILY
Qty: 90 CAPSULE | Refills: 1 | Status: SHIPPED | OUTPATIENT
Start: 2025-08-21

## 2025-09-04 DIAGNOSIS — E11.65 TYPE 2 DIABETES MELLITUS WITH HYPERGLYCEMIA, WITHOUT LONG-TERM CURRENT USE OF INSULIN (HCC): ICD-10-CM

## 2025-09-05 RX ORDER — DULAGLUTIDE 1.5 MG/.5ML
INJECTION, SOLUTION SUBCUTANEOUS
Qty: 4 ADJUSTABLE DOSE PRE-FILLED PEN SYRINGE | Refills: 5 | Status: SHIPPED | OUTPATIENT
Start: 2025-09-05

## (undated) DEVICE — ADTEC SINGLE USE HOOK SCISSORS, SHAFT ONLY, MONOPOLAR, STRAIGHT, WORKING LENGTH: 12 1/4", (310 MM), DIAM. 5 MM, BLUNT/BLUNT, INSULATED, SINGLE ACTION, STERILE, DISPOSABLE, PACKAGE OF 10 PIECES: Brand: AESCULAP

## (undated) DEVICE — BLANKET WRM W40.2XL55.9IN IORT LO BODY + MISTRAL AIR

## (undated) DEVICE — COUNTER NDL 40 COUNT HLD 70 NUM FOAM BLK SGL MAG W BLDE REMV

## (undated) DEVICE — AGENT HEMSTAT W2XL14IN OXIDIZED REGENERATED CELOS ABSRB FOR

## (undated) DEVICE — 3M™ STERI-STRIP™ COMPOUND BENZOIN TINCTURE 40 BAGS/CARTON 4 CARTONS/CASE C1544: Brand: 3M™ STERI-STRIP™

## (undated) DEVICE — COVER MICSCP W46XL120IN 4 BINOC GLS LENS LEICA

## (undated) DEVICE — TROCAR: Brand: KII FIOS FIRST ENTRY

## (undated) DEVICE — COVER,MAYO STAND,XL,STERILE: Brand: MEDLINE

## (undated) DEVICE — INDICATED FOR USE DURING OPEN AND LAPAROSCOPIC CHOLECYSTECTOMY PROCEDURES TO INJECT RADIOPAQUE MEDIA THROUGH THE CYSTIC DUCT INTO THE BILIARY TREE.: Brand: AEROSTAT®

## (undated) DEVICE — NEURO SPONGES: Brand: DEROYAL

## (undated) DEVICE — GLOVE ORANGE PI 7   MSG9070

## (undated) DEVICE — COVER LT HNDL CAM BLU DISP W/ SURG CTRL

## (undated) DEVICE — STRIP,CLOSURE,WOUND,MEDI-STRIP,1/2X4: Brand: MEDLINE

## (undated) DEVICE — PAD,NON-ADHERENT,3X8,STERILE,LF,1/PK: Brand: MEDLINE

## (undated) DEVICE — SUTURE VCRL + SZ 4-0 L18IN ABSRB UD L19MM PS-2 3/8 CIR PRIM VCP496H

## (undated) DEVICE — SET INSUF TUBE HEAT ISO CONN DISP

## (undated) DEVICE — APPLICATOR MEDICATED 26 CC SOLUTION HI LT ORNG CHLORAPREP

## (undated) DEVICE — COVER LT HNDL BLU PLAS

## (undated) DEVICE — C-ARMOR C-ARM EQUIPMENT COVERS CLEAR STERILE UNIVERSAL FIT 12 PER CASE: Brand: C-ARMOR

## (undated) DEVICE — CABLE BPLR L12FT FLYING LD DISPOSABLE

## (undated) DEVICE — SOLUTION IV IRRIG POUR BRL 0.9% SODIUM CHL 2F7124

## (undated) DEVICE — KIT EVAC 0.13IN RECT TB DIA10FR 400CC PVC 3 SPR Y CONN DRN

## (undated) DEVICE — GAUZE,SPONGE,4"X4",16PLY,XRAY,STRL,LF: Brand: MEDLINE

## (undated) DEVICE — PLATE ES AD W 9FT CRD 2

## (undated) DEVICE — JEWISH HOSPITAL TURNOVER KIT: Brand: MEDLINE INDUSTRIES, INC.

## (undated) DEVICE — APPLICATOR ENDOSCP CANN S STL STYL N DEHP FOR DELIVERING

## (undated) DEVICE — GENERAL LAPAROSCOPY: Brand: MEDLINE INDUSTRIES, INC.

## (undated) DEVICE — Device

## (undated) DEVICE — APPLIER CLP M/L SHFT DIA5MM 15 LIG LIGAMAX 5

## (undated) DEVICE — TISSUE RETRIEVAL SYSTEM: Brand: INZII RETRIEVAL SYSTEM

## (undated) DEVICE — SUTURE VCRL SZ 3-0 L18IN ABSRB UD L26MM SH 1/2 CIR J864D

## (undated) DEVICE — CONTAINER,SPECIMEN,PNEU TUBE,3OZ,OR STRL: Brand: MEDLINE

## (undated) DEVICE — SOLUTION IV 1000ML 0.9% SOD CHL

## (undated) DEVICE — PIN ADLT MAYFIELD RIGID MOLD FINGER

## (undated) DEVICE — 3M™ TEGADERM™ TRANSPARENT FILM DRESSING FRAME STYLE, 1624W, 2-3/8 IN X 2-3/4 IN (6 CM X 7 CM), 100/CT 4CT/CASE: Brand: 3M™ TEGADERM™

## (undated) DEVICE — UNDERGLOVE SURG SZ 8 BLU LTX FREE SYN POLYISOPRENE POLYMER

## (undated) DEVICE — TOOL 14MH30 LEGEND 14CM 3MM: Brand: MIDAS REX ™

## (undated) DEVICE — LAMINECTOMY PK

## (undated) DEVICE — SUTURE ETHLN SZ 3-0 L18IN NONABSORBABLE BLK FS-1 L24MM 3/8 663H

## (undated) DEVICE — SUTURE MCRYL SZ 4-0 L27IN ABSRB UD L19MM PS-2 1/2 CIR PRIM Y426H

## (undated) DEVICE — 3M™ IOBAN™ 2 ANTIMICROBIAL INCISE DRAPE 6640EZ: Brand: IOBAN™ 2

## (undated) DEVICE — TROCAR: Brand: KII SLEEVE

## (undated) DEVICE — SUTURE VCRL SZ 0 L18IN ABSRB UD L36MM CT-1 1/2 CIR J840D

## (undated) DEVICE — GAUZE,SPONGE,2"X2",8PLY,STERILE,LF,2'S: Brand: MEDLINE

## (undated) DEVICE — SYRINGE 20ML LL S/C 50

## (undated) DEVICE — SOLUTION IV 1000ML 0.9% SOD CHL FOR IRRIG PLAS CONT

## (undated) DEVICE — SPONGE GZ W4XL4IN COT 12 PLY TYP VII WVN C FLD DSGN

## (undated) DEVICE — STAPLER SKIN H3.9MM WIRE DIA0.58MM CRWN 6.9MM 35 STPL ROT

## (undated) DEVICE — SUTURE VCRL + SZ 0 L27IN ABSRB VLT L26MM UR-6 5/8 CIR VCP603H

## (undated) DEVICE — SURE SET-DOUBLE BASIN-LF: Brand: MEDLINE INDUSTRIES, INC.

## (undated) DEVICE — TROCARS: Brand: KII® BALLOON BLUNT TIP SYSTEM

## (undated) DEVICE — FLOSEAL HEMOSTATIC MATRIX, 10ML: Brand: FLOSEAL HEMOSTATIC MATRIX

## (undated) DEVICE — SUTURE ETHLN SZ 3-0 L30IN NONABSORBABLE BLK L36MM FSLX 3/8 1673BH

## (undated) DEVICE — AGENT HEMSTAT W4XL8IN OXIDIZED REGENERATED CELOS ABSRB

## (undated) DEVICE — C-ARM: Brand: UNBRANDED

## (undated) DEVICE — GLOVE SURG SZ 75 L12IN FNGR THK94MIL TRNSLUC YEL LTX

## (undated) DEVICE — GARMENT COMPR STD FOR 17IN CALF UNIF THER FLOTRN

## (undated) DEVICE — 3M™ DURAPORE™ SURGICAL TAPE 1538-3, 3 INCH X 10 YARD (7,5CM X 9,1M), 4 ROLLS/BOX: Brand: 3M™ DURAPORE™

## (undated) DEVICE — COVER,TABLE,HEAVY DUTY,77"X90",STRL: Brand: MEDLINE

## (undated) DEVICE — NEEDLE HYPO 25GA L1.5IN BVL ORIENTED ECLIPSE

## (undated) DEVICE — GARMENT,MEDLINE,DVT,INT,CALF,MED, GEN2: Brand: MEDLINE

## (undated) DEVICE — SYRINGE MED 30ML STD CLR PLAS LUERLOCK TIP N CTRL DISP